# Patient Record
Sex: FEMALE | Race: WHITE | Employment: UNEMPLOYED | URBAN - METROPOLITAN AREA
[De-identification: names, ages, dates, MRNs, and addresses within clinical notes are randomized per-mention and may not be internally consistent; named-entity substitution may affect disease eponyms.]

---

## 2017-11-21 ENCOUNTER — APPOINTMENT (OUTPATIENT)
Dept: ULTRASOUND IMAGING | Age: 57
DRG: 607 | End: 2017-11-21
Attending: EMERGENCY MEDICINE
Payer: COMMERCIAL

## 2017-11-21 ENCOUNTER — HOSPITAL ENCOUNTER (INPATIENT)
Age: 57
LOS: 3 days | Discharge: HOME HEALTH | DRG: 607 | End: 2017-11-24
Attending: EMERGENCY MEDICINE | Admitting: INTERNAL MEDICINE
Payer: COMMERCIAL

## 2017-11-21 DIAGNOSIS — M79.3 PANNICULITIS: Primary | ICD-10-CM

## 2017-11-21 LAB
ANION GAP SERPL CALC-SCNC: 7 MMOL/L (ref 5–15)
BASOPHILS # BLD: 0 K/UL (ref 0–0.1)
BASOPHILS NFR BLD: 0 % (ref 0–1)
BUN SERPL-MCNC: 14 MG/DL (ref 6–20)
BUN/CREAT SERPL: 16 (ref 12–20)
CALCIUM SERPL-MCNC: 9.8 MG/DL (ref 8.5–10.1)
CHLORIDE SERPL-SCNC: 106 MMOL/L (ref 97–108)
CO2 SERPL-SCNC: 27 MMOL/L (ref 21–32)
CREAT SERPL-MCNC: 0.85 MG/DL (ref 0.55–1.02)
EOSINOPHIL # BLD: 0.1 K/UL (ref 0–0.4)
EOSINOPHIL NFR BLD: 2 % (ref 0–7)
ERYTHROCYTE [DISTWIDTH] IN BLOOD BY AUTOMATED COUNT: 15.3 % (ref 11.5–14.5)
GLUCOSE BLD STRIP.AUTO-MCNC: 157 MG/DL (ref 65–100)
GLUCOSE BLD STRIP.AUTO-MCNC: 94 MG/DL (ref 65–100)
GLUCOSE SERPL-MCNC: 93 MG/DL (ref 65–100)
HCT VFR BLD AUTO: 38 % (ref 35–47)
HGB BLD-MCNC: 11.6 G/DL (ref 11.5–16)
LACTATE SERPL-SCNC: 0.8 MMOL/L (ref 0.4–2)
LYMPHOCYTES # BLD: 1.5 K/UL (ref 0.8–3.5)
LYMPHOCYTES NFR BLD: 21 % (ref 12–49)
MCH RBC QN AUTO: 24.7 PG (ref 26–34)
MCHC RBC AUTO-ENTMCNC: 30.5 G/DL (ref 30–36.5)
MCV RBC AUTO: 81 FL (ref 80–99)
MONOCYTES # BLD: 0.5 K/UL (ref 0–1)
MONOCYTES NFR BLD: 6 % (ref 5–13)
NEUTS SEG # BLD: 5.3 K/UL (ref 1.8–8)
NEUTS SEG NFR BLD: 71 % (ref 32–75)
PLATELET # BLD AUTO: 172 K/UL (ref 150–400)
POTASSIUM SERPL-SCNC: 3.7 MMOL/L (ref 3.5–5.1)
RBC # BLD AUTO: 4.69 M/UL (ref 3.8–5.2)
SERVICE CMNT-IMP: ABNORMAL
SERVICE CMNT-IMP: NORMAL
SODIUM SERPL-SCNC: 140 MMOL/L (ref 136–145)
WBC # BLD AUTO: 7.4 K/UL (ref 3.6–11)

## 2017-11-21 PROCEDURE — 87040 BLOOD CULTURE FOR BACTERIA: CPT

## 2017-11-21 PROCEDURE — 85025 COMPLETE CBC W/AUTO DIFF WBC: CPT

## 2017-11-21 PROCEDURE — 82962 GLUCOSE BLOOD TEST: CPT

## 2017-11-21 PROCEDURE — 83605 ASSAY OF LACTIC ACID: CPT

## 2017-11-21 PROCEDURE — 74011000258 HC RX REV CODE- 258: Performed by: EMERGENCY MEDICINE

## 2017-11-21 PROCEDURE — 93971 EXTREMITY STUDY: CPT

## 2017-11-21 PROCEDURE — 80048 BASIC METABOLIC PNL TOTAL CA: CPT

## 2017-11-21 PROCEDURE — 96375 TX/PRO/DX INJ NEW DRUG ADDON: CPT

## 2017-11-21 PROCEDURE — 74011250637 HC RX REV CODE- 250/637: Performed by: EMERGENCY MEDICINE

## 2017-11-21 PROCEDURE — 65660000000 HC RM CCU STEPDOWN

## 2017-11-21 PROCEDURE — 74011250636 HC RX REV CODE- 250/636: Performed by: EMERGENCY MEDICINE

## 2017-11-21 PROCEDURE — 99284 EMERGENCY DEPT VISIT MOD MDM: CPT

## 2017-11-21 PROCEDURE — 74011250637 HC RX REV CODE- 250/637: Performed by: INTERNAL MEDICINE

## 2017-11-21 PROCEDURE — 36415 COLL VENOUS BLD VENIPUNCTURE: CPT

## 2017-11-21 PROCEDURE — 96365 THER/PROPH/DIAG IV INF INIT: CPT

## 2017-11-21 PROCEDURE — 74011250636 HC RX REV CODE- 250/636: Performed by: INTERNAL MEDICINE

## 2017-11-21 RX ORDER — DEXTROSE 50 % IN WATER (D50W) INTRAVENOUS SYRINGE
12.5-25 AS NEEDED
Status: DISCONTINUED | OUTPATIENT
Start: 2017-11-21 | End: 2017-11-24 | Stop reason: HOSPADM

## 2017-11-21 RX ORDER — METOPROLOL SUCCINATE 25 MG/1
25 TABLET, EXTENDED RELEASE ORAL DAILY
COMMUNITY
End: 2021-11-29

## 2017-11-21 RX ORDER — LEVOTHYROXINE SODIUM 112 UG/1
112 TABLET ORAL
COMMUNITY
End: 2021-11-29

## 2017-11-21 RX ORDER — PANTOPRAZOLE SODIUM 40 MG/1
40 TABLET, DELAYED RELEASE ORAL
Status: DISCONTINUED | OUTPATIENT
Start: 2017-11-22 | End: 2017-11-24 | Stop reason: HOSPADM

## 2017-11-21 RX ORDER — ONDANSETRON 2 MG/ML
4 INJECTION INTRAMUSCULAR; INTRAVENOUS
Status: DISCONTINUED | OUTPATIENT
Start: 2017-11-21 | End: 2017-11-23

## 2017-11-21 RX ORDER — MAGNESIUM SULFATE 100 %
4 CRYSTALS MISCELLANEOUS AS NEEDED
Status: DISCONTINUED | OUTPATIENT
Start: 2017-11-21 | End: 2017-11-24 | Stop reason: HOSPADM

## 2017-11-21 RX ORDER — SODIUM CHLORIDE 0.9 % (FLUSH) 0.9 %
5-10 SYRINGE (ML) INJECTION EVERY 8 HOURS
Status: DISCONTINUED | OUTPATIENT
Start: 2017-11-21 | End: 2017-11-24 | Stop reason: HOSPADM

## 2017-11-21 RX ORDER — GLIMEPIRIDE 2 MG/1
2 TABLET ORAL
COMMUNITY
End: 2019-06-11

## 2017-11-21 RX ORDER — GLIMEPIRIDE 1 MG/1
2 TABLET ORAL
Status: DISCONTINUED | OUTPATIENT
Start: 2017-11-22 | End: 2017-11-24 | Stop reason: HOSPADM

## 2017-11-21 RX ORDER — BUPROPION HYDROCHLORIDE 150 MG/1
150 TABLET ORAL DAILY
Status: DISCONTINUED | OUTPATIENT
Start: 2017-11-22 | End: 2017-11-22

## 2017-11-21 RX ORDER — ENOXAPARIN SODIUM 100 MG/ML
40 INJECTION SUBCUTANEOUS EVERY 24 HOURS
Status: DISCONTINUED | OUTPATIENT
Start: 2017-11-22 | End: 2017-11-22

## 2017-11-21 RX ORDER — LEVOTHYROXINE SODIUM 112 UG/1
112 TABLET ORAL
Status: DISCONTINUED | OUTPATIENT
Start: 2017-11-22 | End: 2017-11-24 | Stop reason: HOSPADM

## 2017-11-21 RX ORDER — PROCHLORPERAZINE MALEATE 10 MG
10 TABLET ORAL
COMMUNITY
End: 2017-11-24

## 2017-11-21 RX ORDER — ALBUTEROL SULFATE 0.83 MG/ML
2.5 SOLUTION RESPIRATORY (INHALATION)
Status: DISCONTINUED | OUTPATIENT
Start: 2017-11-21 | End: 2017-11-24 | Stop reason: HOSPADM

## 2017-11-21 RX ORDER — METFORMIN HYDROCHLORIDE 500 MG/1
500 TABLET, FILM COATED, EXTENDED RELEASE ORAL DAILY
COMMUNITY
End: 2017-11-28 | Stop reason: DRUGHIGH

## 2017-11-21 RX ORDER — PRAVASTATIN SODIUM 40 MG/1
40 TABLET ORAL
Status: DISCONTINUED | OUTPATIENT
Start: 2017-11-21 | End: 2017-11-24 | Stop reason: HOSPADM

## 2017-11-21 RX ORDER — METOPROLOL SUCCINATE 25 MG/1
25 TABLET, EXTENDED RELEASE ORAL DAILY
Status: DISCONTINUED | OUTPATIENT
Start: 2017-11-22 | End: 2017-11-24 | Stop reason: HOSPADM

## 2017-11-21 RX ORDER — HYDROCODONE BITARTRATE AND ACETAMINOPHEN 7.5; 325 MG/1; MG/1
1 TABLET ORAL
COMMUNITY
End: 2017-11-24

## 2017-11-21 RX ORDER — VALSARTAN 40 MG/1
40 TABLET ORAL DAILY
Status: DISCONTINUED | OUTPATIENT
Start: 2017-11-22 | End: 2017-11-24 | Stop reason: HOSPADM

## 2017-11-21 RX ORDER — PRAZOSIN HYDROCHLORIDE 1 MG/1
3 CAPSULE ORAL
Status: DISCONTINUED | OUTPATIENT
Start: 2017-11-21 | End: 2017-11-24 | Stop reason: HOSPADM

## 2017-11-21 RX ORDER — INSULIN LISPRO 100 [IU]/ML
INJECTION, SOLUTION INTRAVENOUS; SUBCUTANEOUS
Status: DISCONTINUED | OUTPATIENT
Start: 2017-11-21 | End: 2017-11-24 | Stop reason: HOSPADM

## 2017-11-21 RX ORDER — OXYCODONE AND ACETAMINOPHEN 5; 325 MG/1; MG/1
1 TABLET ORAL
Status: COMPLETED | OUTPATIENT
Start: 2017-11-21 | End: 2017-11-21

## 2017-11-21 RX ORDER — BUPROPION HYDROCHLORIDE 150 MG/1
150 TABLET ORAL
COMMUNITY
End: 2018-04-21 | Stop reason: DRUGHIGH

## 2017-11-21 RX ORDER — GABAPENTIN 100 MG/1
100 CAPSULE ORAL
Status: DISCONTINUED | OUTPATIENT
Start: 2017-11-21 | End: 2017-11-24 | Stop reason: HOSPADM

## 2017-11-21 RX ORDER — QUETIAPINE FUMARATE 100 MG/1
400 TABLET, FILM COATED ORAL
Status: DISCONTINUED | OUTPATIENT
Start: 2017-11-21 | End: 2017-11-24 | Stop reason: HOSPADM

## 2017-11-21 RX ORDER — GABAPENTIN 100 MG/1
100 CAPSULE ORAL
Status: ON HOLD | COMMUNITY
End: 2017-11-24

## 2017-11-21 RX ORDER — TOPIRAMATE 100 MG/1
200 TABLET, FILM COATED ORAL
Status: DISCONTINUED | OUTPATIENT
Start: 2017-11-21 | End: 2017-11-24 | Stop reason: HOSPADM

## 2017-11-21 RX ORDER — MORPHINE SULFATE 2 MG/ML
2 INJECTION, SOLUTION INTRAMUSCULAR; INTRAVENOUS
Status: COMPLETED | OUTPATIENT
Start: 2017-11-21 | End: 2017-11-21

## 2017-11-21 RX ORDER — LANOLIN ALCOHOL/MO/W.PET/CERES
1000 CREAM (GRAM) TOPICAL DAILY
Status: DISCONTINUED | OUTPATIENT
Start: 2017-11-22 | End: 2017-11-24 | Stop reason: HOSPADM

## 2017-11-21 RX ORDER — SODIUM CHLORIDE 0.9 % (FLUSH) 0.9 %
5-10 SYRINGE (ML) INJECTION AS NEEDED
Status: DISCONTINUED | OUTPATIENT
Start: 2017-11-21 | End: 2017-11-24 | Stop reason: HOSPADM

## 2017-11-21 RX ORDER — LOSARTAN POTASSIUM 25 MG/1
25 TABLET ORAL DAILY
COMMUNITY
End: 2019-01-04

## 2017-11-21 RX ORDER — ACETAMINOPHEN 325 MG/1
650 TABLET ORAL
Status: DISCONTINUED | OUTPATIENT
Start: 2017-11-21 | End: 2017-11-24 | Stop reason: HOSPADM

## 2017-11-21 RX ORDER — HYDROMORPHONE HYDROCHLORIDE 2 MG/ML
1 INJECTION, SOLUTION INTRAMUSCULAR; INTRAVENOUS; SUBCUTANEOUS
Status: COMPLETED | OUTPATIENT
Start: 2017-11-21 | End: 2017-11-21

## 2017-11-21 RX ORDER — OXYCODONE HYDROCHLORIDE 5 MG/1
10 TABLET ORAL
Status: DISCONTINUED | OUTPATIENT
Start: 2017-11-21 | End: 2017-11-22

## 2017-11-21 RX ORDER — QUETIAPINE FUMARATE 400 MG/1
400 TABLET, FILM COATED ORAL
COMMUNITY
End: 2017-11-28 | Stop reason: DRUGHIGH

## 2017-11-21 RX ORDER — POTASSIUM CHLORIDE 750 MG/1
10 TABLET, FILM COATED, EXTENDED RELEASE ORAL
Status: DISCONTINUED | OUTPATIENT
Start: 2017-11-21 | End: 2017-11-24 | Stop reason: HOSPADM

## 2017-11-21 RX ORDER — PRAZOSIN HYDROCHLORIDE 1 MG/1
3 CAPSULE ORAL
COMMUNITY
End: 2018-04-20 | Stop reason: DRUGHIGH

## 2017-11-21 RX ORDER — ERGOCALCIFEROL 1.25 MG/1
50000 CAPSULE ORAL
COMMUNITY
End: 2017-11-24

## 2017-11-21 RX ORDER — CLONAZEPAM 0.5 MG/1
0.5 TABLET ORAL
Status: DISCONTINUED | OUTPATIENT
Start: 2017-11-21 | End: 2017-11-24 | Stop reason: HOSPADM

## 2017-11-21 RX ORDER — ERGOCALCIFEROL 1.25 MG/1
50000 CAPSULE ORAL
Status: DISCONTINUED | OUTPATIENT
Start: 2017-11-23 | End: 2017-11-24 | Stop reason: HOSPADM

## 2017-11-21 RX ORDER — LANOLIN ALCOHOL/MO/W.PET/CERES
1000 CREAM (GRAM) TOPICAL DAILY
COMMUNITY
End: 2017-11-28 | Stop reason: DRUGHIGH

## 2017-11-21 RX ADMIN — POTASSIUM CHLORIDE 10 MEQ: 750 TABLET, FILM COATED, EXTENDED RELEASE ORAL at 22:19

## 2017-11-21 RX ADMIN — Medication 10 ML: at 20:36

## 2017-11-21 RX ADMIN — PRAZOSIN HYDROCHLORIDE 3 MG: 1 CAPSULE ORAL at 22:20

## 2017-11-21 RX ADMIN — QUETIAPINE FUMARATE 400 MG: 100 TABLET ORAL at 22:20

## 2017-11-21 RX ADMIN — CLONAZEPAM 0.5 MG: 0.5 TABLET ORAL at 22:20

## 2017-11-21 RX ADMIN — CEFTRIAXONE SODIUM 2 G: 2 INJECTION, POWDER, FOR SOLUTION INTRAMUSCULAR; INTRAVENOUS at 13:57

## 2017-11-21 RX ADMIN — OXYCODONE HYDROCHLORIDE 10 MG: 5 TABLET ORAL at 22:19

## 2017-11-21 RX ADMIN — PRAVASTATIN SODIUM 40 MG: 40 TABLET ORAL at 22:20

## 2017-11-21 RX ADMIN — ONDANSETRON 4 MG: 2 INJECTION INTRAMUSCULAR; INTRAVENOUS at 20:36

## 2017-11-21 RX ADMIN — VANCOMYCIN HYDROCHLORIDE 3000 MG: 10 INJECTION, POWDER, LYOPHILIZED, FOR SOLUTION INTRAVENOUS at 14:37

## 2017-11-21 RX ADMIN — HYDROMORPHONE HYDROCHLORIDE 1 MG: 2 INJECTION INTRAMUSCULAR; INTRAVENOUS; SUBCUTANEOUS at 16:59

## 2017-11-21 RX ADMIN — TOPIRAMATE 200 MG: 100 TABLET ORAL at 22:20

## 2017-11-21 RX ADMIN — Medication 10 ML: at 22:20

## 2017-11-21 RX ADMIN — OXYCODONE HYDROCHLORIDE AND ACETAMINOPHEN 1 TABLET: 5; 325 TABLET ORAL at 14:51

## 2017-11-21 RX ADMIN — ONDANSETRON 4 MG: 2 INJECTION INTRAMUSCULAR; INTRAVENOUS at 16:58

## 2017-11-21 RX ADMIN — MORPHINE SULFATE 2 MG: 2 INJECTION, SOLUTION INTRAMUSCULAR; INTRAVENOUS at 13:53

## 2017-11-21 RX ADMIN — GABAPENTIN 100 MG: 100 CAPSULE ORAL at 22:26

## 2017-11-21 NOTE — ED NOTES
Assumed care of patient. Patient placed in position of comfort. Call bell in reach. Skin warm and dry. Respirations even and unlabored. In no apparent distress at this time. Pt presents ambulatory into the ED with c/o worsening skin infection and Rt leg pain x 1 week-has been taking PO abx. Sent to ED by PCP for further management.

## 2017-11-21 NOTE — ED NOTES
TRANSFER - OUT REPORT:    Verbal report given to Hazel Sung RN (name) on Bobby May  being transferred to  (unit) for routine progression of care       Report consisted of patients Situation, Background, Assessment and   Recommendations(SBAR). Information from the following report(s) SBAR was reviewed with the receiving nurse. Lines:   Peripheral IV 11/21/17 Left;Upper Arm (Active)   Site Assessment Clean, dry, & intact 11/21/2017  1:13 PM   Phlebitis Assessment 0 11/21/2017  1:13 PM   Infiltration Assessment 0 11/21/2017  1:13 PM   Dressing Status Clean, dry, & intact 11/21/2017  1:13 PM        Opportunity for questions and clarification was provided.

## 2017-11-21 NOTE — PROGRESS NOTES
TRANSFER - IN REPORT:    Verbal report received from Yesenia(name) on Clint Ellis  being received from ED(unit) for routine progression of care      Report consisted of patients Situation, Background, Assessment and   Recommendations(SBAR). Information from the following report(s) SBAR, Kardex, ED Summary, MAR, Accordion and Recent Results was reviewed with the receiving nurse. Opportunity for questions and clarification was provided. Assessment completed upon patients arrival to unit and care assumed at that time. Patient currently in ED.

## 2017-11-21 NOTE — ED PROVIDER NOTES
[facility]  EMERGENCY DEPARTMENT HISTORY AND PHYSICAL EXAM       Date of Service: 11/21/2017   Patient Name: Gokul Jacinto   YOB: 1960  Medical Record Number: [id number]    History of Presenting Illness     Chief Complaint   Patient presents with    Wound Check     Pt states being tx for staph infection Reports \"area\" on R abd isn't healing Pt denies fever +chill    Leg Pain     Sent to r/o DVT        History Provided By:  patient    Additional History:   Gokul Jacinto is a 62 y.o. female with PMhx significant for HTN, depression, diabetes, hypothyroidism, seizure who presents to the ED with cc of leg pain and skin infection. She was seen 11/16/17 by her PCP Dr Davi Franco who prescribed Bactrim for skin infection on the right lower abdomen, she has been taking as prescribed, went back today for f/u with him and he stated the infection is worse and wanted her sent to ED for admission and IV antibiotics, she also c/o RLE pain that started around the same time as the initial infection. He is concerned she may have a DVT. She denies fever, vomiting, diarrhea, dysuria, falls, syncope. She has never had a blood clot before or a MRSA infection. She denies recent long car rides or plane rides. Social Hx: denies Tobacco, denies EtOH, denies Illicit Drugs    There are no other complaints, changes or physical findings at this time.     Primary Care Provider: KERRI Barajas Dr    Past History     Past Medical History:   Past Medical History:   Diagnosis Date    Arrhythmia     Arthritis     Chronic pain     djd    Depression     Diabetes [facility]     GERD (gastroesophageal reflux disease)     Hypertension     Hypothyroidism     Kidney stone     Liver disease     Obesity, morbid, BMI 50 or higher ([facility]     Psychiatric disorder     anxiety    Psychiatric disorder     depression    Seizures ([facility]     Thyroid disease         Past Surgical History:   Past Surgical History:   Procedure Laterality Date    CARDIAC CATHERIZATION  11/18/2010         HX APPENDECTOMY      HX CHOLECYSTECTOMY      HX HYSTERECTOMY      HX TUBAL LIGATION          Family History:   Family History   Problem Relation Age of Onset    Heart Disease Mother     Hypertension Mother     Cancer Mother     Heart Disease Father     Hypertension Father     Cancer Brother         Social History:   Social History   Substance Use Topics    Smoking status: Former Smoker    Smokeless tobacco: Never Used    Alcohol use No        Allergies: Allergies   Allergen Reactions    Celebrex [Celecoxib] Nausea Only    Sulfa (Sulfonamide Antibiotics) Nausea Only    Codeine Other (comments)     Hyper activity         Review of Systems   Review of Systems   Constitutional: Positive for chills. Negative for fever. HENT: Negative. Eyes: Negative. Respiratory: Negative. Cardiovascular: Negative. Gastrointestinal: Negative. Endocrine: Negative. Genitourinary: Negative. Musculoskeletal: Positive for arthralgias (RLE pain ). Negative for gait problem and joint swelling. Skin: Positive for color change and rash (redness and pain right lower quadrant). Neurological: Negative. Psychiatric/Behavioral: Negative. Physical Exam  Physical Exam   Constitutional: She is oriented to person, place, and time. No distress. Morbidly obese     HENT:   Head: Normocephalic and atraumatic. Mouth/Throat: Oropharynx is clear and moist.   Eyes: Conjunctivae are normal. Pupils are equal, round, and reactive to light. Neck: No tracheal deviation present. Cardiovascular: Normal rate, regular rhythm, normal heart sounds and intact distal pulses. Pulmonary/Chest: Effort normal and breath sounds normal. No stridor. She has no wheezes. She has no rales. Abdominal: Soft. She exhibits no distension. There is tenderness (RLQ pain). There is no rebound.    Musculoskeletal: Normal range of motion. She exhibits tenderness (RLE tenderness +HOMANS sign). She exhibits no edema (bilateral LE are equal in size, she has a lot of excess skin but no obvious edema). Neurological: She is alert and oriented to person, place, and time. Skin: Skin is warm. Rash (right lower quadrant with redness, no skin breakdown, malodorus) noted. Psychiatric: She has a normal mood and affect. Her behavior is normal.       Medical Decision Making   I am the first provider for this patient. I reviewed the vital signs, available nursing notes, past medical history, past surgical history, family history and social history. Panniculitis, MRSA skin infection, cellulitis, abscess, DVT, superficial thrombophlebitis    60yo female morbidly obese referred by PCP to ED for IV antibiotics and DVT evaluation, initially tx with Bactrim 11/16/17, on reeval by PCP today worsening skin infection in RLQ with leg pain. She has redness within her pannus concerning for panniculitis, there is no obvious fluctuant area, she has a positive Homans sign, will obtain BMP to eval Creatinine given recent Bactrim use, CBC, blood culture, IV vanc/Ceftriaxone, RLE DVT and admit. Old Medical Records: Old medical records. Nursing notes. ED Course:  12:56 PM   Initial assessment performed. The patients presenting problems have been discussed, and they are in agreement with the care plan formulated and outlined with them. I have encouraged them to ask questions as they arise throughout their visit.   1:47 PM  Spoke with Dr Rosanna Feliciano who states it looks worse despite Fluconazole PO and Bactrim PO, he feels she has failed outpt treatment and who like further eval of RLE pain as well  2:37 PM  Patient refused morphine, requesting percocet, will order, I paged hospitalist for admission      Diagnostic Study Results   Labs -  Results reviewed    Radiologic Studies -  The following have been ordered and reviewed:  DUPLEX LOWER EXT VENOUS RIGHT    (Results Pending)     Study Result      INDICATION:  Leg swelling, pain, DVT suspected     COMPARISON: None.     FINDINGS: Duplex Doppler sonography of the right lower extremity was performed  from the groin to the calf. The right common femoral, femoral and popliteal  veins are compressible with normal color-flow and wave forms and response to  physiologic maneuvers including Valsalva and augmentation. A 4 x 3 x 1.5 cm  complex Duckworth's cyst is incidentally noted.     IMPRESSION  IMPRESSION: Baker's cyst. No deep venous thrombosis identified.          Vital Signs-Reviewed the patient's vital signs. Patient Vitals for the past 12 hrs:   Temp Pulse Resp BP SpO2   11/21/17 1252 98.6 °F (37 °C) 85 18 114/66 98 %       Medications Given in the ED:  Medications   vancomycin (VANCOCIN) 2000 mg in  ml infusion (not administered)   cefTRIAXone (ROCEPHIN) 2 g in 0.9% sodium chloride (MBP/ADV) 50 mL (not administered)   morphine injection 2 mg (not administered)       Diagnosis   Clinical Impression:   Panniculitis, cellulitis, Bakers Cyst    Plan:  1: Panniculitis---will admit for IV antibiotics due to failure of outpt treatment, Vancomycin, Ceftriaxone ordered    2: Bakers cyst--->RLE pain--DVT US ordered, pain medication ordered    Disposition Note:  Admit to Hospitalist for IV antibiotics for cellulitis    _____________________________             ------------------------------------------  Begin Attending Documentation  ------------------------------------------    Attending Attestation: I was not present during the patient's evaluation by the resident. I personally evaluated the patient including the history and physical. I have read the resident's note and agree with their history, physical and plan.     HPI: Jasiel Alejandro is a 62 y.o. female with hx of HTN, DM, GERD, anxiety, and depression who presents by private vehicle to [facility] ED with CC of pain, redness, and swelling to area of skin on right lower ABD. Pt states area has been affected since (11/16/17), and has been becoming progressively worse since onset despite taking Bactrim BID per her PCP. Pt also c/o mild nausea and intermittent chills. She also reports right leg pain since initial onset of her symptoms, which she states is exacerbated by movement of her leg. Pt reports hx of staph infection over the same location, which improved with PO antibiotics. She denies hx of DVT or PE, and denies recent travel or surgery. Pt notes hx of chronic back pain, which is currently unchanged. She specifically denies fever, vomiting, diarrhea, CP, ABD pain, or cough. PCP: Caleb Burr NP    There are no other complaints, changes, or physical findings at this time. PE:  Gen: NAD, WD/WN   Heart: nl S1, S2, no m/r/g   Lungs: CTAB, no w/r/r   Abd: soft, nttp, ND, erythema and warmth over right lower abdomen and groin region, no fluctuance/induration/abscess   Ext: no swelling, no joint erythema/warmth, good ROM all joints, right calf TTP, no left calf TTP   Neuro: grossly intact, no focal deficits    Assessment: Patient presents to ED with panniculitis that has failed to respond to outpatient therapy. She also reports right leg pain. Will obtain labs and duplex RLE to rule out DVT. Plan to start IV antibiotics and admit for further management as patient has failed outpatient therapy.     Diagnosis: Panniculitis, Baker's cyst      Todd Perez MD.    ------------------------------------------  End Attending Documentation  ------------------------------------------

## 2017-11-21 NOTE — IP AVS SNAPSHOT
[address] [facility] 
[phone] Patient: Clarisse Dunlap MRN: [id number] [facility]:[birth year]23 You are allergic to the following Allergen Reactions Celebrex (Celecoxib) Nausea Only Sulfa (Sulfonamide Antibiotics) Nausea Only Codeine Other (comments) Hyper activity Recent Documentation Height Weight Breastfeeding? BMI OB Status Smoking Status 1.575 m (!) 175.2 kg No 70.65 kg/m2 Hysterectomy Former Smoker Unresulted Labs-Please follow up with your PCP about these lab tests Order Current Status CULTURE, BLOOD, PAIRED Preliminary result Emergency Contacts  (Rel.) Home Phone Work Phone Mobile Phone Mookie Brock (Child) [phone] -- -- About your hospitalization You were admitted on:  November 21, 2017 You last received care in the:  [address] You were discharged on:  November 24, 2017 Why you were hospitalized Your primary diagnosis was:  Right Anterior Knee Pain Your diagnoses also included:  Panniculitis, Cyst, Baker's Knee, Right Providers Seen During Your Hospitalization Provider Specialty Primary office phone Shade Hugo MD Emergency Medicine [phone] Shree Thomas MD Hospitalist [phone] Your Primary Care Physician (PCP) Primary Care Physician Office Phone Office Fax Rafiq Meyer [phone] Follow-up Information Follow up With Details Comments Contact Info Murleen Dance, NP  Please call Dr Dennie Mews on Monday to schedule a hospital follow-up appointment. [address] [phone] My Medications STOP taking these medications   
 ergocalciferol 50,000 unit capsule Commonly known as:  ERGOCALCIFEROL HYDROcodone-acetaminophen 7.5-325 mg per tablet Commonly known as:  Polly Matias  
   
  
 prochlorperazine 10 mg tablet Commonly known as:  COMPAZINE  
   
  
  
TAKE these medications as instructed Instructions Each Dose to Equal  
 Morning Noon Evening Bedtime buPROPion  mg tablet Commonly known as:  Jimbo Oden Your last dose was: Your next dose is: Take 150 mg by mouth every morning. 150 mg  
    
   
   
   
  
 busPIRone 15 mg tablet Commonly known as:  BUSPAR Your last dose was: Your next dose is: Take 1 Tab by mouth Multiple. Take 2 tab po with breakfast Take 1 tab po with lunch Take 2 tab po with dinner 15 mg  
    
   
   
   
  
 butalbital-acetaminophen-caffeine -40 mg per tablet Commonly known as:  Darnella Nim Your last dose was: Your next dose is: Take 2 Tabs by mouth every eight (8) hours as needed for Headache. Max Daily Amount: 6 Tabs. 2 Tab  
    
   
   
   
  
 clonazePAM 2 mg tablet Commonly known as:  Nancie Mtz Your last dose was: Your next dose is: Take 0.5 mg by mouth every twelve (12) hours as needed (panci attack). 0.5 mg  
    
   
   
   
  
 diclofenac EC 50 mg EC tablet Commonly known as:  VOLTAREN Your last dose was: Your next dose is: Take 1 Tab by mouth Multiple. Take 1 tab po BID with meals for 1 week then rest for 1 week and can take again repeating same cycle 50 mg  
    
   
   
   
  
 fluconazole 200 mg tablet Commonly known as:  DIFLUCAN Your last dose was: Your next dose is: Take 1 Tab by mouth daily for 7 days. FDA advises cautious prescribing of oral fluconazole in pregnancy. 200 mg  
    
   
   
   
  
 gabapentin 100 mg capsule Commonly known as:  NEURONTIN Your last dose was: Your next dose is: Take 1 Cap by mouth three (3) times daily. 100 mg  
    
   
   
   
  
 glimepiride 2 mg tablet Commonly known as:  AMARYL Your last dose was: Your next dose is: Take 2 mg by mouth every morning. 2 mg KLOR-CON M10 10 mEq tablet Generic drug:  potassium chloride Your last dose was: Your next dose is: Take 10 mEq by mouth nightly. 10 mEq  
    
   
   
   
  
 levothyroxine 112 mcg tablet Commonly known as:  SYNTHROID Your last dose was: Your next dose is: Take 112 mcg by mouth Daily (before breakfast). 112 mcg  
    
   
   
   
  
 losartan 25 mg tablet Commonly known as:  COZAAR Your last dose was: Your next dose is: Take 25 mg by mouth daily. 25 mg  
    
   
   
   
  
 metFORMIN 500 mg Tg24 24 hour tablet [facility] Your last dose was: Your next dose is: Take 500 mg by mouth daily. 500 mg  
    
   
   
   
  
 nystatin (bulk) 15 billion unit Powd Your last dose was: Your next dose is:    
   
   
 1 Units by Does Not Apply route three (3) times daily. To skin folds, ventral fold 1 Units  
    
   
   
   
  
 ondansetron 4 mg disintegrating tablet Commonly known as:  ZOFRAN ODT Your last dose was: Your next dose is: Take 1 Tab by mouth every eight (8) hours as needed for Nausea. 4 mg  
    
   
   
   
  
 oxyCODONE-acetaminophen 7.5-325 mg per tablet Commonly known as:  PERCOCET Your last dose was: Your next dose is: Take 1 Tab by mouth every four (4) hours as needed for Pain. Max Daily Amount: 6 Tabs. 1 Tab  
    
   
   
   
  
 prazosin 1 mg capsule Commonly known as:  MINIPRESS Your last dose was: Your next dose is: Take 3 mg by mouth nightly. 3 mg PriLOSEC 20 mg capsule Generic drug:  omeprazole Your last dose was: Your next dose is: Take 40 mg by mouth daily. 40 mg  
    
   
   
   
  
 * PROAIR HFA 90 mcg/actuation inhaler Generic drug:  albuterol Your last dose was: Your next dose is: Take  by inhalation. * albuterol 2.5 mg /3 mL (0.083 %) nebulizer solution Commonly known as:  PROVENTIL VENTOLIN Your last dose was: Your next dose is:    
   
   
 by Nebulization route once. SEROquel 400 mg tablet Generic drug:  QUEtiapine Your last dose was: Your next dose is: Take 400 mg by mouth nightly. 400 mg  
    
   
   
   
  
 simvastatin 20 mg tablet Commonly known as:  ZOCOR Your last dose was: Your next dose is: Take 20 mg by mouth nightly. 20 mg  
    
   
   
   
  
 TOPAMAX 200 mg tablet Generic drug:  topiramate Your last dose was: Your next dose is: Take 200 mg by mouth nightly. 200 mg  
    
   
   
   
  
 TOPROL XL 25 mg XL tablet Generic drug:  metoprolol succinate Your last dose was: Your next dose is: Take 25 mg by mouth daily. 25 mg  
    
   
   
   
  
 VITAMIN B-12 1,000 mcg tablet Generic drug:  cyanocobalamin Your last dose was: Your next dose is: Take 1,000 mcg by mouth daily. 1000 mcg * Notice: This list has 2 medication(s) that are the same as other medications prescribed for you. Read the directions carefully, and ask your doctor or other care provider to review them with you. Where to Get Your Medications Information on where to get these meds will be given to you by the nurse or doctor. ! Ask your nurse or doctor about these medications  
  busPIRone 15 mg tablet  
 butalbital-acetaminophen-caffeine -40 mg per tablet  
 diclofenac EC 50 mg EC tablet  
 fluconazole 200 mg tablet  
 gabapentin 100 mg capsule nystatin (bulk) 15 billion unit Powd  
 ondansetron 4 mg disintegrating tablet  
 oxyCODONE-acetaminophen 7.5-325 mg per tablet Discharge Instructions HOSPITALIST DISCHARGE INSTRUCTIONS 
NAME: Mckenna Sheriff :  1960 MRN:  [id number] Date/Time:  2017 10:23 AM 
 
ADMIT DATE: 2017 DISCHARGE DATE: 2017 ADMITTING DIAGNOSIS:Panniculitis, Intertrigo, Right leg Pain/Baker's Cyst, Diabetes, Hypothyroidism, Anxiety/Depression, Seizure Disorder, Super Morbid Obesity MEDICATIONS: 
  
 
         As per medication reconciliation · It is important that you take the medication exactly as they are prescribed. · Keep your medication in the bottles provided by the pharmacist and keep a list of the medication names, dosages, and times to be taken in your wallet. · Do not take other medications without consulting your doctor. Pain Management: per above medications What to do at [facility] Recommended diet:  Cardiac Diet and Diabetic Diet Recommended activity: Activity as tolerated If you experience any of the following symptoms then please call your primary care physician or return to the emergency room if you cannot get hold of your doctor: 
Fever, chills, nausea, vomiting, diarrhea, change in mentation, falling, bleeding, shortness of breath. Follow Up: 
 PCP you are to call and set up an appointment to see them in 2 week. F/U [facility] Information obtained by : 
I understand that if any problems occur once I am at home I am to contact my physician. I understand and acknowledge receipt of the instructions indicated above. Physician's or R.N.'s Signature                                                                  Date/Time Patient or Representative Signature                                                          Date/Time Discharge Orders None [employer] Announcement We are excited to announce that we are making your provider's discharge notes available to you in [employer]. You will see these notes when they are completed and signed by the physician that discharged you from your recent hospital stay. If you have any questions or concerns about any information you see in [employer], please call the Health Information Department where you were seen or reach out to your Primary Care Provider for more information about your plan of care. Introducing [facility]! Sara Alfredty introduces [employer] patient portal. Now you can access parts of your medical record, email your doctor's office, and request medication refills online. 1. In your internet browser, go to [email or web address] 2. Click on the First Time User? Click Here link in the Sign In box. You will see the New Member Sign Up page. 3. Enter your [employer] Access Code exactly as it appears below. You will not need to use this code after youve completed the sign-up process. If you do not sign up before the expiration date, you must request a new code. · [employer] Access Code: [email or web address] Expires: 2/19/2018  1:06 PM 
 
4. Enter the last four digits of your Social Security Number (xxxx) and Date of Birth (mm/dd/yyyy) as indicated and click Submit. You will be taken to the next sign-up page. 5. Create a [employer] ID. This will be your [employer] login ID and cannot be changed, so think of one that is secure and easy to remember. 6. Create a [employer] password. You can change your password at any time. 7. Enter your Password Reset Question and Answer.  This can be used at a later time if you forget your password. 8. Enter your e-mail address. You will receive e-mail notification when new information is available in [address] 9. Click Sign Up. You can now view and download portions of your medical record. 10. Click the Download Summary menu link to download a portable copy of your medical information. If you have questions, please visit the Frequently Asked Questions section of the [employer] website. Remember, [employer] is NOT to be used for urgent needs. For medical emergencies, dial 911. Now available from your iPhone and Android! General Information Please provide this summary of care documentation to your next provider. Patient Signature:  ____________________________________________________________ Date:  ____________________________________________________________  
  
[facility] Signature:  ____________________________________________________________ Date:  ____________________________________________________________

## 2017-11-21 NOTE — H&P
Hospitalist Admission Note    NAME: Yoni Rose   :  1960   MRN:  [id number]     Date/Time:  2017 4:26 PM    Patient PCP: Siri Romero NP  ________________________________________________________________________    My assessment of this patient's clinical condition and my plan of care is as follows. Assessment / Plan:  Panniculitis: Failed outpatient therapy as her cellulitis has only worsened over the last 5 days since starting Bactrim  -admit to medical floor  -IV Vancomycin  -IV dilaudid now and otherwise Oxycodone 10 mg every 4 hours as needed for pain  -Wound Care Consult for skin care recommendations    Right Leg Pain: Doppler indicative of Bakers cyst: patient is severe and significantly limits patient  -consult Orthopedic surgery   -PT/OT consult    Diabetes:  -check A1c, continue home Glipizide and hold home metformin    Hypothyroidism:  -continue home Levothyroxine    Anxiety/Depression:  Seizure Disorder:  -continue Wellbutrin, Seroquel and Topamax with prn Klonipin    Hypertension:  -continue home Metoprolol and Losartan    Morbid Obesity    Polypharmacy: I personally corrected the med list in the EMR as the [facility] med list was [facility] per patient; I did not include the bactrim or topical meds on this list as I suspect that these may change at time of discharge (these meds that I did not add to EMR med list were added to bottom of list in this document below)    Code Status: Full  Surrogate Decision Maker:     DVT Prophylaxis: Lovenox  GI Prophylaxis: not indicated but continue home PPI for GERD    Baseline: Walks with cane        Subjective:   CHIEF COMPLAINT: leg pain    HISTORY OF PRESENT ILLNESS:     Saray Hurtado is a 62 y.o.  female who presents with panniculitis in her right groin. Patient reports that she started having pain about last Thursday (5 days PTA) and went to her PCP where she was diagnosed with panniculitis.  Patient was started on Bacrtim. Patient reports compliance with her antibiotic regimen. She had a follow-up appointment today and since her infection appeared to be getting worse her PCP referred her to the ED for admission since she failed outpatient therapy. Patient denies fevers but has had chills. She reports nausea but denies vomiting. She denies any CP or SOB or Abdominal pain. She reports right inguinal burning and states that she has not got any relief from this pain. Patient also complaining of pain behind her right knee and in her upper calf. In the ED patient was started on Vancomycin and RLE doppler was obtained which showed complex bakers cyst.    Patient has been applying Nystatin powder, Clotrimazole and Triamcinolone to her right inguinal rash. We were asked to admit for work up and evaluation of the above problems.      Past Medical History:   Diagnosis Date    Arrhythmia     Arthritis     Chronic pain     djd    Depression     Diabetes (HCC)     GERD (gastroesophageal reflux disease)     Hypertension     Hypothyroidism     Kidney stone     Liver disease     Obesity, morbid, BMI 50 or higher ([facility] [address]     Psychiatric disorder     anxiety    Psychiatric disorder     depression    Seizures ([facility] [facility]     Thyroid disease         Past Surgical History:   Procedure Laterality Date    CARDIAC CATHETERIZATION  11/18/2010         HX APPENDECTOMY      HX CHOLECYSTECTOMY      HX HYSTERECTOMY      HX TUBAL LIGATION         Social History   Substance Use Topics    Smoking status: Former Smoker    Smokeless tobacco: Never Used    Alcohol use No        Family History   Problem Relation Age of Onset    Heart Disease Mother     Hypertension Mother     Cancer Mother     Heart Disease Father     Hypertension Father     Cancer Brother      Allergies   Allergen Reactions    Celebrex [Celecoxib] Nausea Only    Sulfa (Sulfonamide Antibiotics) Nausea Only    Codeine Other (comments)     Hyper activity Prior to Admission medications    Medication Sig Start Date End Date Taking? Authorizing Provider   prochlorperazine (COMPAZINE) 10 mg tablet Take 10 mg by mouth every eight (8) hours as needed (nausea). Yes Historical Provider   levothyroxine (SYNTHROID) 112 mcg tablet Take 112 mcg by mouth Daily (before breakfast). Yes Historical Provider   glimepiride (AMARYL) 2 mg tablet Take 2 mg by mouth every morning. Yes Historical Provider   metFORMIN (GLUMETZA ER) 500 mg TG24 24 hour tablet Take 500 mg by mouth daily. Yes Historical Provider   losartan (COZAAR) 25 mg tablet Take 25 mg by mouth daily. Yes Historical Provider   gabapentin (NEURONTIN) 100 mg capsule Take 100 mg by mouth four (4) times daily as needed (neuropathy pain). Yes Historical Provider   metoprolol succinate (TOPROL XL) 25 mg XL tablet Take 25 mg by mouth daily. Yes Historical Provider   cyanocobalamin (VITAMIN B-12) 1,000 mcg tablet Take 1,000 mcg by mouth daily. Yes Historical Provider   buPROPion XL (WELLBUTRIN XL) 150 mg tablet Take 150 mg by mouth every morning. Yes Historical Provider   prazosin (MINIPRESS) 1 mg capsule Take 3 mg by mouth nightly. Yes Historical Provider   HYDROcodone-acetaminophen (NORCO) 7.5-325 mg per tablet Take 1 Tab by mouth every six (6) hours as needed for Pain. Yes Historical Provider   QUEtiapine (SEROQUEL) 400 mg tablet Take 400 mg by mouth nightly. Yes Historical Provider   Cholecalciferol, Vitamin D3, 50,000 unit cap Take  by mouth every Monday and Thursday. Historical Provider   albuterol (PROAIR HFA) 90 mcg/actuation inhaler Take  by inhalation. Historical Provider   albuterol (PROVENTIL VENTOLIN) 2.5 mg /3 mL (0.083 %) nebulizer solution by Nebulization route once. Historical Provider   ondansetron (ZOFRAN ODT) 4 mg disintegrating tablet Take 1 Tab by mouth every eight (8) hours as needed for Nausea.  3/15/11   April ALONSO Black MD   omeprazole (PRILOSEC) 20 mg capsule Take 40 mg by mouth daily. Historical Provider   topiramate (TOPAMAX) 200 mg tablet Take 200 mg by mouth nightly. 12/8/10   Historical Provider   simvastatin (ZOCOR) 20 mg tablet Take 20 mg by mouth nightly. Ashish Briceno MD   clonazepam (KLONOPIN) 2 mg tablet Take 0.5 mg by mouth every twelve (12) hours as needed (panci attack). Phys MD Kaity   potassium chloride (KLOR-CON M10) 10 mEq tablet Take 10 mEq by mouth nightly. 4/23/10   Ashish Briceno MD   Med list also with: Nystatin powder, Clotrimazole, Triamcinolone and Bactrim      REVIEW OF SYSTEMS:     A complete 14 ponint ROS was reviewed and is negative other than stated as per HPI    Objective:   VITALS:    Visit Vitals    BP (!) 139/94 (BP 1 Location: Right arm, BP Patient Position: At rest)    Pulse 79    Temp 98.6 °F (37 °C)    Resp 12    Ht 5' 2\" (1.575 m)    Wt (!) 175.2 kg (386 lb 3.9 oz)    SpO2 98%    BMI 70.65 kg/m2       PHYSICAL EXAM:    General:    Alert, cooperative, no distress, morbidly obese, appears stated age. HEENT: Atraumatic, anicteric sclerae, pink conjunctivae     No oral ulcers, mucosa moist, throat clear, dentition fair  Neck:  Supple, symmetrical,  Thyroid not enlarge  Lungs:   Clear to auscultation bilaterally. No Wheezing or Rhonchi. No rales. Chest wall:  No tenderness  No Accessory muscle use. Heart:   Regular  rhythm,  No  murmur   No edema  Abdomen:   Soft, non-tender. Not distended. Bowel sounds normal  Extremities: No cyanosis. No clubbing, Skin turgor normal  Skin:     Not pale. Not Jaundiced  Right inguinal panniculitis as below  Psych:  Good insight. Not depressed. Not anxious or agitated. Neurologic: EOMs intact. No facial asymmetry. No aphasia or slurred speech. No focal neurologic deficits.  Alert and oriented X 4.       (Above: right inguinal fold)    (Above: left inguinal fold)        _______________________________________________________________________  Care Plan discussed with:    Comments   Patient x Family      RN x    Care Manager                    Consultant:      _______________________________________________________________________  Expected  Disposition:   Home with Family x   HH/PT/OT/RN ? [place]   SNF/LTC    [facility]    ________________________________________________________________________  TOTAL TIME:  70 Minutes    Critical Care Provided     Minutes non procedure based      Comments    x Reviewed previous records and correcting the med list in the EMR as the previous med list was from [facility]" per patient   >50% of visit spent in counseling and coordination of care x Discussion with patient and/or family and questions answered       ________________________________________________________________________  Signed: David Dao MD    Procedures: see electronic medical records for all procedures/Xrays and details which were not copied into this note but were reviewed prior to creation of Plan. LAB DATA REVIEWED:    Recent Results (from the past 24 hour(s))   METABOLIC PANEL, BASIC    Collection Time: 11/21/17  1:22 PM   Result Value Ref Range    Sodium 140 136 - 145 mmol/L    Potassium 3.7 3.5 - 5.1 mmol/L    Chloride 106 97 - 108 mmol/L    CO2 27 21 - 32 mmol/L    Anion gap 7 5 - 15 mmol/L    Glucose 93 65 - 100 mg/dL    BUN 14 6 - 20 MG/DL    Creatinine 0.85 0.55 - 1.02 MG/DL    BUN/Creatinine ratio 16 12 - 20      GFR est AA >60 >60 ml/min/1.73m2    GFR est non-AA >60 >60 ml/min/1.73m2    Calcium 9.8 8.5 - 10.1 MG/DL   CBC WITH AUTOMATED DIFF    Collection Time: 11/21/17  1:22 PM   Result Value Ref Range    WBC 7.4 3.6 - 11.0 K/uL    RBC 4.69 3.80 - 5.20 M/uL    HGB 11.6 11.5 - 16.0 g/dL    HCT 38.0 35.0 - 47.0 %    MCV 81.0 80.0 - 99.0 FL    MCH 24.7 (L) 26.0 - 34.0 PG    MCHC 30.5 30.0 - 36.5 g/dL    RDW 15.3 (H) 11.5 - 14.5 %    PLATELET 979 836 - 432 K/uL    NEUTROPHILS 71 32 - 75 %    LYMPHOCYTES 21 12 - 49 %    MONOCYTES 6 5 - 13 %    EOSINOPHILS 2 0 - 7 %    BASOPHILS 0 0 - 1 %    ABS. NEUTROPHILS 5.3 1.8 - 8.0 K/UL    ABS. LYMPHOCYTES 1.5 0.8 - 3.5 K/UL    ABS. MONOCYTES 0.5 0.0 - 1.0 K/UL    ABS. EOSINOPHILS 0.1 0.0 - 0.4 K/UL    ABS.  BASOPHILS 0.0 0.0 - 0.1 K/UL   LACTIC ACID    Collection Time: 11/21/17  1:22 PM   Result Value Ref Range    Lactic acid 0.8 0.4 - 2.0 MMOL/L

## 2017-11-21 NOTE — PROGRESS NOTES
Pharmacy Automatic Renal Dosing Protocol - Antimicrobials    Indication for Antimicrobials: SSTI     Current Regimen of Each Antimicrobial:  Vancomycin - pharmacy dosing (Start Date ; Day # 1)    Previous Antimicrobial Therapy:  Bactrim (-) outpatient med    Goal Level: VANCOMYCIN TROUGH GOAL RANGE    Vancomycin Trough: 15 - 20 mcg/mL    Measured / Extrapolated Vancomycin Level:    /     Significant Cultures:    blood: pending    Paralysis, amputations, malnutrition:     Labs:  Recent Labs      17   1322   CREA  0.85   BUN  14   WBC  7.4     Temp (24hrs), Av.6 °F (37 °C), Min:98.6 °F (37 °C), Max:98.6 °F (37 °C)      Creatinine Clearance (mL/min) or Dialysis: 58  No results found for: PCT    Impression/Plan: vancomycin 3000 mg x 1 dose, then 2500 mg every 18 hours     Pharmacy will follow daily and adjust medications as appropriate for renal function and/or serum levels.     Thank you,  Chelsy Tavera, PHARMD

## 2017-11-21 NOTE — ED NOTES
Transported to [address] [place] [address],[address] via stretcher, by [facility] and ED tech.

## 2017-11-21 NOTE — PROGRESS NOTES
Primary Nurse Checo Garland and ALYCIA Madden performed a dual skin assessment on this patient Impairment noted- see wound doc flow sheet. Non-blanchable redness and excoriation noted to pannus. Patient also has scattered bruising on left thigh and nicole.     Marin score is 20

## 2017-11-21 NOTE — PROGRESS NOTES
Oncology Nursing Communication Tool  6:52 PM  11/21/2017     Bedside and Verbal shift change report given to Lalitha Galindo RN (incoming nurse) by Trinity Trevizo (outgoing nurse) on Lucy Yee. Report included the following information SBAR, Kardex, ED Summary, MAR, Accordion and Recent Results. Significant changes during shift: patient admitted from ED. Patient has consult that needs to be called in the morning. Issues for physician to address: wound care? Oncology Shift Note   Admission Date 11/21/2017   Admission Diagnosis Panniculitis   Code Status Full Code   Consults IP CONSULT TO ORTHOPEDIC SURGERY      Cardiac Monitoring [] Yes [x] No      Purposeful Hourly Rounding [x] Yes    Neda Score Total Score: 2   Neda score 3 or > [] Bed Alarm [] Avasys [] 1:1 sitter [] Patient refused (Place signed refusal form in chart)      Pain Managed [x] Yes [] No    Key Pain Meds             HYDROcodone-acetaminophen (NORCO) 7.5-325 mg per tablet  (Taking) Take 1 Tab by mouth every six (6) hours as needed for Pain. Influenza Vaccine             Oxygen needs? [x] Room air Oxygen @  []1L    []2L    []3L   []4L    []5L   []6L     Use home O2? [] Yes [] No  Perform O2 challenge test using  smartphrase (.Homeoxygen)      Last bowel movement Last Bowel Movement Date: 11/20/17      Urinary Catheter             LDAs               Peripheral IV 11/21/17 Left;Upper Arm (Active)   Site Assessment Clean, dry, & intact 11/21/2017  5:38 PM   Phlebitis Assessment 0 11/21/2017  5:38 PM   Infiltration Assessment 0 11/21/2017  5:38 PM   Dressing Status Clean, dry, & intact 11/21/2017  5:38 PM   Dressing Type Tape;Transparent 11/21/2017  5:38 PM   Hub Color/Line Status Pink; Infusing 11/21/2017  5:38 PM                         Readmission Risk Assessment Tool Score Low Risk            2       Total Score        2 Charlson Comorbidity Score (Age + Comorbid Conditions)        Criteria that do not apply: Has Seen PCP in Last 6 Months (Yes=3, No=0)    . Living with Significant Other. Assisted Living. LTAC. SNF. or   Rehab    Patient Length of Stay (>5 days = 3)    IP Visits Last 12 Months (1-3=4, 4=9, >4=11)    Pt. Coverage (Medicare=5 , Medicaid, or Self-Pay=4)       Expected Length of Stay - - -   Actual Length of Stay 0          Opportunity for questions and clarifications were given to the incoming nurse. Patient's bed is in low position, side rails x2, door open PRN, call bell within reach and patient not in distress.       Checo Garland

## 2017-11-22 ENCOUNTER — APPOINTMENT (OUTPATIENT)
Dept: GENERAL RADIOLOGY | Age: 57
DRG: 607 | End: 2017-11-22
Attending: PHYSICIAN ASSISTANT
Payer: COMMERCIAL

## 2017-11-22 PROBLEM — M25.561 RIGHT ANTERIOR KNEE PAIN: Status: ACTIVE | Noted: 2017-11-22

## 2017-11-22 PROBLEM — M71.21: Status: ACTIVE | Noted: 2017-11-22

## 2017-11-22 LAB
ALBUMIN SERPL-MCNC: 2.9 G/DL (ref 3.5–5)
ALBUMIN/GLOB SERPL: 0.7 {RATIO} (ref 1.1–2.2)
ALP SERPL-CCNC: 108 U/L (ref 45–117)
ALT SERPL-CCNC: 27 U/L (ref 12–78)
ANION GAP SERPL CALC-SCNC: 5 MMOL/L (ref 5–15)
AST SERPL-CCNC: 35 U/L (ref 15–37)
BASOPHILS # BLD: 0.1 K/UL (ref 0–0.1)
BASOPHILS NFR BLD: 1 % (ref 0–1)
BILIRUB SERPL-MCNC: 0.3 MG/DL (ref 0.2–1)
BUN SERPL-MCNC: 15 MG/DL (ref 6–20)
BUN/CREAT SERPL: 16 (ref 12–20)
CALCIUM SERPL-MCNC: 8.9 MG/DL (ref 8.5–10.1)
CHLORIDE SERPL-SCNC: 106 MMOL/L (ref 97–108)
CO2 SERPL-SCNC: 27 MMOL/L (ref 21–32)
CREAT SERPL-MCNC: 0.94 MG/DL (ref 0.55–1.02)
EOSINOPHIL # BLD: 0.2 K/UL (ref 0–0.4)
EOSINOPHIL NFR BLD: 3 % (ref 0–7)
ERYTHROCYTE [DISTWIDTH] IN BLOOD BY AUTOMATED COUNT: 15.3 % (ref 11.5–14.5)
EST. AVERAGE GLUCOSE BLD GHB EST-MCNC: 134 MG/DL
GLOBULIN SER CALC-MCNC: 4 G/DL (ref 2–4)
GLUCOSE BLD STRIP.AUTO-MCNC: 117 MG/DL (ref 65–100)
GLUCOSE BLD STRIP.AUTO-MCNC: 119 MG/DL (ref 65–100)
GLUCOSE BLD STRIP.AUTO-MCNC: 124 MG/DL (ref 65–100)
GLUCOSE BLD STRIP.AUTO-MCNC: 87 MG/DL (ref 65–100)
GLUCOSE SERPL-MCNC: 111 MG/DL (ref 65–100)
HBA1C MFR BLD: 6.3 % (ref 4.2–6.3)
HCT VFR BLD AUTO: 37 % (ref 35–47)
HGB BLD-MCNC: 11.1 G/DL (ref 11.5–16)
LYMPHOCYTES # BLD: 1.8 K/UL (ref 0.8–3.5)
LYMPHOCYTES NFR BLD: 25 % (ref 12–49)
MCH RBC QN AUTO: 24.6 PG (ref 26–34)
MCHC RBC AUTO-ENTMCNC: 30 G/DL (ref 30–36.5)
MCV RBC AUTO: 82 FL (ref 80–99)
MONOCYTES # BLD: 0.5 K/UL (ref 0–1)
MONOCYTES NFR BLD: 8 % (ref 5–13)
NEUTS SEG # BLD: 4.6 K/UL (ref 1.8–8)
NEUTS SEG NFR BLD: 63 % (ref 32–75)
PLATELET # BLD AUTO: 164 K/UL (ref 150–400)
POTASSIUM SERPL-SCNC: 4.2 MMOL/L (ref 3.5–5.1)
PROT SERPL-MCNC: 6.9 G/DL (ref 6.4–8.2)
RBC # BLD AUTO: 4.51 M/UL (ref 3.8–5.2)
SERVICE CMNT-IMP: ABNORMAL
SERVICE CMNT-IMP: NORMAL
SODIUM SERPL-SCNC: 138 MMOL/L (ref 136–145)
WBC # BLD AUTO: 7.2 K/UL (ref 3.6–11)

## 2017-11-22 PROCEDURE — G8988 SELF CARE GOAL STATUS: HCPCS | Performed by: OCCUPATIONAL THERAPIST

## 2017-11-22 PROCEDURE — 74011250637 HC RX REV CODE- 250/637: Performed by: INTERNAL MEDICINE

## 2017-11-22 PROCEDURE — 97165 OT EVAL LOW COMPLEX 30 MIN: CPT | Performed by: OCCUPATIONAL THERAPIST

## 2017-11-22 PROCEDURE — 97161 PT EVAL LOW COMPLEX 20 MIN: CPT

## 2017-11-22 PROCEDURE — 97116 GAIT TRAINING THERAPY: CPT

## 2017-11-22 PROCEDURE — 65660000000 HC RM CCU STEPDOWN

## 2017-11-22 PROCEDURE — 80053 COMPREHEN METABOLIC PANEL: CPT | Performed by: INTERNAL MEDICINE

## 2017-11-22 PROCEDURE — G8978 MOBILITY CURRENT STATUS: HCPCS

## 2017-11-22 PROCEDURE — 97530 THERAPEUTIC ACTIVITIES: CPT | Performed by: OCCUPATIONAL THERAPIST

## 2017-11-22 PROCEDURE — 36415 COLL VENOUS BLD VENIPUNCTURE: CPT | Performed by: INTERNAL MEDICINE

## 2017-11-22 PROCEDURE — 85025 COMPLETE CBC W/AUTO DIFF WBC: CPT | Performed by: INTERNAL MEDICINE

## 2017-11-22 PROCEDURE — 74011250636 HC RX REV CODE- 250/636: Performed by: INTERNAL MEDICINE

## 2017-11-22 PROCEDURE — G8979 MOBILITY GOAL STATUS: HCPCS

## 2017-11-22 PROCEDURE — G8987 SELF CARE CURRENT STATUS: HCPCS | Performed by: OCCUPATIONAL THERAPIST

## 2017-11-22 PROCEDURE — 83036 HEMOGLOBIN GLYCOSYLATED A1C: CPT | Performed by: INTERNAL MEDICINE

## 2017-11-22 PROCEDURE — 82962 GLUCOSE BLOOD TEST: CPT

## 2017-11-22 PROCEDURE — 73560 X-RAY EXAM OF KNEE 1 OR 2: CPT

## 2017-11-22 RX ORDER — ENOXAPARIN SODIUM 100 MG/ML
40 INJECTION SUBCUTANEOUS EVERY 12 HOURS
Status: DISCONTINUED | OUTPATIENT
Start: 2017-11-22 | End: 2017-11-24 | Stop reason: HOSPADM

## 2017-11-22 RX ORDER — FLUCONAZOLE 2 MG/ML
200 INJECTION, SOLUTION INTRAVENOUS EVERY 24 HOURS
Status: DISCONTINUED | OUTPATIENT
Start: 2017-11-22 | End: 2017-11-24 | Stop reason: HOSPADM

## 2017-11-22 RX ORDER — HYDROMORPHONE HYDROCHLORIDE 2 MG/1
4 TABLET ORAL
Status: DISCONTINUED | OUTPATIENT
Start: 2017-11-22 | End: 2017-11-23

## 2017-11-22 RX ORDER — BUSPIRONE HYDROCHLORIDE 10 MG/1
15 TABLET ORAL
Status: DISCONTINUED | OUTPATIENT
Start: 2017-11-22 | End: 2017-11-23 | Stop reason: SDUPTHER

## 2017-11-22 RX ORDER — MICONAZOLE NITRATE 20 MG/G
CREAM TOPICAL 2 TIMES DAILY
Status: DISCONTINUED | OUTPATIENT
Start: 2017-11-22 | End: 2017-11-24 | Stop reason: HOSPADM

## 2017-11-22 RX ORDER — BUPROPION HYDROCHLORIDE 150 MG/1
300 TABLET ORAL
Status: DISCONTINUED | OUTPATIENT
Start: 2017-11-22 | End: 2017-11-24 | Stop reason: HOSPADM

## 2017-11-22 RX ORDER — NYSTATIN 100000 [USP'U]/G
POWDER TOPICAL 3 TIMES DAILY
Status: DISCONTINUED | OUTPATIENT
Start: 2017-11-22 | End: 2017-11-22 | Stop reason: ALTCHOICE

## 2017-11-22 RX ORDER — METFORMIN HYDROCHLORIDE 500 MG/1
1000 TABLET ORAL
Status: DISCONTINUED | OUTPATIENT
Start: 2017-11-22 | End: 2017-11-24 | Stop reason: HOSPADM

## 2017-11-22 RX ADMIN — VALSARTAN 40 MG: 40 TABLET ORAL at 12:02

## 2017-11-22 RX ADMIN — ONDANSETRON 4 MG: 2 INJECTION INTRAMUSCULAR; INTRAVENOUS at 21:09

## 2017-11-22 RX ADMIN — METFORMIN HYDROCHLORIDE 1000 MG: 500 TABLET, FILM COATED ORAL at 21:06

## 2017-11-22 RX ADMIN — PRAZOSIN HYDROCHLORIDE 3 MG: 1 CAPSULE ORAL at 21:04

## 2017-11-22 RX ADMIN — CLONAZEPAM 0.5 MG: 0.5 TABLET ORAL at 21:06

## 2017-11-22 RX ADMIN — ONDANSETRON 4 MG: 2 INJECTION INTRAMUSCULAR; INTRAVENOUS at 17:02

## 2017-11-22 RX ADMIN — ACETAMINOPHEN 650 MG: 325 TABLET ORAL at 01:34

## 2017-11-22 RX ADMIN — VANCOMYCIN HYDROCHLORIDE 2500 MG: 10 INJECTION, POWDER, LYOPHILIZED, FOR SOLUTION INTRAVENOUS at 08:39

## 2017-11-22 RX ADMIN — Medication 10 ML: at 05:26

## 2017-11-22 RX ADMIN — GABAPENTIN 100 MG: 100 CAPSULE ORAL at 21:08

## 2017-11-22 RX ADMIN — LEVOTHYROXINE SODIUM 112 MCG: 112 TABLET ORAL at 08:32

## 2017-11-22 RX ADMIN — GLIMEPIRIDE 2 MG: 1 TABLET ORAL at 08:32

## 2017-11-22 RX ADMIN — OXYCODONE HYDROCHLORIDE 10 MG: 5 TABLET ORAL at 01:35

## 2017-11-22 RX ADMIN — POTASSIUM CHLORIDE 10 MEQ: 750 TABLET, FILM COATED, EXTENDED RELEASE ORAL at 21:06

## 2017-11-22 RX ADMIN — OXYCODONE HYDROCHLORIDE 10 MG: 5 TABLET ORAL at 10:10

## 2017-11-22 RX ADMIN — PRAVASTATIN SODIUM 40 MG: 40 TABLET ORAL at 21:07

## 2017-11-22 RX ADMIN — ACETAMINOPHEN 650 MG: 325 TABLET ORAL at 14:05

## 2017-11-22 RX ADMIN — FLUCONAZOLE 200 MG: 2 INJECTION INTRAVENOUS at 12:44

## 2017-11-22 RX ADMIN — ACETAMINOPHEN 650 MG: 325 TABLET ORAL at 21:08

## 2017-11-22 RX ADMIN — ONDANSETRON 4 MG: 2 INJECTION INTRAMUSCULAR; INTRAVENOUS at 13:58

## 2017-11-22 RX ADMIN — ACETAMINOPHEN 650 MG: 325 TABLET ORAL at 05:25

## 2017-11-22 RX ADMIN — HYDROMORPHONE HYDROCHLORIDE 4 MG: 2 TABLET ORAL at 21:08

## 2017-11-22 RX ADMIN — Medication 10 ML: at 12:45

## 2017-11-22 RX ADMIN — CYANOCOBALAMIN TAB 500 MCG 1000 MCG: 500 TAB at 08:32

## 2017-11-22 RX ADMIN — TOPIRAMATE 200 MG: 100 TABLET ORAL at 21:07

## 2017-11-22 RX ADMIN — HYDROMORPHONE HYDROCHLORIDE 4 MG: 2 TABLET ORAL at 12:01

## 2017-11-22 RX ADMIN — BUPROPION HYDROCHLORIDE 300 MG: 150 TABLET, FILM COATED, EXTENDED RELEASE ORAL at 21:05

## 2017-11-22 RX ADMIN — PANTOPRAZOLE SODIUM 40 MG: 40 TABLET, DELAYED RELEASE ORAL at 08:31

## 2017-11-22 RX ADMIN — ENOXAPARIN SODIUM 40 MG: 40 INJECTION SUBCUTANEOUS at 21:08

## 2017-11-22 RX ADMIN — BUSPIRONE HYDROCHLORIDE 30 MG: 10 TABLET ORAL at 21:05

## 2017-11-22 RX ADMIN — MICONAZOLE NITRATE: 20 CREAM TOPICAL at 12:44

## 2017-11-22 RX ADMIN — OXYCODONE HYDROCHLORIDE 10 MG: 5 TABLET ORAL at 05:25

## 2017-11-22 RX ADMIN — BUSPIRONE HYDROCHLORIDE 15 MG: 10 TABLET ORAL at 12:01

## 2017-11-22 RX ADMIN — MICONAZOLE NITRATE: 20 CREAM TOPICAL at 17:06

## 2017-11-22 RX ADMIN — METOPROLOL SUCCINATE 25 MG: 25 TABLET, EXTENDED RELEASE ORAL at 08:33

## 2017-11-22 RX ADMIN — GABAPENTIN 100 MG: 100 CAPSULE ORAL at 10:10

## 2017-11-22 RX ADMIN — ENOXAPARIN SODIUM 40 MG: 40 INJECTION SUBCUTANEOUS at 08:39

## 2017-11-22 RX ADMIN — QUETIAPINE FUMARATE 400 MG: 100 TABLET ORAL at 21:07

## 2017-11-22 RX ADMIN — HYDROMORPHONE HYDROCHLORIDE 4 MG: 2 TABLET ORAL at 17:02

## 2017-11-22 NOTE — PROGRESS NOTES
Oncology Nursing Communication Tool  0730 AM  11/22/2017     Bedside shift change report given to [place] RN (incoming nurse) by Darrion Kong RN (outgoing nurse) on Nakita George. Report included the following information SBAR, Kardex, Procedure Summary, Intake/Output, MAR, Accordion, Recent Results and Med Rec Status. Significant changes during shift: None      Issues for physician to address: pt requesting orders for PTA meds         Oncology Shift Note   Admission Date 11/21/2017   Admission Diagnosis Panniculitis   Code Status Full Code   Consults IP CONSULT TO ORTHOPEDIC SURGERY      Cardiac Monitoring [] Yes [x] No      Purposeful Hourly Rounding [x] Yes    Neda Score Total Score: 2   Neda score 3 or > [] Bed Alarm [] Avasys [] 1:1 sitter [] Patient refused (Place signed refusal form in chart)      Pain Managed [x] Yes [] No    Key Pain Meds             HYDROcodone-acetaminophen (NORCO) 7.5-325 mg per tablet  (Taking) Take 1 Tab by mouth every six (6) hours as needed for Pain. Influenza Vaccine             Oxygen needs?  [x] Room air Oxygen @  []1L    []2L    []3L   []4L    []5L   []6L     Use home O2? [] Yes [x] No  Perform O2 challenge test using  smartphrase (.Homeoxygen)      Last bowel movement Last Bowel Movement Date: 11/20/17      Urinary Catheter             LDAs               Peripheral IV 11/21/17 Left;Upper Arm (Active)   Site Assessment Clean, dry, & intact 11/22/2017  3:21 AM   Phlebitis Assessment 0 11/22/2017  3:21 AM   Infiltration Assessment 0 11/22/2017  3:21 AM   Dressing Status Clean, dry, & intact 11/22/2017  3:21 AM   Dressing Type Tape;Transparent 11/22/2017  3:21 AM   Hub Color/Line Status Pink;Capped;Flushed;Patent 11/22/2017  3:21 AM                         Readmission Risk Assessment Tool Score Low Risk            2       Total Score        2 Charlson Comorbidity Score (Age + Comorbid Conditions)        Criteria that do not apply:    Has Seen PCP in Last 6 Months (Yes=3, No=0)    . Living with Significant Other. Assisted Living. LTAC. SNF. or   Rehab    Patient Length of Stay (>5 days = 3)    IP Visits Last 12 Months (1-3=4, 4=9, >4=11)    Pt. Coverage (Medicare=5 , Medicaid, or Self-Pay=4)       Expected Length of Stay - - -   Actual Length of Stay 1          Opportunity for questions and clarifications were given to the incoming nurse. Patient's bed is in low position, side rails x2, door open PRN, call bell within reach and patient not in distress.       Roel Christiansen RN

## 2017-11-22 NOTE — PROGRESS NOTES
Lovenox Dosing Adjustment   Indication:  DVT ppx  Antiplatelets:  none  Estimated Creatinine Clearance: 104.3 mL/min (based on Cr of 0.94). Estimated Creatinine Clearance (using IBW): 52.2 mL/min  Recent Labs      17   0525  17   1322   CREA  0.94  0.85   BUN  15  14   WBC  7.2  7.4   K  4.2  3.7   CA  8.9  9.8   ALB  2.9*   --    HGB  11.1*  11.6   HCT  37.0  38.0   PLT  164  172   ALT  27   --      Temp (24hrs), Av.4 °F (36.9 °C), Min:97.8 °F (36.6 °C), Max:99.2 °F (37.3 °C)    Wt Readings from Last 1 Encounters:   17 (!) 175.2 kg (386 lb 3.9 oz)      Body mass index is 70.65 kg/(m^2). Plan:  Lovenox 40 mg SQ Daily has been adjusted to Lovenox 40 mg SQ q12 due to patient's BMI and renal function.     Thank you,  [place] Jimenez, [facility]

## 2017-11-22 NOTE — PROGRESS NOTES
Problem: Self Care Deficits Care Plan (Adult)  Goal: *Acute Goals and Plan of Care (Insert Text)  Occupational Therapy Goals:  Initiated 11/22/2017  1. Patient will perform grooming standing with modified independence within 7 days. 2. Patient will perform toileting with modified independence within 7 days. 3. Patient will perform lower body dressing with modified independence within 7 days. 4. Patient will transfer from toilet with modified independence using the least restrictive device and appropriate durable medical equipment within 7 days. Occupational Therapy EVALUATION  Patient: Digna Nicole (66 y.o. female)  Date: 11/22/2017  Primary Diagnosis: Panniculitis        Precautions:   Fall    ASSESSMENT :  Based on the objective data described below, the patient presents with general weakness and increased pain with mobility due to bakers cyst.  Pt was reaching out for objects with mobility with SPC (CGA). With use of rolling walker balance improved and pain was reduced. Pt reports that she has a RW at home. She is performing UB ADLS at a CGA to SBA level and lower body ADLS at a min assist level. .    Patient will benefit from skilled intervention to address the above impairments.   Patients rehabilitation potential is considered to be Good  Factors which may influence rehabilitation potential include:   []             None noted  []             Mental ability/status  []             Medical condition  []             Home/family situation and support systems  []             Safety awareness  [x]             Pain tolerance/management  []             Other:      PLAN :  Recommendations and Planned Interventions:  [x]               Self Care Training                  [x]        Therapeutic Activities  [x]               Functional Mobility Training    []        Cognitive Retraining  [x]               Therapeutic Exercises           []        Endurance Activities  [x]               Balance Training []        Neuromuscular Re-Education  []               Visual/Perceptual Training     [x]   Home Safety Training  [x]               Patient Education                 [x]        Family Training/Education  []               Other (comment):    Frequency/Duration: Patient will be followed by occupational therapy 3 times a week to address goals. Discharge Recommendations: Home Health  Further Equipment Recommendations for Discharge: none     SUBJECTIVE:   Patient stated I hurts less when I use the walker.     OBJECTIVE DATA SUMMARY:   HISTORY:   Past Medical History:   Diagnosis Date    Arrhythmia     Arthritis     Chronic pain     djd    Depression     [facility] ([facility]     GERD (gastroesophageal reflux disease)     Hypertension     Hypothyroidism     Kidney stone     Liver disease     Obesity, morbid, BMI 50 or higher ([facility]     Psychiatric disorder     anxiety    Psychiatric disorder     depression    Seizures ([facility]     Thyroid disease      Past Surgical History:   Procedure Laterality Date    CARDIAC CATHETERIZATION  11/18/2010         HX APPENDECTOMY      HX CHOLECYSTECTOMY      HX HYSTERECTOMY      HX TUBAL LIGATION         Prior Level of Function/Environment/Context: ambulated with SPC; wears slide on shoes; performed ADLS and IADLS without assist    Expanded or extensive additional review of patient history:     Home Situation  Home Environment: Private residence  # Steps to Enter: 3  Rails to Enter: Yes  Hand Rails : Bilateral  One/Two Story Residence: One story  Living Alone: No  Support Systems: Family member(s)  Patient Expects to be Discharged to[de-identified] Private residence  Current DME Used/Available at Home: Cane, straight  Tub or Shower Type: Tub/Shower combination  [x]  Right hand dominant   []  Left hand dominant    EXAMINATION OF PERFORMANCE DEFICITS:  Cognitive/Behavioral Status:  Neurologic State: Alert  Orientation Level: Oriented X4  Cognition: Appropriate decision making; Appropriate for age attention/concentration; Appropriate safety awareness  Perception: Appears intact  Perseveration: No perseveration noted  Safety/Judgement: Awareness of environment; Insight into deficits      Hearing: Auditory  Auditory Impairment: None    Vision/Perceptual:    Tracking: Able to track stimulus in all quadrants w/o difficulty                           Corrective Lenses: Glasses    Range of Motion:    AROM: Within functional limits  PROM: Within functional limits                      Strength:    Strength: Generally decreased, functional                Coordination:  Coordination: Within functional limits  Fine Motor Skills-Upper: Left Intact; Right Intact    Gross Motor Skills-Upper: Left Intact; Right Intact    Tone & Sensation:    Tone: Normal  Sensation: Intact                      Balance:  Sitting: Intact  Standing: Impaired  Standing - Static: Fair  Standing - Dynamic : Fair (needs RW for pain)    Functional Mobility and Transfers for ADLs:  Bed Mobility:  Rolling: Stand-by asssistance  Supine to Sit: Minimum assistance (RLE back onto bed)  Scooting: Stand-by asssistance    Transfers:  Sit to Stand: Contact guard assistance  Stand to Sit: Contact guard assistance  Bed to Chair: Minimum assistance (with SPC and hand held support or CGA with RW)  Toilet Transfer : Minimum assistance    ADL Assessment:  Feeding: Independent    Oral Facial Hygiene/Grooming: Contact guard assistance    Bathing: Minimum assistance (LB)    Upper Body Dressing: Setup    Lower Body Dressing: Minimum assistance    Toileting: Minimum assistance                ADL Intervention and task modifications:       Educated pt on walker safety and possible need for LB AE for dressing. Cognitive Retraining  Safety/Judgement: Awareness of environment; Insight into deficits      Functional Measure:  Barthel Index:    Bathin  Bladder: 10  Bowels: 10  Groomin  Dressin  Feeding: 10  Mobility: 10  Stairs: 0  Toilet Use: 5  Transfer (Bed to Chair and Back): 10  Total: 65       Barthel and G-code impairment scale:  Percentage of impairment CH  0% CI  1-19% CJ  20-39% CK  40-59% CL  60-79% CM  80-99% CN  100%   Barthel Score 0-100 100 99-80 79-60 59-40 20-39 1-19   0   Barthel Score 0-20 20 17-19 13-16 9-12 5-8 1-4 0      The Barthel ADL Index: Guidelines  1. The index should be used as a record of what a patient does, not as a record of what a patient could do. 2. The main aim is to establish degree of independence from any help, physical or verbal, however minor and for whatever reason. 3. The need for supervision renders the patient not independent. 4. A patient's performance should be established using the best available evidence. Asking the patient, friends/relatives and nurses are the usual sources, but direct observation and common sense are also important. However direct testing is not needed. 5. Usually the patient's performance over the preceding 24-48 hours is important, but occasionally longer periods will be relevant. 6. Middle categories imply that the patient supplies over 50 per cent of the effort. 7. Use of aids to be independent is allowed. Santi Gill., Barthel, DImtiazW. ([id number]). Functional evaluation: the Barthel Index. [facility] (14)2. RAYRAY Meza, Yasir Frias.Schuyler., [place], [address] [place] [address] (1999). Measuring the change indisability after inpatient rehabilitation; comparison of the responsiveness of the Barthel Index and Functional [place] Measure. Journal of Neurology, Neurosurgery, and Psychiatry, 66(4), 415-439. ALONSO Hylton.ALIZA.A, FANI Villasenor, & Fernando Overton M.A. (2004.) Assessment of post-stroke quality of life in cost-effectiveness studies: The usefulness of the Barthel Index and the EuroQoL-5D. Quality of Life Research, 13, 381-10         G codes:   In compliance with CMSs Claims Based Outcome Reporting, the following G-code set was chosen for this patient based on their primary functional limitation being treated: The outcome measure chosen to determine the severity of the functional limitation was the barthel with a score of 65/100 which was correlated with the impairment scale. ? Self Care:     - CURRENT STATUS: CJ - 20%-39% impaired, limited or restricted    - GOAL STATUS: CI - 1%-19% impaired, limited or restricted    - D/C STATUS:  ---------------To be determined---------------     Occupational Therapy Evaluation Charge Determination   History Examination Decision-Making   LOW Complexity : Brief history review  LOW Complexity : 1-3 performance deficits relating to physical, cognitive , or psychosocial skils that result in activity limitations and / or participation restrictions  MEDIUM Complexity : Patient may present with comorbidities that affect occupational performnce. Miniml to moderate modification of tasks or assistance (eg, physical or verbal ) with assesment(s) is necessary to enable patient to complete evaluation       Based on the above components, the patient evaluation is determined to be of the following complexity level: LOW   Pain:  Pain Scale 1: Numeric (0 - 10)  Pain Intensity 1: 10  Pain Location 1: Head;Leg        Pain Intervention(s) 1: Medication (see MAR)  Activity Tolerance:     Please refer to the flowsheet for vital signs taken during this treatment. After treatment:   [] Patient left in no apparent distress sitting up in chair  [x] Patient left in no apparent distress in bed  [x] Call bell left within reach  [x] Nursing notified  [] Caregiver present  [] Bed alarm activated    COMMUNICATION/EDUCATION:   The patients plan of care was discussed with: Physical Therapist, Registered Nurse and patient. [x] Home safety education was provided and the patient/caregiver indicated understanding. [x] Patient have participated as able in goal setting and plan of care.   [x] Patient agree to work toward stated goals and plan of care.  [] Patient understands intent and goals of therapy, but is neutral about his/her participation. [] Patient is unable to participate in goal setting and plan of care. This patients plan of care is appropriate for delegation to MELODY.     Thank you for this referral.  Erica Amaral OTR/L  Time Calculation: 23 mins

## 2017-11-22 NOTE — WOUND CARE
Wound care consulted to see this patient for right sided inguinal panniculitis. She is obese with a large abdominal pannus that often grows yeast. Currently only mildly itchy. There is mild redness with satellite lesions along the right side. Today the patient was instructed on alternate therapy once the yeast rash is gone (moisture wicking technology with coffee filters). Also told about Inter-Dry cloths that are available through [employer] [facility] on-line as an alternate. Plan for skin care: Treat twice daily with the Secura Antifungal Cream. kThis contains the Miconizole to treat the infection and the zinc oxide to protect the skin.    Shamar Mueller, RN, BSN, CWON ([id number])

## 2017-11-22 NOTE — PROGRESS NOTES
Oncology Interdisciplinary rounds were held today to discuss patient plan of care and outcomes. The following members were present: Nursing and Case Management.     ALOS: 1  DRG GLOS: 2.9    Plan of Care Discharge Disposition   Wound Care following  Antibiotics Home with home health- does not want SNF  11/24-11/25

## 2017-11-22 NOTE — PROGRESS NOTES
Initial CM Assessment    CM in to see patient for discharge planning. Introduced self and role of CM and verified all demographics. Patient is 61 yo female w PMH significant for HTN, depression, DM. Patient was admitted to hospital for failed outpatient treatement of panniculities in her right groin - sent from PCP today after apparent worsening after trial of Bactrim at home. Patient also has right leg pain w/ doppler diagnosis of Baker's cyst - pain limiting ambulation. She uses a rolling walker at home at baseline    Patient lives in private home w/ her  and mother and states \"we all take care of each other\". CM received consult for discharge planning and possible SNF placement. Patient states she does not want to go to SNF, but would rather go home w/ home health. Patient's mother [facility] [address] and she would like to also use them for services. FOC offered and signed form placed on chart. Referral sent to [facility] via [facility]. Patient states her mother and her daughter could assist her with wound care at home as needed after instruction by home health. Care Management Interventions  PCP Verified by CM:  Yes (PCP - Bernadine Caputo NP)  Transition of Care Consult (CM Consult): Home Health, Discharge Planning (Patient offered [address] - does not want SNF - OT/PT recommend Home w/ [facility] as well)  [address]: Yes  MyChart Signup: No  Discharge Durable Medical Equipment: No (Patient has rolling walker)  Physical Therapy Consult: Yes  Occupational Therapy Consult: Yes  Current Support Network: Lives with Spouse, Own Home, Family Lives Nearby  Confirm Follow Up Transport: Family  Plan discussed with Pt/Family/Caregiver: Yes  Freedom of Choice Offered: Yes ([address] form placed on chart)    Alejandra Ding, RN, BSN, ACM   - Medical Oncology  [phone]

## 2017-11-22 NOTE — PROGRESS NOTES
Problem: Mobility Impaired (Adult and Pediatric)  Goal: *Acute Goals and Plan of Care (Insert Text)  Physical Therapy Goals  Initiated 11/22/2017  1. Patient will move from supine to sit and sit to supine  in bed with independence within 7 day(s). 2.  Patient will transfer from bed to chair and chair to bed with modified independence using the least restrictive device within 7 day(s). 3.  Patient will perform sit to stand with modified independence within 7 day(s). 4.  Patient will ambulate with modified independence for 150 feet with the least restrictive device within 7 day(s). 5.  Patient will ascend/descend 3 stairs with one handrail(s) with modified independence within 7 day(s). physical Therapy EVALUATION  Patient: Anthony Santa (56 y.o. female)  Date: 11/22/2017  Primary Diagnosis: Panniculitis        Precautions:   Fall    ASSESSMENT :  Based on the objective data described below, the patient presents with c/o right posterior leg pain due to Baker's cyst, impaired balance, and decreased activity tolerance limiting patient's safe functional mobility. Patient lives with spouse and her mother in a 1 story home with 3 GREG. Patient normally ambulates with SPC or RW, depending on balance/strength. Patient required SBA-min assist for bed mobility, then CGA to stand. Patient ambulated 50 ft, first using SPC and HHA, then switched to RW due to fatigue/pain. No LOB noted, mainly limited by pain at this time. Anticipate discharge home with HHPT. Will continue to follow while patient remains in hospital.    Patient will benefit from skilled intervention to address the above impairments.   Patients rehabilitation potential is considered to be Good  Factors which may influence rehabilitation potential include:   []         None noted  []         Mental ability/status  []         Medical condition  []         Home/family situation and support systems  []         Safety awareness  [x]         Pain tolerance/management  []         Other:      PLAN :  Recommendations and Planned Interventions:  [x]           Bed Mobility Training             []    Neuromuscular Re-Education  [x]           Transfer Training                   []    Orthotic/Prosthetic Training  [x]           Gait Training                         []    Modalities  [x]           Therapeutic Exercises           []    Edema Management/Control  [x]           Therapeutic Activities            [x]    Patient and Family Training/Education  []           Other (comment):    Frequency/Duration: Patient will be followed by physical therapy  4 times a week to address goals. Discharge Recommendations: Home Health  Further Equipment Recommendations for Discharge: none, has all DME     SUBJECTIVE:   Patient stated I usually get around pretty good. It's just this pain. ...    OBJECTIVE DATA SUMMARY:   HISTORY:    Past Medical History:   Diagnosis Date    Arrhythmia     Arthritis     Chronic pain     djd    Depression     [facility]     GERD (gastroesophageal reflux disease)     Hypertension     Hypothyroidism     Kidney stone     Liver disease     Obesity, morbid, BMI 50 or higher ([facility]     Psychiatric disorder     anxiety    Psychiatric disorder     depression    Seizures ([facility]     Thyroid disease      Past Surgical History:   Procedure Laterality Date    CARDIAC CATHETERIZATION  11/18/2010         HX APPENDECTOMY      HX CHOLECYSTECTOMY      HX HYSTERECTOMY      HX TUBAL LIGATION       Prior Level of Function/Home Situation: mod I using SPC or RW, lives with spouse and mother   Personal factors and/or comorbidities impacting plan of care:     Home Situation  Home Environment: Private residence  # Steps to Enter: 3  Rails to Enter: Yes  Hand Rails : Bilateral  One/Two Story Residence: One story  Living Alone: No  Support Systems: Family member(s)  Patient Expects to be Discharged to[de-identified] Private residence  Current DME Used/Available at Home: Cane, straight  Tub or Shower Type: Tub/Shower combination    EXAMINATION/PRESENTATION/DECISION MAKING:   Critical Behavior:  Neurologic State: Alert  Orientation Level: Oriented X4  Cognition: Appropriate decision making, Appropriate for age attention/concentration, Appropriate safety awareness  Safety/Judgement: Awareness of environment, Insight into deficits  Hearing: Auditory  Auditory Impairment: None  Skin:  Receiving wound care for skin breakdown under pannus  Edema: bilateral LE's   Range Of Motion:  AROM: Within functional limits           PROM: Within functional limits           Strength:    Strength: Generally decreased, functional                    Tone & Sensation:   Tone: Normal              Sensation: Intact               Coordination:  Coordination: Within functional limits  Vision:   Tracking: Able to track stimulus in all quadrants w/o difficulty  Corrective Lenses: Glasses  Functional Mobility:  Bed Mobility:  Rolling: Stand-by asssistance  Supine to Sit: Minimum assistance (RLE back onto bed)     Scooting: Stand-by asssistance  Transfers:  Sit to Stand: Contact guard assistance  Stand to Sit: Contact guard assistance        Bed to Chair: Minimum assistance (with SPC and hand held support or CGA with RW)              Balance:   Sitting: Intact  Standing: Impaired  Standing - Static: Fair  Standing - Dynamic : Fair (needs RW for pain)  Ambulation/Gait Training:  Distance (ft): 50 Feet (ft)  Assistive Device: Cane, straight;Gait belt;Walker, rolling (first using SPC, then w/RW due to pain and impaired balance)  Ambulation - Level of Assistance: Contact guard assistance     Gait Description (WDL): Exceptions to WDL  Gait Abnormalities: Decreased step clearance;Shuffling gait;Trunk sway increased        Base of Support: Widened     Speed/Nan: Shuffled; Slow  Step Length: Left shortened;Right shortened            Functional Measure:  Barthel Index:    Bathin  Bladder: 10  Bowels: 10  Groomin  Dressin  Feeding: 10  Mobility: 10  Stairs: 0  Toilet Use: 5  Transfer (Bed to Chair and Back): 10  Total: 65       Barthel and G-code impairment scale:  Percentage of impairment CH  0% CI  1-19% CJ  20-39% CK  40-59% CL  60-79% CM  80-99% CN  100%   Barthel Score 0-100 100 99-80 79-60 59-40 20-39 1-19   0   Barthel Score 0-20 20 17-19 13-16 9-12 5-8 1-4 0      The Barthel ADL Index: Guidelines  1. The index should be used as a record of what a patient does, not as a record of what a patient could do. 2. The main aim is to establish degree of independence from any help, physical or verbal, however minor and for whatever reason. 3. The need for supervision renders the patient not independent. 4. A patient's performance should be established using the best available evidence. Asking the patient, friends/relatives and nurses are the usual sources, but direct observation and common sense are also important. However direct testing is not needed. 5. Usually the patient's performance over the preceding 24-48 hours is important, but occasionally longer periods will be relevant. 6. Middle categories imply that the patient supplies over 50 per cent of the effort. 7. Use of aids to be independent is allowed. Ирина Londono., Barthel, D.W. ([id number]). Functional evaluation: the Barthel Index. [address] [facility] (14)2. Errol Lemon ronnell RAYRAY Mark, Sid Washington., Ketty Grayson., [place], [address] [place] [address] (). Measuring the change indisability after inpatient rehabilitation; comparison of the responsiveness of the Barthel Index and Functional [place] Measure. Journal of Neurology, Neurosurgery, and Psychiatry, 66(4), 592-013. Soledad Mar, N.[place].A, ROSANNA Villasenor.FREEDOM, & Brois Jacinto, M.A. (2004.) Assessment of post-stroke quality of life in cost-effectiveness studies: The usefulness of the Barthel Index and the EuroQoL-5D. Quality of Life Research, 13, 027-34       G codes:   In compliance with CMSs Claims Based Outcome Reporting, the following G-code set was chosen for this patient based on their primary functional limitation being treated: The outcome measure chosen to determine the severity of the functional limitation was the Barthel Index with a score of 65/100 which was correlated with the impairment scale. ? Mobility - Walking and Moving Around:     - CURRENT STATUS: CJ - 20%-39% impaired, limited or restricted    - GOAL STATUS: CI - 1%-19% impaired, limited or restricted    - D/C STATUS:  ---------------To be determined---------------        Pain:  Pain Scale 1: Numeric (0 - 10)  Pain Intensity 1: 10  Pain Location 1: Head;Leg        Pain Intervention(s) 1: Medication (see MAR)  Activity Tolerance:   VSS  Please refer to the flowsheet for vital signs taken during this treatment. After treatment:   []         Patient left in no apparent distress sitting up in chair  [x]         Patient left in no apparent distress in bed  [x]         Call bell left within reach  [x]         Nursing notified  []         Caregiver present  []         Bed alarm activated    COMMUNICATION/EDUCATION:   The patients plan of care was discussed with: Occupational Therapist and Registered Nurse. [x]         Fall prevention education was provided and the patient/caregiver indicated understanding. [x]         Patient/family have participated as able in goal setting and plan of care. [x]         Patient/family agree to work toward stated goals and plan of care. []         Patient understands intent and goals of therapy, but is neutral about his/her participation. []         Patient is unable to participate in goal setting and plan of care.     Thank you for this referral.  Yaa Holcomb, PT   Time Calculation: 15 mins

## 2017-11-22 NOTE — PROGRESS NOTES
Hospitalist Progress Note    NAME: Vickey Cooks   :  1960   MRN:  [id number]       Assessment / Plan:  Panniculitis/Intertrigo: Failed outpatient therapy as her cellulitis has only worsened over the last 5 days since starting Bactrim, c/w Vancomycin add Diflucan, add Nystatin topical  Right Leg Pain: Doppler indicative of Bakers cyst: patient is severe and significantly limits patient, may need Rehab. Ortho consult is pending  Diabetes: continue home Glipizide and hold home metformin, HBA1C 6.3  Hypothyroidism: continue home Levothyroxine  Anxiety/Depression: c/w Wellbutrin, Buspar, Seroquel  Seizure Disorder:  C/w Topamax  Hypertension: continue home Metoprolol and Losartan  Super Morbid Obesity: c/w diabetic diet, needs bariatric treatment. Polypharmacy: medication reconciled  Surrogate Decision Maker:   Baseline: Walks with cane  Body mass index is 70.65 kg/(m^2). Code status: Full  Prophylaxis: Lovenox  Recommended Disposition: SNF/LTC     Subjective:     Chief Complaint / Reason for Physician Visit  \"My leg hurts\". Discussed with RN events overnight. Review of Systems:  Symptom Y/N Comments  Symptom Y/N Comments   Fever/Chills    Chest Pain     Poor Appetite    Edema     Cough    Abdominal Pain     Sputum    Joint Pain y    SOB/DIOP    Pruritis/Rash     Nausea/vomit    Tolerating PT/OT     Diarrhea    Tolerating Diet y    Constipation    Other       Could NOT obtain due to:      Objective:     VITALS:   Last 24hrs VS reviewed since prior progress note.  Most recent are:  Patient Vitals for the past 24 hrs:   Temp Pulse Resp BP SpO2   17 0722 98.3 °F (36.8 °C) 74 16 133/73 96 %   17 2248 97.8 °F (36.6 °C) 75 16 132/63 98 %   17 1949 99.2 °F (37.3 °C) 82 16 99/68 94 %   17 1737 98.4 °F (36.9 °C) 83 16 125/66 93 %   17 1600 - 79 14 116/68 97 %   17 1519 - 80 16 136/68 -   17 1438 - 79 12 (!) 139/94 98 %   17 1252 98.6 °F (37 °C) 85 18 114/66 98 %     No intake or output data in the 24 hours ending 11/22/17 0958     PHYSICAL EXAM:  General: WD, WN. Alert, cooperative, in pain    EENT:  EOMI. Anicteric sclerae. MMM  Resp:  Coarse BS  CV:  Regular  rhythm,  No edema  GI:  Soft, Non distended, Non tender.  +Bowel sounds  Neurologic:  Alert and oriented X 3, normal speech,   Psych:   Good insight. Not anxious nor agitated  Skin:  No rashes. No jaundice    Reviewed most current lab test results and cultures  YES  Reviewed most current radiology test results   YES  Review and summation of old records today    NO  Reviewed patient's current orders and MAR    YES  PMH/SH reviewed - no change compared to H&P  ________________________________________________________________________  Care Plan discussed with:    Comments   Patient y    Family      RN y    Care Manager     Consultant                        Multidiciplinary team rounds were held today with , nursing, pharmacist and clinical coordinator. Patient's plan of care was discussed; medications were reviewed and discharge planning was addressed. ________________________________________________________________________  Total NON critical care TIME: 35  Minutes    Total CRITICAL CARE TIME Spent:   Minutes non procedure based      Comments   >50% of visit spent in counseling and coordination of care y    ________________________________________________________________________  Hank Archibald MD     Procedures: see electronic medical records for all procedures/Xrays and details which were not copied into this note but were reviewed prior to creation of Plan. LABS:  I reviewed today's most current labs and imaging studies.   Pertinent labs include:  Recent Labs      11/22/17   0525  11/21/17   1322   WBC  7.2  7.4   HGB  11.1*  11.6   HCT  37.0  38.0   PLT  164  172     Recent Labs      11/22/17   0525  11/21/17   1322   NA  138  140   K  4.2  3.7   CL  106  106   CO2  27  27   GLU 111*  93   BUN  15  14   CREA  0.94  0.85   CA  8.9  9.8   ALB  2.9*   --    TBILI  0.3   --    SGOT  35   --    ALT  27   --        Signed: Kari Nava MD

## 2017-11-22 NOTE — CONSULTS
CC: Right knee pain    HPI: 62 y.o. female with history of   Right knee pain for approximately 1 week. She reports the pain was gradual onset. There is no injury reported. She is admitted to the hospital with a panniculitis. The right knee pain is anterior in nature. She has no pain with   Range of motion. She can bear weight. She has had no fevers or chills. ROS:  Positive per HPI, otherwise negative. PMHX:  Past Medical History:   Diagnosis Date    Arrhythmia     Arthritis     Chronic pain     djd    Depression     Diabetes (HCC)     GERD (gastroesophageal reflux disease)     Hypertension     Hypothyroidism     Kidney stone     Liver disease     Obesity, morbid, BMI 50 or higher ([facility] [address]     Psychiatric disorder     anxiety    Psychiatric disorder     depression    Seizures ([facility] [address]     Thyroid disease        PSHX  Past Surgical History:   Procedure Laterality Date    CARDIAC CATHETERIZATION  11/18/2010         HX APPENDECTOMY      HX CHOLECYSTECTOMY      HX HYSTERECTOMY      HX TUBAL LIGATION         ALLERGIES:  Allergies   Allergen Reactions    Celebrex [Celecoxib] Nausea Only    Sulfa (Sulfonamide Antibiotics) Nausea Only    Codeine Other (comments)     Hyper activity       MEDS  No current facility-administered medications on file prior to encounter. Current Outpatient Prescriptions on File Prior to Encounter   Medication Sig Dispense Refill    albuterol (PROAIR HFA) 90 mcg/actuation inhaler Take  by inhalation.  albuterol (PROVENTIL VENTOLIN) 2.5 mg /3 mL (0.083 %) nebulizer solution by Nebulization route once.  ondansetron (ZOFRAN ODT) 4 mg disintegrating tablet Take 1 Tab by mouth every eight (8) hours as needed for Nausea. 10 Tab 0    omeprazole (PRILOSEC) 20 mg capsule Take 40 mg by mouth daily.  topiramate (TOPAMAX) 200 mg tablet Take 200 mg by mouth nightly.  simvastatin (ZOCOR) 20 mg tablet Take 20 mg by mouth nightly.       clonazepam (KLONOPIN) 2 mg tablet Take 0.5 mg by mouth every twelve (12) hours as needed (panci attack).  potassium chloride (KLOR-CON M10) 10 mEq tablet Take 10 mEq by mouth nightly. SOC HX  Social History     Social History    Marital status:      Spouse name: N/A    Number of children: N/A    Years of education: N/A     Occupational History    Not on file. Social History Main Topics    Smoking status: Former Smoker    Smokeless tobacco: Never Used    Alcohol use No    Drug use: No    Sexual activity: Not Currently     Partners: Male     Other Topics Concern    Not on file     Social History Narrative       PE:  Visit Vitals    /62 (BP 1 Location: Left arm, BP Patient Position: At rest)    Pulse 77    Temp 98.3 °F (36.8 °C)    Resp 16    Ht 5' 2\" (1.575 m)    Wt (!) 175.2 kg (386 lb 3.9 oz)    SpO2 (!) 89%    Breastfeeding No    BMI 70.65 kg/m2       A&Ox3  Normocephalic, atraumatic  Trachea midline  No audible wheezes  NRRR  Abdomen soft/NT  Morbidly obese    Right Lower Extremity:   - skin intact; Compartments soft and compressible. -   Bony landmarks difficult to palpate due to large amount of adipose tissue around the knee. There is mild tenderness to palpation anteriorly  Likely at the patella. There is no pain with short arc or large arc range of motion. No joint irritability. No warmth or erythema around the joint.  -   I cannot appreciate a palpable Baker cyst at the popliteal fossa  - Motor: +EHL/FHL/TA/GSC/PL/PB  - SILT DP/MP/LP/MP/SURAL/SAPHENOUS  - Vascular: + DP, CR < 2 sec      IMAGING:    ultrasound demonstrated complex Baker's cyst at the popliteal fossa. Negative for DVT     three  Plain film views of the right knee  Demonstrated tricompartmental arthritic changes, most severe at the patellofemoral joint with a large osteophyte present. A/P:   Right knee osteoarthritis. No signs of septic joint.  -   No surgical intervention indicated.   The patient is not a surgical candidate as her BMI is 70.59  -  Recommend Tylenol or anti-inflammatories as tolerated if no contraindications for pain control.  -   Ambulate the patient with physical therapy  -  Weight loss highly recommended  - medical problems per primary team.  -   Will sign off. Call with questions or concerns.

## 2017-11-22 NOTE — PROGRESS NOTES
Bedside and Verbal shift change report given to Shante Driver RN (oncoming nurse) by Fariba Fuentes (offgoing nurse). Report given with SBAR, Kardex, Intake/Output, MAR and Recent Results.

## 2017-11-22 NOTE — CONSULTS
Orthopedic CONSULT NOTE    Subjective:     Date of Consultation:  November 22, 2017      Miguel Angel Lerner is a 62 y.o. female who is being seen for  Right knee pain. While Pt. Was admitted to the hospital. Workup has revealed U/S  that shows no dvt and incidental bakers cyst . Pt. Has not  been followed prior for this no injury. , and is now on medical service , admitted for panniculitis  . Patient's ambulatory status includes None and their living environment is home. Has insulin dependant DM, obesity . Patient Active Problem List    Diagnosis Date Noted    Right anterior knee pain 11/22/2017    Cyst, baker's knee, right 11/22/2017    Panniculitis 11/21/2017    Normal coronary arteries 07/30/2014    Diabetes ([address]     Hypertension     SOB (shortness of breath) 07/16/2014    DIOP (dyspnea on exertion) 07/16/2014     Family History   Problem Relation Age of Onset    Heart Disease Mother     Hypertension Mother     Cancer Mother     Heart Disease Father     Hypertension Father     Cancer Brother       Social History   Substance Use Topics    Smoking status: Former Smoker    Smokeless tobacco: Never Used    Alcohol use No     Past Medical History:   Diagnosis Date    Arrhythmia     Arthritis     Chronic pain     djd    Depression     Diabetes ([address]     GERD (gastroesophageal reflux disease)     Hypertension     Hypothyroidism     Kidney stone     Liver disease     Obesity, morbid, BMI 50 or higher ([address]     Psychiatric disorder     anxiety    Psychiatric disorder     depression    Seizures ([address]     Thyroid disease       Past Surgical History:   Procedure Laterality Date    CARDIAC CATHETERIZATION  11/18/2010         HX APPENDECTOMY      HX CHOLECYSTECTOMY      HX HYSTERECTOMY      HX TUBAL LIGATION        Prior to Admission medications    Medication Sig Start Date End Date Taking?  Authorizing Provider   prochlorperazine (COMPAZINE) 10 mg tablet Take 10 mg by mouth every eight (8) hours as needed (nausea). Yes Historical Provider   levothyroxine (SYNTHROID) 112 mcg tablet Take 112 mcg by mouth Daily (before breakfast). Yes Historical Provider   glimepiride (AMARYL) 2 mg tablet Take 2 mg by mouth every morning. Yes Historical Provider   metFORMIN (GLUMETZA ER) 500 mg TG24 24 hour tablet Take 500 mg by mouth daily. Yes Historical Provider   losartan (COZAAR) 25 mg tablet Take 25 mg by mouth daily. Yes Historical Provider   gabapentin (NEURONTIN) 100 mg capsule Take 100 mg by mouth four (4) times daily as needed (neuropathy pain). Yes Historical Provider   metoprolol succinate (TOPROL XL) 25 mg XL tablet Take 25 mg by mouth daily. Yes Historical Provider   cyanocobalamin (VITAMIN B-12) 1,000 mcg tablet Take 1,000 mcg by mouth daily. Yes Historical Provider   buPROPion XL (WELLBUTRIN XL) 150 mg tablet Take 150 mg by mouth every morning. Yes Historical Provider   prazosin (MINIPRESS) 1 mg capsule Take 3 mg by mouth nightly. Yes Historical Provider   HYDROcodone-acetaminophen (NORCO) 7.5-325 mg per tablet Take 1 Tab by mouth every six (6) hours as needed for Pain. Yes Historical Provider   QUEtiapine (SEROQUEL) 400 mg tablet Take 400 mg by mouth nightly. Yes Historical Provider   ergocalciferol (ERGOCALCIFEROL) 50,000 unit capsule Take 50,000 Units by mouth every Monday and Thursday. Yes Historical Provider   albuterol (PROAIR HFA) 90 mcg/actuation inhaler Take  by inhalation. Historical Provider   albuterol (PROVENTIL VENTOLIN) 2.5 mg /3 mL (0.083 %) nebulizer solution by Nebulization route once. Historical Provider   ondansetron (ZOFRAN ODT) 4 mg disintegrating tablet Take 1 Tab by mouth every eight (8) hours as needed for Nausea. 3/15/11   April ALONSO Black MD   omeprazole (PRILOSEC) 20 mg capsule Take 40 mg by mouth daily. Historical Provider   topiramate (TOPAMAX) 200 mg tablet Take 200 mg by mouth nightly.  12/8/10   Historical Provider   simvastatin (ZOCOR) 20 mg tablet Take 20 mg by mouth nightly. Ashish Briceno MD   clonazepam (KLONOPIN) 2 mg tablet Take 0.5 mg by mouth every twelve (12) hours as needed (panci attack). Ashish Briceno MD   potassium chloride (KLOR-CON M10) 10 mEq tablet Take 10 mEq by mouth nightly.  4/23/10   Ashish Briceno MD     Current Facility-Administered Medications   Medication Dose Route Frequency    buPROPion XL (WELLBUTRIN XL) tablet 300 mg  300 mg Oral QHS    busPIRone (BUSPAR) tablet 15 mg  15 mg Oral Multiple    fluconazole (DIFLUCAN) 200mg/100 mL IVPB (premix)  200 mg IntraVENous Q24H    nystatin (MYCOSTATIN) 100,000 unit/gram powder   Topical TID    oxyCODONE IR (ROXICODONE) tablet 10 mg  10 mg Oral Q4H PRN    albuterol (PROVENTIL VENTOLIN) nebulizer solution 2.5 mg  2.5 mg Nebulization Q4H PRN    clonazePAM (KlonoPIN) tablet 0.5 mg  0.5 mg Oral Q12H PRN    cyanocobalamin (VITAMIN B12) tablet 1,000 mcg  1,000 mcg Oral DAILY    gabapentin (NEURONTIN) capsule 100 mg  100 mg Oral QID PRN    glimepiride (AMARYL) tablet 2 mg  2 mg Oral ACB    levothyroxine (SYNTHROID) tablet 112 mcg  112 mcg Oral ACB    valsartan (DIOVAN) tablet 40 mg  40 mg Oral DAILY    metoprolol succinate (TOPROL-XL) XL tablet 25 mg  25 mg Oral DAILY    pantoprazole (PROTONIX) tablet 40 mg  40 mg Oral ACB    potassium chloride SR (KLOR-CON 10) tablet 10 mEq  10 mEq Oral QHS    prazosin (MINIPRESS) capsule 3 mg  3 mg Oral QHS    QUEtiapine (SEROquel) tablet 400 mg  400 mg Oral QHS    pravastatin (PRAVACHOL) tablet 40 mg  40 mg Oral QHS    topiramate (TOPAMAX) tablet 200 mg  200 mg Oral QHS    sodium chloride (NS) flush 5-10 mL  5-10 mL IntraVENous Q8H    sodium chloride (NS) flush 5-10 mL  5-10 mL IntraVENous PRN    acetaminophen (TYLENOL) tablet 650 mg  650 mg Oral Q4H PRN    ondansetron (ZOFRAN) injection 4 mg  4 mg IntraVENous Q4H PRN    enoxaparin (LOVENOX) injection 40 mg  40 mg SubCUTAneous Q24H    insulin lispro (HUMALOG) injection SubCUTAneous AC&HS    glucose chewable tablet 16 g  4 Tab Oral PRN    dextrose (D50W) injection syrg 12.5-25 g  12.5-25 g IntraVENous PRN    glucagon (GLUCAGEN) injection 1 mg  1 mg IntraMUSCular PRN    vancomycin (VANCOCIN) 2,500 mg in 0.9% sodium chloride 500 mL IVPB  2,500 mg IntraVENous Q18H    [START ON 2017] ergocalciferol (ERGOCALCIFEROL) capsule 50,000 Units  50,000 Units Oral EVERY MON & TH      Allergies   Allergen Reactions    Celebrex [Celecoxib] Nausea Only    Sulfa (Sulfonamide Antibiotics) Nausea Only    Codeine Other (comments)     Hyper activity        Review of Systems:  A comprehensive review of systems was negative except for that written in the HPI. Mental Status: no dementia    Objective:     Patient Vitals for the past 8 hrs:   BP Temp Pulse Resp SpO2   17 0722 133/73 98.3 °F (36.8 °C) 74 16 96 %     Temp (24hrs), Av.5 °F (36.9 °C), Min:97.8 °F (36.6 °C), Max:99.2 °F (37.3 °C)      EXAM: alert, cooperative, no distress, appears stated age, normal mood,   Right knee small amount of fluid anterior in the area of the patella bursa  . No increase warmth, no large knee joint effusion. No erythema. Knee is stable. Exam is difficult due to body habitus The patient left the office before the visit was finished. Knee exam non tender, stable.      Imaging Review: pending    Labs:   Recent Results (from the past 24 hour(s))   METABOLIC PANEL, BASIC    Collection Time: 17  1:22 PM   Result Value Ref Range    Sodium 140 136 - 145 mmol/L    Potassium 3.7 3.5 - 5.1 mmol/L    Chloride 106 97 - 108 mmol/L    CO2 27 21 - 32 mmol/L    Anion gap 7 5 - 15 mmol/L    Glucose 93 65 - 100 mg/dL    BUN 14 6 - 20 MG/DL    Creatinine 0.85 0.55 - 1.02 MG/DL    BUN/Creatinine ratio 16 12 - 20      GFR est AA >60 >60 ml/min/1.73m2    GFR est non-AA >60 >60 ml/min/1.73m2    Calcium 9.8 8.5 - 10.1 MG/DL   CBC WITH AUTOMATED DIFF    Collection Time: 17  1:22 PM   Result Value Ref Range WBC 7.4 3.6 - 11.0 K/uL    RBC 4.69 3.80 - 5.20 M/uL    HGB 11.6 11.5 - 16.0 g/dL    HCT 38.0 35.0 - 47.0 %    MCV 81.0 80.0 - 99.0 FL    MCH 24.7 (L) 26.0 - 34.0 PG    MCHC 30.5 30.0 - 36.5 g/dL    RDW 15.3 (H) 11.5 - 14.5 %    PLATELET 271 858 - 867 K/uL    NEUTROPHILS 71 32 - 75 %    LYMPHOCYTES 21 12 - 49 %    MONOCYTES 6 5 - 13 %    EOSINOPHILS 2 0 - 7 %    BASOPHILS 0 0 - 1 %    ABS. NEUTROPHILS 5.3 1.8 - 8.0 K/UL    ABS. LYMPHOCYTES 1.5 0.8 - 3.5 K/UL    ABS. MONOCYTES 0.5 0.0 - 1.0 K/UL    ABS. EOSINOPHILS 0.1 0.0 - 0.4 K/UL    ABS. BASOPHILS 0.0 0.0 - 0.1 K/UL   CULTURE, BLOOD, PAIRED    Collection Time: 11/21/17  1:22 PM   Result Value Ref Range    Special Requests: NO SPECIAL REQUESTS      Culture result: NO GROWTH AFTER 18 HOURS     LACTIC ACID    Collection Time: 11/21/17  1:22 PM   Result Value Ref Range    Lactic acid 0.8 0.4 - 2.0 MMOL/L   GLUCOSE, POC    Collection Time: 11/21/17  4:55 PM   Result Value Ref Range    Glucose (POC) 94 65 - 100 mg/dL    Performed by Taylor Quispe    GLUCOSE, POC    Collection Time: 11/21/17  9:08 PM   Result Value Ref Range    Glucose (POC) 157 (H) 65 - 100 mg/dL    Performed by Malcolm Asif (PCT)    METABOLIC PANEL, COMPREHENSIVE    Collection Time: 11/22/17  5:25 AM   Result Value Ref Range    Sodium 138 136 - 145 mmol/L    Potassium 4.2 3.5 - 5.1 mmol/L    Chloride 106 97 - 108 mmol/L    CO2 27 21 - 32 mmol/L    Anion gap 5 5 - 15 mmol/L    Glucose 111 (H) 65 - 100 mg/dL    BUN 15 6 - 20 MG/DL    Creatinine 0.94 0.55 - 1.02 MG/DL    BUN/Creatinine ratio 16 12 - 20      GFR est AA >60 >60 ml/min/1.73m2    GFR est non-AA >60 >60 ml/min/1.73m2    Calcium 8.9 8.5 - 10.1 MG/DL    Bilirubin, total 0.3 0.2 - 1.0 MG/DL    ALT (SGPT) 27 12 - 78 U/L    AST (SGOT) 35 15 - 37 U/L    Alk.  phosphatase 108 45 - 117 U/L    Protein, total 6.9 6.4 - 8.2 g/dL    Albumin 2.9 (L) 3.5 - 5.0 g/dL    Globulin 4.0 2.0 - 4.0 g/dL    A-G Ratio 0.7 (L) 1.1 - 2.2     CBC WITH AUTOMATED DIFF Collection Time: 11/22/17  5:25 AM   Result Value Ref Range    WBC 7.2 3.6 - 11.0 K/uL    RBC 4.51 3.80 - 5.20 M/uL    HGB 11.1 (L) 11.5 - 16.0 g/dL    HCT 37.0 35.0 - 47.0 %    MCV 82.0 80.0 - 99.0 FL    MCH 24.6 (L) 26.0 - 34.0 PG    MCHC 30.0 30.0 - 36.5 g/dL    RDW 15.3 (H) 11.5 - 14.5 %    PLATELET 635 084 - 356 K/uL    NEUTROPHILS 63 32 - 75 %    LYMPHOCYTES 25 12 - 49 %    MONOCYTES 8 5 - 13 %    EOSINOPHILS 3 0 - 7 %    BASOPHILS 1 0 - 1 %    ABS. NEUTROPHILS 4.6 1.8 - 8.0 K/UL    ABS. LYMPHOCYTES 1.8 0.8 - 3.5 K/UL    ABS. MONOCYTES 0.5 0.0 - 1.0 K/UL    ABS. EOSINOPHILS 0.2 0.0 - 0.4 K/UL    ABS. BASOPHILS 0.1 0.0 - 0.1 K/UL   HEMOGLOBIN A1C WITH EAG    Collection Time: 11/22/17  5:25 AM   Result Value Ref Range    Hemoglobin A1c 6.3 4.2 - 6.3 %    Est. average glucose 134 mg/dL   GLUCOSE, POC    Collection Time: 11/22/17  7:21 AM   Result Value Ref Range    Glucose (POC) 124 (H) 65 - 100 mg/dL    Performed by Dann Min          Impression:     Patient Active Problem List    Diagnosis Date Noted    Right anterior knee pain 11/22/2017    Cyst, baker's knee, right 11/22/2017    Panniculitis 11/21/2017    Normal coronary arteries 07/30/2014    Diabetes ([address]     Hypertension     SOB (shortness of breath) 07/16/2014    DIOP (dyspnea on exertion) 07/16/2014     Principal Problem:    Right anterior knee pain (11/22/2017)    Active Problems:    Panniculitis (11/21/2017)      Cyst, baker's knee, right (11/22/2017)        Plan:   Xray pending  Pain could be bursitis vs arthritis flare  Low suspicion for septic knee  Pain not in the area of the bakers cyst  Asymptomatic bakers cyst   Will check xray and when infection is under control may benefit from steroids  conservative management for now  Ice elevate and non steroidal if she can tolerate   Dr. Radha Castanon aware and agree with above plan.       Cat Rangel, [id number]

## 2017-11-23 LAB
ALBUMIN SERPL-MCNC: 2.6 G/DL (ref 3.5–5)
ALBUMIN/GLOB SERPL: 0.7 {RATIO} (ref 1.1–2.2)
ALP SERPL-CCNC: 91 U/L (ref 45–117)
ALT SERPL-CCNC: 26 U/L (ref 12–78)
ANION GAP SERPL CALC-SCNC: 7 MMOL/L (ref 5–15)
AST SERPL-CCNC: 31 U/L (ref 15–37)
BASOPHILS # BLD: 0 K/UL (ref 0–0.1)
BASOPHILS NFR BLD: 1 % (ref 0–1)
BILIRUB SERPL-MCNC: 0.3 MG/DL (ref 0.2–1)
BUN SERPL-MCNC: 16 MG/DL (ref 6–20)
BUN/CREAT SERPL: 18 (ref 12–20)
CALCIUM SERPL-MCNC: 8.9 MG/DL (ref 8.5–10.1)
CHLORIDE SERPL-SCNC: 107 MMOL/L (ref 97–108)
CO2 SERPL-SCNC: 27 MMOL/L (ref 21–32)
CREAT SERPL-MCNC: 0.89 MG/DL (ref 0.55–1.02)
EOSINOPHIL # BLD: 0.2 K/UL (ref 0–0.4)
EOSINOPHIL NFR BLD: 3 % (ref 0–7)
ERYTHROCYTE [DISTWIDTH] IN BLOOD BY AUTOMATED COUNT: 15.3 % (ref 11.5–14.5)
GLOBULIN SER CALC-MCNC: 3.7 G/DL (ref 2–4)
GLUCOSE BLD STRIP.AUTO-MCNC: 104 MG/DL (ref 65–100)
GLUCOSE BLD STRIP.AUTO-MCNC: 114 MG/DL (ref 65–100)
GLUCOSE BLD STRIP.AUTO-MCNC: 60 MG/DL (ref 65–100)
GLUCOSE BLD STRIP.AUTO-MCNC: 65 MG/DL (ref 65–100)
GLUCOSE BLD STRIP.AUTO-MCNC: 89 MG/DL (ref 65–100)
GLUCOSE BLD STRIP.AUTO-MCNC: 97 MG/DL (ref 65–100)
GLUCOSE SERPL-MCNC: 111 MG/DL (ref 65–100)
HCT VFR BLD AUTO: 36 % (ref 35–47)
HGB BLD-MCNC: 10.8 G/DL (ref 11.5–16)
LYMPHOCYTES # BLD: 1.7 K/UL (ref 0.8–3.5)
LYMPHOCYTES NFR BLD: 32 % (ref 12–49)
MAGNESIUM SERPL-MCNC: 1.9 MG/DL (ref 1.6–2.4)
MCH RBC QN AUTO: 24.9 PG (ref 26–34)
MCHC RBC AUTO-ENTMCNC: 30 G/DL (ref 30–36.5)
MCV RBC AUTO: 82.9 FL (ref 80–99)
MONOCYTES # BLD: 0.4 K/UL (ref 0–1)
MONOCYTES NFR BLD: 8 % (ref 5–13)
NEUTS SEG # BLD: 3 K/UL (ref 1.8–8)
NEUTS SEG NFR BLD: 56 % (ref 32–75)
PLATELET # BLD AUTO: 164 K/UL (ref 150–400)
POTASSIUM SERPL-SCNC: 4.2 MMOL/L (ref 3.5–5.1)
PROT SERPL-MCNC: 6.3 G/DL (ref 6.4–8.2)
RBC # BLD AUTO: 4.34 M/UL (ref 3.8–5.2)
SERVICE CMNT-IMP: ABNORMAL
SERVICE CMNT-IMP: NORMAL
SODIUM SERPL-SCNC: 141 MMOL/L (ref 136–145)
WBC # BLD AUTO: 5.4 K/UL (ref 3.6–11)

## 2017-11-23 PROCEDURE — 83735 ASSAY OF MAGNESIUM: CPT | Performed by: INTERNAL MEDICINE

## 2017-11-23 PROCEDURE — 80053 COMPREHEN METABOLIC PANEL: CPT | Performed by: INTERNAL MEDICINE

## 2017-11-23 PROCEDURE — 85025 COMPLETE CBC W/AUTO DIFF WBC: CPT | Performed by: INTERNAL MEDICINE

## 2017-11-23 PROCEDURE — 74011250637 HC RX REV CODE- 250/637: Performed by: INTERNAL MEDICINE

## 2017-11-23 PROCEDURE — 65660000000 HC RM CCU STEPDOWN

## 2017-11-23 PROCEDURE — 82962 GLUCOSE BLOOD TEST: CPT

## 2017-11-23 PROCEDURE — 74011250636 HC RX REV CODE- 250/636: Performed by: INTERNAL MEDICINE

## 2017-11-23 PROCEDURE — 36415 COLL VENOUS BLD VENIPUNCTURE: CPT | Performed by: INTERNAL MEDICINE

## 2017-11-23 PROCEDURE — 74011000250 HC RX REV CODE- 250: Performed by: INTERNAL MEDICINE

## 2017-11-23 RX ORDER — BUSPIRONE HYDROCHLORIDE 10 MG/1
15 TABLET ORAL
Status: DISCONTINUED | OUTPATIENT
Start: 2017-11-23 | End: 2017-11-24 | Stop reason: HOSPADM

## 2017-11-23 RX ORDER — BUSPIRONE HYDROCHLORIDE 10 MG/1
30 TABLET ORAL EVERY 12 HOURS
Status: DISCONTINUED | OUTPATIENT
Start: 2017-11-23 | End: 2017-11-24 | Stop reason: HOSPADM

## 2017-11-23 RX ORDER — BUTALBITAL, ACETAMINOPHEN AND CAFFEINE 50; 325; 40 MG/1; MG/1; MG/1
2 TABLET ORAL
Status: DISCONTINUED | OUTPATIENT
Start: 2017-11-23 | End: 2017-11-24 | Stop reason: HOSPADM

## 2017-11-23 RX ORDER — SODIUM CHLORIDE 9 MG/ML
50 INJECTION, SOLUTION INTRAVENOUS CONTINUOUS
Status: DISCONTINUED | OUTPATIENT
Start: 2017-11-23 | End: 2017-11-24 | Stop reason: HOSPADM

## 2017-11-23 RX ORDER — DICLOFENAC SODIUM 25 MG/1
50 TABLET, DELAYED RELEASE ORAL 2 TIMES DAILY WITH MEALS
Status: DISCONTINUED | OUTPATIENT
Start: 2017-11-23 | End: 2017-11-24 | Stop reason: HOSPADM

## 2017-11-23 RX ORDER — HYDROCODONE BITARTRATE AND ACETAMINOPHEN 7.5; 325 MG/1; MG/1
1 TABLET ORAL
Status: DISCONTINUED | OUTPATIENT
Start: 2017-11-23 | End: 2017-11-24 | Stop reason: HOSPADM

## 2017-11-23 RX ADMIN — LEVOTHYROXINE SODIUM 112 MCG: 112 TABLET ORAL at 06:30

## 2017-11-23 RX ADMIN — SODIUM CHLORIDE 10 MG: 9 INJECTION INTRAMUSCULAR; INTRAVENOUS; SUBCUTANEOUS at 12:18

## 2017-11-23 RX ADMIN — HYDROMORPHONE HYDROCHLORIDE 4 MG: 2 TABLET ORAL at 02:34

## 2017-11-23 RX ADMIN — GLIMEPIRIDE 2 MG: 1 TABLET ORAL at 09:01

## 2017-11-23 RX ADMIN — VALSARTAN 40 MG: 40 TABLET ORAL at 08:37

## 2017-11-23 RX ADMIN — POTASSIUM CHLORIDE 10 MEQ: 750 TABLET, FILM COATED, EXTENDED RELEASE ORAL at 20:46

## 2017-11-23 RX ADMIN — ACETAMINOPHEN 650 MG: 325 TABLET ORAL at 02:35

## 2017-11-23 RX ADMIN — BUSPIRONE HYDROCHLORIDE 30 MG: 10 TABLET ORAL at 08:40

## 2017-11-23 RX ADMIN — Medication 10 ML: at 06:29

## 2017-11-23 RX ADMIN — MICONAZOLE NITRATE: 20 CREAM TOPICAL at 17:43

## 2017-11-23 RX ADMIN — QUETIAPINE FUMARATE 400 MG: 100 TABLET ORAL at 20:48

## 2017-11-23 RX ADMIN — BUSPIRONE HYDROCHLORIDE 15 MG: 10 TABLET ORAL at 12:17

## 2017-11-23 RX ADMIN — HYDROCODONE BITARTRATE AND ACETAMINOPHEN 1 TABLET: 7.5; 325 TABLET ORAL at 08:44

## 2017-11-23 RX ADMIN — FLUCONAZOLE 200 MG: 2 INJECTION INTRAVENOUS at 10:57

## 2017-11-23 RX ADMIN — BUSPIRONE HYDROCHLORIDE 30 MG: 10 TABLET ORAL at 20:45

## 2017-11-23 RX ADMIN — SODIUM CHLORIDE 50 ML/HR: 900 INJECTION, SOLUTION INTRAVENOUS at 12:21

## 2017-11-23 RX ADMIN — CLONAZEPAM 0.5 MG: 0.5 TABLET ORAL at 20:53

## 2017-11-23 RX ADMIN — CYANOCOBALAMIN TAB 500 MCG 1000 MCG: 500 TAB at 08:39

## 2017-11-23 RX ADMIN — BUPROPION HYDROCHLORIDE 300 MG: 150 TABLET, FILM COATED, EXTENDED RELEASE ORAL at 20:44

## 2017-11-23 RX ADMIN — PANTOPRAZOLE SODIUM 40 MG: 40 TABLET, DELAYED RELEASE ORAL at 08:37

## 2017-11-23 RX ADMIN — DICLOFENAC SODIUM 50 MG: 25 TABLET, DELAYED RELEASE ORAL at 08:40

## 2017-11-23 RX ADMIN — TOPIRAMATE 200 MG: 100 TABLET ORAL at 20:49

## 2017-11-23 RX ADMIN — VANCOMYCIN HYDROCHLORIDE 2500 MG: 10 INJECTION, POWDER, LYOPHILIZED, FOR SOLUTION INTRAVENOUS at 02:35

## 2017-11-23 RX ADMIN — ACETAMINOPHEN 650 MG: 325 TABLET ORAL at 06:29

## 2017-11-23 RX ADMIN — PRAVASTATIN SODIUM 40 MG: 40 TABLET ORAL at 20:47

## 2017-11-23 RX ADMIN — ENOXAPARIN SODIUM 40 MG: 40 INJECTION SUBCUTANEOUS at 20:45

## 2017-11-23 RX ADMIN — ONDANSETRON 4 MG: 2 INJECTION INTRAMUSCULAR; INTRAVENOUS at 06:29

## 2017-11-23 RX ADMIN — PRAZOSIN HYDROCHLORIDE 3 MG: 1 CAPSULE ORAL at 20:47

## 2017-11-23 RX ADMIN — MICONAZOLE NITRATE: 20 CREAM TOPICAL at 12:27

## 2017-11-23 RX ADMIN — ENOXAPARIN SODIUM 40 MG: 40 INJECTION SUBCUTANEOUS at 08:44

## 2017-11-23 RX ADMIN — SODIUM CHLORIDE 10 MG: 9 INJECTION INTRAMUSCULAR; INTRAVENOUS; SUBCUTANEOUS at 20:53

## 2017-11-23 RX ADMIN — BUTALBITAL, ACETAMINOPHEN AND CAFFEINE 2 TABLET: 50; 325; 40 TABLET ORAL at 12:17

## 2017-11-23 RX ADMIN — HYDROCODONE BITARTRATE AND ACETAMINOPHEN 1 TABLET: 7.5; 325 TABLET ORAL at 14:57

## 2017-11-23 RX ADMIN — METOPROLOL SUCCINATE 25 MG: 25 TABLET, EXTENDED RELEASE ORAL at 08:37

## 2017-11-23 RX ADMIN — Medication 10 ML: at 20:50

## 2017-11-23 RX ADMIN — BUTALBITAL, ACETAMINOPHEN AND CAFFEINE 2 TABLET: 50; 325; 40 TABLET ORAL at 20:50

## 2017-11-23 RX ADMIN — ONDANSETRON 4 MG: 2 INJECTION INTRAMUSCULAR; INTRAVENOUS at 02:35

## 2017-11-23 RX ADMIN — DICLOFENAC SODIUM 50 MG: 25 TABLET, DELAYED RELEASE ORAL at 17:40

## 2017-11-23 RX ADMIN — Medication 10 ML: at 15:01

## 2017-11-23 RX ADMIN — GABAPENTIN 100 MG: 100 CAPSULE ORAL at 20:50

## 2017-11-23 RX ADMIN — ONDANSETRON 4 MG: 2 INJECTION INTRAMUSCULAR; INTRAVENOUS at 10:38

## 2017-11-23 RX ADMIN — HYDROCODONE BITARTRATE AND ACETAMINOPHEN 1 TABLET: 7.5; 325 TABLET ORAL at 18:52

## 2017-11-23 RX ADMIN — ERGOCALCIFEROL 50000 UNITS: 1.25 CAPSULE ORAL at 08:37

## 2017-11-23 NOTE — PROGRESS NOTES
Oncology Nursing Communication Tool  7:05 PM  11/23/2017     Bedside and Verbal shift change report given to Eb Schwarz RN (incoming nurse) by Heri Chris (outgoing nurse) on [facility]. Report included the following information SBAR, Kardex, Intake/Output, MAR, Accordion and Recent Results. Significant changes during shift: Patient had episode of nausea and vomiting that was unrelieved by zofran. Patient was NPO for the afternoon. Patient was also treated for hypoglycemia this evening and taken off NPO. Patient needs new IV, oncoming RN aware. Issues for physician to address: Patient continues with leg pain. Oncology Shift Note   Admission Date 11/21/2017   Admission Diagnosis Panniculitis   Code Status Full Code   Consults IP CONSULT TO ORTHOPEDIC SURGERY      Cardiac Monitoring [] Yes [x] No      Purposeful Hourly Rounding [x] Yes    Neda Score Total Score: 2   Neda score 3 or > [] Bed Alarm [] Avasys [] 1:1 sitter [] Patient refused (Place signed refusal form in chart)      Pain Managed [] Yes [x] No    Key Pain Meds             HYDROcodone-acetaminophen (NORCO) 7.5-325 mg per tablet  (Taking) Take 1 Tab by mouth every six (6) hours as needed for Pain. Influenza Vaccine Received Flu Vaccine for Current Season (usually Sept-March): Yes           Oxygen needs?  [x] Room air Oxygen @  []1L    []2L    []3L   []4L    []5L   []6L     Use home O2? [] Yes [] No  Perform O2 challenge test using  smartphrase (.Homeoxygen)      Last bowel movement Last Bowel Movement Date: 11/20/17      Urinary Catheter             LDAs               Peripheral IV 11/22/17 Right Forearm (Active)   Site Assessment Clean, dry, & intact 11/23/2017  3:00 PM   Phlebitis Assessment 0 11/23/2017  3:00 PM   Infiltration Assessment 0 11/23/2017  3:00 PM   Dressing Status Clean, dry, & intact 11/23/2017  3:00 PM   Dressing Type Tape;Transparent 11/23/2017  3:00 PM   Hub Color/Line Status Infusing;Blue 11/23/2017  3:00 PM                         Readmission Risk Assessment Tool Score Low Risk            2       Total Score        2 Charlson Comorbidity Score (Age + Comorbid Conditions)        Criteria that do not apply:    Has Seen PCP in Last 6 Months (Yes=3, No=0)    . Living with Significant Other. Assisted Living. LTAC. SNF. or   Rehab    Patient Length of Stay (>5 days = 3)    IP Visits Last 12 Months (1-3=4, 4=9, >4=11)    Pt. Coverage (Medicare=5 , Medicaid, or Self-Pay=4)       Expected Length of Stay 2d 21h   Actual Length of Stay 2          Opportunity for questions and clarifications were given to the incoming nurse. Patient's bed is in low position, side rails x2, door open PRN, call bell within reach and patient not in distress.       Heri Chris

## 2017-11-23 NOTE — PROGRESS NOTES
RN contacted Dr. Debora Khoury to report patient continues with nausea despite zofran and patient is also complaining of headache. RN advised to keep patient NPO for now and to administer Norco as prescribed for headache. RN will continue to monitor patient.

## 2017-11-23 NOTE — PROGRESS NOTES
Problem: Falls - Risk of  Goal: *Absence of Falls  Document Neda Fall Risk and appropriate interventions in the flowsheet.    Outcome: Progressing Towards Goal  Fall Risk Interventions:  Mobility Interventions: Patient to call before getting OOB, Utilize walker, cane, or other assitive device         Medication Interventions: Evaluate medications/consider consulting pharmacy, Patient to call before getting OOB, Teach patient to arise slowly

## 2017-11-23 NOTE — PROGRESS NOTES
Hospitalist Progress Note    NAME: Mckenna Sheriff   :  1960   MRN:  [id number]       Assessment / Plan:  Panniculitis/Intertrigo: Failed outpatient therapy as her cellulitis has only worsened over the last 5 days since starting Bactrim, D/c Vancomyci, c/w  Diflucan, add Nystatin topical  Right Leg Pain: Doppler indicative of Bakers cyst: Ortho help appreciated, will plan for D/c tomorrow home with home health, add Diclofenac  Diabetes: continue home Glipizide and  metformin, HBA1C 6.3  Hypothyroidism: continue home Levothyroxine  Anxiety/Depression: c/w Wellbutrin, Buspar, Seroquel  Seizure Disorder:  C/w Topamax  Hypertension: continue home Metoprolol and Losartan  Super Morbid Obesity: c/w diabetic diet, needs bariatric treatment. Polypharmacy: medication reconciled  Surrogate Decision Maker:   Baseline: Walks with cane  Body mass index is 70.65 kg/(m^2). Code status: Full  Prophylaxis: Lovenox  Recommended Disposition: Home with Home Health services     Subjective:     Chief Complaint / Reason for Physician Visit  \"I feel nauseous\". Discussed with RN events overnight. Review of Systems:  Symptom Y/N Comments  Symptom Y/N Comments   Fever/Chills    Chest Pain     Poor Appetite    Edema     Cough    Abdominal Pain     Sputum    Joint Pain y    SOB/DIOP    Pruritis/Rash     Nausea/vomit y   Tolerating PT/OT     Diarrhea    Tolerating Diet y    Constipation    Other       Could NOT obtain due to:      Objective:     VITALS:   Last 24hrs VS reviewed since prior progress note. Most recent are:  Patient Vitals for the past 24 hrs:   Temp Pulse Resp BP SpO2   17 2319 98 °F (36.7 °C) 80 16 113/46 95 %   17 1933 98.6 °F (37 °C) (!) 114 17 139/71 94 %   17 1444 98.3 °F (36.8 °C) 77 16 105/62 (!) 89 %   17 1113 98.5 °F (36.9 °C) 72 16 127/78 94 %     No intake or output data in the 24 hours ending 17 0815     PHYSICAL EXAM:  General: WD, WN.  Alert, cooperative, in pain    EENT:  EOMI. Anicteric sclerae. MMM  Resp:  Coarse BS  CV:  Regular  rhythm,  No edema  GI:  Soft, Non distended, Non tender.  +Bowel sounds  Neurologic:  Alert and oriented X 3, normal speech,   Psych:   Good insight. Not anxious nor agitated  Skin:  No rashes. No jaundice    Reviewed most current lab test results and cultures  YES  Reviewed most current radiology test results   YES  Review and summation of old records today    NO  Reviewed patient's current orders and MAR    YES  PMH/SH reviewed - no change compared to H&P  ________________________________________________________________________  Care Plan discussed with:    Comments   Patient y    Family      RN y    Care Manager     Consultant                        Multidiciplinary team rounds were held today with , nursing, pharmacist and clinical coordinator. Patient's plan of care was discussed; medications were reviewed and discharge planning was addressed. ________________________________________________________________________  Total NON critical care TIME: 25  Minutes    Total CRITICAL CARE TIME Spent:   Minutes non procedure based      Comments   >50% of visit spent in counseling and coordination of care y    ________________________________________________________________________  Adrian Mckeon MD     Procedures: see electronic medical records for all procedures/Xrays and details which were not copied into this note but were reviewed prior to creation of Plan. LABS:  I reviewed today's most current labs and imaging studies.   Pertinent labs include:  Recent Labs      11/23/17   0237  11/22/17   0525  11/21/17   1322   WBC  5.4  7.2  7.4   HGB  10.8*  11.1*  11.6   HCT  36.0  37.0  38.0   PLT  164  164  172     Recent Labs      11/23/17   0237  11/22/17   0525  11/21/17   1322   NA  141  138  140   K  4.2  4.2  3.7   CL  107  106  106   CO2  27  27  27   GLU  111*  111*  93   BUN  16  15  14   CREA  0.89  0.94  0.85 CA  8.9  8.9  9.8   MG  1.9   --    --    ALB  2.6*  2.9*   --    TBILI  0.3  0.3   --    SGOT  31  35   --    ALT  26  27   --        Signed: Jj Thomas MD

## 2017-11-23 NOTE — PROGRESS NOTES
*CM acknowledged consult*. CM followed up with [facility], referral still pending. CM will continue to follow up with home health agency in regards to pt's d/c needs.     BARBARA Allen CM  [phone]

## 2017-11-23 NOTE — PROGRESS NOTES
Patients blood glucose noted to be 60, pt given 4 oz orange juice. 1704:  RN rechecked patients blood glucose, now 65, pt given 4 more ounces of orange juice. 1717:  RN rechecked patients blood glucose, now 89. Patient given 3 devaughn crackers and peanut butter. Patient is ordering dinner. RN will continue to monitor.

## 2017-11-24 VITALS
HEART RATE: 65 BPM | HEIGHT: 62 IN | OXYGEN SATURATION: 94 % | DIASTOLIC BLOOD PRESSURE: 63 MMHG | TEMPERATURE: 98.1 F | BODY MASS INDEX: 53.92 KG/M2 | RESPIRATION RATE: 16 BRPM | WEIGHT: 293 LBS | SYSTOLIC BLOOD PRESSURE: 112 MMHG

## 2017-11-24 LAB
GLUCOSE BLD STRIP.AUTO-MCNC: 130 MG/DL (ref 65–100)
GLUCOSE BLD STRIP.AUTO-MCNC: 131 MG/DL (ref 65–100)
SERVICE CMNT-IMP: ABNORMAL
SERVICE CMNT-IMP: ABNORMAL

## 2017-11-24 PROCEDURE — 74011250637 HC RX REV CODE- 250/637: Performed by: INTERNAL MEDICINE

## 2017-11-24 PROCEDURE — 74011250636 HC RX REV CODE- 250/636: Performed by: INTERNAL MEDICINE

## 2017-11-24 PROCEDURE — 82962 GLUCOSE BLOOD TEST: CPT

## 2017-11-24 RX ORDER — FLUCONAZOLE 200 MG/1
200 TABLET ORAL DAILY
Qty: 7 TAB | Refills: 0 | Status: SHIPPED | OUTPATIENT
Start: 2017-11-24 | End: 2017-12-01

## 2017-11-24 RX ORDER — GABAPENTIN 100 MG/1
100 CAPSULE ORAL 3 TIMES DAILY
Qty: 90 CAP | Refills: 1 | Status: SHIPPED | OUTPATIENT
Start: 2017-11-24 | End: 2021-11-29

## 2017-11-24 RX ORDER — DICLOFENAC SODIUM 50 MG/1
50 TABLET, DELAYED RELEASE ORAL
Qty: 30 TAB | Refills: 0 | Status: ON HOLD | OUTPATIENT
Start: 2017-11-24 | End: 2019-01-01

## 2017-11-24 RX ORDER — BUTALBITAL, ACETAMINOPHEN AND CAFFEINE 50; 325; 40 MG/1; MG/1; MG/1
2 TABLET ORAL
Qty: 30 TAB | Refills: 0 | Status: ON HOLD | OUTPATIENT
Start: 2017-11-24 | End: 2019-01-01

## 2017-11-24 RX ORDER — ONDANSETRON 4 MG/1
4 TABLET, ORALLY DISINTEGRATING ORAL
Qty: 20 TAB | Refills: 0 | Status: SHIPPED | OUTPATIENT
Start: 2017-11-24 | End: 2018-12-26

## 2017-11-24 RX ORDER — BUSPIRONE HYDROCHLORIDE 15 MG/1
15 TABLET ORAL
Qty: 150 TAB | Refills: 1 | Status: ON HOLD | OUTPATIENT
Start: 2017-11-24 | End: 2019-01-01 | Stop reason: CLARIF

## 2017-11-24 RX ORDER — OXYCODONE AND ACETAMINOPHEN 7.5; 325 MG/1; MG/1
1 TABLET ORAL
Qty: 30 TAB | Refills: 0 | Status: ON HOLD | OUTPATIENT
Start: 2017-11-24 | End: 2019-01-01

## 2017-11-24 RX ORDER — NYSTATIN 100MM UNIT
1 POWDER (EA) MISCELLANEOUS 3 TIMES DAILY
Qty: 2 EACH | Refills: 1 | Status: SHIPPED | OUTPATIENT
Start: 2017-11-24 | End: 2018-12-26

## 2017-11-24 RX ADMIN — FLUCONAZOLE 200 MG: 2 INJECTION INTRAVENOUS at 10:51

## 2017-11-24 RX ADMIN — BUSPIRONE HYDROCHLORIDE 30 MG: 10 TABLET ORAL at 08:37

## 2017-11-24 RX ADMIN — HYDROCODONE BITARTRATE AND ACETAMINOPHEN 1 TABLET: 7.5; 325 TABLET ORAL at 09:26

## 2017-11-24 RX ADMIN — VALSARTAN 40 MG: 40 TABLET ORAL at 08:37

## 2017-11-24 RX ADMIN — HYDROCODONE BITARTRATE AND ACETAMINOPHEN 1 TABLET: 7.5; 325 TABLET ORAL at 05:30

## 2017-11-24 RX ADMIN — HYDROCODONE BITARTRATE AND ACETAMINOPHEN 1 TABLET: 7.5; 325 TABLET ORAL at 13:03

## 2017-11-24 RX ADMIN — PANTOPRAZOLE SODIUM 40 MG: 40 TABLET, DELAYED RELEASE ORAL at 08:38

## 2017-11-24 RX ADMIN — CYANOCOBALAMIN TAB 500 MCG 1000 MCG: 500 TAB at 08:38

## 2017-11-24 RX ADMIN — Medication 10 ML: at 05:34

## 2017-11-24 RX ADMIN — BUSPIRONE HYDROCHLORIDE 15 MG: 10 TABLET ORAL at 13:00

## 2017-11-24 RX ADMIN — MICONAZOLE NITRATE: 20 CREAM TOPICAL at 08:38

## 2017-11-24 RX ADMIN — METOPROLOL SUCCINATE 25 MG: 25 TABLET, EXTENDED RELEASE ORAL at 08:38

## 2017-11-24 RX ADMIN — DICLOFENAC SODIUM 50 MG: 25 TABLET, DELAYED RELEASE ORAL at 08:37

## 2017-11-24 RX ADMIN — ENOXAPARIN SODIUM 40 MG: 40 INJECTION SUBCUTANEOUS at 08:37

## 2017-11-24 RX ADMIN — BUTALBITAL, ACETAMINOPHEN AND CAFFEINE 2 TABLET: 50; 325; 40 TABLET ORAL at 08:37

## 2017-11-24 RX ADMIN — LEVOTHYROXINE SODIUM 112 MCG: 112 TABLET ORAL at 08:37

## 2017-11-24 RX ADMIN — Medication 10 ML: at 13:02

## 2017-11-24 RX ADMIN — GLIMEPIRIDE 2 MG: 1 TABLET ORAL at 08:37

## 2017-11-24 RX ADMIN — GABAPENTIN 100 MG: 100 CAPSULE ORAL at 08:37

## 2017-11-24 NOTE — DISCHARGE INSTRUCTIONS
HOSPITALIST DISCHARGE INSTRUCTIONS  NAME: Clint Ellis   :  1960   MRN:  [id number]     Date/Time:  2017 10:23 AM    ADMIT DATE: 2017     DISCHARGE DATE: 2017     ADMITTING DIAGNOSIS:Panniculitis, Intertrigo, Right leg Pain/Baker's Cyst, Diabetes, Hypothyroidism, Anxiety/Depression, Seizure Disorder, Super Morbid Obesity      MEDICATIONS:                As per medication reconciliation    · It is important that you take the medication exactly as they are prescribed. · Keep your medication in the bottles provided by the pharmacist and keep a list of the medication names, dosages, and times to be taken in your wallet. · Do not take other medications without consulting your doctor. Pain Management: per above medications    What to do at Home    Recommended diet:  Cardiac Diet and Diabetic Diet    Recommended activity: Activity as tolerated    If you experience any of the following symptoms then please call your primary care physician or return to the emergency room if you cannot get hold of your doctor:  Fever, chills, nausea, vomiting, diarrhea, change in mentation, falling, bleeding, shortness of breath. Follow Up:   PCP you are to call and set up an appointment to see them in 2 week. F/U [facility]      Information obtained by :  I understand that if any problems occur once I am at home I am to contact my physician. I understand and acknowledge receipt of the instructions indicated above.                                                                                                                                            Physician's or R.N.'s Signature                                                                  Date/Time                                                                                                                                              Patient or Representative Signature Date/Time

## 2017-11-24 NOTE — PROGRESS NOTES
Pt has dc orders and will need HH for Nursing, teaching, PT. Earlier in admission, pt selected [facility] and ref was sent. [facility] has been unable to reach pt's PCP today to get OK for [facility] - office is closed for holiday. Pt is able to do wound care and has cream for home use. [facility] will confirm with PCP on Monday,  and make first visit on Tues, . Pt and family aware. PCP appmt as reflected on AVS.    Pt for dc home with miriam.   BARBARA Beard

## 2017-11-24 NOTE — PROGRESS NOTES
Oncology Nursing Communication Tool  8:18 AM  11/24/2017     Bedside shift change report given to LILLIAN Mac RN (incoming nurse) by Anne Browne RN (outgoing nurse) on [facility]. Report included the following information SBAR, Kardex, Intake/Output, MAR, Accordion, Recent Results and Med Rec Status. Significant changes during shift: None      Issues for physician to address: None         Oncology Shift Note   Admission Date 11/21/2017   Admission Diagnosis Panniculitis   Code Status Full Code   Consults IP CONSULT TO ORTHOPEDIC SURGERY      Cardiac Monitoring [] Yes [x] No      Purposeful Hourly Rounding [x] Yes    Neda Score Total Score: 2   Neda score 3 or > [] Bed Alarm [] Avasys [] 1:1 sitter [] Patient refused (Place signed refusal form in chart)      Pain Managed [x] Yes [] No    Key Pain Meds             HYDROcodone-acetaminophen (NORCO) 7.5-325 mg per tablet  (Taking) Take 1 Tab by mouth every six (6) hours as needed for Pain. Influenza Vaccine Received Flu Vaccine for Current Season (usually Sept-March): Yes           Oxygen needs?  [x] Room air Oxygen @  []1L    []2L    []3L   []4L    []5L   []6L     Use home O2? [] Yes [x] No  Perform O2 challenge test using  smartphrase (.Homeoxygen)      Last bowel movement Last Bowel Movement Date: 11/20/17      Urinary Catheter             LDAs               Peripheral IV 11/22/17 Right Forearm (Active)   Site Assessment Clean, dry, & intact 11/24/2017  3:07 AM   Phlebitis Assessment 0 11/24/2017  3:07 AM   Infiltration Assessment 0 11/24/2017  3:07 AM   Dressing Status Clean, dry, & intact 11/24/2017  3:07 AM   Dressing Type Tape;Transparent 11/24/2017  3:07 AM   Hub Color/Line Status Blue;Capped;Flushed;Patent 11/24/2017  3:07 AM                         Readmission Risk Assessment Tool Score Low Risk            2       Total Score        2 Charlson Comorbidity Score (Age + Comorbid Conditions)        Criteria that do not apply:    Has Seen PCP in Last 6 Months (Yes=3, No=0)    . Living with Significant Other. Assisted Living. LTAC. SNF. or   Rehab    Patient Length of Stay (>5 days = 3)    IP Visits Last 12 Months (1-3=4, 4=9, >4=11)    Pt. Coverage (Medicare=5 , Medicaid, or Self-Pay=4)       Expected Length of Stay 2d 21h   Actual Length of Stay 3          Opportunity for questions and clarifications were given to the incoming nurse. Patient's bed is in low position, side rails x2, door open PRN, call bell within reach and patient not in distress.       India Banuelos RN

## 2017-11-24 NOTE — DIABETES MGMT
DTC Progress Note    Recommendations/ Comments: Chart reviewed for hypoglycemia- per RN, patient with n/v yesterday and was also NPO- please encourage po intake    Chart reviewed on [address]. Patient is a 62 y.o. female with known diabetes on Glimepiride and Metformin at home. A1c:   Lab Results   Component Value Date/Time    Hemoglobin A1c 6.3 11/22/2017 05:25 AM       Recent Glucose Results: Lab Results   Component Value Date/Time    GLUCPOC 131 (H) 11/24/2017 08:12 AM    GLUCPOC 104 (H) 11/23/2017 08:54 PM    GLUCPOC 89 11/23/2017 05:18 PM        Lab Results   Component Value Date/Time    Creatinine 0.89 11/23/2017 02:37 AM     Estimated Creatinine Clearance: 110.2 mL/min (based on Cr of 0.89). Active Orders   Diet    DIET CARDIAC Regular; Consistent Carb 1800kcal        PO intake: Patient Vitals for the past 72 hrs:   % Diet Eaten   11/23/17 1854 100 %       Current hospital DM medication: Lispro Correctional insulin with normal sensitivity    Will continue to follow as needed. Thank you.   Yoselin Chilel, [address] [place] [address], [address]  Office:  [phone]

## 2017-11-24 NOTE — DISCHARGE SUMMARY
Hospitalist Discharge Summary     Patient ID:  Marycruz Mena  [id number]  55 y.o.  1960    PCP on record: Bernadine aCputo NP    Admit date: 11/21/2017  Discharge date and time: 11/24/2017      DISCHARGE DIAGNOSIS:    Panniculitis, Intertrigo, Right leg Pain/Baker's Cyst, Diabetes, Hypothyroidism, Anxiety/Depression, Seizure Disorder, Super Morbid Obesity      CONSULTATIONS:  IP CONSULT TO ORTHOPEDIC SURGERY    Excerpted HPI from H&P of Laina Mcmahon MD:  Aimee Betancourt is a 62 y.o.  female who presents with panniculitis in her right groin. Patient reports that she started having pain about last Thursday (5 days PTA) and went to her PCP where she was diagnosed with panniculitis. Patient was started on Bacrtim. Patient reports compliance with her antibiotic regimen. She had a follow-up appointment today and since her infection appeared to be getting worse her PCP referred her to the ED for admission since she failed outpatient therapy. Patient denies fevers but has had chills. She reports nausea but denies vomiting. She denies any CP or SOB or Abdominal pain. She reports right inguinal burning and states that she has not got any relief from this pain. Patient also complaining of pain behind her right knee and in her upper calf. In the ED patient was started on Vancomycin and RLE doppler was obtained which showed complex bakers cyst.  Patient has been applying Nystatin powder, Clotrimazole and Triamcinolone to her right inguinal rash. We were asked to admit for work up and evaluation of the above problems. ______________________________________________________________________  DISCHARGE SUMMARY/HOSPITAL COURSE:  for full details see H&P, daily progress notes, labs, consult notes. Panniculitis/Intertrigo:  Failed outpatient therapy as her cellulitis has only worsened over the last 5 days since starting Bactrim, D/c Vancomycin, c/w  Diflucan, add Nystatin topical  Right Leg Pain: Doppler indicative of Bakers cyst: Ortho help appreciated, will plan for D/c tomorrow home with home health, add Diclofenac  Diabetes: continue home Glipizide and  metformin, HBA1C 6.3  Hypothyroidism: continue home Levothyroxine  Anxiety/Depression: c/w Wellbutrin, Buspar, Seroquel  Seizure Disorder:  C/w Topamax  Hypertension: continue home Metoprolol and Losartan  Super Morbid Obesity: c/w diabetic diet, needs bariatric treatment. Polypharmacy: medication reconciled  Surrogate Decision Maker:   Baseline: Walks with cane  Body mass index is 70.65 kg/(m^2). Code status: Full  Prophylaxis: Lovenox  Recommended Disposition: Home with Home Health services    D/c Home with [address]  And F/U with PCP and [facility]    _______________________________________________________________________  Patient seen and examined by me on discharge day. Pertinent Findings:  Gen:    Not in distress  Chest: Clear lungs  CVS:   Regular rhythm. No edema  Abd:  Soft, not distended, not tender  Neuro:  Alert, GCS 15    Home Health Care Discharge Planning: [facility]  Face to Face Encounter      NAME: Pastor Hunter   :  1960   MRN:  [id number]     Primary Diagnosis: Panniculitis, Intertrigo, Right leg Pain/Baker's Cyst, Diabetes, Hypothyroidism, Anxiety/Depression, Seizure Disorder, Super Morbid Obesity    Date of Face to Face:  2017 10:20 AM                                  Face to Face Encounter findings are related to primary reason for home care:   YES    1. I certify that the patient needs intermittent skilled nursing care, physical therapy and/or speech therapy. I will not be following this patient in the Community and Dr. Valdez Garcia, NP will be responsible for signing the [address] of Care. 2. Initial Orders for Care: Physical Therapy and Occupational Therapy    3.  I certify that this patient is homebound because of illness or injury, need the aid of supportive devices such as crutches, canes, wheelchairs, and walkers; the use of special transportation; or the assistance of another person in order to leave their place of residence. There exists a normal inability to leave home and leaving home requires a considerable and taxing effort. 4. I certify that this patient is under my care and that I had a Face-to-Face Encounter that meets the physician Face-to-Face Encounter requirements. Document the physical findings from the Face-to-Face Encounter that support the need for skilled services: Has new diagnosis that requires skilled nursing teaching and intervention , Has new medications that requires skilled nursing teaching and monitoring for understanding and compliance , Needs skilled safety assessment and interventions  and Has new finding of weakness and altered mobility that requires skilled physical/occupational and/or speech therapy services for evaluation and interventions. Patient is super morbidly obese, poor balance and poor endurance, high risk for falls          Julian Truong MD  Discharging Physician  Office: [phone]  Fax:   [phone]    _______________________________________________________________________  DISCHARGE MEDICATIONS:   Current Discharge Medication List      START taking these medications    Details   busPIRone (BUSPAR) 15 mg tablet Take 1 Tab by mouth Multiple. Take 2 tab po with breakfast  Take 1 tab po with lunch  Take 2 tab po with dinner  Qty: 150 Tab, Refills: 1      butalbital-acetaminophen-caffeine (FIORICET, ESGIC) -40 mg per tablet Take 2 Tabs by mouth every eight (8) hours as needed for Headache. Max Daily Amount: 6 Tabs. Qty: 30 Tab, Refills: 0      diclofenac EC (VOLTAREN) 50 mg EC tablet Take 1 Tab by mouth Multiple.  Take 1 tab po BID with meals for 1 week then rest for 1 week and can take again repeating same cycle  Qty: 30 Tab, Refills: 0      fluconazole (DIFLUCAN) 200 mg tablet Take 1 Tab by mouth daily for 7 days. FDA advises cautious prescribing of oral fluconazole in pregnancy. Qty: 7 Tab, Refills: 0      oxyCODONE-acetaminophen (PERCOCET) 7.5-325 mg per tablet Take 1 Tab by mouth every four (4) hours as needed for Pain. Max Daily Amount: 6 Tabs. Qty: 30 Tab, Refills: 0      nystatin, bulk, 15 billion unit powd 1 Units by Does Not Apply route three (3) times daily. To skin folds, ventral fold  Qty: 2 Each, Refills: 1         CONTINUE these medications which have CHANGED    Details   gabapentin (NEURONTIN) 100 mg capsule Take 1 Cap by mouth three (3) times daily. Qty: 90 Cap, Refills: 1      ondansetron (ZOFRAN ODT) 4 mg disintegrating tablet Take 1 Tab by mouth every eight (8) hours as needed for Nausea. Qty: 20 Tab, Refills: 0         CONTINUE these medications which have NOT CHANGED    Details   levothyroxine (SYNTHROID) 112 mcg tablet Take 112 mcg by mouth Daily (before breakfast). glimepiride (AMARYL) 2 mg tablet Take 2 mg by mouth every morning. metFORMIN (GLUMETZA ER) 500 mg TG24 24 hour tablet Take 500 mg by mouth daily. losartan (COZAAR) 25 mg tablet Take 25 mg by mouth daily. metoprolol succinate (TOPROL XL) 25 mg XL tablet Take 25 mg by mouth daily. cyanocobalamin (VITAMIN B-12) 1,000 mcg tablet Take 1,000 mcg by mouth daily. buPROPion XL (WELLBUTRIN XL) 150 mg tablet Take 150 mg by mouth every morning. prazosin (MINIPRESS) 1 mg capsule Take 3 mg by mouth nightly. QUEtiapine (SEROQUEL) 400 mg tablet Take 400 mg by mouth nightly. albuterol (PROAIR HFA) 90 mcg/actuation inhaler Take  by inhalation. albuterol (PROVENTIL VENTOLIN) 2.5 mg /3 mL (0.083 %) nebulizer solution by Nebulization route once. omeprazole (PRILOSEC) 20 mg capsule Take 40 mg by mouth daily. topiramate (TOPAMAX) 200 mg tablet Take 200 mg by mouth nightly. simvastatin (ZOCOR) 20 mg tablet Take 20 mg by mouth nightly.       clonazepam (KLONOPIN) 2 mg tablet Take 0.5 mg by mouth every twelve (12) hours as needed (panci attack). potassium chloride (KLOR-CON M10) 10 mEq tablet Take 10 mEq by mouth nightly. STOP taking these medications       prochlorperazine (COMPAZINE) 10 mg tablet Comments:   Reason for Stopping:         HYDROcodone-acetaminophen (NORCO) 7.5-325 mg per tablet Comments:   Reason for Stopping:         ergocalciferol (ERGOCALCIFEROL) 50,000 unit capsule Comments:   Reason for Stopping:               My Recommended Diet, Activity, Wound Care, and follow-up labs are listed in the patient's Discharge Insturctions which I have personally completed and reviewed.     _______________________________________________________________________  DISPOSITION:    Home with Family:    Home with HH/PT/OT/RN: y   SNF/LTC:    SHEA:    OTHER:        Condition at Discharge:  Stable  _______________________________________________________________________  Follow up with:   PCP : Patrick Brasher NP  Follow-up Information     Follow up With Details Comments [id number] [phone]          F/U PCP  F/U [facility]      Total time in minutes spent coordinating this discharge (includes going over instructions, follow-up, prescriptions, and preparing report for sign off to her PCP) :  35 minutes    Signed:  Leslie Preston MD

## 2017-11-24 NOTE — FACE TO FACE
Home Health Care Discharge Planning: [facility]  Face to Face Encounter      NAME: Babatunde Bell   :  1960   MRN:  [id number]     Primary Diagnosis: Panniculitis, Intertrigo, Right leg Pain/Baker's Cyst, Diabetes, Hypothyroidism, Anxiety/Depression, Seizure Disorder, Super Morbid Obesity    Date of Face to Face:  2017 10:20 AM                                  Face to Face Encounter findings are related to primary reason for home care:   YES    1. I certify that the patient needs intermittent skilled nursing care, physical therapy and/or speech therapy. I will not be following this patient in the Community and Dr. Judith Chaparro, NP will be responsible for signing the [address] of Care. 2. Initial Orders for Care: Physical Therapy and Occupational Therapy    3. I certify that this patient is homebound because of illness or injury, need the aid of supportive devices such as crutches, canes, wheelchairs, and walkers; the use of special transportation; or the assistance of another person in order to leave their place of residence. There exists a normal inability to leave home and leaving home requires a considerable and taxing effort. 4. I certify that this patient is under my care and that I had a Face-to-Face Encounter that meets the physician Face-to-Face Encounter requirements. Document the physical findings from the Face-to-Face Encounter that support the need for skilled services: Has new diagnosis that requires skilled nursing teaching and intervention , Has new medications that requires skilled nursing teaching and monitoring for understanding and compliance , Needs skilled safety assessment and interventions  and Has new finding of weakness and altered mobility that requires skilled physical/occupational and/or speech therapy services for evaluation and interventions.    Patient is super morbidly obese, poor balance and poor endurance, high risk for macie Deleon MD  Discharging Physician  Office: [phone]  Fax:   [phone]

## 2017-11-26 LAB
BACTERIA SPEC CULT: NORMAL
SERVICE CMNT-IMP: NORMAL

## 2017-11-27 ENCOUNTER — HOME HEALTH ADMISSION (OUTPATIENT)
Dept: HOME HEALTH SERVICES | Facility: HOME HEALTH | Age: 57
End: 2017-11-27
Payer: COMMERCIAL

## 2017-11-28 ENCOUNTER — HOME CARE VISIT (OUTPATIENT)
Dept: SCHEDULING | Facility: HOME HEALTH | Age: 57
End: 2017-11-28
Payer: COMMERCIAL

## 2017-11-28 VITALS
DIASTOLIC BLOOD PRESSURE: 89 MMHG | SYSTOLIC BLOOD PRESSURE: 136 MMHG | WEIGHT: 293 LBS | BODY MASS INDEX: 53.92 KG/M2 | HEIGHT: 62 IN | OXYGEN SATURATION: 97 % | TEMPERATURE: 98.9 F | HEART RATE: 100 BPM | RESPIRATION RATE: 23 BRPM

## 2017-11-28 PROCEDURE — G0151 HHCP-SERV OF PT,EA 15 MIN: HCPCS

## 2017-11-29 ENCOUNTER — HOME CARE VISIT (OUTPATIENT)
Dept: SCHEDULING | Facility: HOME HEALTH | Age: 57
End: 2017-11-29
Payer: COMMERCIAL

## 2017-11-29 PROCEDURE — G0157 HHC PT ASSISTANT EA 15: HCPCS

## 2017-11-29 PROCEDURE — G0299 HHS/HOSPICE OF RN EA 15 MIN: HCPCS

## 2017-11-30 ENCOUNTER — HOME CARE VISIT (OUTPATIENT)
Dept: HOME HEALTH SERVICES | Facility: HOME HEALTH | Age: 57
End: 2017-11-30
Payer: COMMERCIAL

## 2017-11-30 VITALS
TEMPERATURE: 98.2 F | HEART RATE: 65 BPM | OXYGEN SATURATION: 96 % | SYSTOLIC BLOOD PRESSURE: 120 MMHG | DIASTOLIC BLOOD PRESSURE: 80 MMHG | RESPIRATION RATE: 18 BRPM

## 2017-11-30 VITALS
SYSTOLIC BLOOD PRESSURE: 126 MMHG | RESPIRATION RATE: 17 BRPM | HEART RATE: 74 BPM | TEMPERATURE: 97.5 F | DIASTOLIC BLOOD PRESSURE: 86 MMHG | OXYGEN SATURATION: 97 %

## 2017-12-01 ENCOUNTER — HOME CARE VISIT (OUTPATIENT)
Dept: SCHEDULING | Facility: HOME HEALTH | Age: 57
End: 2017-12-01
Payer: COMMERCIAL

## 2017-12-01 PROCEDURE — G0300 HHS/HOSPICE OF LPN EA 15 MIN: HCPCS

## 2017-12-04 ENCOUNTER — HOME CARE VISIT (OUTPATIENT)
Dept: HOME HEALTH SERVICES | Facility: HOME HEALTH | Age: 57
End: 2017-12-04
Payer: COMMERCIAL

## 2017-12-04 ENCOUNTER — HOME CARE VISIT (OUTPATIENT)
Dept: SCHEDULING | Facility: HOME HEALTH | Age: 57
End: 2017-12-04
Payer: COMMERCIAL

## 2017-12-04 VITALS
SYSTOLIC BLOOD PRESSURE: 130 MMHG | TEMPERATURE: 97.8 F | RESPIRATION RATE: 17 BRPM | DIASTOLIC BLOOD PRESSURE: 70 MMHG | HEART RATE: 79 BPM | OXYGEN SATURATION: 97 %

## 2017-12-04 PROCEDURE — G0157 HHC PT ASSISTANT EA 15: HCPCS

## 2017-12-05 ENCOUNTER — HOME CARE VISIT (OUTPATIENT)
Dept: HOME HEALTH SERVICES | Facility: HOME HEALTH | Age: 57
End: 2017-12-05
Payer: COMMERCIAL

## 2017-12-05 VITALS
HEART RATE: 76 BPM | OXYGEN SATURATION: 96 % | RESPIRATION RATE: 19 BRPM | TEMPERATURE: 98.1 F | DIASTOLIC BLOOD PRESSURE: 84 MMHG | SYSTOLIC BLOOD PRESSURE: 128 MMHG

## 2017-12-06 ENCOUNTER — HOME CARE VISIT (OUTPATIENT)
Dept: SCHEDULING | Facility: HOME HEALTH | Age: 57
End: 2017-12-06
Payer: COMMERCIAL

## 2017-12-06 ENCOUNTER — HOME CARE VISIT (OUTPATIENT)
Dept: HOME HEALTH SERVICES | Facility: HOME HEALTH | Age: 57
End: 2017-12-06
Payer: COMMERCIAL

## 2017-12-06 VITALS
TEMPERATURE: 97.7 F | SYSTOLIC BLOOD PRESSURE: 110 MMHG | OXYGEN SATURATION: 6 % | HEART RATE: 73 BPM | RESPIRATION RATE: 22 BRPM | DIASTOLIC BLOOD PRESSURE: 70 MMHG

## 2017-12-06 PROCEDURE — G0299 HHS/HOSPICE OF RN EA 15 MIN: HCPCS

## 2017-12-07 ENCOUNTER — HOME CARE VISIT (OUTPATIENT)
Dept: HOME HEALTH SERVICES | Facility: HOME HEALTH | Age: 57
End: 2017-12-07
Payer: COMMERCIAL

## 2017-12-08 ENCOUNTER — HOME CARE VISIT (OUTPATIENT)
Dept: SCHEDULING | Facility: HOME HEALTH | Age: 57
End: 2017-12-08
Payer: COMMERCIAL

## 2017-12-11 ENCOUNTER — HOME CARE VISIT (OUTPATIENT)
Dept: SCHEDULING | Facility: HOME HEALTH | Age: 57
End: 2017-12-11
Payer: COMMERCIAL

## 2017-12-11 ENCOUNTER — HOME CARE VISIT (OUTPATIENT)
Dept: HOME HEALTH SERVICES | Facility: HOME HEALTH | Age: 57
End: 2017-12-11
Payer: COMMERCIAL

## 2017-12-11 VITALS
SYSTOLIC BLOOD PRESSURE: 140 MMHG | DIASTOLIC BLOOD PRESSURE: 82 MMHG | HEART RATE: 87 BPM | OXYGEN SATURATION: 95 % | RESPIRATION RATE: 22 BRPM | TEMPERATURE: 97.8 F

## 2017-12-11 PROCEDURE — G0299 HHS/HOSPICE OF RN EA 15 MIN: HCPCS

## 2017-12-12 PROCEDURE — A6449 LT COMPRES BAND >=3" <5"/YD: HCPCS

## 2017-12-12 PROCEDURE — A4452 WATERPROOF TAPE: HCPCS

## 2017-12-13 ENCOUNTER — HOME CARE VISIT (OUTPATIENT)
Dept: HOME HEALTH SERVICES | Facility: HOME HEALTH | Age: 57
End: 2017-12-13
Payer: COMMERCIAL

## 2017-12-15 ENCOUNTER — HOME CARE VISIT (OUTPATIENT)
Dept: SCHEDULING | Facility: HOME HEALTH | Age: 57
End: 2017-12-15
Payer: COMMERCIAL

## 2017-12-15 ENCOUNTER — HOME CARE VISIT (OUTPATIENT)
Dept: HOME HEALTH SERVICES | Facility: HOME HEALTH | Age: 57
End: 2017-12-15
Payer: COMMERCIAL

## 2017-12-15 PROCEDURE — G0299 HHS/HOSPICE OF RN EA 15 MIN: HCPCS

## 2017-12-17 VITALS
HEART RATE: 76 BPM | SYSTOLIC BLOOD PRESSURE: 128 MMHG | OXYGEN SATURATION: 95 % | DIASTOLIC BLOOD PRESSURE: 72 MMHG | TEMPERATURE: 98.1 F | RESPIRATION RATE: 20 BRPM

## 2017-12-18 ENCOUNTER — HOME CARE VISIT (OUTPATIENT)
Dept: HOME HEALTH SERVICES | Facility: HOME HEALTH | Age: 57
End: 2017-12-18
Payer: COMMERCIAL

## 2017-12-19 ENCOUNTER — HOME CARE VISIT (OUTPATIENT)
Dept: SCHEDULING | Facility: HOME HEALTH | Age: 57
End: 2017-12-19
Payer: COMMERCIAL

## 2017-12-19 ENCOUNTER — HOME CARE VISIT (OUTPATIENT)
Dept: HOME HEALTH SERVICES | Facility: HOME HEALTH | Age: 57
End: 2017-12-19
Payer: COMMERCIAL

## 2018-04-18 ENCOUNTER — HOME HEALTH ADMISSION (OUTPATIENT)
Dept: HOME HEALTH SERVICES | Facility: HOME HEALTH | Age: 58
End: 2018-04-18
Payer: COMMERCIAL

## 2018-04-20 ENCOUNTER — HOME CARE VISIT (OUTPATIENT)
Dept: SCHEDULING | Facility: HOME HEALTH | Age: 58
End: 2018-04-20
Payer: COMMERCIAL

## 2018-04-20 VITALS
SYSTOLIC BLOOD PRESSURE: 128 MMHG | OXYGEN SATURATION: 98 % | TEMPERATURE: 97.3 F | DIASTOLIC BLOOD PRESSURE: 74 MMHG | HEART RATE: 76 BPM | RESPIRATION RATE: 18 BRPM

## 2018-04-20 PROCEDURE — A6250 SKIN SEAL PROTECT MOISTURIZR: HCPCS

## 2018-04-20 PROCEDURE — G0299 HHS/HOSPICE OF RN EA 15 MIN: HCPCS

## 2018-04-20 PROCEDURE — 400013 HH SOC

## 2018-04-24 ENCOUNTER — HOME CARE VISIT (OUTPATIENT)
Dept: SCHEDULING | Facility: HOME HEALTH | Age: 58
End: 2018-04-24
Payer: COMMERCIAL

## 2018-04-24 PROCEDURE — G0300 HHS/HOSPICE OF LPN EA 15 MIN: HCPCS

## 2018-04-26 ENCOUNTER — HOME CARE VISIT (OUTPATIENT)
Dept: SCHEDULING | Facility: HOME HEALTH | Age: 58
End: 2018-04-26
Payer: COMMERCIAL

## 2018-04-26 VITALS
SYSTOLIC BLOOD PRESSURE: 130 MMHG | OXYGEN SATURATION: 97 % | HEART RATE: 77 BPM | TEMPERATURE: 98.3 F | DIASTOLIC BLOOD PRESSURE: 80 MMHG | RESPIRATION RATE: 17 BRPM

## 2018-05-02 ENCOUNTER — HOME CARE VISIT (OUTPATIENT)
Dept: HOME HEALTH SERVICES | Facility: HOME HEALTH | Age: 58
End: 2018-05-02
Payer: COMMERCIAL

## 2018-05-02 ENCOUNTER — HOME CARE VISIT (OUTPATIENT)
Dept: SCHEDULING | Facility: HOME HEALTH | Age: 58
End: 2018-05-02
Payer: COMMERCIAL

## 2018-05-02 VITALS
SYSTOLIC BLOOD PRESSURE: 130 MMHG | HEART RATE: 70 BPM | RESPIRATION RATE: 20 BRPM | TEMPERATURE: 97.4 F | OXYGEN SATURATION: 97 % | DIASTOLIC BLOOD PRESSURE: 78 MMHG

## 2018-05-02 PROCEDURE — G0299 HHS/HOSPICE OF RN EA 15 MIN: HCPCS

## 2018-08-29 ENCOUNTER — APPOINTMENT (OUTPATIENT)
Dept: CT IMAGING | Age: 58
End: 2018-08-29
Attending: EMERGENCY MEDICINE
Payer: COMMERCIAL

## 2018-08-29 ENCOUNTER — HOSPITAL ENCOUNTER (EMERGENCY)
Age: 58
Discharge: HOME | End: 2018-08-29
Attending: EMERGENCY MEDICINE
Payer: COMMERCIAL

## 2018-08-29 VITALS
SYSTOLIC BLOOD PRESSURE: 148 MMHG | BODY MASS INDEX: 53.92 KG/M2 | HEIGHT: 62 IN | TEMPERATURE: 98 F | RESPIRATION RATE: 16 BRPM | WEIGHT: 293 LBS | OXYGEN SATURATION: 95 % | DIASTOLIC BLOOD PRESSURE: 96 MMHG | HEART RATE: 95 BPM

## 2018-08-29 DIAGNOSIS — R73.9 HYPERGLYCEMIA: ICD-10-CM

## 2018-08-29 DIAGNOSIS — M54.6 ACUTE LEFT-SIDED THORACIC BACK PAIN: ICD-10-CM

## 2018-08-29 DIAGNOSIS — N30.01 ACUTE CYSTITIS WITH HEMATURIA: Primary | ICD-10-CM

## 2018-08-29 LAB
ALBUMIN SERPL-MCNC: 3.1 G/DL (ref 3.5–5)
ALBUMIN/GLOB SERPL: 0.8 {RATIO} (ref 1.1–2.2)
ALP SERPL-CCNC: 71 U/L (ref 45–117)
ALT SERPL-CCNC: 31 U/L (ref 12–78)
ANION GAP SERPL CALC-SCNC: 10 MMOL/L (ref 5–15)
APPEARANCE UR: CLEAR
AST SERPL-CCNC: 33 U/L (ref 15–37)
ATRIAL RATE: 100 BPM
BACTERIA URNS QL MICRO: ABNORMAL /HPF
BASOPHILS # BLD: 0.1 K/UL (ref 0–0.1)
BASOPHILS NFR BLD: 1 % (ref 0–1)
BILIRUB SERPL-MCNC: 0.3 MG/DL (ref 0.2–1)
BILIRUB UR QL: NEGATIVE
BUN SERPL-MCNC: 13 MG/DL (ref 6–20)
BUN/CREAT SERPL: 15 (ref 12–20)
CALCIUM SERPL-MCNC: 8.7 MG/DL (ref 8.5–10.1)
CALCULATED P AXIS, ECG09: 58 DEGREES
CALCULATED R AXIS, ECG10: 37 DEGREES
CALCULATED T AXIS, ECG11: 39 DEGREES
CHLORIDE SERPL-SCNC: 107 MMOL/L (ref 97–108)
CO2 SERPL-SCNC: 24 MMOL/L (ref 21–32)
COLOR UR: ABNORMAL
CREAT SERPL-MCNC: 0.85 MG/DL (ref 0.55–1.02)
DIAGNOSIS, 93000: NORMAL
DIFFERENTIAL METHOD BLD: ABNORMAL
EOSINOPHIL # BLD: 0.1 K/UL (ref 0–0.4)
EOSINOPHIL NFR BLD: 2 % (ref 0–7)
EPITH CASTS URNS QL MICRO: ABNORMAL /LPF
ERYTHROCYTE [DISTWIDTH] IN BLOOD BY AUTOMATED COUNT: 16.9 % (ref 11.5–14.5)
GLOBULIN SER CALC-MCNC: 4.1 G/DL (ref 2–4)
GLUCOSE SERPL-MCNC: 158 MG/DL (ref 65–100)
GLUCOSE UR STRIP.AUTO-MCNC: NEGATIVE MG/DL
HCT VFR BLD AUTO: 35.5 % (ref 35–47)
HGB BLD-MCNC: 9.9 G/DL (ref 11.5–16)
HGB UR QL STRIP: ABNORMAL
HYALINE CASTS URNS QL MICRO: ABNORMAL /LPF (ref 0–5)
IMM GRANULOCYTES # BLD: 0 K/UL (ref 0–0.04)
IMM GRANULOCYTES NFR BLD AUTO: 0 % (ref 0–0.5)
KETONES UR QL STRIP.AUTO: NEGATIVE MG/DL
LEUKOCYTE ESTERASE UR QL STRIP.AUTO: ABNORMAL
LIPASE SERPL-CCNC: 127 U/L (ref 73–393)
LYMPHOCYTES # BLD: 1.2 K/UL (ref 0.8–3.5)
LYMPHOCYTES NFR BLD: 18 % (ref 12–49)
MCH RBC QN AUTO: 21.8 PG (ref 26–34)
MCHC RBC AUTO-ENTMCNC: 27.9 G/DL (ref 30–36.5)
MCV RBC AUTO: 78.2 FL (ref 80–99)
MONOCYTES # BLD: 0.4 K/UL (ref 0–1)
MONOCYTES NFR BLD: 6 % (ref 5–13)
MUCOUS THREADS URNS QL MICRO: ABNORMAL /LPF
NEUTS SEG # BLD: 5.1 K/UL (ref 1.8–8)
NEUTS SEG NFR BLD: 73 % (ref 32–75)
NITRITE UR QL STRIP.AUTO: NEGATIVE
NRBC # BLD: 0 K/UL (ref 0–0.01)
NRBC BLD-RTO: 0 PER 100 WBC
P-R INTERVAL, ECG05: 146 MS
PH UR STRIP: 6 [PH] (ref 5–8)
PLATELET # BLD AUTO: 177 K/UL (ref 150–400)
PMV BLD AUTO: 11.7 FL (ref 8.9–12.9)
POTASSIUM SERPL-SCNC: 3.8 MMOL/L (ref 3.5–5.1)
PROT SERPL-MCNC: 7.2 G/DL (ref 6.4–8.2)
PROT UR STRIP-MCNC: NEGATIVE MG/DL
Q-T INTERVAL, ECG07: 342 MS
QRS DURATION, ECG06: 72 MS
QTC CALCULATION (BEZET), ECG08: 441 MS
RBC # BLD AUTO: 4.54 M/UL (ref 3.8–5.2)
RBC #/AREA URNS HPF: ABNORMAL /HPF (ref 0–5)
RBC MORPH BLD: ABNORMAL
SODIUM SERPL-SCNC: 141 MMOL/L (ref 136–145)
SP GR UR REFRACTOMETRY: 1.02 (ref 1–1.03)
TROPONIN I SERPL-MCNC: <0.05 NG/ML
UROBILINOGEN UR QL STRIP.AUTO: 0.2 EU/DL (ref 0.2–1)
VENTRICULAR RATE, ECG03: 100 BPM
WBC # BLD AUTO: 6.9 K/UL (ref 3.6–11)
WBC URNS QL MICRO: ABNORMAL /HPF (ref 0–4)

## 2018-08-29 PROCEDURE — 84484 ASSAY OF TROPONIN QUANT: CPT | Performed by: EMERGENCY MEDICINE

## 2018-08-29 PROCEDURE — 96365 THER/PROPH/DIAG IV INF INIT: CPT

## 2018-08-29 PROCEDURE — 94640 AIRWAY INHALATION TREATMENT: CPT

## 2018-08-29 PROCEDURE — 83690 ASSAY OF LIPASE: CPT | Performed by: EMERGENCY MEDICINE

## 2018-08-29 PROCEDURE — 74011250636 HC RX REV CODE- 250/636: Performed by: EMERGENCY MEDICINE

## 2018-08-29 PROCEDURE — 74011000250 HC RX REV CODE- 250: Performed by: EMERGENCY MEDICINE

## 2018-08-29 PROCEDURE — 74011000258 HC RX REV CODE- 258: Performed by: EMERGENCY MEDICINE

## 2018-08-29 PROCEDURE — 74176 CT ABD & PELVIS W/O CONTRAST: CPT

## 2018-08-29 PROCEDURE — 77030029684 HC NEB SM VOL KT MONA -A

## 2018-08-29 PROCEDURE — 85025 COMPLETE CBC W/AUTO DIFF WBC: CPT | Performed by: EMERGENCY MEDICINE

## 2018-08-29 PROCEDURE — 81001 URINALYSIS AUTO W/SCOPE: CPT | Performed by: EMERGENCY MEDICINE

## 2018-08-29 PROCEDURE — 36415 COLL VENOUS BLD VENIPUNCTURE: CPT | Performed by: EMERGENCY MEDICINE

## 2018-08-29 PROCEDURE — 93005 ELECTROCARDIOGRAM TRACING: CPT

## 2018-08-29 PROCEDURE — 80053 COMPREHEN METABOLIC PANEL: CPT | Performed by: EMERGENCY MEDICINE

## 2018-08-29 PROCEDURE — 99284 EMERGENCY DEPT VISIT MOD MDM: CPT

## 2018-08-29 RX ORDER — OXYCODONE HYDROCHLORIDE 5 MG/1
5 TABLET ORAL
Qty: 6 TAB | Refills: 0 | Status: ON HOLD | OUTPATIENT
Start: 2018-08-29 | End: 2019-01-01

## 2018-08-29 RX ORDER — SODIUM CHLORIDE 900 MG/100ML
INJECTION INTRAVENOUS
Status: DISCONTINUED
Start: 2018-08-29 | End: 2018-08-29 | Stop reason: HOSPADM

## 2018-08-29 RX ORDER — FENTANYL CITRATE 50 UG/ML
50 INJECTION, SOLUTION INTRAMUSCULAR; INTRAVENOUS
Status: DISCONTINUED | OUTPATIENT
Start: 2018-08-29 | End: 2018-08-29

## 2018-08-29 RX ORDER — IPRATROPIUM BROMIDE AND ALBUTEROL SULFATE 2.5; .5 MG/3ML; MG/3ML
3 SOLUTION RESPIRATORY (INHALATION)
Status: COMPLETED | OUTPATIENT
Start: 2018-08-29 | End: 2018-08-29

## 2018-08-29 RX ORDER — CEFTRIAXONE 1 G/1
INJECTION, POWDER, FOR SOLUTION INTRAMUSCULAR; INTRAVENOUS
Status: DISCONTINUED
Start: 2018-08-29 | End: 2018-08-29 | Stop reason: HOSPADM

## 2018-08-29 RX ORDER — CIPROFLOXACIN 500 MG/1
500 TABLET ORAL 2 TIMES DAILY
Qty: 20 TAB | Refills: 0 | Status: SHIPPED | OUTPATIENT
Start: 2018-08-29 | End: 2018-09-08

## 2018-08-29 RX ORDER — ONDANSETRON 2 MG/ML
4 INJECTION INTRAMUSCULAR; INTRAVENOUS
Status: DISCONTINUED | OUTPATIENT
Start: 2018-08-29 | End: 2018-08-29 | Stop reason: HOSPADM

## 2018-08-29 RX ADMIN — IPRATROPIUM BROMIDE AND ALBUTEROL SULFATE 3 ML: .5; 3 SOLUTION RESPIRATORY (INHALATION) at 11:00

## 2018-08-29 RX ADMIN — CEFTRIAXONE 1 G: 1 INJECTION, POWDER, FOR SOLUTION INTRAMUSCULAR; INTRAVENOUS at 14:14

## 2018-08-29 NOTE — DISCHARGE INSTRUCTIONS
Back Pain: Care Instructions  Your Care Instructions    Back pain has many possible causes. It is often related to problems with muscles and ligaments of the back. It may also be related to problems with the nerves, discs, or bones of the back. Moving, lifting, standing, sitting, or sleeping in an awkward way can strain the back. Sometimes you don't notice the injury until later. Arthritis is another common cause of back pain. Although it may hurt a lot, back pain usually improves on its own within several weeks. Most people recover in 12 weeks or less. Using good home treatment and being careful not to stress your back can help you feel better sooner. Follow-up care is a key part of your treatment and safety. Be sure to make and go to all appointments, and call your doctor if you are having problems. It's also a good idea to know your test results and keep a list of the medicines you take. How can you care for yourself at home? · Sit or lie in positions that are most comfortable and reduce your pain. Try one of these positions when you lie down:  ¨ Lie on your back with your knees bent and supported by large pillows. ¨ Lie on the floor with your legs on the seat of a sofa or chair. Duayne Lark on your side with your knees and hips bent and a pillow between your legs. ¨ Lie on your stomach if it does not make pain worse. · Do not sit up in bed, and avoid soft couches and twisted positions. Bed rest can help relieve pain at first, but it delays healing. Avoid bed rest after the first day of back pain. · Change positions every 30 minutes. If you must sit for long periods of time, take breaks from sitting. Get up and walk around, or lie in a comfortable position. · Try using a heating pad on a low or medium setting for 15 to 20 minutes every 2 or 3 hours. Try a warm shower in place of one session with the heating pad. · You can also try an ice pack for 10 to 15 minutes every 2 to 3 hours.  Put a thin cloth between the ice pack and your skin. · Take pain medicines exactly as directed. ¨ If the doctor gave you a prescription medicine for pain, take it as prescribed. ¨ If you are not taking a prescription pain medicine, ask your doctor if you can take an over-the-counter medicine. · Take short walks several times a day. You can start with 5 to 10 minutes, 3 or 4 times a day, and work up to longer walks. Walk on level surfaces and avoid hills and stairs until your back is better. · Return to work and other activities as soon as you can. Continued rest without activity is usually not good for your back. · To prevent future back pain, do exercises to stretch and strengthen your back and stomach. Learn how to use good posture, safe lifting techniques, and proper body mechanics. When should you call for help? Call your doctor now or seek immediate medical care if:    · You have new or worsening numbness in your legs.     · You have new or worsening weakness in your legs. (This could make it hard to stand up.)     · You lose control of your bladder or bowels.    Watch closely for changes in your health, and be sure to contact your doctor if:    · You have a fever, lose weight, or don't feel well.     · You do not get better as expected. Where can you learn more? Go to http://luis-julieth.info/. Enter N020 in the search box to learn more about \"Back Pain: Care Instructions. \"  Current as of: November 29, 2017  Content Version: 11.7  © 7152-3754 [employer]. Care instructions adapted under license by [employer] (which disclaims liability or warranty for this information). If you have questions about a medical condition or this instruction, always ask your healthcare professional. [place] [address] any warranty or liability for your use of this information.

## 2018-08-29 NOTE — ED PROVIDER NOTES
EMERGENCY DEPARTMENT HISTORY AND PHYSICAL EXAM 
 
 
Date: 8/29/2018 Patient Name: Manju Lucas History of Presenting Illness Chief Complaint Patient presents with  Flank Pain Ambulatory with cane for L flank pain x 8/16 Pt states she saw PCP on 8/16 and placed on antibiotics but without definitive diagnosis Pt reports \"pins and needles down there\" History Provided By: Patient HPI: Manju Lucas, 62 y.o. female with PMHx significant for DDD, kidney stone, DM, HTN, GERD, anxiety, and arthritis, presents ambulatory to the ED with cc of left mid back pain x ~2 weeks, which has been gradually migrating towards her left flank since onset. Pt reports associated nausea, and states her pain is generally worse when laying down at night. She states she was seen by her PCP (8/16/18), had normal UA, was diagnosed with kidney stone, and started on Amoxicillin, which she has been taking without improvement. Pt also reports she has been having diarrhea x ~6 days, and states her stool has been yellow in color. She denies recent trauma, injury, fall, or heavy lifting. Pt reports hx of cholecystectomy and hysterectomy. She specifically denies CP, SOB, cough, vomiting, dysuria, increased urinary frequency, and vaginal bleeding or discharge. Pt denies alcohol or tobacco use. There are no other complaints, changes, or physical findings at this time. PCP: PROVIDER UNKNOWN Current Facility-Administered Medications Medication Dose Route Frequency Provider Last Rate Last Dose  ondansetron (ZOFRAN) injection 4 mg  4 mg IntraVENous NOW Martine Cesar MD      
 fentaNYL citrate (PF) injection 50 mcg  50 mcg IntraVENous NOW Martine Cesar MD      
 
Current Outpatient Prescriptions Medication Sig Dispense Refill  buPROPion XL (WELLBUTRIN XL) 150 mg tablet Take 300 mg by mouth every morning.  clonazePAM (KLONOPIN) 2 mg tablet Take 1 mg by mouth three (3) times daily.  QUEtiapine (SEROQUEL) 400 mg tablet Take 400 mg by mouth nightly.  prazosin (MINIPRESS) 1 mg capsule Take 4 mg by mouth nightly.  ergocalciferol (VITAMIN D2) 50,000 unit capsule Take 50,000 Units by mouth two (2) days a week. Twice a week Sunday and Wednesday  metFORMIN (GLUCOPHAGE) 500 mg tablet Take 500 mg by mouth every evening.  estradiol (ESTRACE) 0.5 mg tablet Take 0.5 mg by mouth daily.  terconazole (TERAZOL 7) 0.4 % vaginal cream Insert 1 Applicator into vagina nightly. has refill for 7 day supply  fluconazole (DIFLUCAN) 100 mg tablet Take 100 mg by mouth Every Mon, Wed and Sat.  FLUoxetine (PROZAC) 20 mg capsule Take 20 mg by mouth daily.  diclofenac potassium (CATAFLAM) 50 mg tablet Take 50 mg by mouth three (3) times daily as needed (PRN back pain). pt takes prn back pain  nystatin (MYCOSTATIN) 100,000 unit/gram ointment Apply 1 Units to affected area two (2) times a day. apply twice daily to affected area  albuterol (PROVENTIL VENTOLIN) 2.5 mg /3 mL (0.083 %) nebulizer solution 1.25 mg by Nebulization route three (3) times daily as needed for Wheezing or Shortness of Breath.  albuterol (PROVENTIL HFA, VENTOLIN HFA, PROAIR HFA) 90 mcg/actuation inhaler Take 2 Puffs by inhalation three (3) times daily as needed for Wheezing or Shortness of Breath.  miconazole (MICOTIN) 2 % topical cream Apply 1 Each to affected area two (2) times a day. Apply ointment to fungal infection under abdominal pannus and groin twice a day for 14 days.  oxyCODONE-acetaminophen (PERCOCET) 7.5-325 mg per tablet Take 1 Tab by mouth every eight (8) hours as needed for Pain.  HYDROcodone-acetaminophen (NORCO) 7.5-325 mg per tablet Take 1 Tab by mouth every six (6) hours as needed for Pain.  cyanocobalamin 1,000 mcg tablet Take 5,000 mcg by mouth daily.  aspirin 81 mg chewable tablet Take 81 mg by mouth daily.  gabapentin (NEURONTIN) 100 mg capsule Take 1 Cap by mouth three (3) times daily. (Patient taking differently: Take 1 Cap by mouth four (4) times daily.) 90 Cap 1  
 ondansetron (ZOFRAN ODT) 4 mg disintegrating tablet Take 1 Tab by mouth every eight (8) hours as needed for Nausea. 20 Tab 0  
 busPIRone (BUSPAR) 15 mg tablet Take 1 Tab by mouth Multiple. Take 2 tab po with breakfast 
Take 1 tab po with lunch Take 2 tab po with dinner 150 Tab 1  
 butalbital-acetaminophen-caffeine (FIORICET, ESGIC) -40 mg per tablet Take 2 Tabs by mouth every eight (8) hours as needed for Headache. Max Daily Amount: 6 Tabs. 30 Tab 0  
 diclofenac EC (VOLTAREN) 50 mg EC tablet Take 1 Tab by mouth Multiple. Take 1 tab po BID with meals for 1 week then rest for 1 week and can take again repeating same cycle (Patient not taking: Reported on 4/21/2018) 30 Tab 0  
 oxyCODONE-acetaminophen (PERCOCET) 7.5-325 mg per tablet Take 1 Tab by mouth every four (4) hours as needed for Pain. Max Daily Amount: 6 Tabs. (Patient not taking: Reported on 11/28/2017) 30 Tab 0  
 nystatin, bulk, 15 billion unit powd 1 Units by Does Not Apply route three (3) times daily. To skin folds, ventral fold 2 Each 1  
 levothyroxine (SYNTHROID) 112 mcg tablet Take 112 mcg by mouth Daily (before breakfast).  glimepiride (AMARYL) 2 mg tablet Take 2 mg by mouth every morning.  losartan (COZAAR) 25 mg tablet Take 25 mg by mouth daily.  metoprolol succinate (TOPROL XL) 25 mg XL tablet Take 25 mg by mouth daily.  omeprazole (PRILOSEC) 20 mg capsule Take 40 mg by mouth daily.  topiramate (TOPAMAX) 200 mg tablet Take 200 mg by mouth nightly.  simvastatin (ZOCOR) 20 mg tablet Take 20 mg by mouth nightly.  potassium chloride (KLOR-CON M10) 10 mEq tablet Take 10 mEq by mouth nightly. Past History Past Medical History: 
Past Medical History:  
Diagnosis Date  Arrhythmia  Arthritis  Chronic pain   
 daniele  Depression  Diabetes ([facility]  GERD (gastroesophageal reflux disease)  Hypertension  Hypothyroidism  Kidney stone  Liver disease  Obesity, morbid, BMI 50 or higher ([facility]  Psychiatric disorder   
 anxiety  Psychiatric disorder   
 depression  Seizures ([facility]  Thyroid disease Past Surgical History: 
Past Surgical History:  
Procedure Laterality Date  CARDIAC CATHETERIZATION  11/18/2010  HX APPENDECTOMY  HX CHOLECYSTECTOMY  HX HYSTERECTOMY  HX TUBAL LIGATION Family History: 
Family History Problem Relation Age of Onset  Heart Disease Mother  Hypertension Mother  Cancer Mother  Heart Disease Father  Hypertension Father  Cancer Brother Social History: 
Social History Substance Use Topics  Smoking status: Former Smoker  Smokeless tobacco: Never Used  Alcohol use No  
 
 
Allergies: Allergies Allergen Reactions  Celebrex [Celecoxib] Nausea Only  Sulfa (Sulfonamide Antibiotics) Nausea Only  Adhesive  Codeine Other (comments) Hyper activity Review of Systems Review of Systems Respiratory: Negative for cough and shortness of breath. Cardiovascular: Negative for chest pain. Gastrointestinal: Positive for nausea. Negative for vomiting. Genitourinary: Negative for dysuria, frequency, vaginal bleeding and vaginal discharge. Musculoskeletal: Positive for back pain (left mid). All other systems reviewed and are negative. Physical Exam  
Physical Exam  
 
Vital signs and nursing notes reviewed CONSTITUTIONAL: Alert, in mild distress; well-developed; well-nourished. Morbidly obese body habitus HEAD:  Normocephalic, atraumatic EYES: PERRL; EOM's intact. ENTM: Nose: no rhinorrhea; Throat: no erythema or exudate, mucous membranes moist 
Neck:  Supple. trachea is midline. RESP: Chest clear, equal breath sounds.  - W/R/R 
 CV: regular tachycardia; S1 and S2 WNL; No murmurs, gallops or rubs. 2+ radial and DP pulses bilaterally. GI: non-distended, normal bowel sounds, abdomen soft; epigastric tenderness to palpation. No masses or organomegaly. : No costo-vertebral angle tenderness. BACK:  Tender in left thoracic area; no midline tenderness, normal appearance, without evidence of trauma UPPER EXT:  Normal inspection. no joint or soft tissue swelling LOWER EXT: No edema, no calf tenderness. NEURO: Alert and oriented x3, 5/5 strength and light touch sensation intact in bilateral upper and lower extremities. SKIN: No rashes; Warm and dry PSYCH: Normal mood, normal affect Diagnostic Study Results Labs - Recent Results (from the past 12 hour(s)) EKG, 12 LEAD, INITIAL Collection Time: 08/29/18 10:37 AM  
Result Value Ref Range Ventricular Rate 100 BPM  
 Atrial Rate 100 BPM  
 P-R Interval 146 ms  
 QRS Duration 72 ms Q-T Interval 342 ms QTC Calculation (Bezet) 441 ms Calculated P Axis 58 degrees Calculated R Axis 37 degrees Calculated T Axis 39 degrees Diagnosis Normal sinus rhythm Low voltage QRS When compared with ECG of 20-MAR-2014 13:03, No significant change was found CBC WITH AUTOMATED DIFF Collection Time: 08/29/18 10:46 AM  
Result Value Ref Range WBC 6.9 3.6 - 11.0 K/uL  
 RBC 4.54 3.80 - 5.20 M/uL HGB 9.9 (L) 11.5 - 16.0 g/dL HCT 35.5 35.0 - 47.0 % MCV 78.2 (L) 80.0 - 99.0 FL  
 MCH 21.8 (L) 26.0 - 34.0 PG  
 MCHC 27.9 (L) 30.0 - 36.5 g/dL  
 RDW 16.9 (H) 11.5 - 14.5 % PLATELET 135 650 - 328 K/uL MPV 11.7 8.9 - 12.9 FL  
 NRBC 0.0 0  WBC ABSOLUTE NRBC 0.00 0.00 - 0.01 K/uL NEUTROPHILS 73 32 - 75 % LYMPHOCYTES 18 12 - 49 % MONOCYTES 6 5 - 13 % EOSINOPHILS 2 0 - 7 % BASOPHILS 1 0 - 1 % IMMATURE GRANULOCYTES 0 0.0 - 0.5 % ABS. NEUTROPHILS 5.1 1.8 - 8.0 K/UL  
 ABS. LYMPHOCYTES 1.2 0.8 - 3.5 K/UL ABS. MONOCYTES 0.4 0.0 - 1.0 K/UL  
 ABS. EOSINOPHILS 0.1 0.0 - 0.4 K/UL  
 ABS. BASOPHILS 0.1 0.0 - 0.1 K/UL  
 ABS. IMM. GRANS. 0.0 0.00 - 0.04 K/UL  
 DF AUTOMATED    
 RBC COMMENTS OVALOCYTES 1+ RBC COMMENTS TEARDROP CELLS 
PRESENT 
    
 RBC COMMENTS MICROCYTOSIS 
1+ METABOLIC PANEL, COMPREHENSIVE Collection Time: 08/29/18 10:46 AM  
Result Value Ref Range Sodium 141 136 - 145 mmol/L Potassium 3.8 3.5 - 5.1 mmol/L Chloride 107 97 - 108 mmol/L  
 CO2 24 21 - 32 mmol/L Anion gap 10 5 - 15 mmol/L Glucose 158 (H) 65 - 100 mg/dL BUN 13 6 - 20 MG/DL Creatinine 0.85 0.55 - 1.02 MG/DL  
 BUN/Creatinine ratio 15 12 - 20 GFR est AA >60 >60 ml/min/1.73m2 GFR est non-AA >60 >60 ml/min/1.73m2 Calcium 8.7 8.5 - 10.1 MG/DL Bilirubin, total 0.3 0.2 - 1.0 MG/DL  
 ALT (SGPT) 31 12 - 78 U/L  
 AST (SGOT) 33 15 - 37 U/L Alk. phosphatase 71 45 - 117 U/L Protein, total 7.2 6.4 - 8.2 g/dL Albumin 3.1 (L) 3.5 - 5.0 g/dL Globulin 4.1 (H) 2.0 - 4.0 g/dL A-G Ratio 0.8 (L) 1.1 - 2.2 LIPASE Collection Time: 08/29/18 10:46 AM  
Result Value Ref Range Lipase 127 73 - 393 U/L  
TROPONIN I Collection Time: 08/29/18 10:46 AM  
Result Value Ref Range Troponin-I, Qt. <0.05 <0.05 ng/mL URINALYSIS W/ RFLX MICROSCOPIC Collection Time: 08/29/18 10:46 AM  
Result Value Ref Range Color YELLOW/STRAW Appearance CLEAR CLEAR Specific gravity 1.019 1.003 - 1.030    
 pH (UA) 6.0 5.0 - 8.0 Protein NEGATIVE  NEG mg/dL Glucose NEGATIVE  NEG mg/dL Ketone NEGATIVE  NEG mg/dL Bilirubin NEGATIVE  NEG Blood MODERATE (A) NEG Urobilinogen 0.2 0.2 - 1.0 EU/dL Nitrites NEGATIVE  NEG Leukocyte Esterase SMALL (A) NEG    
 WBC 5-10 0 - 4 /hpf  
 RBC 5-10 0 - 5 /hpf Epithelial cells MODERATE (A) FEW /lpf Bacteria 1+ (A) NEG /hpf Mucus 1+ (A) NEG /lpf Hyaline cast 0-2 0 - 5 /lpf Radiologic Studies -  
 
 CT Results  (Last 48 hours) 08/29/18 1114  CT ABD PELV WO CONT Final result Impression:  IMPRESSION:  
Nonobstructing stone in the right renal pelvis. No ureteral stone or  
hydronephrosis. Midline 4.9 cm pelvic cyst potentially of ovarian etiology,  
however is not well-visualized evaluated due to body habitus. It is unlikely  
that this Narrative:  EXAM:  CT ABD PELV WO CONT INDICATION: Left flank pain for 2 weeks COMPARISON: 1/12/2011 CONTRAST:  None. TECHNIQUE:   
Thin axial images were obtained through the abdomen and pelvis. Coronal and  
sagittal reconstructions were generated. Oral contrast was not administered. CT  
dose reduction was achieved through use of a standardized protocol tailored for  
this examination and automatic exposure control for dose modulation. The absence of intravenous contrast material reduces the sensitivity for  
evaluation of the solid parenchymal organs of the abdomen. FINDINGS:   
LUNG BASES: Clear. INCIDENTALLY IMAGED HEART AND MEDIASTINUM: Unremarkable. LIVER: No mass or biliary dilatation. GALLBLADDER: Surgically absent. SPLEEN: No mass. PANCREAS: No mass or ductal dilatation. ADRENALS: Unremarkable. KIDNEYS/URETERS: There is an 8 mm stone in the right renal pelvis without  
hydronephrosis. No ureteral stone or hydronephrosis. STOMACH: Unremarkable. SMALL BOWEL: No dilatation or wall thickening. COLON: No dilatation or wall thickening. APPENDIX: Surgically absent. PERITONEUM: No ascites or pneumoperitoneum. RETROPERITONEUM: No lymphadenopathy or aortic aneurysm. REPRODUCTIVE ORGANS: The uterus is surgically absent. There is a midline 4.9 x  
4.5 cm cyst, potentially of ovarian etiology, however it is not well-visualized  
due to patient body habitus. URINARY BLADDER: No mass or calculus. BONES: No destructive bone lesion.   
ADDITIONAL COMMENTS: N/A  
   
  
  
 
 
 Medical Decision Making I am the first provider for this patient. I reviewed the vital signs, available nursing notes, past medical history, past surgical history, family history and social history. Vital Signs-Reviewed the patient's vital signs. Patient Vitals for the past 12 hrs: 
 Temp Pulse Resp BP SpO2  
08/29/18 1130 - - - - 94 % 08/29/18 1103 - - 19 - 94 % 08/29/18 1037 - - 17 128/88 95 % 08/29/18 0959 98 °F (36.7 °C) (!) 104 20 151/90 97 % Pulse Oximetry Analysis - 97% on RA Cardiac Monitor:  
Rate: 100 bpm 
Rhythm: Sinus Rhythm EKG interpretation: 10:37 Rhythm: sinus rhythm. Rate (approx.): 100; Axis: normal; NV interval: normal; QRS interval: normal ; ST/T wave: normal; Other findings: no acute ischemic changes. Records Reviewed: Nursing Notes and Old Medical Records Provider Notes (Medical Decision Making):  
 
63 yo Female with mid back pain, initially central, now more left-sided, with accompanying diarrhea with yellow stools and epigastric pain. Pt is without gallbladder, will check lipase, though no risk factors for pancreatitis. Will evaluate for UTI and pyelonephritis. Consider renal stone, pt does have tender left side of the back, no rash consistent with shingles, but could be musculoskeletal. 
 
ED Course:  
Initial assessment performed. The patients presenting problems have been discussed, and they are in agreement with the care plan formulated and outlined with them. I have encouraged them to ask questions as they arise throughout their visit. 2:05 PM 
Pt re-evaluated, states she is feeling well enough for discharge, will prescribe pain medication to take at home, as pt unable to secure a ride. Disposition: 
DISCHARGE NOTE: 
2:05 PM 
The patient is ready for discharge. The patients signs, symptoms, diagnosis, and instructions for discharge have been discussed and the pt has conveyed their understanding.  The patient is to follow up as recommended or return to the ER should their symptoms worsen. Plan has been discussed and patient has conveyed their agreement. PLAN: 
1. Current Discharge Medication List  
  
START taking these medications Details  
ciprofloxacin HCl (CIPRO) 500 mg tablet Take 1 Tab by mouth two (2) times a day for 10 days. Qty: 20 Tab, Refills: 0  
  
oxyCODONE IR (ROXICODONE) 5 mg immediate release tablet Take 1 Tab by mouth every four (4) hours as needed for Pain for up to 6 doses. Max Daily Amount: 30 mg. 
Qty: 6 Tab, Refills: 0 Associated Diagnoses: Acute left-sided thoracic back pain 2. Follow-up Information Follow up With Details Comments Contact Info Provider Unknown In 3 days  for re-evaluation of your symptoms and follow-up regarding your pelvic cyst. Patient not available to ask Return to ED if worse Diagnosis Clinical Impression: 1. Acute cystitis with hematuria 2. Acute left-sided thoracic back pain 3. Hyperglycemia Attestations: This note is prepared by Cristiana Smith, acting as Scribe for Reinaldo Caballero MD. Reinaldo Caballero MD: The scribe's documentation has been prepared under my direction and personally reviewed by me in its entirety. I confirm that the note above accurately reflects all work, treatment, procedures, and medical decision making performed by me.

## 2018-08-29 NOTE — ED TRIAGE NOTES
Pt reports left flank pain for last several weeks, saw pcp on 16 of aug, reports given augmentin and told was kidney stone. Reports pain is worse, and has not passed stone. Pt reports now having numbness and tingling in genital area. Denies fever, reports nause, denies vomiting

## 2018-08-29 NOTE — ED NOTES
Discussed with the patient and all questioned fully answered. She will call me if any problems arise. I have reviewed discharge instructions with the patient. The patient verbalized understanding.

## 2018-11-08 ENCOUNTER — OFFICE VISIT (OUTPATIENT)
Dept: CARDIOLOGY CLINIC | Age: 58
End: 2018-11-08

## 2018-11-08 VITALS
WEIGHT: 293 LBS | HEIGHT: 62 IN | BODY MASS INDEX: 53.92 KG/M2 | HEART RATE: 97 BPM | DIASTOLIC BLOOD PRESSURE: 72 MMHG | RESPIRATION RATE: 20 BRPM | OXYGEN SATURATION: 96 % | SYSTOLIC BLOOD PRESSURE: 140 MMHG

## 2018-11-08 DIAGNOSIS — I10 HYPERTENSION, UNSPECIFIED TYPE: Primary | ICD-10-CM

## 2018-11-08 DIAGNOSIS — J44.9 CHRONIC OBSTRUCTIVE PULMONARY DISEASE, UNSPECIFIED COPD TYPE (HCC): ICD-10-CM

## 2018-11-08 DIAGNOSIS — R06.09 DOE (DYSPNEA ON EXERTION): ICD-10-CM

## 2018-11-08 PROBLEM — E66.01 OBESITY, MORBID (HCC): Status: ACTIVE | Noted: 2018-11-08

## 2018-11-08 RX ORDER — LORAZEPAM 1 MG/1
1 TABLET ORAL
Refills: 0 | COMMUNITY
Start: 2018-08-10 | End: 2019-08-20

## 2018-11-08 NOTE — PROGRESS NOTES
1. Have you been to the ER, urgent care clinic since your last visit? Hospitalized since your last visit? YES, ER, [facility], COUPLE OF MONTHS AGO, KIDNEY STONES. 2. Have you seen or consulted any other health care providers outside of the [address] [place] [address] since your last visit? Include any pap smears or colon screening. YES, PHYSIATRIST    C/O SOB.

## 2018-11-08 NOTE — PROGRESS NOTES
Rebecca Biggs DNP, ANP-BC  Subjective/HPI:     Shiva Fraire is a 62 y.o. female is here for, Long follow-up/new patient consultation. Patient last seen 2014 for dyspnea on exertion. Due to abnormal nuclear stress test coronary angiography was performed that MCV showing normal coronary arteries, ejection fraction normal.  She is here to discuss recurrent and progressive dyspnea on exertion, she brings a pulmonary function report with the interpretation of moderately severe obstruction with low vital capacity. Former smoker quit 20 years ago, no occupational history of toxins. PCP Provider  Nilson Coughlin DO  Past Medical History:   Diagnosis Date    Arrhythmia     Arthritis     Chronic pain     djd    Depression     Diabetes [facility] [address]     GERD (gastroesophageal reflux disease)     Hypertension     Hypothyroidism     Kidney stone     Liver disease     Obesity, morbid, BMI 50 or higher ([facility] [address]     Psychiatric disorder     anxiety    Psychiatric disorder     depression    Seizures ([facility] [address]     Thyroid disease       Past Surgical History:   Procedure Laterality Date    CARDIAC CATHETERIZATION  11/18/2010         HX APPENDECTOMY      HX CHOLECYSTECTOMY      HX HYSTERECTOMY      HX TUBAL LIGATION       Allergies   Allergen Reactions    Celebrex [Celecoxib] Nausea Only    Sulfa (Sulfonamide Antibiotics) Nausea Only    Adhesive     Codeine Other (comments)     Hyper activity      Family History   Problem Relation Age of Onset    Heart Disease Mother     Hypertension Mother     Cancer Mother     Heart Disease Father     Hypertension Father     Cancer Brother       Current Outpatient Medications   Medication Sig    LORazepam (ATIVAN) 1 mg tablet Take 1 mg by mouth as needed.  buPROPion XL (WELLBUTRIN XL) 150 mg tablet Take 300 mg by mouth every morning.  QUEtiapine (SEROQUEL) 400 mg tablet Take 400 mg by mouth nightly.     prazosin (MINIPRESS) 1 mg capsule Take 4 mg by mouth nightly.  ergocalciferol (VITAMIN D2) 50,000 unit capsule Take 50,000 Units by mouth two (2) days a week. Twice a week Sunday and Wednesday    metFORMIN (GLUCOPHAGE) 500 mg tablet Take 500 mg by mouth every evening.  estradiol (ESTRACE) 0.5 mg tablet Take 0.5 mg by mouth daily.  albuterol (PROVENTIL VENTOLIN) 2.5 mg /3 mL (0.083 %) nebulizer solution 1.25 mg by Nebulization route three (3) times daily as needed for Wheezing or Shortness of Breath.  albuterol (PROVENTIL HFA, VENTOLIN HFA, PROAIR HFA) 90 mcg/actuation inhaler Take 2 Puffs by inhalation three (3) times daily as needed for Wheezing or Shortness of Breath.  ondansetron (ZOFRAN ODT) 4 mg disintegrating tablet Take 1 Tab by mouth every eight (8) hours as needed for Nausea.  diclofenac EC (VOLTAREN) 50 mg EC tablet Take 1 Tab by mouth Multiple. Take 1 tab po BID with meals for 1 week then rest for 1 week and can take again repeating same cycle    nystatin, bulk, 15 billion unit powd 1 Units by Does Not Apply route three (3) times daily. To skin folds, ventral fold    levothyroxine (SYNTHROID) 112 mcg tablet Take 112 mcg by mouth Daily (before breakfast).  glimepiride (AMARYL) 2 mg tablet Take 2 mg by mouth every morning.  losartan (COZAAR) 25 mg tablet Take 25 mg by mouth daily.  metoprolol succinate (TOPROL XL) 25 mg XL tablet Take 25 mg by mouth daily.  omeprazole (PRILOSEC) 20 mg capsule Take 40 mg by mouth daily.  topiramate (TOPAMAX) 200 mg tablet Take 200 mg by mouth nightly.  simvastatin (ZOCOR) 20 mg tablet Take 20 mg by mouth nightly.  potassium chloride (KLOR-CON M10) 10 mEq tablet Take 10 mEq by mouth nightly.  oxyCODONE IR (ROXICODONE) 5 mg immediate release tablet Take 1 Tab by mouth every four (4) hours as needed for Pain for up to 6 doses. Max Daily Amount: 30 mg.    clonazePAM (KLONOPIN) 2 mg tablet Take 1 mg by mouth three (3) times daily.     terconazole (TERAZOL 7) 0.4 % vaginal cream Insert 1 Applicator into vagina nightly. has refill for 7 day supply    FLUoxetine (PROZAC) 20 mg capsule Take 20 mg by mouth daily.  diclofenac potassium (CATAFLAM) 50 mg tablet Take 50 mg by mouth three (3) times daily as needed (PRN back pain). pt takes prn back pain    nystatin (MYCOSTATIN) 100,000 unit/gram ointment Apply 1 Units to affected area two (2) times a day. apply twice daily to affected area    miconazole (MICOTIN) 2 % topical cream Apply 1 Each to affected area two (2) times a day. Apply ointment to fungal infection under abdominal pannus and groin twice a day for 14 days.  oxyCODONE-acetaminophen (PERCOCET) 7.5-325 mg per tablet Take 1 Tab by mouth every eight (8) hours as needed for Pain.  HYDROcodone-acetaminophen (NORCO) 7.5-325 mg per tablet Take 1 Tab by mouth every six (6) hours as needed for Pain.  cyanocobalamin 1,000 mcg tablet Take 5,000 mcg by mouth daily.  aspirin 81 mg chewable tablet Take 81 mg by mouth daily.  gabapentin (NEURONTIN) 100 mg capsule Take 1 Cap by mouth three (3) times daily. (Patient taking differently: Take 1 Cap by mouth four (4) times daily.)    busPIRone (BUSPAR) 15 mg tablet Take 1 Tab by mouth Multiple. Take 2 tab po with breakfast  Take 1 tab po with lunch  Take 2 tab po with dinner    butalbital-acetaminophen-caffeine (FIORICET, ESGIC) -40 mg per tablet Take 2 Tabs by mouth every eight (8) hours as needed for Headache. Max Daily Amount: 6 Tabs.  oxyCODONE-acetaminophen (PERCOCET) 7.5-325 mg per tablet Take 1 Tab by mouth every four (4) hours as needed for Pain. Max Daily Amount: 6 Tabs. (Patient not taking: Reported on 11/28/2017)     No current facility-administered medications for this visit.        Vitals:    11/08/18 1343 11/08/18 1405   BP: 140/78 140/72   Pulse: 97    Resp: 20    SpO2: 96%    Weight: (!) 408 lb 11.2 oz (185.4 kg)    Height: 5' 2\" (1.575 m)      Social History     Socioeconomic History    Marital status:  Spouse name: Not on file    Number of children: Not on file    Years of education: Not on file    Highest education level: Not on file   Social Needs    Financial resource strain: Not on file    Food insecurity - worry: Not on file    Food insecurity - inability: Not on file    Transportation needs - medical: Not on file   Social[employer] needs - non-medical: Not on file   Occupational History    Not on file   Tobacco Use    Smoking status: Former Smoker    Smokeless tobacco: Never Used   Substance and Sexual Activity    Alcohol use: No    Drug use: No    Sexual activity: Not Currently     Partners: Male   Other Topics Concern    Not on file   Social History Narrative    Not on file       I have reviewed the nurses notes, vitals, problem list, allergy list, medical history, family, social history and medications. Review of Symptoms:    General: Pt denies excessive weight gain or loss. Pt is able to conduct ADL's  HEENT: Denies blurred vision, headaches, epistaxis and difficulty swallowing. Respiratory: + Shortness of breath, + DIOP, no wheezing or stridor. Cardiovascular: Denies precordial pain, palpitations, edema or PND  Gastrointestinal: Denies poor appetite, indigestion, abdominal pain or blood in stool  Musculoskeletal: Denies pain or swelling from muscles or joints  Neurologic: Denies tremor, paresthesias, or sensory motor disturbance  Skin: Denies rash, itching or texture change. Physical Exam:      General: Well developed, morbidly obese in no acute distress, cooperative and alert  HEENT: No carotid bruits, no JVD, trach is midline. Neck Supple, PEERL, EOM intact. Heart:  Normal S1/S2 negative S3 or S4. Regular, no murmur, gallop or rub.   Respiratory: Diminished bilateral bases correction up, no expiratory wheezing rhonchi or rales. Abdomen:   Soft, non-tender, no masses, bowel sounds are active.   Extremities:  No edema, normal cap refill, no cyanosis, atraumatic.    Neuro: A&Ox3, speech clear, gait stable. Skin: Skin color is normal. No rashes or lesions. Non diaphoretic  Vascular: 2+ pulses symmetric in all extremities    Cardiographics    ECG: Normal sinus rhythm  Results for orders placed or performed during the hospital encounter of 08/29/18   EKG, 12 LEAD, INITIAL   Result Value Ref Range    Ventricular Rate 100 BPM    Atrial Rate 100 BPM    P-R Interval 146 ms    QRS Duration 72 ms    Q-T Interval 342 ms    QTC Calculation (Bezet) 441 ms    Calculated P Axis 58 degrees    Calculated R Axis 37 degrees    Calculated T Axis 39 degrees    Diagnosis       Normal sinus rhythm  Low voltage QRS  When compared with ECG of 20-MAR-2014 13:03,  No significant change was found  Confirmed by Naz Roa ([id number]) on 8/29/2018 7:00:26 PM           Cardiology Labs:  Lab Results   Component Value Date/Time    Cholesterol, total 185 07/25/2014 09:49 AM    HDL Cholesterol 64 07/25/2014 09:49 AM    LDL, calculated 77 07/25/2014 09:49 AM    Triglyceride 222 (H) 07/25/2014 09:49 AM       Lab Results   Component Value Date/Time    Sodium 141 08/29/2018 10:46 AM    Potassium 3.8 08/29/2018 10:46 AM    Chloride 107 08/29/2018 10:46 AM    CO2 24 08/29/2018 10:46 AM    Anion gap 10 08/29/2018 10:46 AM    Glucose 158 (H) 08/29/2018 10:46 AM    BUN 13 08/29/2018 10:46 AM    Creatinine 0.85 08/29/2018 10:46 AM    BUN/Creatinine ratio 15 08/29/2018 10:46 AM    GFR est AA >60 08/29/2018 10:46 AM    GFR est non-AA >60 08/29/2018 10:46 AM    Calcium 8.7 08/29/2018 10:46 AM    Bilirubin, total 0.3 08/29/2018 10:46 AM    AST (SGOT) 33 08/29/2018 10:46 AM    Alk. phosphatase 71 08/29/2018 10:46 AM    Protein, total 7.2 08/29/2018 10:46 AM    Albumin 3.1 (L) 08/29/2018 10:46 AM    Globulin 4.1 (H) 08/29/2018 10:46 AM    A-G Ratio 0.8 (L) 08/29/2018 10:46 AM    ALT (SGPT) 31 08/29/2018 10:46 AM           Assessment:     Assessment:     Diagnoses and all orders for this visit:    1.  Hypertension, unspecified type  - AMB POC EKG ROUTINE W/ 12 LEADS, INTER & REP    2. DIOP (dyspnea on exertion)    3. Chronic obstructive pulmonary disease, unspecified COPD type ([facility] [address]        ICD-10-CM ICD-9-CM    1. Hypertension, unspecified type I10 401.9 AMB POC EKG ROUTINE W/ 12 LEADS, INTER & REP   2. DIOP (dyspnea on exertion) R06.09 786.09    3. Chronic obstructive pulmonary disease, unspecified COPD type ([facility] J44.9 496      Orders Placed This Encounter    AMB POC EKG ROUTINE W/ 12 LEADS, INTER & REP     Order Specific Question:   Reason for Exam:     Answer:   ROUTINE    LORazepam (ATIVAN) 1 mg tablet     Sig: Take 1 mg by mouth as needed. Refill:  0        Plan:     Patient is a 59-year-old female presenting with progressive dyspnea on exertion, previous cardiac workup which included coronary angiography in 2014 shows normal coronary anatomy. Will further evaluate with echocardiogram however given abnormal pulmonary function test would proceed with pulmonary consult. Rita Benavides NP    This note was created using voice recognition software. Despite editing, there may be syntax errors. [facility]    11/8/2018         Patient seen, examined by me personally. Plan discussed as detailed. Agree with note as outlined by  NP. I confirm findings in history and physical exam. No additional findings noted. Agree with plan as outlined above. Her symptoms appear to be related to COPD, morbid obesity than cardiac. If echo normal, no further cardiac w/u needed. May need sleep apnea evaluation as well.     Aj Martinez MD

## 2018-11-14 ENCOUNTER — CLINICAL SUPPORT (OUTPATIENT)
Dept: CARDIOLOGY CLINIC | Age: 58
End: 2018-11-14

## 2018-11-14 DIAGNOSIS — R06.09 DYSPNEA ON EXERTION: Primary | ICD-10-CM

## 2018-11-20 ENCOUNTER — TELEPHONE (OUTPATIENT)
Dept: CARDIOLOGY CLINIC | Age: 58
End: 2018-11-20

## 2018-11-21 NOTE — TELEPHONE ENCOUNTER
Verified patient with two identifiers.   Spoke with pt advised her ECHO results were normal, faxed ECHO results to PCP Dr. Bradley Currie, @ [phone] per her request.     Ora Stark NP   You 23 minutes ago (10:57 AM)      With in normal limits

## 2018-11-28 NOTE — TELEPHONE ENCOUNTER
Patient returned your call. Please call her back when available.     Thanks,     [employer]

## 2018-12-26 ENCOUNTER — HOSPITAL ENCOUNTER (EMERGENCY)
Age: 58
Discharge: HOME | End: 2018-12-26
Attending: EMERGENCY MEDICINE
Payer: COMMERCIAL

## 2018-12-26 ENCOUNTER — APPOINTMENT (OUTPATIENT)
Dept: GENERAL RADIOLOGY | Age: 58
End: 2018-12-26
Attending: EMERGENCY MEDICINE
Payer: COMMERCIAL

## 2018-12-26 VITALS
HEART RATE: 95 BPM | DIASTOLIC BLOOD PRESSURE: 76 MMHG | RESPIRATION RATE: 16 BRPM | SYSTOLIC BLOOD PRESSURE: 129 MMHG | BODY MASS INDEX: 53.92 KG/M2 | OXYGEN SATURATION: 96 % | HEIGHT: 62 IN | WEIGHT: 293 LBS | TEMPERATURE: 98.5 F

## 2018-12-26 DIAGNOSIS — R06.00 DYSPNEA, UNSPECIFIED TYPE: Primary | ICD-10-CM

## 2018-12-26 DIAGNOSIS — N39.0 URINARY TRACT INFECTION WITH HEMATURIA, SITE UNSPECIFIED: ICD-10-CM

## 2018-12-26 DIAGNOSIS — B37.2 CANDIDA INFECTION OF FLEXURAL SKIN: ICD-10-CM

## 2018-12-26 DIAGNOSIS — R31.9 URINARY TRACT INFECTION WITH HEMATURIA, SITE UNSPECIFIED: ICD-10-CM

## 2018-12-26 LAB
ALBUMIN SERPL-MCNC: 3 G/DL (ref 3.5–5)
ALBUMIN/GLOB SERPL: 0.8 {RATIO} (ref 1.1–2.2)
ALP SERPL-CCNC: 63 U/L (ref 45–117)
ALT SERPL-CCNC: 26 U/L (ref 12–78)
ANION GAP SERPL CALC-SCNC: 6 MMOL/L (ref 5–15)
APPEARANCE UR: ABNORMAL
AST SERPL-CCNC: 29 U/L (ref 15–37)
ATRIAL RATE: 90 BPM
BACTERIA URNS QL MICRO: ABNORMAL /HPF
BASOPHILS # BLD: 0.1 K/UL (ref 0–0.1)
BASOPHILS NFR BLD: 1 % (ref 0–1)
BILIRUB SERPL-MCNC: 0.3 MG/DL (ref 0.2–1)
BILIRUB UR QL: NEGATIVE
BNP SERPL-MCNC: 34 PG/ML (ref 0–125)
BUN SERPL-MCNC: 21 MG/DL (ref 6–20)
BUN/CREAT SERPL: 19 (ref 12–20)
CALCIUM SERPL-MCNC: 9.1 MG/DL (ref 8.5–10.1)
CALCULATED P AXIS, ECG09: 47 DEGREES
CALCULATED R AXIS, ECG10: 27 DEGREES
CALCULATED T AXIS, ECG11: 25 DEGREES
CHLORIDE SERPL-SCNC: 107 MMOL/L (ref 97–108)
CK MB CFR SERPL CALC: 1.9 % (ref 0–2.5)
CK MB SERPL-MCNC: 3.6 NG/ML (ref 5–25)
CK SERPL-CCNC: 194 U/L (ref 26–192)
CO2 SERPL-SCNC: 26 MMOL/L (ref 21–32)
COLOR UR: ABNORMAL
CREAT SERPL-MCNC: 1.11 MG/DL (ref 0.55–1.02)
DIAGNOSIS, 93000: NORMAL
DIFFERENTIAL METHOD BLD: ABNORMAL
EOSINOPHIL # BLD: 0.3 K/UL (ref 0–0.4)
EOSINOPHIL NFR BLD: 4 % (ref 0–7)
EPITH CASTS URNS QL MICRO: ABNORMAL /LPF
ERYTHROCYTE [DISTWIDTH] IN BLOOD BY AUTOMATED COUNT: 17.7 % (ref 11.5–14.5)
GLOBULIN SER CALC-MCNC: 3.7 G/DL (ref 2–4)
GLUCOSE SERPL-MCNC: 155 MG/DL (ref 65–100)
GLUCOSE UR STRIP.AUTO-MCNC: NEGATIVE MG/DL
HCT VFR BLD AUTO: 38.4 % (ref 35–47)
HGB BLD-MCNC: 10.7 G/DL (ref 11.5–16)
HGB UR QL STRIP: ABNORMAL
IMM GRANULOCYTES # BLD: 0 K/UL (ref 0–0.04)
IMM GRANULOCYTES NFR BLD AUTO: 0 % (ref 0–0.5)
KETONES UR QL STRIP.AUTO: NEGATIVE MG/DL
LEUKOCYTE ESTERASE UR QL STRIP.AUTO: ABNORMAL
LIPASE SERPL-CCNC: 262 U/L (ref 73–393)
LYMPHOCYTES # BLD: 1.7 K/UL (ref 0.8–3.5)
LYMPHOCYTES NFR BLD: 22 % (ref 12–49)
MCH RBC QN AUTO: 21.7 PG (ref 26–34)
MCHC RBC AUTO-ENTMCNC: 27.9 G/DL (ref 30–36.5)
MCV RBC AUTO: 77.7 FL (ref 80–99)
MONOCYTES # BLD: 0.7 K/UL (ref 0–1)
MONOCYTES NFR BLD: 9 % (ref 5–13)
NEUTS SEG # BLD: 5.1 K/UL (ref 1.8–8)
NEUTS SEG NFR BLD: 64 % (ref 32–75)
NITRITE UR QL STRIP.AUTO: POSITIVE
NRBC # BLD: 0 K/UL (ref 0–0.01)
NRBC BLD-RTO: 0 PER 100 WBC
P-R INTERVAL, ECG05: 150 MS
PH UR STRIP: 6 [PH] (ref 5–8)
PLATELET # BLD AUTO: 237 K/UL (ref 150–400)
PMV BLD AUTO: 11.6 FL (ref 8.9–12.9)
POTASSIUM SERPL-SCNC: 4.3 MMOL/L (ref 3.5–5.1)
PROT SERPL-MCNC: 6.7 G/DL (ref 6.4–8.2)
PROT UR STRIP-MCNC: NEGATIVE MG/DL
Q-T INTERVAL, ECG07: 366 MS
QRS DURATION, ECG06: 74 MS
QTC CALCULATION (BEZET), ECG08: 447 MS
RBC # BLD AUTO: 4.94 M/UL (ref 3.8–5.2)
RBC #/AREA URNS HPF: ABNORMAL /HPF (ref 0–5)
RBC MORPH BLD: ABNORMAL
SODIUM SERPL-SCNC: 139 MMOL/L (ref 136–145)
SP GR UR REFRACTOMETRY: 1.03 (ref 1–1.03)
TROPONIN I SERPL-MCNC: <0.05 NG/ML
UA: UC IF INDICATED,UAUC: ABNORMAL
UROBILINOGEN UR QL STRIP.AUTO: 0.2 EU/DL (ref 0.2–1)
VENTRICULAR RATE, ECG03: 90 BPM
WBC # BLD AUTO: 7.9 K/UL (ref 3.6–11)
WBC URNS QL MICRO: ABNORMAL /HPF (ref 0–4)

## 2018-12-26 PROCEDURE — 96376 TX/PRO/DX INJ SAME DRUG ADON: CPT

## 2018-12-26 PROCEDURE — 96375 TX/PRO/DX INJ NEW DRUG ADDON: CPT

## 2018-12-26 PROCEDURE — 96366 THER/PROPH/DIAG IV INF ADDON: CPT

## 2018-12-26 PROCEDURE — 93005 ELECTROCARDIOGRAM TRACING: CPT

## 2018-12-26 PROCEDURE — 81001 URINALYSIS AUTO W/SCOPE: CPT

## 2018-12-26 PROCEDURE — 84484 ASSAY OF TROPONIN QUANT: CPT

## 2018-12-26 PROCEDURE — 87186 SC STD MICRODIL/AGAR DIL: CPT

## 2018-12-26 PROCEDURE — 85025 COMPLETE CBC W/AUTO DIFF WBC: CPT

## 2018-12-26 PROCEDURE — 99283 EMERGENCY DEPT VISIT LOW MDM: CPT

## 2018-12-26 PROCEDURE — 74011250636 HC RX REV CODE- 250/636: Performed by: EMERGENCY MEDICINE

## 2018-12-26 PROCEDURE — 74022 RADEX COMPL AQT ABD SERIES: CPT

## 2018-12-26 PROCEDURE — 83880 ASSAY OF NATRIURETIC PEPTIDE: CPT

## 2018-12-26 PROCEDURE — 36415 COLL VENOUS BLD VENIPUNCTURE: CPT

## 2018-12-26 PROCEDURE — 74011000250 HC RX REV CODE- 250: Performed by: EMERGENCY MEDICINE

## 2018-12-26 PROCEDURE — 99284 EMERGENCY DEPT VISIT MOD MDM: CPT

## 2018-12-26 PROCEDURE — 82550 ASSAY OF CK (CPK): CPT

## 2018-12-26 PROCEDURE — 83690 ASSAY OF LIPASE: CPT

## 2018-12-26 PROCEDURE — 94640 AIRWAY INHALATION TREATMENT: CPT

## 2018-12-26 PROCEDURE — 77030029684 HC NEB SM VOL KT MONA -A

## 2018-12-26 PROCEDURE — 96365 THER/PROPH/DIAG IV INF INIT: CPT

## 2018-12-26 PROCEDURE — 87077 CULTURE AEROBIC IDENTIFY: CPT

## 2018-12-26 PROCEDURE — 80053 COMPREHEN METABOLIC PANEL: CPT

## 2018-12-26 PROCEDURE — 74011000258 HC RX REV CODE- 258: Performed by: EMERGENCY MEDICINE

## 2018-12-26 PROCEDURE — 87086 URINE CULTURE/COLONY COUNT: CPT

## 2018-12-26 RX ORDER — CEFUROXIME AXETIL 500 MG/1
500 TABLET ORAL 2 TIMES DAILY
Qty: 14 TAB | Refills: 0 | Status: ON HOLD | OUTPATIENT
Start: 2018-12-26 | End: 2019-01-01

## 2018-12-26 RX ORDER — ONDANSETRON 2 MG/ML
4 INJECTION INTRAMUSCULAR; INTRAVENOUS
Status: COMPLETED | OUTPATIENT
Start: 2018-12-26 | End: 2018-12-26

## 2018-12-26 RX ORDER — ALBUTEROL SULFATE 90 UG/1
2 AEROSOL, METERED RESPIRATORY (INHALATION)
Qty: 1 INHALER | Refills: 0 | Status: SHIPPED | OUTPATIENT
Start: 2018-12-26

## 2018-12-26 RX ORDER — METOCLOPRAMIDE 10 MG/1
10 TABLET ORAL
Qty: 12 TAB | Refills: 0 | Status: SHIPPED | OUTPATIENT
Start: 2018-12-26 | End: 2018-12-31

## 2018-12-26 RX ORDER — EXENATIDE 2 MG/.85ML
INJECTION, SUSPENSION, EXTENDED RELEASE SUBCUTANEOUS
Refills: 0 | Status: ON HOLD | COMMUNITY
Start: 2018-12-05 | End: 2019-01-01

## 2018-12-26 RX ORDER — SODIUM CHLORIDE 0.9 % (FLUSH) 0.9 %
5-10 SYRINGE (ML) INJECTION EVERY 8 HOURS
Status: DISCONTINUED | OUTPATIENT
Start: 2018-12-26 | End: 2018-12-26 | Stop reason: HOSPADM

## 2018-12-26 RX ORDER — FAMOTIDINE 10 MG/ML
20 INJECTION INTRAVENOUS
Status: COMPLETED | OUTPATIENT
Start: 2018-12-26 | End: 2018-12-26

## 2018-12-26 RX ORDER — ONDANSETRON 4 MG/1
4 TABLET, ORALLY DISINTEGRATING ORAL
Qty: 10 TAB | Refills: 0 | Status: SHIPPED | OUTPATIENT
Start: 2018-12-26 | End: 2018-12-26

## 2018-12-26 RX ORDER — NYSTATIN 100MM UNIT
1 POWDER (EA) MISCELLANEOUS 3 TIMES DAILY
Qty: 2 EACH | Refills: 1 | Status: SHIPPED | OUTPATIENT
Start: 2018-12-26 | End: 2021-11-29

## 2018-12-26 RX ORDER — SODIUM CHLORIDE 0.9 % (FLUSH) 0.9 %
5-10 SYRINGE (ML) INJECTION AS NEEDED
Status: DISCONTINUED | OUTPATIENT
Start: 2018-12-26 | End: 2018-12-26 | Stop reason: HOSPADM

## 2018-12-26 RX ORDER — IPRATROPIUM BROMIDE AND ALBUTEROL SULFATE 2.5; .5 MG/3ML; MG/3ML
3 SOLUTION RESPIRATORY (INHALATION) ONCE
Status: COMPLETED | OUTPATIENT
Start: 2018-12-26 | End: 2018-12-26

## 2018-12-26 RX ADMIN — CEFTRIAXONE 1 G: 1 INJECTION, POWDER, FOR SOLUTION INTRAMUSCULAR; INTRAVENOUS at 04:24

## 2018-12-26 RX ADMIN — ONDANSETRON 4 MG: 2 INJECTION INTRAMUSCULAR; INTRAVENOUS at 06:24

## 2018-12-26 RX ADMIN — IPRATROPIUM BROMIDE AND ALBUTEROL SULFATE 3 ML: .5; 3 SOLUTION RESPIRATORY (INHALATION) at 06:01

## 2018-12-26 RX ADMIN — FAMOTIDINE 20 MG: 10 INJECTION, SOLUTION INTRAVENOUS at 03:18

## 2018-12-26 RX ADMIN — ONDANSETRON 4 MG: 2 INJECTION INTRAMUSCULAR; INTRAVENOUS at 03:18

## 2018-12-26 NOTE — DISCHARGE INSTRUCTIONS
Yeast Skin Infection: Care Instructions  Your Care Instructions    Yeast normally lives on your skin. Sometimes too much yeast can overgrow in certain areas of the skin and cause an infection. The infection causes red, scaly, moist patches on your skin that may itch. Common areas for skin yeast infections are skin folds under the breasts or belly area. The warm and moist areas in the skin folds can make it easier for yeast to overgrow. Yeast infections also can be found on other parts of the body such as the groin or armpits. You will probably get a cream or ointment that contains an antifungal medicine. Examples of these are miconazole and clotrimazole. You put it on your skin to treat the infection. Your doctor may give you a prescription for the cream or ointment. Or you may be able to buy it without a prescription at most drugstores. If the infection is severe, the doctor will prescribe antifungal pills. A yeast infection usually goes away after about a week of treatment. But it's important to use the medicine for as long as your doctor tells you to. Follow-up care is a key part of your treatment and safety. Be sure to make and go to all appointments, and call your doctor if you are having problems. It's also a good idea to know your test results and keep a list of the medicines you take. How can you care for yourself at home? · Be safe with medicines. Take your medicines exactly as prescribed. Call your doctor if you think you are having a problem with your medicine. · Keep your skin clean and dry. Your doctor may suggest using powder that contains an antifungal medicine in the skin folds. · Wear loose clothing. When should you call for help? Call your doctor now or seek immediate medical care if:    · You have symptoms of infection, such as:  ? Increased pain, swelling, warmth, or redness. ? Red streaks leading from the area. ? Pus draining from the area.   ? A fever.    Watch closely for changes in your health, and be sure to contact your doctor if:    · You do not get better as expected. Where can you learn more? Go to http://luis-julieth.info/. Enter I979 in the search box to learn more about \"Yeast Skin Infection: Care Instructions. \"  Current as of: April 18, 2018  Content Version: 11.8  © 3806-9206 [employer]. Care instructions adapted under license by [employer] (which disclaims liability or warranty for this information). If you have questions about a medical condition or this instruction, always ask your healthcare professional. [place] [address] any warranty or liability for your use of this information. Urinary Tract Infection in Women: Care Instructions  Your Care Instructions    A urinary tract infection, or UTI, is a general term for an infection anywhere between the kidneys and the urethra (where urine comes out). Most UTIs are bladder infections. They often cause pain or burning when you urinate. UTIs are caused by bacteria and can be cured with antibiotics. Be sure to complete your treatment so that the infection goes away. Follow-up care is a key part of your treatment and safety. Be sure to make and go to all appointments, and call your doctor if you are having problems. It's also a good idea to know your test results and keep a list of the medicines you take. How can you care for yourself at home? · Take your antibiotics as directed. Do not stop taking them just because you feel better. You need to take the full course of antibiotics. · Drink extra water and other fluids for the next day or two. This may help wash out the bacteria that are causing the infection. (If you have kidney, heart, or liver disease and have to limit fluids, talk with your doctor before you increase your fluid intake.)  · Avoid drinks that are carbonated or have caffeine. They can irritate the bladder. · Urinate often.  Try to empty your bladder each time. · To relieve pain, take a hot bath or lay a heating pad set on low over your lower belly or genital area. Never go to sleep with a heating pad in place. To prevent UTIs  · Drink plenty of water each day. This helps you urinate often, which clears bacteria from your system. (If you have kidney, heart, or liver disease and have to limit fluids, talk with your doctor before you increase your fluid intake.)  · Urinate when you need to. · Urinate right after you have sex. · Change sanitary pads often. · Avoid douches, bubble baths, feminine hygiene sprays, and other feminine hygiene products that have deodorants. · After going to the bathroom, wipe from front to back. When should you call for help? Call your doctor now or seek immediate medical care if:    · Symptoms such as fever, chills, nausea, or vomiting get worse or appear for the first time.     · You have new pain in your back just below your rib cage. This is called flank pain.     · There is new blood or pus in your urine.     · You have any problems with your antibiotic medicine.    Watch closely for changes in your health, and be sure to contact your doctor if:    · You are not getting better after taking an antibiotic for 2 days.     · Your symptoms go away but then come back. Where can you learn more? Go to http://luis-julieth.info/. Enter Z197 in the search box to learn more about \"Urinary Tract Infection in Women: Care Instructions. \"  Current as of: March 21, 2018  Content Version: 11.8  © 0167-1495 [employer]. Care instructions adapted under license by [employer] (which disclaims liability or warranty for this information). If you have questions about a medical condition or this instruction, always ask your healthcare professional. [place] [address] any warranty or liability for your use of this information.          Shortness of Breath: Care Instructions  Your Care Instructions  Shortness of breath has many causes. Sometimes conditions such as anxiety can lead to shortness of breath. Some people get mild shortness of breath when they exercise. Trouble breathing also can be a symptom of a serious problem, such as asthma, lung disease, emphysema, heart problems, and pneumonia. If your shortness of breath continues, you may need tests and treatment. Watch for any changes in your breathing and other symptoms. Follow-up care is a key part of your treatment and safety. Be sure to make and go to all appointments, and call your doctor if you are having problems. It's also a good idea to know your test results and keep a list of the medicines you take. How can you care for yourself at home? · Do not smoke or allow others to smoke around you. If you need help quitting, talk to your doctor about stop-smoking programs and medicines. These can increase your chances of quitting for good. · Get plenty of rest and sleep. · Take your medicines exactly as prescribed. Call your doctor if you think you are having a problem with your medicine. · Find healthy ways to deal with stress. ? Exercise daily. ? Get plenty of sleep. ? Eat regularly and well. When should you call for help? Call 911 anytime you think you may need emergency care. For example, call if:    · You have severe shortness of breath.     · You have symptoms of a heart attack. These may include:  ? Chest pain or pressure, or a strange feeling in the chest.  ? Sweating. ? Shortness of breath. ? Nausea or vomiting. ? Pain, pressure, or a strange feeling in the back, neck, jaw, or upper belly or in one or both shoulders or arms. ? Lightheadedness or sudden weakness. ? A fast or irregular heartbeat. After you call 911, the  may tell you to chew 1 adult-strength or 2 to 4 low-dose aspirin. Wait for an ambulance.  Do not try to drive yourself.    Call your doctor now or seek immediate medical care if:    · Your shortness of breath gets worse or you start to wheeze. Wheezing is a high-pitched sound when you breathe.     · You wake up at night out of breath or have to prop your head up on several pillows to breathe.     · You are short of breath after only light activity or while at rest.    Watch closely for changes in your health, and be sure to contact your doctor if:    · You do not get better over the next 1 to 2 days. Where can you learn more? Go to http://luis-julieth.info/. Enter S780 in the search box to learn more about \"Shortness of Breath: Care Instructions. \"  Current as of: December 6, 2017  Content Version: 11.8  © 1336-0219 [employer]. Care instructions adapted under license by [employer] (which disclaims liability or warranty for this information). If you have questions about a medical condition or this instruction, always ask your healthcare professional. [place] [address] any warranty or liability for your use of this information.

## 2018-12-26 NOTE — ED NOTES
Emergency Department Nursing Progress Note:    While flushing left AC IV, resistance noted. Firmness palpated at IV site, patient denies pain. IV removed due to infiltration. Right forearm IV started, medications given without difficulty.

## 2018-12-26 NOTE — ED TRIAGE NOTES
Emergency Department Triage Note:    Brought in by ambulance  from home with chief complaint of shortness of breath. SOB began this morning, hx of COPD - reports worsening over the past year. Also reports two day history of dysuria. Per EMS, O2 98% en route. Speaking in full sentences upon arrival, no apparent distress noted at this time.

## 2018-12-26 NOTE — ED NOTES
Emergency Department Nursing Discharge Note:    Discharge instructions provided by Dr. Jordi Mckay. Patient verbalized understanding. IV catheter removed with tip intact. Patient wheelchair out of ED to lobby. Family transportation home.

## 2018-12-26 NOTE — ED PROVIDER NOTES
EMERGENCY DEPARTMENT HISTORY AND PHYSICAL EXAM      Date: 12/26/2018  Patient Name: Jennifer Sparrow    History of Presenting Illness     Chief Complaint   Patient presents with    Shortness of Breath       History Provided By: Patient and EMS    HPI: Jennifer Sparrow, 62 y.o. female with PMHx significant for DM, hypothyroidism, COPD, anxiety, and depression, presents via EMS to the ED with cc of acute on chronic SOB x 1 day. Pt states he has a nebulizer and inhaler at home, however has not been using it as it makes her feel nauseous. She reports prior hx of 2ppd tobacco use x 17 years and states she quit several years ago. Per EMS, upon arrival pt's oxygen saturation remained 97% on RA. Pt also c/o 10/10 sharp mid-abdominal pain and dysuria x 2 days. Of note, she also reports new onset BLE swelling x several weeks. Pt specifically denies any recent fever, chills, vomiting, diarrhea, CP, lightheadedness, dizziness, numbness, weakness, tingling, BLE swelling, HA, heart palpitations, changes in BM, changes in PO intake, melena, hematochezia, cough, or congestion. Allergies: celebrex, sulfa, adhesive, codeine  PMHx: Significant for DM, COPD  PSHx: Significant for hysterectomy, cholecystectomy   Social Hx: - tobacco (prior), - EtOH, - Illicit Drugs    There are no other complaints, changes, or physical findings at this time.     PCP: Ramon Guevara DO   Cardiology: Harinder Morgan MD    Current Facility-Administered Medications   Medication Dose Route Frequency Provider Last Rate Last Dose    sodium chloride (NS) flush 5-10 mL  5-10 mL IntraVENous Q8H Wilson Stratton MD        sodium chloride (NS) flush 5-10 mL  5-10 mL IntraVENous PRN Wilson Stratton MD        sodium chloride (NS) flush 5-10 mL  5-10 mL IntraVENous Q8H ELVIRA'Reyna Wong MD        sodium chloride (NS) flush 5-10 mL  5-10 mL IntraVENous PRN Reyna Black MD         Current Outpatient Medications   Medication Sig Dispense Refill    BYDUREON BCISE 2 mg/0.85 mL atIn INJECT UNDER THE SKIN ONCE WEEKLY AS DIRECTED  0    nystatin, bulk, 15 billion unit powd 1 Units by Does Not Apply route three (3) times daily. To skin folds, ventral fold 2 Each 1    cefUROXime (CEFTIN) 500 mg tablet Take 1 Tab by mouth two (2) times a day for 7 days. 14 Tab 0    albuterol (PROVENTIL HFA, VENTOLIN HFA, PROAIR HFA) 90 mcg/actuation inhaler Take 2 Puffs by inhalation every four (4) hours as needed for Wheezing. 1 Inhaler 0    metoclopramide HCl (REGLAN) 10 mg tablet Take 1 Tab by mouth every six (6) hours as needed. 12 Tab 0    buPROPion XL (WELLBUTRIN XL) 150 mg tablet Take 300 mg by mouth every morning.  QUEtiapine (SEROQUEL) 400 mg tablet Take 400 mg by mouth nightly.  prazosin (MINIPRESS) 1 mg capsule Take 4 mg by mouth nightly.  ergocalciferol (VITAMIN D2) 50,000 unit capsule Take 50,000 Units by mouth two (2) days a week. Twice a week Sunday and Wednesday      metFORMIN (GLUCOPHAGE) 500 mg tablet Take 500 mg by mouth every evening.  diclofenac potassium (CATAFLAM) 50 mg tablet Take 50 mg by mouth three (3) times daily as needed (PRN back pain). pt takes prn back pain      aspirin 81 mg chewable tablet Take 81 mg by mouth daily.  gabapentin (NEURONTIN) 100 mg capsule Take 1 Cap by mouth three (3) times daily. (Patient taking differently: Take 1 Cap by mouth four (4) times daily.) 90 Cap 1    busPIRone (BUSPAR) 15 mg tablet Take 1 Tab by mouth Multiple. Take 2 tab po with breakfast  Take 1 tab po with lunch  Take 2 tab po with dinner 150 Tab 1    diclofenac EC (VOLTAREN) 50 mg EC tablet Take 1 Tab by mouth Multiple. Take 1 tab po BID with meals for 1 week then rest for 1 week and can take again repeating same cycle 30 Tab 0    levothyroxine (SYNTHROID) 112 mcg tablet Take 112 mcg by mouth Daily (before breakfast).  glimepiride (AMARYL) 2 mg tablet Take 2 mg by mouth every morning.       losartan (COZAAR) 25 mg tablet Take 25 mg by mouth daily.  metoprolol succinate (TOPROL XL) 25 mg XL tablet Take 25 mg by mouth daily.  omeprazole (PRILOSEC) 20 mg capsule Take 40 mg by mouth daily.  topiramate (TOPAMAX) 200 mg tablet Take 200 mg by mouth nightly.  simvastatin (ZOCOR) 20 mg tablet Take 20 mg by mouth nightly.  potassium chloride (KLOR-CON M10) 10 mEq tablet Take 10 mEq by mouth nightly.  LORazepam (ATIVAN) 1 mg tablet Take 1 mg by mouth as needed. 0    oxyCODONE IR (ROXICODONE) 5 mg immediate release tablet Take 1 Tab by mouth every four (4) hours as needed for Pain for up to 6 doses. Max Daily Amount: 30 mg. 6 Tab 0    clonazePAM (KLONOPIN) 2 mg tablet Take 1 mg by mouth three (3) times daily.  estradiol (ESTRACE) 0.5 mg tablet Take 0.5 mg by mouth daily.  terconazole (TERAZOL 7) 0.4 % vaginal cream Insert 1 Applicator into vagina nightly. has refill for 7 day supply      FLUoxetine (PROZAC) 20 mg capsule Take 20 mg by mouth daily.  miconazole (MICOTIN) 2 % topical cream Apply 1 Each to affected area two (2) times a day. Apply ointment to fungal infection under abdominal pannus and groin twice a day for 14 days.  oxyCODONE-acetaminophen (PERCOCET) 7.5-325 mg per tablet Take 1 Tab by mouth every eight (8) hours as needed for Pain.  HYDROcodone-acetaminophen (NORCO) 7.5-325 mg per tablet Take 1 Tab by mouth every six (6) hours as needed for Pain.  cyanocobalamin 1,000 mcg tablet Take 5,000 mcg by mouth daily.  butalbital-acetaminophen-caffeine (FIORICET, ESGIC) -40 mg per tablet Take 2 Tabs by mouth every eight (8) hours as needed for Headache. Max Daily Amount: 6 Tabs. 30 Tab 0    oxyCODONE-acetaminophen (PERCOCET) 7.5-325 mg per tablet Take 1 Tab by mouth every four (4) hours as needed for Pain. Max Daily Amount: 6 Tabs.  (Patient not taking: Reported on 11/28/2017) 30 Tab 0       Past History     Past Medical History:  Past Medical History:   Diagnosis Date    Arrhythmia     Arthritis     Chronic pain     djd    Depression     Diabetes (HCC)     GERD (gastroesophageal reflux disease)     Hypertension     Hypothyroidism     Kidney stone     Liver disease     Obesity, morbid, BMI 50 or higher ([facility] [address]     Psychiatric disorder     anxiety    Psychiatric disorder     depression    Seizures ([facility] [address]     Thyroid disease        Past Surgical History:  Past Surgical History:   Procedure Laterality Date    CARDIAC CATHETERIZATION  11/18/2010         HX APPENDECTOMY      HX CHOLECYSTECTOMY      HX HYSTERECTOMY      HX TUBAL LIGATION         Family History:  Family History   Problem Relation Age of Onset    Heart Disease Mother     Hypertension Mother     Cancer Mother     Heart Disease Father     Hypertension Father     Cancer Brother        Social History:  Social History     Tobacco Use    Smoking status: Former Smoker    Smokeless tobacco: Never Used   Substance Use Topics    Alcohol use: No    Drug use: No       Allergies: Allergies   Allergen Reactions    Celebrex [Celecoxib] Nausea Only    Sulfa (Sulfonamide Antibiotics) Nausea Only    Adhesive     Codeine Other (comments)     Hyper activity         Review of Systems   Review of Systems   Constitutional: Negative. Negative for appetite change, chills and fever. HENT: Negative. Negative for congestion. Eyes: Negative. Respiratory: Positive for shortness of breath. Negative for cough. Cardiovascular: Positive for leg swelling. Negative for chest pain and palpitations. Gastrointestinal: Positive for abdominal pain and nausea. Negative for blood in stool, constipation, diarrhea and vomiting. Genitourinary: Positive for dysuria. Negative for frequency and hematuria. Musculoskeletal: Negative. Skin: Negative. Neurological: Negative. Negative for dizziness, weakness, light-headedness, numbness and headaches. Psychiatric/Behavioral: Negative.     All other systems reviewed and are negative.       Physical Exam   General appearance - morbidly obese, well appearing, and in no distress  Eyes - pupils equal and reactive, extraocular eye movements intact  ENT - mucous membranes moist, pharynx normal without lesions  Neck - supple, no significant adenopathy; non-tender to palpation  Chest - clear to auscultation, no wheezes, rales or rhonchi; non-tender to palpation  Heart - normal rate and regular rhythm, S1 and S2 normal, no murmurs noted  Abdomen - soft, mild generalized tenderness, nondistended, no masses or organomegaly  Musculoskeletal - no joint tenderness, deformity or swelling; normal ROM  Extremities - peripheral pulses normal, trace b/l pedal edema  Skin - normal coloration and turgor, no rashes  Neurological - alert, oriented x3, normal speech, no focal findings or movement disorder noted    Diagnostic Study Results     Labs -     Recent Results (from the past 12 hour(s))   EKG, 12 LEAD, INITIAL    Collection Time: 12/26/18  2:04 AM   Result Value Ref Range    Ventricular Rate 90 BPM    Atrial Rate 90 BPM    P-R Interval 150 ms    QRS Duration 74 ms    Q-T Interval 366 ms    QTC Calculation (Bezet) 447 ms    Calculated P Axis 47 degrees    Calculated R Axis 27 degrees    Calculated T Axis 25 degrees    Diagnosis       Normal sinus rhythm  Low voltage QRS  When compared with ECG of 29-AUG-2018 10:37,  No significant change was found     METABOLIC PANEL, COMPREHENSIVE    Collection Time: 12/26/18  2:11 AM   Result Value Ref Range    Sodium 139 136 - 145 mmol/L    Potassium 4.3 3.5 - 5.1 mmol/L    Chloride 107 97 - 108 mmol/L    CO2 26 21 - 32 mmol/L    Anion gap 6 5 - 15 mmol/L    Glucose 155 (H) 65 - 100 mg/dL    BUN 21 (H) 6 - 20 MG/DL    Creatinine 1.11 (H) 0.55 - 1.02 MG/DL    BUN/Creatinine ratio 19 12 - 20      GFR est AA >60 >60 ml/min/1.73m2    GFR est non-AA 50 (L) >60 ml/min/1.73m2    Calcium 9.1 8.5 - 10.1 MG/DL    Bilirubin, total 0.3 0.2 - 1.0 MG/DL    ALT (SGPT) 26 12 - 78 U/L    AST (SGOT) 29 15 - 37 U/L    Alk. phosphatase 63 45 - 117 U/L    Protein, total 6.7 6.4 - 8.2 g/dL    Albumin 3.0 (L) 3.5 - 5.0 g/dL    Globulin 3.7 2.0 - 4.0 g/dL    A-G Ratio 0.8 (L) 1.1 - 2.2     CBC WITH AUTOMATED DIFF    Collection Time: 12/26/18  2:11 AM   Result Value Ref Range    WBC 7.9 3.6 - 11.0 K/uL    RBC 4.94 3.80 - 5.20 M/uL    HGB 10.7 (L) 11.5 - 16.0 g/dL    HCT 38.4 35.0 - 47.0 %    MCV 77.7 (L) 80.0 - 99.0 FL    MCH 21.7 (L) 26.0 - 34.0 PG    MCHC 27.9 (L) 30.0 - 36.5 g/dL    RDW 17.7 (H) 11.5 - 14.5 %    PLATELET 286 639 - 466 K/uL    MPV 11.6 8.9 - 12.9 FL    NRBC 0.0 0  WBC    ABSOLUTE NRBC 0.00 0.00 - 0.01 K/uL    NEUTROPHILS 64 32 - 75 %    LYMPHOCYTES 22 12 - 49 %    MONOCYTES 9 5 - 13 %    EOSINOPHILS 4 0 - 7 %    BASOPHILS 1 0 - 1 %    IMMATURE GRANULOCYTES 0 0.0 - 0.5 %    ABS. NEUTROPHILS 5.1 1.8 - 8.0 K/UL    ABS. LYMPHOCYTES 1.7 0.8 - 3.5 K/UL    ABS. MONOCYTES 0.7 0.0 - 1.0 K/UL    ABS. EOSINOPHILS 0.3 0.0 - 0.4 K/UL    ABS. BASOPHILS 0.1 0.0 - 0.1 K/UL    ABS. IMM.  GRANS. 0.0 0.00 - 0.04 K/UL    DF AUTOMATED      RBC COMMENTS ANISOCYTOSIS  2+        RBC COMMENTS HYPOCHROMIA  1+        RBC COMMENTS POLYCHROMASIA  PRESENT       TROPONIN I    Collection Time: 12/26/18  2:11 AM   Result Value Ref Range    Troponin-I, Qt. <0.05 <0.05 ng/mL   CK W/ CKMB & INDEX    Collection Time: 12/26/18  2:11 AM   Result Value Ref Range     (H) 26 - 192 U/L    CK - MB 3.6 (H) <3.6 NG/ML    CK-MB Index 1.9 0 - 2.5     LIPASE    Collection Time: 12/26/18  2:11 AM   Result Value Ref Range    Lipase 262 73 - 393 U/L   NT-PRO BNP    Collection Time: 12/26/18  2:11 AM   Result Value Ref Range    NT pro-BNP 34 0 - 125 PG/ML   URINALYSIS W/ REFLEX CULTURE    Collection Time: 12/26/18  3:08 AM   Result Value Ref Range    Color YELLOW/STRAW      Appearance CLOUDY (A) CLEAR      Specific gravity 1.028 1.003 - 1.030      pH (UA) 6.0 5.0 - 8.0      Protein NEGATIVE  NEG mg/dL    Glucose NEGATIVE  NEG mg/dL    Ketone NEGATIVE  NEG mg/dL    Bilirubin NEGATIVE  NEG      Blood LARGE (A) NEG      Urobilinogen 0.2 0.2 - 1.0 EU/dL    Nitrites POSITIVE (A) NEG      Leukocyte Esterase MODERATE (A) NEG      WBC 20-50 0 - 4 /hpf    RBC 10-20 0 - 5 /hpf    Epithelial cells FEW FEW /lpf    Bacteria 4+ (A) NEG /hpf    UA:UC IF INDICATED URINE CULTURE ORDERED (A) CNI         Radiologic Studies -   CXR Results  (Last 48 hours)               12/26/18 0307  XR ABD ACUTE W 1 V CHEST Final result    Impression:  IMPRESSION:   Right renal calculus. No acute process is identified. Narrative:   CLINICAL HISTORY: Abdominal pain       INDICATION: Abdominal pain and dyspnea       COMPARISON: 3/30/2014       FINDINGS:    PA view of the chest and 2 views of the abdomen are obtained. The cardiopericardial silhouette is within normal limits. There is no pleural   effusion, pneumothorax or focal consolidation present. There is no obstruction or free intraperitoneal air. There is no organomegaly. Visualized osseous structures are without acute change. Right renal calculus               Medical Decision Making   I am the first provider for this patient. I reviewed the vital signs, available nursing notes, past medical history, past surgical history, family history and social history. Vital Signs-Reviewed the patient's vital signs. Patient Vitals for the past 12 hrs:   Temp Pulse Resp BP SpO2   12/26/18 0200     96 %   12/26/18 0155 98.5 °F (36.9 °C) 95 16 129/76 98 %       Pulse Oximetry Analysis - 98% on RA    Cardiac Monitor:   Rate: 95 bpm  Rhythm: Normal Sinus Rhythm      EKG interpretation: (Preliminary)  0204  Rhythm: normal sinus rhythm; and irregular. Rate (approx.): 90; Axis: normal; [place] interval: normal; QRS interval: normal ; QTc: normal; ST/T wave: normal; Other findings: no ischemic changes.   Written by JOSE Arizmendi, as dictated by Denis Escalera MD.    Records Reviewed: Nursing Notes, Old Medical Records, Previous electrocardiograms, Ambulance Run Sheet, Previous Radiology Studies and Previous Laboratory Studies    Provider Notes (Medical Decision Making):   DDx: COPD exacerbation, PNA, UTI, GERD, CHF    ED Course:   Initial assessment performed. The patients presenting problems have been discussed, and they are in agreement with the care plan formulated and outlined with them. I have encouraged them to ask questions as they arise throughout their visit. Medications Given in the ED:    Medications   sodium chloride (NS) flush 5-10 mL (not administered)   sodium chloride (NS) flush 5-10 mL (not administered)   sodium chloride (NS) flush 5-10 mL (not administered)   sodium chloride (NS) flush 5-10 mL (not administered)   ondansetron (ZOFRAN) injection 4 mg (4 mg IntraVENous Given 12/26/18 0318)   famotidine (PF) (PEPCID) injection 20 mg (20 mg IntraVENous Given 12/26/18 0318)   cefTRIAXone (ROCEPHIN) 1 g in 0.9% sodium chloride (MBP/ADV) 50 mL (0 g IntraVENous IV Completed 12/26/18 0615)   albuterol-ipratropium (DUO-NEB) 2.5 MG-0.5 MG/3 ML (3 mL Nebulization Given 12/26/18 0601)   ondansetron (ZOFRAN) injection 4 mg (4 mg IntraVENous Given 12/26/18 2835)     Progress note:  6:13 AM  Pt called me back in the room after the initial interview to look at her abdominal wall skin because she is concerned she may have MRSA as she has had it in the past. Under her pannus there is beefy red, moist skin c/w candida. Will treat with Nystatin. Written by JOSE Haynes, as dictated by Reyna Black MD    Progress note:  6:22 AM  At time of discharge pt requesting a prescription for Seroquel. She states her PCP typically writes this, however she has been taking more than she's supposed to and is now out, but the prescription cannot be refilled until 12/31. I informed pt that I am unable to do that given she has not been taking the medication as prescribed.  I will change her Zofran prescription to Reglan. She is otherwise noted to be feeling better, ready for discharge. Updated pt and/or family on all final lab and imaging findings. Will follow up as instructed. All questions have been answered, pt voiced understanding and agreement with plan. Abx were prescribed, pt advised that diarrhea and rash are possible side effects of the medications. Specific return precautions provided as well as instructions to return to the ED should sx worsen at any time. Vital signs stable for discharge. Critical Care Time: 0    Disposition:  DISCHARGE NOTE:  6:24 AM  The patient is ready for discharge. The patients signs, symptoms, diagnosis, and instructions for discharge have been discussed and the pt has conveyed their understanding. The patient is to follow up as recommended with PCP or return to the ER should their symptoms worsen. Plan has been discussed and patient has conveyed their agreement. PLAN:  1. Discharge home  Discharge Medication List as of 12/26/2018  6:15 AM      START taking these medications    Details   cefUROXime (CEFTIN) 500 mg tablet Take 1 Tab by mouth two (2) times a day for 7 days. , Print, Disp-14 Tab, R-0         CONTINUE these medications which have CHANGED    Details   nystatin, bulk, 15 billion unit powd 1 Units by Does Not Apply route three (3) times daily. To skin folds, ventral fold, Print, Disp-2 Each, R-1      albuterol (PROVENTIL HFA, VENTOLIN HFA, PROAIR HFA) 90 mcg/actuation inhaler Take 2 Puffs by inhalation every four (4) hours as needed for Wheezing., Print, Disp-1 Inhaler, R-0      ondansetron (ZOFRAN ODT) 4 mg disintegrating tablet Take 1 Tab by mouth every eight (8) hours as needed for Nausea. , Print, Disp-10 Tab, R-0         CONTINUE these medications which have NOT CHANGED    Details   BYDUREON BCISE 2 mg/0.85 mL atIn INJECT UNDER THE SKIN ONCE WEEKLY AS DIRECTED, Historical Med, R-0, LORELEI      buPROPion XL (WELLBUTRIN XL) 150 mg tablet Take 300 mg by mouth every morning., Historical Med      QUEtiapine (SEROQUEL) 400 mg tablet Take 400 mg by mouth nightly., Historical Med      prazosin (MINIPRESS) 1 mg capsule Take 4 mg by mouth nightly., Historical Med      ergocalciferol (VITAMIN D2) 50,000 unit capsule Take 50,000 Units by mouth two (2) days a week. Twice a week Sunday and Wednesday, Historical Med      metFORMIN (GLUCOPHAGE) 500 mg tablet Take 500 mg by mouth every evening., Historical Med      diclofenac potassium (CATAFLAM) 50 mg tablet Take 50 mg by mouth three (3) times daily as needed (PRN back pain). pt takes prn back pain, Historical Med      aspirin 81 mg chewable tablet Take 81 mg by mouth daily. , Historical Med      gabapentin (NEURONTIN) 100 mg capsule Take 1 Cap by mouth three (3) times daily. , Print, Disp-90 Cap, R-1      busPIRone (BUSPAR) 15 mg tablet Take 1 Tab by mouth Multiple. Take 2 tab po with breakfast  Take 1 tab po with lunch  Take 2 tab po with dinner, Print, Disp-150 Tab, R-1      diclofenac EC (VOLTAREN) 50 mg EC tablet Take 1 Tab by mouth Multiple. Take 1 tab po BID with meals for 1 week then rest for 1 week and can take again repeating same cycle, Print, Disp-30 Tab, R-0      levothyroxine (SYNTHROID) 112 mcg tablet Take 112 mcg by mouth Daily (before breakfast). , Historical Med      glimepiride (AMARYL) 2 mg tablet Take 2 mg by mouth every morning., Historical Med      losartan (COZAAR) 25 mg tablet Take 25 mg by mouth daily. , Historical Med      metoprolol succinate (TOPROL XL) 25 mg XL tablet Take 25 mg by mouth daily. , Historical Med      omeprazole (PRILOSEC) 20 mg capsule 40 mg, Oral, DAILY, Until Discontinued, Historical Med      topiramate (TOPAMAX) 200 mg tablet Take 200 mg by mouth nightly., Historical Med      simvastatin (ZOCOR) 20 mg tablet 20 mg, Oral, EVERY BEDTIME, Until Discontinued, Historical Med      potassium chloride (KLOR-CON M10) 10 mEq tablet Take 10 mEq by mouth nightly., Historical Med      LORazepam (ATIVAN) 1 mg tablet Take 1 mg by mouth as needed., Historical Med, R-0      oxyCODONE IR (ROXICODONE) 5 mg immediate release tablet Take 1 Tab by mouth every four (4) hours as needed for Pain for up to 6 doses. Max Daily Amount: 30 mg., Print, Disp-6 Tab, R-0      clonazePAM (KLONOPIN) 2 mg tablet Take 1 mg by mouth three (3) times daily. , Historical Med      estradiol (ESTRACE) 0.5 mg tablet Take 0.5 mg by mouth daily. , Historical Med      terconazole (TERAZOL 7) 0.4 % vaginal cream Insert 1 Applicator into vagina nightly. has refill for 7 day supply, Historical Med      FLUoxetine (PROZAC) 20 mg capsule Take 20 mg by mouth daily. , Historical Med      miconazole (MICOTIN) 2 % topical cream Apply 1 Each to affected area two (2) times a day. Apply ointment to fungal infection under abdominal pannus and groin twice a day for 14 days. , Historical Med      !! oxyCODONE-acetaminophen (PERCOCET) 7.5-325 mg per tablet Take 1 Tab by mouth every eight (8) hours as needed for Pain., Historical Med      HYDROcodone-acetaminophen (NORCO) 7.5-325 mg per tablet Take 1 Tab by mouth every six (6) hours as needed for Pain., Historical Med      cyanocobalamin 1,000 mcg tablet Take 5,000 mcg by mouth daily. , Historical Med      butalbital-acetaminophen-caffeine (FIORICET, ESGIC) -40 mg per tablet Take 2 Tabs by mouth every eight (8) hours as needed for Headache. Max Daily Amount: 6 Tabs., Print, Disp-30 Tab, R-0      !! oxyCODONE-acetaminophen (PERCOCET) 7.5-325 mg per tablet Take 1 Tab by mouth every four (4) hours as needed for Pain. Max Daily Amount: 6 Tabs., Print, Disp-30 Tab, R-0       !! - Potential duplicate medications found. Please discuss with provider.       STOP taking these medications       nystatin (MYCOSTATIN) 100,000 unit/gram ointment Comments:   Reason for Stopping:         albuterol (PROVENTIL VENTOLIN) 2.5 mg /3 mL (0.083 %) nebulizer solution Comments:   Reason for Stoppin.   Follow-up Information     Follow up With Specialties Details Why Contact Info    Nithin May, DO Family Practice In 2 days  [address] [facility]  [phone]      [facility] [place] EM[facility] Emergency Medicine  If symptoms worsen [address]  [address] [facility]  [phone]        Return to ED if worse     Diagnosis     Clinical Impression:   1. Dyspnea, unspecified type    2. Urinary tract infection with hematuria, site unspecified    3. Candida infection of flexural skin        Attestations: This note is prepared by Cora Collins, acting as Scribe for Reyna Crowe MD.    Lady Issac MD: The scribe's documentation has been prepared under my direction and personally reviewed by me in its entirety. I confirm that the note above accurately reflects all work, treatment, procedures, and medical decision making performed by me. This note will not be viewable in [address]

## 2018-12-28 LAB
BACTERIA SPEC CULT: ABNORMAL
CC UR VC: ABNORMAL
SERVICE CMNT-IMP: ABNORMAL

## 2018-12-31 ENCOUNTER — APPOINTMENT (OUTPATIENT)
Dept: CT IMAGING | Age: 58
DRG: 660 | End: 2018-12-31
Attending: EMERGENCY MEDICINE
Payer: COMMERCIAL

## 2018-12-31 ENCOUNTER — HOSPITAL ENCOUNTER (INPATIENT)
Age: 58
LOS: 4 days | Discharge: NURSING, REHAB OR LONG-TERM CARE | DRG: 660 | End: 2019-01-04
Attending: EMERGENCY MEDICINE | Admitting: HOSPITALIST
Payer: COMMERCIAL

## 2018-12-31 DIAGNOSIS — R11.2 INTRACTABLE VOMITING WITH NAUSEA, UNSPECIFIED VOMITING TYPE: ICD-10-CM

## 2018-12-31 DIAGNOSIS — R19.00 PELVIC MASS: ICD-10-CM

## 2018-12-31 DIAGNOSIS — N20.1 RIGHT URETERAL STONE: Primary | ICD-10-CM

## 2018-12-31 PROBLEM — N13.2 URETERAL STONE WITH HYDRONEPHROSIS: Status: ACTIVE | Noted: 2018-12-31

## 2018-12-31 LAB
ALBUMIN SERPL-MCNC: 3.3 G/DL (ref 3.5–5)
ALBUMIN/GLOB SERPL: 0.8 {RATIO} (ref 1.1–2.2)
ALP SERPL-CCNC: 60 U/L (ref 45–117)
ALT SERPL-CCNC: 33 U/L (ref 12–78)
ANION GAP SERPL CALC-SCNC: 7 MMOL/L (ref 5–15)
APPEARANCE UR: ABNORMAL
AST SERPL-CCNC: 45 U/L (ref 15–37)
BACTERIA URNS QL MICRO: ABNORMAL /HPF
BASOPHILS # BLD: 0.1 K/UL (ref 0–0.1)
BASOPHILS NFR BLD: 1 % (ref 0–1)
BILIRUB SERPL-MCNC: 0.4 MG/DL (ref 0.2–1)
BILIRUB UR QL CFM: NEGATIVE
BUN SERPL-MCNC: 17 MG/DL (ref 6–20)
BUN/CREAT SERPL: 15 (ref 12–20)
CALCIUM SERPL-MCNC: 9.1 MG/DL (ref 8.5–10.1)
CHLORIDE SERPL-SCNC: 109 MMOL/L (ref 97–108)
CO2 SERPL-SCNC: 24 MMOL/L (ref 21–32)
COLOR UR: ABNORMAL
CREAT SERPL-MCNC: 1.12 MG/DL (ref 0.55–1.02)
DIFFERENTIAL METHOD BLD: ABNORMAL
EOSINOPHIL # BLD: 0.1 K/UL (ref 0–0.4)
EOSINOPHIL NFR BLD: 1 % (ref 0–7)
EPITH CASTS URNS QL MICRO: ABNORMAL /LPF
ERYTHROCYTE [DISTWIDTH] IN BLOOD BY AUTOMATED COUNT: 17.8 % (ref 11.5–14.5)
EST. AVERAGE GLUCOSE BLD GHB EST-MCNC: 209 MG/DL
GLOBULIN SER CALC-MCNC: 4 G/DL (ref 2–4)
GLUCOSE BLD STRIP.AUTO-MCNC: 101 MG/DL (ref 65–100)
GLUCOSE BLD STRIP.AUTO-MCNC: 103 MG/DL (ref 65–100)
GLUCOSE BLD STRIP.AUTO-MCNC: 113 MG/DL (ref 65–100)
GLUCOSE SERPL-MCNC: 110 MG/DL (ref 65–100)
GLUCOSE UR STRIP.AUTO-MCNC: NEGATIVE MG/DL
HBA1C MFR BLD: 8.9 % (ref 4.2–6.3)
HCT VFR BLD AUTO: 37.8 % (ref 35–47)
HGB BLD-MCNC: 10.8 G/DL (ref 11.5–16)
HGB UR QL STRIP: ABNORMAL
IMM GRANULOCYTES # BLD: 0 K/UL (ref 0–0.04)
IMM GRANULOCYTES NFR BLD AUTO: 0 % (ref 0–0.5)
KETONES UR QL STRIP.AUTO: ABNORMAL MG/DL
LACTATE SERPL-SCNC: 1.3 MMOL/L (ref 0.4–2)
LEUKOCYTE ESTERASE UR QL STRIP.AUTO: ABNORMAL
LIPASE SERPL-CCNC: 283 U/L (ref 73–393)
LYMPHOCYTES # BLD: 1.3 K/UL (ref 0.8–3.5)
LYMPHOCYTES NFR BLD: 19 % (ref 12–49)
MCH RBC QN AUTO: 21.6 PG (ref 26–34)
MCHC RBC AUTO-ENTMCNC: 28.6 G/DL (ref 30–36.5)
MCV RBC AUTO: 75.4 FL (ref 80–99)
MONOCYTES # BLD: 0.6 K/UL (ref 0–1)
MONOCYTES NFR BLD: 9 % (ref 5–13)
NEUTS SEG # BLD: 4.8 K/UL (ref 1.8–8)
NEUTS SEG NFR BLD: 70 % (ref 32–75)
NITRITE UR QL STRIP.AUTO: NEGATIVE
NRBC # BLD: 0 K/UL (ref 0–0.01)
NRBC BLD-RTO: 0 PER 100 WBC
OTHER,OTHU: ABNORMAL
PH UR STRIP: 6 [PH] (ref 5–8)
PLATELET # BLD AUTO: 164 K/UL (ref 150–400)
POTASSIUM SERPL-SCNC: 3.9 MMOL/L (ref 3.5–5.1)
PROT SERPL-MCNC: 7.3 G/DL (ref 6.4–8.2)
PROT UR STRIP-MCNC: ABNORMAL MG/DL
RBC # BLD AUTO: 5.01 M/UL (ref 3.8–5.2)
RBC #/AREA URNS HPF: ABNORMAL /HPF (ref 0–5)
RBC MORPH BLD: ABNORMAL
SERVICE CMNT-IMP: ABNORMAL
SODIUM SERPL-SCNC: 140 MMOL/L (ref 136–145)
SP GR UR REFRACTOMETRY: 1.02 (ref 1–1.03)
TROPONIN I SERPL-MCNC: <0.05 NG/ML
UA: UC IF INDICATED,UAUC: ABNORMAL
UROBILINOGEN UR QL STRIP.AUTO: 0.2 EU/DL (ref 0.2–1)
WBC # BLD AUTO: 6.9 K/UL (ref 3.6–11)
WBC URNS QL MICRO: ABNORMAL /HPF (ref 0–4)

## 2018-12-31 PROCEDURE — 80053 COMPREHEN METABOLIC PANEL: CPT

## 2018-12-31 PROCEDURE — 74011000250 HC RX REV CODE- 250: Performed by: EMERGENCY MEDICINE

## 2018-12-31 PROCEDURE — 83690 ASSAY OF LIPASE: CPT

## 2018-12-31 PROCEDURE — 87077 CULTURE AEROBIC IDENTIFY: CPT

## 2018-12-31 PROCEDURE — 81001 URINALYSIS AUTO W/SCOPE: CPT

## 2018-12-31 PROCEDURE — 87086 URINE CULTURE/COLONY COUNT: CPT

## 2018-12-31 PROCEDURE — 99285 EMERGENCY DEPT VISIT HI MDM: CPT

## 2018-12-31 PROCEDURE — 74177 CT ABD & PELVIS W/CONTRAST: CPT

## 2018-12-31 PROCEDURE — 82962 GLUCOSE BLOOD TEST: CPT

## 2018-12-31 PROCEDURE — 83605 ASSAY OF LACTIC ACID: CPT

## 2018-12-31 PROCEDURE — 74011250637 HC RX REV CODE- 250/637: Performed by: HOSPITALIST

## 2018-12-31 PROCEDURE — 74011636320 HC RX REV CODE- 636/320: Performed by: EMERGENCY MEDICINE

## 2018-12-31 PROCEDURE — 87186 SC STD MICRODIL/AGAR DIL: CPT

## 2018-12-31 PROCEDURE — 84484 ASSAY OF TROPONIN QUANT: CPT

## 2018-12-31 PROCEDURE — 74011250636 HC RX REV CODE- 250/636: Performed by: EMERGENCY MEDICINE

## 2018-12-31 PROCEDURE — 96375 TX/PRO/DX INJ NEW DRUG ADDON: CPT

## 2018-12-31 PROCEDURE — 85025 COMPLETE CBC W/AUTO DIFF WBC: CPT

## 2018-12-31 PROCEDURE — 83036 HEMOGLOBIN GLYCOSYLATED A1C: CPT

## 2018-12-31 PROCEDURE — 77030029684 HC NEB SM VOL KT MONA -A

## 2018-12-31 PROCEDURE — 96361 HYDRATE IV INFUSION ADD-ON: CPT

## 2018-12-31 PROCEDURE — 96374 THER/PROPH/DIAG INJ IV PUSH: CPT

## 2018-12-31 PROCEDURE — 94640 AIRWAY INHALATION TREATMENT: CPT

## 2018-12-31 PROCEDURE — 36415 COLL VENOUS BLD VENIPUNCTURE: CPT

## 2018-12-31 PROCEDURE — 74011250636 HC RX REV CODE- 250/636: Performed by: HOSPITALIST

## 2018-12-31 PROCEDURE — 65270000029 HC RM PRIVATE

## 2018-12-31 PROCEDURE — 77030033269 HC SLV COMPR SCD KNE2 CARD -B

## 2018-12-31 RX ORDER — CEFUROXIME AXETIL 250 MG/1
500 TABLET ORAL 2 TIMES DAILY
Status: CANCELLED | OUTPATIENT
Start: 2018-12-31

## 2018-12-31 RX ORDER — SODIUM CHLORIDE 0.9 % (FLUSH) 0.9 %
10 SYRINGE (ML) INJECTION
Status: COMPLETED | OUTPATIENT
Start: 2018-12-31 | End: 2018-12-31

## 2018-12-31 RX ORDER — MAGNESIUM SULFATE 100 %
4 CRYSTALS MISCELLANEOUS AS NEEDED
Status: DISCONTINUED | OUTPATIENT
Start: 2018-12-31 | End: 2019-01-04 | Stop reason: HOSPADM

## 2018-12-31 RX ORDER — PANTOPRAZOLE SODIUM 40 MG/1
40 TABLET, DELAYED RELEASE ORAL DAILY
Status: DISCONTINUED | OUTPATIENT
Start: 2019-01-01 | End: 2019-01-04 | Stop reason: HOSPADM

## 2018-12-31 RX ORDER — TOPIRAMATE 100 MG/1
200 TABLET, FILM COATED ORAL
Status: DISCONTINUED | OUTPATIENT
Start: 2018-12-31 | End: 2019-01-04 | Stop reason: HOSPADM

## 2018-12-31 RX ORDER — METOPROLOL SUCCINATE 25 MG/1
25 TABLET, EXTENDED RELEASE ORAL DAILY
Status: DISCONTINUED | OUTPATIENT
Start: 2019-01-01 | End: 2019-01-04 | Stop reason: HOSPADM

## 2018-12-31 RX ORDER — DICLOFENAC SODIUM 50 MG/1
50 TABLET, DELAYED RELEASE ORAL
Status: DISCONTINUED | OUTPATIENT
Start: 2018-12-31 | End: 2019-01-04 | Stop reason: HOSPADM

## 2018-12-31 RX ORDER — LEVOFLOXACIN 5 MG/ML
750 INJECTION, SOLUTION INTRAVENOUS
Status: DISCONTINUED | OUTPATIENT
Start: 2018-12-31 | End: 2019-01-01

## 2018-12-31 RX ORDER — KETOROLAC TROMETHAMINE 30 MG/ML
30 INJECTION, SOLUTION INTRAMUSCULAR; INTRAVENOUS
Status: COMPLETED | OUTPATIENT
Start: 2018-12-31 | End: 2018-12-31

## 2018-12-31 RX ORDER — GLIMEPIRIDE 2 MG/1
2 TABLET ORAL
Qty: 1 TAB | Refills: 0 | Status: SHIPPED | OUTPATIENT
Start: 2018-12-31 | End: 2018-12-31

## 2018-12-31 RX ORDER — ESTRADIOL 1 MG/1
0.5 TABLET ORAL DAILY
Status: DISCONTINUED | OUTPATIENT
Start: 2019-01-01 | End: 2019-01-04 | Stop reason: HOSPADM

## 2018-12-31 RX ORDER — LOSARTAN POTASSIUM 25 MG/1
25 TABLET ORAL DAILY
Status: DISCONTINUED | OUTPATIENT
Start: 2019-01-01 | End: 2019-01-02

## 2018-12-31 RX ORDER — FENTANYL CITRATE 50 UG/ML
50 INJECTION, SOLUTION INTRAMUSCULAR; INTRAVENOUS
Status: DISCONTINUED | OUTPATIENT
Start: 2018-12-31 | End: 2019-01-02

## 2018-12-31 RX ORDER — SODIUM CHLORIDE 0.9 % (FLUSH) 0.9 %
5-10 SYRINGE (ML) INJECTION EVERY 8 HOURS
Status: DISCONTINUED | OUTPATIENT
Start: 2018-12-31 | End: 2019-01-04 | Stop reason: HOSPADM

## 2018-12-31 RX ORDER — PRAZOSIN HYDROCHLORIDE 1 MG/1
4 CAPSULE ORAL
Status: DISCONTINUED | OUTPATIENT
Start: 2018-12-31 | End: 2019-01-04 | Stop reason: HOSPADM

## 2018-12-31 RX ORDER — GABAPENTIN 100 MG/1
100 CAPSULE ORAL 3 TIMES DAILY
Status: DISCONTINUED | OUTPATIENT
Start: 2018-12-31 | End: 2019-01-04 | Stop reason: HOSPADM

## 2018-12-31 RX ORDER — NYSTATIN 100000 [USP'U]/G
POWDER TOPICAL 3 TIMES DAILY
Status: DISCONTINUED | OUTPATIENT
Start: 2018-12-31 | End: 2019-01-04 | Stop reason: HOSPADM

## 2018-12-31 RX ORDER — LEVOTHYROXINE SODIUM 112 UG/1
112 TABLET ORAL
Status: DISCONTINUED | OUTPATIENT
Start: 2019-01-01 | End: 2019-01-04 | Stop reason: HOSPADM

## 2018-12-31 RX ORDER — ONDANSETRON 2 MG/ML
INJECTION INTRAMUSCULAR; INTRAVENOUS
Status: DISCONTINUED
Start: 2018-12-31 | End: 2018-12-31 | Stop reason: WASHOUT

## 2018-12-31 RX ORDER — FENTANYL CITRATE 50 UG/ML
100 INJECTION, SOLUTION INTRAMUSCULAR; INTRAVENOUS
Status: DISCONTINUED | OUTPATIENT
Start: 2018-12-31 | End: 2018-12-31

## 2018-12-31 RX ORDER — ACETAMINOPHEN 10 MG/ML
1000 INJECTION, SOLUTION INTRAVENOUS ONCE
Status: COMPLETED | OUTPATIENT
Start: 2018-12-31 | End: 2018-12-31

## 2018-12-31 RX ORDER — SODIUM CHLORIDE 9 MG/ML
50 INJECTION, SOLUTION INTRAVENOUS
Status: COMPLETED | OUTPATIENT
Start: 2018-12-31 | End: 2018-12-31

## 2018-12-31 RX ORDER — DEXTROSE 50 % IN WATER (D50W) INTRAVENOUS SYRINGE
12.5-25 AS NEEDED
Status: DISCONTINUED | OUTPATIENT
Start: 2018-12-31 | End: 2019-01-04 | Stop reason: HOSPADM

## 2018-12-31 RX ORDER — FENTANYL CITRATE 50 UG/ML
25 INJECTION, SOLUTION INTRAMUSCULAR; INTRAVENOUS
Status: COMPLETED | OUTPATIENT
Start: 2018-12-31 | End: 2018-12-31

## 2018-12-31 RX ORDER — LANOLIN ALCOHOL/MO/W.PET/CERES
5000 CREAM (GRAM) TOPICAL DAILY
Status: DISCONTINUED | OUTPATIENT
Start: 2019-01-01 | End: 2019-01-04 | Stop reason: HOSPADM

## 2018-12-31 RX ORDER — LEVOFLOXACIN 5 MG/ML
750 INJECTION, SOLUTION INTRAVENOUS EVERY 24 HOURS
Status: DISCONTINUED | OUTPATIENT
Start: 2018-12-31 | End: 2018-12-31

## 2018-12-31 RX ORDER — PRAVASTATIN SODIUM 40 MG/1
40 TABLET ORAL
Status: DISCONTINUED | OUTPATIENT
Start: 2018-12-31 | End: 2019-01-04 | Stop reason: HOSPADM

## 2018-12-31 RX ORDER — IPRATROPIUM BROMIDE AND ALBUTEROL SULFATE 2.5; .5 MG/3ML; MG/3ML
3 SOLUTION RESPIRATORY (INHALATION) ONCE
Status: COMPLETED | OUTPATIENT
Start: 2018-12-31 | End: 2018-12-31

## 2018-12-31 RX ORDER — FLUOXETINE HYDROCHLORIDE 20 MG/1
20 CAPSULE ORAL DAILY
Status: DISCONTINUED | OUTPATIENT
Start: 2019-01-01 | End: 2019-01-03

## 2018-12-31 RX ORDER — GUAIFENESIN 100 MG/5ML
81 LIQUID (ML) ORAL DAILY
Status: DISCONTINUED | OUTPATIENT
Start: 2019-01-01 | End: 2019-01-04 | Stop reason: HOSPADM

## 2018-12-31 RX ORDER — LEVOTHYROXINE SODIUM 112 UG/1
112 TABLET ORAL
Qty: 1 TAB | Refills: 0 | Status: SHIPPED | OUTPATIENT
Start: 2018-12-31 | End: 2018-12-31

## 2018-12-31 RX ORDER — CLONAZEPAM 1 MG/1
1 TABLET ORAL 3 TIMES DAILY
Status: DISCONTINUED | OUTPATIENT
Start: 2018-12-31 | End: 2019-01-03

## 2018-12-31 RX ORDER — SODIUM CHLORIDE 0.9 % (FLUSH) 0.9 %
5-10 SYRINGE (ML) INJECTION AS NEEDED
Status: DISCONTINUED | OUTPATIENT
Start: 2018-12-31 | End: 2019-01-04 | Stop reason: HOSPADM

## 2018-12-31 RX ORDER — QUETIAPINE FUMARATE 100 MG/1
400 TABLET, FILM COATED ORAL
Status: DISCONTINUED | OUTPATIENT
Start: 2018-12-31 | End: 2019-01-01

## 2018-12-31 RX ORDER — ONDANSETRON 2 MG/ML
4 INJECTION INTRAMUSCULAR; INTRAVENOUS
Status: DISCONTINUED | OUTPATIENT
Start: 2018-12-31 | End: 2018-12-31

## 2018-12-31 RX ORDER — INSULIN LISPRO 100 [IU]/ML
INJECTION, SOLUTION INTRAVENOUS; SUBCUTANEOUS
Status: DISCONTINUED | OUTPATIENT
Start: 2018-12-31 | End: 2019-01-04 | Stop reason: HOSPADM

## 2018-12-31 RX ORDER — ONDANSETRON 2 MG/ML
4 INJECTION INTRAMUSCULAR; INTRAVENOUS
Status: COMPLETED | OUTPATIENT
Start: 2018-12-31 | End: 2018-12-31

## 2018-12-31 RX ORDER — BUPROPION HYDROCHLORIDE 150 MG/1
300 TABLET ORAL
Status: DISCONTINUED | OUTPATIENT
Start: 2019-01-01 | End: 2019-01-04 | Stop reason: HOSPADM

## 2018-12-31 RX ADMIN — CLONAZEPAM 1 MG: 1 TABLET ORAL at 17:02

## 2018-12-31 RX ADMIN — PROCHLORPERAZINE EDISYLATE 10 MG: 5 INJECTION INTRAMUSCULAR; INTRAVENOUS at 09:41

## 2018-12-31 RX ADMIN — DIATRIZOATE MEGLUMINE AND DIATRIZOATE SODIUM 30 ML: 660; 100 LIQUID ORAL; RECTAL at 08:02

## 2018-12-31 RX ADMIN — FENTANYL CITRATE 25 MCG: 50 INJECTION, SOLUTION INTRAMUSCULAR; INTRAVENOUS at 07:41

## 2018-12-31 RX ADMIN — ACETAMINOPHEN 1000 MG: 10 INJECTION, SOLUTION INTRAVENOUS at 09:41

## 2018-12-31 RX ADMIN — PRAZOSIN HYDROCHLORIDE 4 MG: 1 CAPSULE ORAL at 22:46

## 2018-12-31 RX ADMIN — LEVOFLOXACIN 750 MG: 750 INJECTION, SOLUTION INTRAVENOUS at 17:02

## 2018-12-31 RX ADMIN — SODIUM CHLORIDE 1000 ML: 900 INJECTION, SOLUTION INTRAVENOUS at 07:41

## 2018-12-31 RX ADMIN — GABAPENTIN 100 MG: 100 CAPSULE ORAL at 16:13

## 2018-12-31 RX ADMIN — ONDANSETRON 4 MG: 2 INJECTION INTRAMUSCULAR; INTRAVENOUS at 07:41

## 2018-12-31 RX ADMIN — IPRATROPIUM BROMIDE AND ALBUTEROL SULFATE 3 ML: .5; 3 SOLUTION RESPIRATORY (INHALATION) at 16:13

## 2018-12-31 RX ADMIN — CLONAZEPAM 1 MG: 1 TABLET ORAL at 22:28

## 2018-12-31 RX ADMIN — IOPAMIDOL 100 ML: 755 INJECTION, SOLUTION INTRAVENOUS at 10:28

## 2018-12-31 RX ADMIN — FENTANYL CITRATE 50 MCG: 50 INJECTION, SOLUTION INTRAMUSCULAR; INTRAVENOUS at 16:23

## 2018-12-31 RX ADMIN — KETOROLAC TROMETHAMINE 30 MG: 30 INJECTION, SOLUTION INTRAMUSCULAR at 11:56

## 2018-12-31 RX ADMIN — Medication 10 ML: at 22:30

## 2018-12-31 RX ADMIN — FENTANYL CITRATE 50 MCG: 50 INJECTION, SOLUTION INTRAMUSCULAR; INTRAVENOUS at 22:33

## 2018-12-31 RX ADMIN — GABAPENTIN 100 MG: 100 CAPSULE ORAL at 22:28

## 2018-12-31 RX ADMIN — NYSTATIN: 100000 POWDER TOPICAL at 21:30

## 2018-12-31 RX ADMIN — TOPIRAMATE 200 MG: 100 TABLET, FILM COATED ORAL at 22:46

## 2018-12-31 RX ADMIN — Medication 10 ML: at 22:28

## 2018-12-31 RX ADMIN — SODIUM CHLORIDE 50 ML/HR: 900 INJECTION, SOLUTION INTRAVENOUS at 10:29

## 2018-12-31 RX ADMIN — QUETIAPINE FUMARATE 400 MG: 100 TABLET ORAL at 22:28

## 2018-12-31 RX ADMIN — PRAVASTATIN SODIUM 40 MG: 40 TABLET ORAL at 21:30

## 2018-12-31 RX ADMIN — Medication 10 ML: at 10:29

## 2018-12-31 NOTE — CONSULTS
Urology Consult    Patient: Rios Pizarro MRN: [id number]  SSN: [id number]    YOB: 1960  Age: 62 y.o. Sex: female          Date of Consultation:  December 31, 2018  Requesting Physician: Anne Marie Jamil MD  Reason for Consultation:    Pre-existing [facility] Patient:            Assessment/Plan:  Impression: Large 9 mm obstructing Right ureteral stone with pain. Recommend: Pain control, Flomax 0.4 mg po BID, IV hydration and urine for C&S. May need stent. NPO past MN and will re-assess in AM. The procedure along with the attendant risks and complications and alternative options for treatment were discussed with the patient who understands and agrees to proceed. Thank you,          Hulon Apley., M.D.  [phone]     History of Present Illness:  Patient is a 62 y.o. female admitted 12/31/2018 to the hospital for No admission diagnoses are documented for this encounter. .  She notes Right renal colic this am. CT today reveals Right upper ureteral stone 9 mms. Having trouble achieving pain relief. No fever or gross hematuria. Had some pain this past fall and CT revealed large stone Right renal pelvis. Did okay until now. Multiple medical problems not least of which is morbid obesity. Past Medical History: Allergies   Allergen Reactions    Celebrex [Celecoxib] Nausea Only    Morphine Nausea Only    Sulfa (Sulfonamide Antibiotics) Nausea Only    Adhesive     Codeine Other (comments)     Hyper activity      Prior to Admission medications    Medication Sig Start Date End Date Taking? Authorizing Provider   BYDUREON BCISE 2 mg/0.85 mL atIn INJECT UNDER THE SKIN ONCE WEEKLY AS DIRECTED 12/5/18   Ashish Briceno MD   nystatin, bulk, 15 billion unit powd 1 Units by Does Not Apply route three (3) times daily. To skin folds, ventral fold 12/26/18   Reyna Black MD   cefUROXime (CEFTIN) 500 mg tablet Take 1 Tab by mouth two (2) times a day for 7 days. 12/26/18 1/2/19  Reyna Black MD   albuterol (PROVENTIL HFA, VENTOLIN HFA, PROAIR HFA) 90 mcg/actuation inhaler Take 2 Puffs by inhalation every four (4) hours as needed for Wheezing. 12/26/18   Reyna Black MD   LORazepam (ATIVAN) 1 mg tablet Take 1 mg by mouth as needed. 8/10/18   Provider, Historical   oxyCODONE IR (ROXICODONE) 5 mg immediate release tablet Take 1 Tab by mouth every four (4) hours as needed for Pain for up to 6 doses. Max Daily Amount: 30 mg. 8/29/18   Walter Ramos MD   buPROPion XL (WELLBUTRIN XL) 150 mg tablet Take 300 mg by mouth every morning. Provider, Historical   clonazePAM (KLONOPIN) 2 mg tablet Take 1 mg by mouth three (3) times daily. Other, MD Ashish   QUEtiapine (SEROQUEL) 400 mg tablet Take 400 mg by mouth nightly. Provider, Historical   prazosin (MINIPRESS) 1 mg capsule Take 4 mg by mouth nightly. Provider, Historical   ergocalciferol (VITAMIN D2) 50,000 unit capsule Take 50,000 Units by mouth two (2) days a week. Twice a week Sunday and Wednesday    Provider, Historical   metFORMIN (GLUCOPHAGE) 500 mg tablet Take 500 mg by mouth every evening. Provider, Historical   estradiol (ESTRACE) 0.5 mg tablet Take 0.5 mg by mouth daily. Provider, Historical   terconazole (TERAZOL 7) 0.4 % vaginal cream Insert 1 Applicator into vagina nightly. has refill for 7 day supply    Provider, Historical   FLUoxetine (PROZAC) 20 mg capsule Take 20 mg by mouth daily. Provider, Historical   diclofenac potassium (CATAFLAM) 50 mg tablet Take 50 mg by mouth three (3) times daily as needed (PRN back pain). pt takes prn back pain    Provider, Historical   miconazole (MICOTIN) 2 % topical cream Apply 1 Each to affected area two (2) times a day. Apply ointment to fungal infection under abdominal pannus and groin twice a day for 14 days. Provider, Historical   oxyCODONE-acetaminophen (PERCOCET) 7.5-325 mg per tablet Take 1 Tab by mouth every eight (8) hours as needed for Pain. Provider, Historical   HYDROcodone-acetaminophen (NORCO) 7.5-325 mg per tablet Take 1 Tab by mouth every six (6) hours as needed for Pain. Madina Candelaria MD   cyanocobalamin 1,000 mcg tablet Take 5,000 mcg by mouth daily. Provider, Historical   aspirin 81 mg chewable tablet Take 81 mg by mouth daily. Ainta Montez NP   gabapentin (NEURONTIN) 100 mg capsule Take 1 Cap by mouth three (3) times daily. Patient taking differently: Take 1 Cap by mouth four (4) times daily. 11/24/17   Keisha Perry MD   busPIRone (BUSPAR) 15 mg tablet Take 1 Tab by mouth Multiple. Take 2 tab po with breakfast  Take 1 tab po with lunch  Take 2 tab po with dinner 11/24/17   Keisha Perry MD   butalbital-acetaminophen-caffeine (FIORICET, ESGIC) -40 mg per tablet Take 2 Tabs by mouth every eight (8) hours as needed for Headache. Max Daily Amount: 6 Tabs. 11/24/17   Milo Hinkle MD   diclofenac EC (VOLTAREN) 50 mg EC tablet Take 1 Tab by mouth Multiple. Take 1 tab po BID with meals for 1 week then rest for 1 week and can take again repeating same cycle 11/24/17   Keisha Perry MD   oxyCODONE-acetaminophen (PERCOCET) 7.5-325 mg per tablet Take 1 Tab by mouth every four (4) hours as needed for Pain. Max Daily Amount: 6 Tabs. Patient not taking: Reported on 11/28/2017 11/24/17   Keisha Perry MD   levothyroxine (SYNTHROID) 112 mcg tablet Take 112 mcg by mouth Daily (before breakfast). Provider, Historical   glimepiride (AMARYL) 2 mg tablet Take 2 mg by mouth every morning. Provider, Historical   losartan (COZAAR) 25 mg tablet Take 25 mg by mouth daily. Provider, Historical   metoprolol succinate (TOPROL XL) 25 mg XL tablet Take 25 mg by mouth daily. Provider, Historical   omeprazole (PRILOSEC) 20 mg capsule Take 40 mg by mouth daily. Provider, Historical   topiramate (TOPAMAX) 200 mg tablet Take 200 mg by mouth nightly.  12/8/10   Provider, Historical   simvastatin (ZOCOR) 20 mg tablet Take 20 mg by mouth nightly. Ashish Briceno MD   potassium chloride (KLOR-CON M10) 10 mEq tablet Take 10 mEq by mouth nightly. 4/23/10   Ashish Briceno MD      PMHx:  has a past medical history of Arrhythmia, Arthritis, Chronic pain, Depression, Diabetes ([address] GERD (gastroesophageal reflux disease), Hypertension, Hypothyroidism, Kidney stone, Liver disease, Obesity, morbid, BMI 50 or higher ([address] Psychiatric disorder, Psychiatric disorder, Seizures ([address] and Thyroid disease. PSurgHx:  has a past surgical history that includes hx cholecystectomy; hx appendectomy; hx hysterectomy; hx tubal ligation; and cardiac catheterization (2010). PSocHx:  reports that she has quit smoking. she has never used smokeless tobacco. She reports that she does not drink alcohol or use drugs. ROS:  Admission ROS by No admitting provider for patient encounter. from 2018 were reviewed with the patient and changes (other than per HPI) include: none. Physical Exam:            Vitals[de-identified]    Temp (24hrs), Av.4 °F (36.3 °C), Min:97.4 °F (36.3 °C), Max:97.4 °F (36.3 °C)   Blood pressure 130/76, pulse 89, temperature 97.4 °F (36.3 °C), resp. rate 18, height 5' 2\" (1.575 m), weight (!) 184 kg (405 lb 10.3 oz), SpO2 95 %. I&O's:    No intake/output data recorded. General:    appears nontoxic                     Skin:  no clubbing, cyanosis, edema                HEENT:  NCAT, O/P Clear        Throat/Neck:  supple, no LAD                 Chest[de-identified]  Clear      Heart[de-identified]  Regular rate and rhythm        Abdomen/Flank:  no CVAT, non-tender abdomen without masses. OBESE.              Bladder[de-identified]  Bladder not palpable   Genitalia:  N/a   Lymph nodes:  no cervical, supraclavicular, or groin adenopathy     Lab Results   Component Value Date/Time    WBC 6.9 2018 07:39 AM    HCT 37.8 2018 07:39 AM    PLATELET 532  07:39 AM    Sodium 140 2018 07:39 AM    Potassium 3.9 2018 07:39 AM Chloride 109 (H) 12/31/2018 07:39 AM    CO2 24 12/31/2018 07:39 AM    BUN 17 12/31/2018 07:39 AM    Creatinine 1.12 (H) 12/31/2018 07:39 AM    Glucose 110 (H) 12/31/2018 07:39 AM    Calcium 9.1 12/31/2018 07:39 AM    Magnesium 1.9 11/23/2017 02:37 AM    INR 1.0 07/25/2014 09:49 AM       UA:   Lab Results   Component Value Date/Time    Color DARK YELLOW 12/31/2018 09:49 AM    Appearance CLOUDY (A) 12/31/2018 09:49 AM    Specific gravity 1.021 12/31/2018 09:49 AM    pH (UA) 6.0 12/31/2018 09:49 AM    Protein TRACE (A) 12/31/2018 09:49 AM    Glucose NEGATIVE  12/31/2018 09:49 AM    Ketone TRACE (A) 12/31/2018 09:49 AM    Bilirubin NEGATIVE  12/26/2018 03:08 AM    Urobilinogen 0.2 12/31/2018 09:49 AM    Nitrites NEGATIVE  12/31/2018 09:49 AM    Leukocyte Esterase SMALL (A) 12/31/2018 09:49 AM    Epithelial cells MODERATE (A) 12/31/2018 09:49 AM    Bacteria 1+ (A) 12/31/2018 09:49 AM    WBC 5-10 12/31/2018 09:49 AM    RBC 10-20 12/31/2018 09:49 AM       Cultures: Reviewed  Xrays: Reviewed    Signed By: Korin Crowley MD  - December 31, 2018

## 2018-12-31 NOTE — ED NOTES
Pt states she can not take fentanyl or zofran because they both make her nauseous. Dr. Juan Diego Villeda bedside

## 2018-12-31 NOTE — ED NOTES
Bedside shift change report given to Clifford Ontiveros RN (oncoming nurse) by Marvin Nixon RN (offgoing nurse). Report included the following information SBAR, Kardex, ED Summary, Procedure Summary, MAR and Recent Results.

## 2018-12-31 NOTE — ED TRIAGE NOTES
Emergency Department Triage Note: 
 
Ambulatory to triage  from home with chief complaint of right flank pain. Reports being seen at [facility] ED 12/26/18 for same complaint, diagnosed with UTI. C/o urinary frequency, nausea, chills, and diarrhea. Denies hematuria, dysuria, or fever.

## 2018-12-31 NOTE — H&P
Hospitalist Admission NoteNAME: Samantha Pizarro :  1960 MRN:  [id number] Date/Time:  2018 2:39 PM 
 
Patient PCP: Marcel East, DO 
______________________________________________________________________ Given the patient's current clinical presentation, I have a high level of concern for decompensation if discharged from the emergency department. Complex decision making was performed, which includes reviewing the patient's available past medical records, laboratory results, and x-ray films. My assessment of this patient's clinical condition and my plan of care is as follows. Assessment / Plan: 
 
Right proximal ureter calculus POA 
causing minimal upstream hydroureter and hydronephrosis. -med admit 
-IVF, flomax, prn antiemetics and pain control 
-NPO midnight 
-urology on board UTI POA 
-urine cx grew klebsiella on , pt on cefuroxime and sensitivity is intermediate 
-will start on Levaquin empirically 
-follow urine cx sent today Diabetes: POA 
-check A1c, ISS, holding Glipizide and metformin as pt will be NPO 
  
Hypothyroidism: POA 
-continue home Levothyroxine 
  
Anxiety/Depression: POA Seizure Disorder: 
-continue Wellbutrin, Seroquel and Topamax with prn Klonipin 
  
Hypertension: 
-continue home Metoprolol and Losartan 
  
Polypharmacy:  
Pt on multiple medications with some duplicates, consulted pharmacy to review home medications 
  
Morbid Obesity Code Status: full Surrogate Decision Maker: , daughter and son DVT Prophylaxis: SCD 
GI Prophylaxis: not indicated but continue home PPI for GERD Baseline: Lives with  Subjective: CHIEF COMPLAINT: abd pain, N/V diarrhea HISTORY OF PRESENT ILLNESS:    
Tony Cardenas is a 62 y.o.   Morbidly obese female with h/o DJD, chronic pain, DM, anxiety, depression, seizures presented to ER with c/o above symptoms for last 5 days. Pain is on right side of abdomen, with chills, intractable N/V and loose BM. C/o dysuria and says she was diagnosed UTI on 12/26 and has been on antibiotics with no improvement. In ER she was afebrile, normal WBC. CT abd/pelvis showed There is a 9 mm calculus in the right proximal ureter causing minimal upstream hydroureter and hydronephrosis. Midline hypodense pelvic mass, unchanged from August 2018 is of unclear etiology. A top differential consideration is an ovarian cyst. Pt seen by urology, plan to take to OR in am for stent. We were asked to admit for work up and evaluation of the above problems. Past Medical History:  
Diagnosis Date  Arrhythmia  Arthritis  Chronic pain   
 djd  Depression  Diabetes [address] [address]  GERD (gastroesophageal reflux disease)  Hypertension  Hypothyroidism  Kidney stone  Liver disease  Obesity, morbid, BMI 50 or higher ([facility] [address]  Psychiatric disorder   
 anxiety  Psychiatric disorder   
 depression  Seizures ([facility] [address]  Thyroid disease Past Surgical History:  
Procedure Laterality Date  CARDIAC CATHETERIZATION  11/18/2010  HX APPENDECTOMY  HX CHOLECYSTECTOMY  HX HYSTERECTOMY  HX TUBAL LIGATION Social History Tobacco Use  Smoking status: Former Smoker  Smokeless tobacco: Never Used Substance Use Topics  Alcohol use: No  
  
 
Family History Problem Relation Age of Onset  Heart Disease Mother  Hypertension Mother  Cancer Mother  Heart Disease Father  Hypertension Father  Cancer Brother Allergies Allergen Reactions  Celebrex [Celecoxib] Nausea Only  Morphine Nausea Only  Sulfa (Sulfonamide Antibiotics) Nausea Only  Adhesive  Codeine Other (comments) Hyper activity Prior to Admission medications Medication Sig Start Date End Date Taking? Authorizing Provider levothyroxine (SYNTHROID) 112 mcg tablet Take 1 Tab by mouth now for 1 dose. 12/31/18 12/31/18 Yes Fito Clifton MD  
glimepiride (AMARYL) 2 mg tablet Take 1 Tab by mouth now for 1 dose. 12/31/18 12/31/18 Yes Fito Clifton MD  
BYDUREON BCISE 2 mg/0.85 mL atIn INJECT UNDER THE SKIN ONCE WEEKLY AS DIRECTED 12/5/18   Other, MD Ashish  
nystatin, bulk, 15 billion unit powd 1 Units by Does Not Apply route three (3) times daily. To skin folds, ventral fold 12/26/18   Reyna Black MD  
cefUROXime (CEFTIN) 500 mg tablet Take 1 Tab by mouth two (2) times a day for 7 days. 12/26/18 1/2/19  Reyna Black MD  
albuterol (PROVENTIL HFA, VENTOLIN HFA, PROAIR HFA) 90 mcg/actuation inhaler Take 2 Puffs by inhalation every four (4) hours as needed for Wheezing. 12/26/18   Reyna Black MD  
LORazepam (ATIVAN) 1 mg tablet Take 1 mg by mouth as needed. 8/10/18   Provider, Historical  
oxyCODONE IR (ROXICODONE) 5 mg immediate release tablet Take 1 Tab by mouth every four (4) hours as needed for Pain for up to 6 doses. Max Daily Amount: 30 mg. 8/29/18   Miguel Angel Johnson MD  
buPROPion XL (WELLBUTRIN XL) 150 mg tablet Take 300 mg by mouth every morning. Provider, Historical  
clonazePAM (KLONOPIN) 2 mg tablet Take 1 mg by mouth three (3) times daily. Other, MD Ashish  
QUEtiapine (SEROQUEL) 400 mg tablet Take 400 mg by mouth nightly. Provider, Historical  
prazosin (MINIPRESS) 1 mg capsule Take 4 mg by mouth nightly. Provider, Historical  
ergocalciferol (VITAMIN D2) 50,000 unit capsule Take 50,000 Units by mouth two (2) days a week. Twice a week Sunday and Wednesday    Provider, Historical  
metFORMIN (GLUCOPHAGE) 500 mg tablet Take 500 mg by mouth every evening. Provider, Historical  
estradiol (ESTRACE) 0.5 mg tablet Take 0.5 mg by mouth daily.     Provider, Historical  
terconazole (TERAZOL 7) 0.4 % vaginal cream Insert 1 Applicator into vagina nightly. has refill for 7 day supply    Provider, Historical  
FLUoxetine (PROZAC) 20 mg capsule Take 20 mg by mouth daily. Provider, Historical  
diclofenac potassium (CATAFLAM) 50 mg tablet Take 50 mg by mouth three (3) times daily as needed (PRN back pain). pt takes prn back pain    Provider, Historical  
miconazole (MICOTIN) 2 % topical cream Apply 1 Each to affected area two (2) times a day. Apply ointment to fungal infection under abdominal pannus and groin twice a day for 14 days. Provider, Historical  
oxyCODONE-acetaminophen (PERCOCET) 7.5-325 mg per tablet Take 1 Tab by mouth every eight (8) hours as needed for Pain. Provider, Historical  
HYDROcodone-acetaminophen (NORCO) 7.5-325 mg per tablet Take 1 Tab by mouth every six (6) hours as needed for Pain. Briana Franklin MD  
cyanocobalamin 1,000 mcg tablet Take 5,000 mcg by mouth daily. Provider, Historical  
aspirin 81 mg chewable tablet Take 81 mg by mouth daily. Blane Villalba NP  
gabapentin (NEURONTIN) 100 mg capsule Take 1 Cap by mouth three (3) times daily. Patient taking differently: Take 1 Cap by mouth four (4) times daily. 11/24/17   Tom King MD  
busPIRone (BUSPAR) 15 mg tablet Take 1 Tab by mouth Multiple. Take 2 tab po with breakfast 
Take 1 tab po with lunch Take 2 tab po with dinner 11/24/17   Tom King MD  
butalbital-acetaminophen-caffeine (FIORICET, ESGIC) -40 mg per tablet Take 2 Tabs by mouth every eight (8) hours as needed for Headache. Max Daily Amount: 6 Tabs. 11/24/17   Mencias, Vivienne Siemens, MD  
diclofenac EC (VOLTAREN) 50 mg EC tablet Take 1 Tab by mouth Multiple. Take 1 tab po BID with meals for 1 week then rest for 1 week and can take again repeating same cycle 11/24/17   Tom King MD  
oxyCODONE-acetaminophen (PERCOCET) 7.5-325 mg per tablet Take 1 Tab by mouth every four (4) hours as needed for Pain. Max Daily Amount: 6 Tabs. Patient not taking: Reported on 11/28/2017 11/24/17   Betzaida Le MD  
levothyroxine (SYNTHROID) 112 mcg tablet Take 112 mcg by mouth Daily (before breakfast). Provider, Historical  
glimepiride (AMARYL) 2 mg tablet Take 2 mg by mouth every morning. Provider, Historical  
losartan (COZAAR) 25 mg tablet Take 25 mg by mouth daily. Provider, Historical  
metoprolol succinate (TOPROL XL) 25 mg XL tablet Take 25 mg by mouth daily. Provider, Historical  
omeprazole (PRILOSEC) 20 mg capsule Take 40 mg by mouth daily. Provider, Historical  
topiramate (TOPAMAX) 200 mg tablet Take 200 mg by mouth nightly. 12/8/10   Provider, Historical  
simvastatin (ZOCOR) 20 mg tablet Take 20 mg by mouth nightly. Ashish Briceno MD  
potassium chloride (KLOR-CON M10) 10 mEq tablet Take 10 mEq by mouth nightly. 4/23/10   Ashish Briceno MD  
 
 
REVIEW OF SYSTEMS:    
I am not able to complete the review of systems because: The patient is intubated and sedated The patient has altered mental status due to his acute medical problems The patient has baseline aphasia from prior stroke(s) The patient has baseline dementia and is not reliable historian The patient is in acute medical distress and unable to provide information Total of 12 systems reviewed as follows:   
   POSITIVE= underlined text  Negative = text not underlined General:  fever, chills, sweats, generalized weakness, weight loss/gain,  
   loss of appetite Eyes:    blurred vision, eye pain, loss of vision, double vision ENT:    rhinorrhea, pharyngitis Respiratory:   cough, sputum production, SOB, DIOP, wheezing, pleuritic pain  
Cardiology:   chest pain, palpitations, orthopnea, PND, edema, syncope Gastrointestinal:  abdominal pain , N/V, diarrhea, dysphagia, constipation, bleeding Genitourinary:  frequency, urgency, dysuria, hematuria, incontinence Muskuloskeletal :  arthralgia, myalgia, back pain Hematology:  easy bruising, nose or gum bleeding, lymphadenopathy Dermatological: rash, ulceration, pruritis, color change / jaundice Endocrine:   hot flashes or polydipsia Neurological:  headache, dizziness, confusion, focal weakness, paresthesia, Speech difficulties, memory loss, gait difficulty Psychological: Feelings of anxiety, depression, agitation Objective: VITALS:   
Visit Vitals /76 Pulse 89 Temp 97.4 °F (36.3 °C) Resp 18 Ht 5' 2\" (1.575 m) Wt (!) 184 kg (405 lb 10.3 oz) SpO2 95% BMI 74.19 kg/m² PHYSICAL EXAM: 
 
General:    Alert, cooperative, no distress, appears stated age. HEENT: Atraumatic, anicteric sclerae, pink conjunctivae No oral ulcers, mucosa moist, throat clear, dentition fair Neck:  Supple, symmetrical,  thyroid: non tender Lungs:   Clear to auscultation bilaterally. No Wheezing or Rhonchi. No rales. Chest wall:  No tenderness  No Accessory muscle use. Heart:   Regular  rhythm,  No  murmur   No edema Abdomen:   Soft, right side abd tenderness. Not distended. Bowel sounds normal 
Extremities: No cyanosis. No clubbing,   
  Skin turgor normal, Capillary refill normal, Radial dial pulse 2+ Skin:     Not pale. Not Jaundiced  No rashes Psych:  Good insight. Not depressed. Not anxious or agitated. Neurologic: EOMs intact. No facial asymmetry. No aphasia or slurred speech. Symmetrical strength, Sensation grossly intact. Alert and oriented X 4.  
 
_______________________________________________________________________ Care Plan discussed with: 
  Comments Patient x Family RN x Care Manager Consultant:     
_______________________________________________________________________ Expected  Disposition:  
Home with Family x HH/PT/OT/RN   
SNF/LTC   
SHEA   
________________________________________________________________________ TOTAL TIME:  60 Minutes Critical Care Provided     Minutes non procedure based Comments Reviewed previous records  
>50% of visit spent in counseling and coordination of care  Discussion with patient and/or family and questions answered 
  
 
________________________________________________________________________ Signed: Rasheed Mayfield MD 
 
Procedures: see electronic medical records for all procedures/Xrays and details which were not copied into this note but were reviewed prior to creation of Plan. LAB DATA REVIEWED:   
Recent Results (from the past 24 hour(s)) CBC WITH AUTOMATED DIFF Collection Time: 12/31/18  7:39 AM  
Result Value Ref Range WBC 6.9 3.6 - 11.0 K/uL  
 RBC 5.01 3.80 - 5.20 M/uL  
 HGB 10.8 (L) 11.5 - 16.0 g/dL HCT 37.8 35.0 - 47.0 % MCV 75.4 (L) 80.0 - 99.0 FL  
 MCH 21.6 (L) 26.0 - 34.0 PG  
 MCHC 28.6 (L) 30.0 - 36.5 g/dL  
 RDW 17.8 (H) 11.5 - 14.5 % PLATELET 693 694 - 648 K/uL NRBC 0.0 0  WBC ABSOLUTE NRBC 0.00 0.00 - 0.01 K/uL NEUTROPHILS 70 32 - 75 % LYMPHOCYTES 19 12 - 49 % MONOCYTES 9 5 - 13 % EOSINOPHILS 1 0 - 7 % BASOPHILS 1 0 - 1 % IMMATURE GRANULOCYTES 0 0.0 - 0.5 % ABS. NEUTROPHILS 4.8 1.8 - 8.0 K/UL  
 ABS. LYMPHOCYTES 1.3 0.8 - 3.5 K/UL  
 ABS. MONOCYTES 0.6 0.0 - 1.0 K/UL  
 ABS. EOSINOPHILS 0.1 0.0 - 0.4 K/UL  
 ABS. BASOPHILS 0.1 0.0 - 0.1 K/UL  
 ABS. IMM. GRANS. 0.0 0.00 - 0.04 K/UL  
 DF AUTOMATED    
 RBC COMMENTS MICROCYTOSIS 1+ 
    
 RBC COMMENTS HYPOCHROMIA 2+ 
    
 RBC COMMENTS OVALOCYTES 1+ LIPASE Collection Time: 12/31/18  7:39 AM  
Result Value Ref Range Lipase 283 73 - 393 U/L  
TROPONIN I Collection Time: 12/31/18  7:39 AM  
Result Value Ref Range Troponin-I, Qt. <0.05 <0.05 ng/mL LACTIC ACID Collection Time: 12/31/18  7:39 AM  
Result Value Ref Range Lactic acid 1.3 0.4 - 2.0 MMOL/L  
METABOLIC PANEL, COMPREHENSIVE Collection Time: 12/31/18  7:39 AM  
Result Value Ref Range Sodium 140 136 - 145 mmol/L Potassium 3.9 3.5 - 5.1 mmol/L Chloride 109 (H) 97 - 108 mmol/L  
 CO2 24 21 - 32 mmol/L Anion gap 7 5 - 15 mmol/L Glucose 110 (H) 65 - 100 mg/dL BUN 17 6 - 20 MG/DL Creatinine 1.12 (H) 0.55 - 1.02 MG/DL  
 BUN/Creatinine ratio 15 12 - 20 GFR est AA >60 >60 ml/min/1.73m2 GFR est non-AA 50 (L) >60 ml/min/1.73m2 Calcium 9.1 8.5 - 10.1 MG/DL Bilirubin, total 0.4 0.2 - 1.0 MG/DL  
 ALT (SGPT) 33 12 - 78 U/L  
 AST (SGOT) 45 (H) 15 - 37 U/L Alk. phosphatase 60 45 - 117 U/L Protein, total 7.3 6.4 - 8.2 g/dL Albumin 3.3 (L) 3.5 - 5.0 g/dL Globulin 4.0 2.0 - 4.0 g/dL A-G Ratio 0.8 (L) 1.1 - 2.2 URINALYSIS W/ REFLEX CULTURE Collection Time: 12/31/18  9:49 AM  
Result Value Ref Range Color DARK YELLOW Appearance CLOUDY (A) CLEAR Specific gravity 1.021 1.003 - 1.030    
 pH (UA) 6.0 5.0 - 8.0 Protein TRACE (A) NEG mg/dL Glucose NEGATIVE  NEG mg/dL Ketone TRACE (A) NEG mg/dL Blood LARGE (A) NEG Urobilinogen 0.2 0.2 - 1.0 EU/dL Nitrites NEGATIVE  NEG Leukocyte Esterase SMALL (A) NEG    
 WBC 5-10 0 - 4 /hpf  
 RBC 10-20 0 - 5 /hpf Epithelial cells MODERATE (A) FEW /lpf Bacteria 1+ (A) NEG /hpf  
 UA:UC IF INDICATED URINE CULTURE ORDERED (A) CNI Other: Renal Epithelial cells Present BILIRUBIN, CONFIRM Collection Time: 12/31/18  9:49 AM  
Result Value Ref Range Bilirubin UA, confirm NEGATIVE  NEG    
GLUCOSE, POC Collection Time: 12/31/18  2:03 PM  
Result Value Ref Range Glucose (POC) 103 (H) 65 - 100 mg/dL Performed by [facility]

## 2018-12-31 NOTE — ED PROVIDER NOTES
EMERGENCY DEPARTMENT HISTORY AND PHYSICAL EXAM 
 
 
Date: 12/31/2018 Patient Name: Erica Quinonez History of Presenting Illness Chief Complaint Patient presents with  Flank Pain History Provided By: Patient HPI: Erica Quinonez, 62 y.o. female with PMHx significant for kidney stone, DM, HTN, seizures, presents ambulatory to the ED with cc of R flank and diffuse abdominal pain with associated N/V/D. Pt was initially seen here in the ED on 12/26/18 for the same complaints and was diagnosed with UTI. She states she has been taking the ABX as prescribed without any improvement. Pt reports waking up at midnight tonight with 10/10 R flank pain along with abdominal pain. She states her pain today is worse than when she was previously evaluated on 12/26. Pt reports last episode of vomiting was ~11:00 PM last night. She states she tried taking prescribed Zofran without any relief. Pt describes diarrhea as \"sometimes watery and sometimes looks like oil on top. \" She denies any recent foreign travel. Pt specifically denies any recent CP, SOB, fevers, chills, or any other complaints. PSHx: complete hysterectomy, appendectomy, cholecystecomy PFHx: DM, cardiac disease Social hx: -tobacco, -EtOH, -illicit drugs There are no other complaints, changes, or physical findings at this time. PCP: Saida Armenta, DO No current facility-administered medications on file prior to encounter. Current Outpatient Medications on File Prior to Encounter Medication Sig Dispense Refill  BYDUREON BCISE 2 mg/0.85 mL atIn INJECT UNDER THE SKIN ONCE WEEKLY AS DIRECTED  0  
 nystatin, bulk, 15 billion unit powd 1 Units by Does Not Apply route three (3) times daily. To skin folds, ventral fold 2 Each 1  
 cefUROXime (CEFTIN) 500 mg tablet Take 1 Tab by mouth two (2) times a day for 7 days.  14 Tab 0  
 albuterol (PROVENTIL HFA, VENTOLIN HFA, PROAIR HFA) 90 mcg/actuation inhaler Take 2 Puffs by inhalation every four (4) hours as needed for Wheezing. 1 Inhaler 0  
 LORazepam (ATIVAN) 1 mg tablet Take 1 mg by mouth as needed. 0  
 oxyCODONE IR (ROXICODONE) 5 mg immediate release tablet Take 1 Tab by mouth every four (4) hours as needed for Pain for up to 6 doses. Max Daily Amount: 30 mg. 6 Tab 0  
 buPROPion XL (WELLBUTRIN XL) 150 mg tablet Take 300 mg by mouth every morning.  clonazePAM (KLONOPIN) 2 mg tablet Take 1 mg by mouth three (3) times daily.  QUEtiapine (SEROQUEL) 400 mg tablet Take 400 mg by mouth nightly.  prazosin (MINIPRESS) 1 mg capsule Take 4 mg by mouth nightly.  ergocalciferol (VITAMIN D2) 50,000 unit capsule Take 50,000 Units by mouth two (2) days a week. Twice a week Sunday and Wednesday  metFORMIN (GLUCOPHAGE) 500 mg tablet Take 500 mg by mouth every evening.  estradiol (ESTRACE) 0.5 mg tablet Take 0.5 mg by mouth daily.  terconazole (TERAZOL 7) 0.4 % vaginal cream Insert 1 Applicator into vagina nightly. has refill for 7 day supply  FLUoxetine (PROZAC) 20 mg capsule Take 20 mg by mouth daily.  diclofenac potassium (CATAFLAM) 50 mg tablet Take 50 mg by mouth three (3) times daily as needed (PRN back pain). pt takes prn back pain    
 miconazole (MICOTIN) 2 % topical cream Apply 1 Each to affected area two (2) times a day. Apply ointment to fungal infection under abdominal pannus and groin twice a day for 14 days.  oxyCODONE-acetaminophen (PERCOCET) 7.5-325 mg per tablet Take 1 Tab by mouth every eight (8) hours as needed for Pain.  HYDROcodone-acetaminophen (NORCO) 7.5-325 mg per tablet Take 1 Tab by mouth every six (6) hours as needed for Pain.  cyanocobalamin 1,000 mcg tablet Take 5,000 mcg by mouth daily.  aspirin 81 mg chewable tablet Take 81 mg by mouth daily.     
 gabapentin (NEURONTIN) 100 mg capsule Take 1 Cap by mouth three (3) times daily. (Patient taking differently: Take 1 Cap by mouth four (4) times daily.) 90 Cap 1  
 busPIRone (BUSPAR) 15 mg tablet Take 1 Tab by mouth Multiple. Take 2 tab po with breakfast 
Take 1 tab po with lunch Take 2 tab po with dinner 150 Tab 1  
 butalbital-acetaminophen-caffeine (FIORICET, ESGIC) -40 mg per tablet Take 2 Tabs by mouth every eight (8) hours as needed for Headache. Max Daily Amount: 6 Tabs. 30 Tab 0  
 diclofenac EC (VOLTAREN) 50 mg EC tablet Take 1 Tab by mouth Multiple. Take 1 tab po BID with meals for 1 week then rest for 1 week and can take again repeating same cycle 30 Tab 0  
 oxyCODONE-acetaminophen (PERCOCET) 7.5-325 mg per tablet Take 1 Tab by mouth every four (4) hours as needed for Pain. Max Daily Amount: 6 Tabs. (Patient not taking: Reported on 11/28/2017) 30 Tab 0  
 levothyroxine (SYNTHROID) 112 mcg tablet Take 112 mcg by mouth Daily (before breakfast).  glimepiride (AMARYL) 2 mg tablet Take 2 mg by mouth every morning.  losartan (COZAAR) 25 mg tablet Take 25 mg by mouth daily.  metoprolol succinate (TOPROL XL) 25 mg XL tablet Take 25 mg by mouth daily.  omeprazole (PRILOSEC) 20 mg capsule Take 40 mg by mouth daily.  topiramate (TOPAMAX) 200 mg tablet Take 200 mg by mouth nightly.  simvastatin (ZOCOR) 20 mg tablet Take 20 mg by mouth nightly.  potassium chloride (KLOR-CON M10) 10 mEq tablet Take 10 mEq by mouth nightly. Past History Past Medical History: 
Past Medical History:  
Diagnosis Date  Arrhythmia  Arthritis  Chronic pain   
 djd  Depression  Diabetes ([address]  GERD (gastroesophageal reflux disease)  Hypertension  Hypothyroidism  Kidney stone  Liver disease  Obesity, morbid, BMI 50 or higher ([address]  Psychiatric disorder   
 anxiety  Psychiatric disorder   
 depression  Seizures ([facility] [address]  Thyroid disease Past Surgical History: 
Past Surgical History: Procedure Laterality Date  CARDIAC CATHETERIZATION  11/18/2010  HX APPENDECTOMY  HX CHOLECYSTECTOMY  HX HYSTERECTOMY  HX TUBAL LIGATION Family History: 
Family History Problem Relation Age of Onset  Heart Disease Mother  Hypertension Mother  Cancer Mother  Heart Disease Father  Hypertension Father  Cancer Brother Social History: 
Social History Tobacco Use  Smoking status: Former Smoker  Smokeless tobacco: Never Used Substance Use Topics  Alcohol use: No  
 Drug use: No  
 
 
Allergies: Allergies Allergen Reactions  Celebrex [Celecoxib] Nausea Only  Morphine Nausea Only  Sulfa (Sulfonamide Antibiotics) Nausea Only  Adhesive  Codeine Other (comments) Hyper activity Review of Systems Review of Systems Constitutional: Negative for chills and fever. HENT: Negative. Eyes: Negative. Respiratory: Negative for cough and shortness of breath. Cardiovascular: Negative for chest pain. Gastrointestinal: Positive for abdominal pain, diarrhea, nausea and vomiting. Endocrine: Negative for heat intolerance. Genitourinary: Positive for flank pain. Musculoskeletal: Negative for neck pain. Skin: Negative for rash. Allergic/Immunologic: Negative for immunocompromised state. Neurological: Negative for dizziness. Hematological: Does not bruise/bleed easily. Psychiatric/Behavioral: Negative. All other systems reviewed and are negative. Physical Exam  
Physical Exam  
Constitutional: She is oriented to person, place, and time. She appears well-developed. No distress. Severe morbid obesity HENT:  
Head: Normocephalic and atraumatic. Eyes: EOM are normal. Pupils are equal, round, and reactive to light. Neck: Normal range of motion. Neck supple. Cardiovascular: Normal rate, regular rhythm and normal heart sounds. Pulmonary/Chest: Effort normal and breath sounds normal. No respiratory distress. Abdominal: Soft. Bowel sounds are normal. She exhibits no mass. There is no tenderness. R CVA tenderness Diffuse abdominal tenderness without any rebound or guarding Musculoskeletal: Normal range of motion. She exhibits no edema. Neurological: She is alert and oriented to person, place, and time. Coordination normal.  
Skin: Skin is warm and dry. Psychiatric: She has a normal mood and affect. Her behavior is normal.  
Nursing note and vitals reviewed. Diagnostic Study Results Labs - Recent Results (from the past 12 hour(s)) CBC WITH AUTOMATED DIFF Collection Time: 12/31/18  7:39 AM  
Result Value Ref Range WBC 6.9 3.6 - 11.0 K/uL  
 RBC 5.01 3.80 - 5.20 M/uL  
 HGB 10.8 (L) 11.5 - 16.0 g/dL HCT 37.8 35.0 - 47.0 % MCV 75.4 (L) 80.0 - 99.0 FL  
 MCH 21.6 (L) 26.0 - 34.0 PG  
 MCHC 28.6 (L) 30.0 - 36.5 g/dL  
 RDW 17.8 (H) 11.5 - 14.5 % PLATELET 453 809 - 170 K/uL NRBC 0.0 0  WBC ABSOLUTE NRBC 0.00 0.00 - 0.01 K/uL NEUTROPHILS 70 32 - 75 % LYMPHOCYTES 19 12 - 49 % MONOCYTES 9 5 - 13 % EOSINOPHILS 1 0 - 7 % BASOPHILS 1 0 - 1 % IMMATURE GRANULOCYTES 0 0.0 - 0.5 % ABS. NEUTROPHILS 4.8 1.8 - 8.0 K/UL  
 ABS. LYMPHOCYTES 1.3 0.8 - 3.5 K/UL  
 ABS. MONOCYTES 0.6 0.0 - 1.0 K/UL  
 ABS. EOSINOPHILS 0.1 0.0 - 0.4 K/UL  
 ABS. BASOPHILS 0.1 0.0 - 0.1 K/UL  
 ABS. IMM. GRANS. 0.0 0.00 - 0.04 K/UL  
 DF AUTOMATED    
 RBC COMMENTS MICROCYTOSIS 1+ 
    
 RBC COMMENTS HYPOCHROMIA 2+ 
    
 RBC COMMENTS OVALOCYTES 1+ LIPASE Collection Time: 12/31/18  7:39 AM  
Result Value Ref Range Lipase 283 73 - 393 U/L  
TROPONIN I Collection Time: 12/31/18  7:39 AM  
Result Value Ref Range Troponin-I, Qt. <0.05 <0.05 ng/mL LACTIC ACID Collection Time: 12/31/18  7:39 AM  
Result Value Ref Range  Lactic acid 1.3 0.4 - 2.0 MMOL/L  
METABOLIC PANEL, COMPREHENSIVE  
 Collection Time: 12/31/18  7:39 AM  
Result Value Ref Range Sodium 140 136 - 145 mmol/L Potassium 3.9 3.5 - 5.1 mmol/L Chloride 109 (H) 97 - 108 mmol/L  
 CO2 24 21 - 32 mmol/L Anion gap 7 5 - 15 mmol/L Glucose 110 (H) 65 - 100 mg/dL BUN 17 6 - 20 MG/DL Creatinine 1.12 (H) 0.55 - 1.02 MG/DL  
 BUN/Creatinine ratio 15 12 - 20 GFR est AA >60 >60 ml/min/1.73m2 GFR est non-AA 50 (L) >60 ml/min/1.73m2 Calcium 9.1 8.5 - 10.1 MG/DL Bilirubin, total 0.4 0.2 - 1.0 MG/DL  
 ALT (SGPT) 33 12 - 78 U/L  
 AST (SGOT) 45 (H) 15 - 37 U/L Alk. phosphatase 60 45 - 117 U/L Protein, total 7.3 6.4 - 8.2 g/dL Albumin 3.3 (L) 3.5 - 5.0 g/dL Globulin 4.0 2.0 - 4.0 g/dL A-G Ratio 0.8 (L) 1.1 - 2.2 URINALYSIS W/ REFLEX CULTURE Collection Time: 12/31/18  9:49 AM  
Result Value Ref Range Color DARK YELLOW Appearance CLOUDY (A) CLEAR Specific gravity 1.021 1.003 - 1.030    
 pH (UA) 6.0 5.0 - 8.0 Protein TRACE (A) NEG mg/dL Glucose NEGATIVE  NEG mg/dL Ketone TRACE (A) NEG mg/dL Blood LARGE (A) NEG Urobilinogen 0.2 0.2 - 1.0 EU/dL Nitrites NEGATIVE  NEG Leukocyte Esterase SMALL (A) NEG    
 WBC 5-10 0 - 4 /hpf  
 RBC 10-20 0 - 5 /hpf Epithelial cells MODERATE (A) FEW /lpf Bacteria 1+ (A) NEG /hpf  
 UA:UC IF INDICATED URINE CULTURE ORDERED (A) CNI Other: Renal Epithelial cells Present BILIRUBIN, CONFIRM Collection Time: 12/31/18  9:49 AM  
Result Value Ref Range Bilirubin UA, confirm NEGATIVE  NEG    
GLUCOSE, POC Collection Time: 12/31/18  2:03 PM  
Result Value Ref Range Glucose (POC) 103 (H) 65 - 100 mg/dL Performed by [facility] Radiologic Studies -  
CT ABD PELV W CONT Final Result IMPRESSION:  
1. There is a 9 mm calculus in the right proximal ureter causing minimal  
upstream hydroureter and hydronephrosis. 2.   Midline hypodense pelvic mass, unchanged from August 2018 is of unclear etiology. A top differential consideration is an ovarian cyst.  
  
 
CT Results  (Last 48 hours) 12/31/18 1030  CT ABD PELV W CONT Final result Impression:  IMPRESSION:  
1. There is a 9 mm calculus in the right proximal ureter causing minimal  
upstream hydroureter and hydronephrosis. 2.   Midline hypodense pelvic mass, unchanged from August 2018 is of unclear  
etiology. A top differential consideration is an ovarian cyst.  
  
 Narrative:  EXAM: CT ABD PELV W CONT INDICATION: 62year-old with right flank and diffuse abdominal pain as well as  
nausea, vomiting, diarrhea. History of hysterectomy, appendectomy,  
cholecystectomy. COMPARISON: CT abdomen pelvis 8/29/2018, 1/12/2011 CONTRAST: 100 mL of Isovue-370. TECHNIQUE:   
Following the uneventful intravenous administration of contrast, thin axial  
images were obtained through the abdomen and pelvis. Coronal and sagittal  
reconstructions were generated. Oral contrast was administered. CT dose  
reduction was achieved through use of a standardized protocol tailored for this  
examination and automatic exposure control for dose modulation. FINDINGS:   
LUNG BASES: Clear. INCIDENTALLY IMAGED HEART AND MEDIASTINUM: Unremarkable. LIVER: No mass or biliary dilatation. GALLBLADDER: Surgically absent. SPLEEN: No mass. PANCREAS: No mass or ductal dilatation. ADRENALS: Unchanged thickening throughout the left adrenal gland dating back to  
2011. KIDNEYS: There is a 9 mm calculus in the right proximal ureter causing minimal  
upstream hydroureter and hydronephrosis. No left-sided nephrolithiasis or  
hydronephrosis. The left ureter is unremarkable. Bladder is normal in  
appearance. STOMACH: Unremarkable. SMALL BOWEL: No dilatation or wall thickening. COLON: No dilatation or wall thickening. APPENDIX: Surgically absent. PERITONEUM: No ascites or pneumoperitoneum. RETROPERITONEUM: No lymphadenopathy or aortic aneurysm. REPRODUCTIVE ORGANS: Midline oval hypodense mass measuring 4.6 x 4.6 cm,  
unchanged from August 2018. Detailed evaluation limited by body habitus. Uterus  
is surgically absent. URINARY BLADDER: No mass or calculus. BONES: No destructive bone lesion. ADDITIONAL COMMENTS: N/A  
   
  
  
 
CXR Results  (Last 48 hours) None Medical Decision Making I am the first provider for this patient. I reviewed the vital signs, available nursing notes, past medical history, past surgical history, family history and social history. Vital Signs-Reviewed the patient's vital signs. Patient Vitals for the past 12 hrs: 
 Temp Pulse Resp BP SpO2  
12/31/18 1132    130/76 95 % 12/31/18 1100    107/85 96 % 12/31/18 0948  89  143/63 97 % 12/31/18 0505 97.4 °F (36.3 °C) (!) 101 18 141/80 95 % Pulse Oximetry Analysis - 95% on RA Records Reviewed: Nursing Notes, Old Medical Records, Previous Radiology Studies and Previous Laboratory Studies Provider Notes (Medical Decision Making): DDx: kidney stone, UTI, diverticulitis, pancreatitis, electrolyte abnormality, gastroenteritis, colitis, dehydration ED Course:  
Initial assessment performed. The patients presenting problems have been discussed, and they are in agreement with the care plan formulated and outlined with them. I have encouraged them to ask questions as they arise throughout their visit. 11:37 AM 
Reviewed CT scan. Pt states her pain is unimproved. Will order Toradol. Consult Note: 
12:41 PM 
Eve Delacruz MD spoke with Dr. Dar Holt, Specialty: urology Discussed pt's hx, disposition, and available diagnostic and imaging results. Reviewed care plans. Consultant agrees with plans as outlined. He recommends admission to hospitalist and urology will f/u for stent placement.  
 
CONSULT NOTE:  
12:50 PM 
Eve Delacruz MD spoke with Dr. Branden Appiah,  
 Specialty: Hospitalist 
Discussed pt's hx, disposition, and available diagnostic and imaging results. Reviewed care plans. Consultant will evaluate pt for admission. PLAN: 
1. Admit to Hospitalist  
 
Admission Note: 
12:50 PM 
Patient is being admitted to the hospital by Dr. Marquis Foster. The results of their tests and reasons for their admission have been discussed with them and available family. They convey agreement and understanding for the need to be admitted and for their admission diagnosis. Diagnosis Clinical Impression: No diagnosis found. Attestations: This note is prepared by Debby Garcia, acting as Scribe for Osiel King MD. 
 
The scribe's documentation has been prepared under my direction and personally reviewed by me in its entirety. I confirm that the note above accurately reflects all work, treatment, procedures, and medical decision making performed by me.  
Osiel King MD

## 2019-01-01 LAB
ANION GAP SERPL CALC-SCNC: 11 MMOL/L (ref 5–15)
BASOPHILS # BLD: 0.1 K/UL (ref 0–0.1)
BASOPHILS NFR BLD: 1 % (ref 0–1)
BUN SERPL-MCNC: 15 MG/DL (ref 6–20)
BUN/CREAT SERPL: 20 (ref 12–20)
CALCIUM SERPL-MCNC: 8.5 MG/DL (ref 8.5–10.1)
CHLORIDE SERPL-SCNC: 110 MMOL/L (ref 97–108)
CO2 SERPL-SCNC: 21 MMOL/L (ref 21–32)
CREAT SERPL-MCNC: 0.75 MG/DL (ref 0.55–1.02)
DIFFERENTIAL METHOD BLD: ABNORMAL
EOSINOPHIL # BLD: 0.2 K/UL (ref 0–0.4)
EOSINOPHIL NFR BLD: 3 % (ref 0–7)
ERYTHROCYTE [DISTWIDTH] IN BLOOD BY AUTOMATED COUNT: 17.8 % (ref 11.5–14.5)
GLUCOSE BLD STRIP.AUTO-MCNC: 115 MG/DL (ref 65–100)
GLUCOSE BLD STRIP.AUTO-MCNC: 130 MG/DL (ref 65–100)
GLUCOSE BLD STRIP.AUTO-MCNC: 132 MG/DL (ref 65–100)
GLUCOSE BLD STRIP.AUTO-MCNC: 92 MG/DL (ref 65–100)
GLUCOSE SERPL-MCNC: 102 MG/DL (ref 65–100)
HCT VFR BLD AUTO: 33.7 % (ref 35–47)
HGB BLD-MCNC: 9.6 G/DL (ref 11.5–16)
IMM GRANULOCYTES # BLD: 0 K/UL (ref 0–0.04)
IMM GRANULOCYTES NFR BLD AUTO: 0 % (ref 0–0.5)
LYMPHOCYTES # BLD: 1.7 K/UL (ref 0.8–3.5)
LYMPHOCYTES NFR BLD: 28 % (ref 12–49)
MCH RBC QN AUTO: 21.8 PG (ref 26–34)
MCHC RBC AUTO-ENTMCNC: 28.5 G/DL (ref 30–36.5)
MCV RBC AUTO: 76.4 FL (ref 80–99)
MONOCYTES # BLD: 0.7 K/UL (ref 0–1)
MONOCYTES NFR BLD: 12 % (ref 5–13)
NEUTS SEG # BLD: 3.3 K/UL (ref 1.8–8)
NEUTS SEG NFR BLD: 56 % (ref 32–75)
NRBC # BLD: 0 K/UL (ref 0–0.01)
NRBC BLD-RTO: 0 PER 100 WBC
PLATELET # BLD AUTO: 166 K/UL (ref 150–400)
POTASSIUM SERPL-SCNC: 3.3 MMOL/L (ref 3.5–5.1)
RBC # BLD AUTO: 4.41 M/UL (ref 3.8–5.2)
RBC MORPH BLD: ABNORMAL
SERVICE CMNT-IMP: ABNORMAL
SERVICE CMNT-IMP: NORMAL
SODIUM SERPL-SCNC: 142 MMOL/L (ref 136–145)
WBC # BLD AUTO: 6 K/UL (ref 3.6–11)

## 2019-01-01 PROCEDURE — 74011250637 HC RX REV CODE- 250/637: Performed by: HOSPITALIST

## 2019-01-01 PROCEDURE — 80048 BASIC METABOLIC PNL TOTAL CA: CPT

## 2019-01-01 PROCEDURE — 85025 COMPLETE CBC W/AUTO DIFF WBC: CPT

## 2019-01-01 PROCEDURE — 94640 AIRWAY INHALATION TREATMENT: CPT

## 2019-01-01 PROCEDURE — 65270000029 HC RM PRIVATE

## 2019-01-01 PROCEDURE — 74011000250 HC RX REV CODE- 250: Performed by: HOSPITALIST

## 2019-01-01 PROCEDURE — 74011250636 HC RX REV CODE- 250/636: Performed by: HOSPITALIST

## 2019-01-01 PROCEDURE — 36415 COLL VENOUS BLD VENIPUNCTURE: CPT

## 2019-01-01 PROCEDURE — 82962 GLUCOSE BLOOD TEST: CPT

## 2019-01-01 RX ORDER — POTASSIUM CHLORIDE 750 MG/1
40 TABLET, FILM COATED, EXTENDED RELEASE ORAL
Status: COMPLETED | OUTPATIENT
Start: 2019-01-01 | End: 2019-01-01

## 2019-01-01 RX ORDER — QUETIAPINE FUMARATE 400 MG/1
400 TABLET, FILM COATED ORAL
COMMUNITY
End: 2021-11-29

## 2019-01-01 RX ORDER — HYDROMORPHONE HYDROCHLORIDE 2 MG/ML
1 INJECTION, SOLUTION INTRAMUSCULAR; INTRAVENOUS; SUBCUTANEOUS
Status: COMPLETED | OUTPATIENT
Start: 2019-01-01 | End: 2019-01-01

## 2019-01-01 RX ORDER — KETOROLAC TROMETHAMINE 30 MG/ML
15 INJECTION, SOLUTION INTRAMUSCULAR; INTRAVENOUS ONCE
Status: ACTIVE | OUTPATIENT
Start: 2019-01-01 | End: 2019-01-02

## 2019-01-01 RX ORDER — BUSPIRONE HYDROCHLORIDE 15 MG/1
15 TABLET ORAL 3 TIMES DAILY
COMMUNITY

## 2019-01-01 RX ORDER — IPRATROPIUM BROMIDE AND ALBUTEROL SULFATE 2.5; .5 MG/3ML; MG/3ML
3 SOLUTION RESPIRATORY (INHALATION) 2 TIMES DAILY
Status: DISCONTINUED | OUTPATIENT
Start: 2019-01-01 | End: 2019-01-03

## 2019-01-01 RX ORDER — LEVOFLOXACIN 5 MG/ML
750 INJECTION, SOLUTION INTRAVENOUS EVERY 24 HOURS
Status: DISCONTINUED | OUTPATIENT
Start: 2019-01-01 | End: 2019-01-02

## 2019-01-01 RX ORDER — BUSPIRONE HYDROCHLORIDE 5 MG/1
15 TABLET ORAL 3 TIMES DAILY
Status: DISCONTINUED | OUTPATIENT
Start: 2019-01-01 | End: 2019-01-04 | Stop reason: HOSPADM

## 2019-01-01 RX ADMIN — PRAVASTATIN SODIUM 40 MG: 40 TABLET ORAL at 21:44

## 2019-01-01 RX ADMIN — TOPIRAMATE 200 MG: 100 TABLET, FILM COATED ORAL at 21:44

## 2019-01-01 RX ADMIN — PRAZOSIN HYDROCHLORIDE 4 MG: 1 CAPSULE ORAL at 21:44

## 2019-01-01 RX ADMIN — Medication 10 ML: at 06:07

## 2019-01-01 RX ADMIN — NYSTATIN: 100000 POWDER TOPICAL at 13:24

## 2019-01-01 RX ADMIN — CLONAZEPAM 1 MG: 1 TABLET ORAL at 09:28

## 2019-01-01 RX ADMIN — Medication 10 ML: at 21:50

## 2019-01-01 RX ADMIN — CLONAZEPAM 1 MG: 1 TABLET ORAL at 14:32

## 2019-01-01 RX ADMIN — HYDROMORPHONE HYDROCHLORIDE 1 MG: 2 INJECTION INTRAMUSCULAR; INTRAVENOUS; SUBCUTANEOUS at 01:00

## 2019-01-01 RX ADMIN — Medication 10 ML: at 09:40

## 2019-01-01 RX ADMIN — POTASSIUM CHLORIDE 40 MEQ: 750 TABLET, EXTENDED RELEASE ORAL at 09:27

## 2019-01-01 RX ADMIN — BUSPIRONE HYDROCHLORIDE 15 MG: 5 TABLET ORAL at 22:38

## 2019-01-01 RX ADMIN — LEVOTHYROXINE SODIUM 112 MCG: 112 TABLET ORAL at 06:08

## 2019-01-01 RX ADMIN — NYSTATIN: 100000 POWDER TOPICAL at 21:43

## 2019-01-01 RX ADMIN — GABAPENTIN 100 MG: 100 CAPSULE ORAL at 09:29

## 2019-01-01 RX ADMIN — LEVOFLOXACIN 750 MG: 750 INJECTION, SOLUTION INTRAVENOUS at 17:41

## 2019-01-01 RX ADMIN — PANTOPRAZOLE SODIUM 40 MG: 40 TABLET, DELAYED RELEASE ORAL at 09:30

## 2019-01-01 RX ADMIN — FENTANYL CITRATE 50 MCG: 50 INJECTION, SOLUTION INTRAMUSCULAR; INTRAVENOUS at 20:34

## 2019-01-01 RX ADMIN — GABAPENTIN 100 MG: 100 CAPSULE ORAL at 21:43

## 2019-01-01 RX ADMIN — METOPROLOL SUCCINATE 25 MG: 25 TABLET, EXTENDED RELEASE ORAL at 09:33

## 2019-01-01 RX ADMIN — ESTRADIOL 0.5 MG: 1 TABLET ORAL at 09:31

## 2019-01-01 RX ADMIN — FENTANYL CITRATE 50 MCG: 50 INJECTION, SOLUTION INTRAMUSCULAR; INTRAVENOUS at 06:31

## 2019-01-01 RX ADMIN — QUETIAPINE FUMARATE 600 MG: 400 TABLET ORAL at 22:38

## 2019-01-01 RX ADMIN — Medication 10 ML: at 21:51

## 2019-01-01 RX ADMIN — GABAPENTIN 100 MG: 100 CAPSULE ORAL at 17:39

## 2019-01-01 RX ADMIN — IPRATROPIUM BROMIDE AND ALBUTEROL SULFATE 3 ML: .5; 3 SOLUTION RESPIRATORY (INHALATION) at 20:46

## 2019-01-01 RX ADMIN — CLONAZEPAM 1 MG: 1 TABLET ORAL at 21:42

## 2019-01-01 RX ADMIN — NYSTATIN: 100000 POWDER TOPICAL at 09:34

## 2019-01-01 RX ADMIN — BUPROPION HYDROCHLORIDE 300 MG: 150 TABLET, FILM COATED, EXTENDED RELEASE ORAL at 06:07

## 2019-01-01 RX ADMIN — LOSARTAN POTASSIUM 25 MG: 25 TABLET ORAL at 09:29

## 2019-01-01 RX ADMIN — FENTANYL CITRATE 50 MCG: 50 INJECTION, SOLUTION INTRAMUSCULAR; INTRAVENOUS at 13:12

## 2019-01-01 NOTE — PROGRESS NOTES
Bedside shift change report given to Efrain Overton (oncoming nurse) by Isabell Aguilar RN (offgoing nurse). Report included the following information SBAR, Kardex, Intake/Output, MAR and Recent Results.

## 2019-01-01 NOTE — PROGRESS NOTES
General Surgery End of Shift Nursing Note Bedside shift change report given to Shanelle (oncoming nurse) by Sammy Betancur (offgoing nurse). Report included the following information SBAR, Kardex, ED Summary, Intake/Output, MAR and Accordion. Shift worked:   [id number] Summary of shift:    Pt received from the ED with no complaints of pain. Tolerating diet well. Issues for physician to address:   none Number times ambulated in hallway past shift: 0 Number of times OOB to chair past shift: 0 Pain Management: 
Current medication: none given Patient states pain is manageable on current pain medication:none given GI: 
 
Current diet:  DIET DIABETIC CONSISTENT CARB Regular DIET NPO Tolerating current diet: YES Passing flatus: YES Last Bowel Movement: today 
 in ED Respiratory: 
 
Incentive Spirometer at bedside: NO 
Patient instructed on use: NO 
 
Patient Safety: 
 
Falls Score: 2 Bed Alarm On? Not applicable Sitter? Not applicable Shalini Roe

## 2019-01-01 NOTE — PROGRESS NOTES
General Surgery End of Shift Nursing Note Bedside shift change report given to Shanelle (oncoming nurse) by Martina Fitzpatrick (offgoing nurse). Report included the following information SBAR, Kardex, Intake/Output, MAR and Recent Results. Shift worked:   C Summary of shift:    0 Issues for physician to address:   0 Number times ambulated in hallway past shift: 0 Number of times OOB to chair past shift: 2 Pain Management: 
Current medication: 0 Patient states pain is manageable on current pain medication: YES 
 
GI: 
 
Current diet:  DIET DIABETIC CONSISTENT CARB Regular DIET NPO Tolerating current diet: YES Passing flatus: YES Last Bowel Movement: today Appearance: 0 Respiratory: 
 
Incentive Spirometer at bedside: YES Patient instructed on use: YES Patient Safety: 
 
Falls Score: 1 Bed Alarm On? No 
Sitter?  No 
 
Richie Eduardo RN

## 2019-01-01 NOTE — PROGRESS NOTES
Problem: Falls - Risk of 
Goal: *Absence of Falls Document Danielle Primes Fall Risk and appropriate interventions in the flowsheet. Fall Risk Interventions: 
Mobility Interventions: Communicate number of staff needed for ambulation/transfer, Patient to call before getting OOB Mentation Interventions: Door open when patient unattended Medication Interventions: Patient to call before getting OOB Elimination Interventions: Call light in reach, Patient to call for help with toileting needs Problem: Patient Education: Go to Patient Education Activity Goal: Patient/Family Education Outcome: Progressing Towards Goal 
Call bell within reach, siderails upx3.  Up with one assist.

## 2019-01-01 NOTE — PROGRESS NOTES
Pharmacy Medication Reconciliation The patient was interviewed regarding current PTA medication list, use and drug allergies;  michelle present in room and obtained permission from patient to discuss drug regimen with visitor(s) present. The patient was questioned regarding use of any other inhalers, topical products, over the counter medications, herbal medications, vitamin products or ophthalmic/nasal/otic medication use. -Clarified PTA med list with patient and RX query. PTA medication list was corrected to the following:  
 
Prior to Admission Medications Prescriptions Last Dose Informant Patient Reported? Taking? LORazepam (ATIVAN) 1 mg tablet   Yes Yes Sig: Take 1 mg by mouth every eight (8) hours as needed for Anxiety. QUEtiapine (SEROQUEL) 400 mg tablet   Yes Yes Sig: Take 600 mg by mouth nightly. albuterol (PROVENTIL HFA, VENTOLIN HFA, PROAIR HFA) 90 mcg/actuation inhaler   No Yes Sig: Take 2 Puffs by inhalation every four (4) hours as needed for Wheezing. aspirin 81 mg chewable tablet   Yes Yes Sig: Take 81 mg by mouth daily. buPROPion XL (WELLBUTRIN XL) 150 mg tablet   Yes Yes Sig: Take 300 mg by mouth nightly. busPIRone (BUSPAR) 15 mg tablet   Yes Yes Sig: Take 15 mg by mouth three (3) times daily. diclofenac potassium (CATAFLAM) 50 mg tablet   Yes Yes Sig: Take 50 mg by mouth two (2) times a day. Patient takes 1 tablet twice daily for a week on, then a week off, then repeats. ergocalciferol (VITAMIN D2) 50,000 unit capsule   Yes Yes Sig: Take 50,000 Units by mouth every seven (7) days. Twice a week Sunday and Wednesday  
estradiol (ESTRACE) 0.5 mg tablet   Yes Yes Sig: Take 0.5 mg by mouth daily. gabapentin (NEURONTIN) 100 mg capsule  Self No Yes Sig: Take 1 Cap by mouth three (3) times daily. Patient taking differently: Take 1 Cap by mouth four (4) times daily. glimepiride (AMARYL) 2 mg tablet   Yes Yes Sig: Take 2 mg by mouth every morning. levothyroxine (SYNTHROID) 112 mcg tablet   Yes Yes Sig: Take 112 mcg by mouth Daily (before breakfast). losartan (COZAAR) 25 mg tablet   Yes Yes Sig: Take 25 mg by mouth daily. metFORMIN (GLUCOPHAGE) 500 mg tablet   Yes Yes Sig: Take 1,000 mg by mouth daily (with dinner). metoprolol succinate (TOPROL XL) 25 mg XL tablet   Yes Yes Sig: Take 25 mg by mouth daily. miconazole (MICOTIN) 2 % topical cream   Yes Yes Sig: Apply 1 Each to affected area two (2) times a day. Apply ointment to fungal infection under abdominal pannus and groin twice a day for 14 days. nystatin, bulk, 15 billion unit powd   No Yes Si Units by Does Not Apply route three (3) times daily. To skin folds, ventral fold  
omeprazole (PRILOSEC) 20 mg capsule   Yes Yes Sig: Take 40 mg by mouth daily. potassium chloride (KLOR-CON M10) 10 mEq tablet   Yes Yes Sig: Take 10 mEq by mouth nightly. prazosin (MINIPRESS) 1 mg capsule   Yes Yes Sig: Take 4 mg by mouth nightly. simvastatin (ZOCOR) 20 mg tablet   Yes Yes Sig: Take 20 mg by mouth nightly. terconazole (TERAZOL 7) 0.4 % vaginal cream   Yes Yes Sig: Insert 1 Applicator into vagina nightly. has refill for 7 day supply  
topiramate (TOPAMAX) 200 mg tablet   Yes Yes Sig: Take 200 mg by mouth nightly. Facility-Administered Medications: None Thank you, [address], [place]

## 2019-01-01 NOTE — PROGRESS NOTES
Hospitalist Progress Note NAME: Vivienne Subramanian :  1960 MRN:  [id number] Assessment / Plan: 
Right proximal ureter calculus POA 
causing minimal upstream hydroureter and hydronephrosis IVF, flomax, prn antiemetics and pain control 
-Keep NPO 
-urology on board, likely for stent placement today. 
  
UTI POA 
-urine cx grew klebsiella on , pt on cefuroxime and sensitivity is intermediate 
-will continue on Levaquin empirically against which klebsiella was sensitive. 
-follow urine cx sent today 
  
Diabetes: POA 
-check A1c, ISS, holding Glipizide and metformin as pt will be NPO 
  
Hypothyroidism: POA 
-continue home Levothyroxine 
  
Anxiety/Depression: POA Seizure Disorder: 
-continue Wellbutrin, Seroquel and Topamax with prn Klonipin 
  
Hypertension: 
-continue home Metoprolol and Losartan 
  
  
Morbid Obesity 
  
Code Status: full Surrogate Decision Maker: , daughter and son DVT Prophylaxis: SCD 
GI Prophylaxis: not indicated but continue home PPI for GERD 
  
Baseline: Lives with  Subjective: Chief Complaint / Reason for Physician Visit Follow up of renal stone , still complains of right flank pain and nausea. Review of Systems: 
Symptom Y/N Comments  Symptom Y/N Comments Fever/Chills    Chest Pain Poor Appetite    Edema Cough    Abdominal Pain Sputum    Joint Pain SOB/DIOP    Pruritis/Rash Nausea/vomit    Tolerating PT/OT Diarrhea    Tolerating Diet Constipation    Other Could NOT obtain due to:   
 
Objective: VITALS:  
Last 24hrs VS reviewed since prior progress note. Most recent are: 
Patient Vitals for the past 24 hrs: 
 Temp Pulse Resp BP SpO2  
19 0335 98.4 °F (36.9 °C) 98 18 103/61 98 % 18 2216 98.5 °F (36.9 °C) 92 18 (!) 121/91 95 % 18 1912 98.2 °F (36.8 °C) 91 18 132/77 96 % 18 1649 98.2 °F (36.8 °C) 76 16 131/63 97 % 12/31/18 1400 98.6 °F (37 °C)  16 (!) 140/116 96 % 12/31/18 1132    130/76 95 % 12/31/18 1100    107/85 96 % 12/31/18 0948  89  143/63 97 % Intake/Output Summary (Last 24 hours) at 1/1/2019 8966 Last data filed at 1/1/2019 0400 Gross per 24 hour Intake 490 ml Output 450 ml Net 40 ml PHYSICAL EXAM: 
General: WD, WN. Alert, cooperative, no acute distress   
EENT:  EOMI. Anicteric sclerae. MMM Resp:  CTA bilaterally, no wheezing or rales. No accessory muscle use CV:  Regular  rhythm,  No edema GI:  Soft, Non distended, Non tender.  +Bowel sounds Neurologic:  Alert and oriented X 3, normal speech, Psych:   Good insight. Not anxious nor agitated Skin:  No rashes. No jaundice Reviewed most current lab test results and cultures  YES Reviewed most current radiology test results   YES Review and summation of old records today    NO Reviewed patient's current orders and MAR    YES 
PMH/SH reviewed - no change compared to H&P 
________________________________________________________________________ Care Plan discussed with: 
  Comments Patient Family RN Care Manager Consultant Multidiciplinary team rounds were held today with , nursing, pharmacist and clinical coordinator. Patient's plan of care was discussed; medications were reviewed and discharge planning was addressed. ________________________________________________________________________ Total CRITICAL CARE TIME Spent:   Minutes non procedure based Comments >50% of visit spent in counseling and coordination of care    
________________________________________________________________________ Jose David Devi MD  
 
Procedures: see electronic medical records for all procedures/Xrays and details which were not copied into this note but were reviewed prior to creation of Plan. LABS: 
I reviewed today's most current labs and imaging studies. Pertinent labs include: 
Recent Labs 01/01/19 
0431 12/31/18 
9569 WBC 6.0 6.9 HGB 9.6* 10.8* HCT 33.7* 37.8  164 Recent Labs 01/01/19 0431 12/31/18 
3620  140  
K 3.3* 3.9 * 109* CO2 21 24 * 110* BUN 15 17 CREA 0.75 1.12* CA 8.5 9.1 ALB  --  3.3* TBILI  --  0.4 SGOT  --  45* ALT  --  33 Signed: Rigo Hough MD

## 2019-01-02 ENCOUNTER — ANESTHESIA (OUTPATIENT)
Dept: SURGERY | Age: 59
DRG: 660 | End: 2019-01-02
Payer: COMMERCIAL

## 2019-01-02 ENCOUNTER — ANESTHESIA EVENT (OUTPATIENT)
Dept: SURGERY | Age: 59
DRG: 660 | End: 2019-01-02
Payer: COMMERCIAL

## 2019-01-02 ENCOUNTER — APPOINTMENT (OUTPATIENT)
Dept: GENERAL RADIOLOGY | Age: 59
DRG: 660 | End: 2019-01-02
Attending: UROLOGY
Payer: COMMERCIAL

## 2019-01-02 LAB
ALBUMIN SERPL-MCNC: 2.6 G/DL (ref 3.5–5)
ALBUMIN/GLOB SERPL: 0.8 {RATIO} (ref 1.1–2.2)
ALP SERPL-CCNC: 54 U/L (ref 45–117)
ALT SERPL-CCNC: 31 U/L (ref 12–78)
ANION GAP SERPL CALC-SCNC: 9 MMOL/L (ref 5–15)
AST SERPL-CCNC: 44 U/L (ref 15–37)
BACTERIA SPEC CULT: ABNORMAL
BASOPHILS # BLD: 0.1 K/UL (ref 0–0.1)
BASOPHILS NFR BLD: 1 % (ref 0–1)
BILIRUB SERPL-MCNC: 0.4 MG/DL (ref 0.2–1)
BUN SERPL-MCNC: 17 MG/DL (ref 6–20)
BUN/CREAT SERPL: 19 (ref 12–20)
CALCIUM SERPL-MCNC: 8.4 MG/DL (ref 8.5–10.1)
CC UR VC: ABNORMAL
CHLORIDE SERPL-SCNC: 110 MMOL/L (ref 97–108)
CO2 SERPL-SCNC: 20 MMOL/L (ref 21–32)
CREAT SERPL-MCNC: 0.9 MG/DL (ref 0.55–1.02)
DIFFERENTIAL METHOD BLD: ABNORMAL
EOSINOPHIL # BLD: 0.2 K/UL (ref 0–0.4)
EOSINOPHIL NFR BLD: 4 % (ref 0–7)
ERYTHROCYTE [DISTWIDTH] IN BLOOD BY AUTOMATED COUNT: 17.6 % (ref 11.5–14.5)
GLOBULIN SER CALC-MCNC: 3.4 G/DL (ref 2–4)
GLUCOSE BLD STRIP.AUTO-MCNC: 119 MG/DL (ref 65–100)
GLUCOSE BLD STRIP.AUTO-MCNC: 127 MG/DL (ref 65–100)
GLUCOSE BLD STRIP.AUTO-MCNC: 156 MG/DL (ref 65–100)
GLUCOSE BLD STRIP.AUTO-MCNC: 83 MG/DL (ref 65–100)
GLUCOSE BLD STRIP.AUTO-MCNC: 95 MG/DL (ref 65–100)
GLUCOSE SERPL-MCNC: 125 MG/DL (ref 65–100)
HCT VFR BLD AUTO: 32.7 % (ref 35–47)
HGB BLD-MCNC: 9.1 G/DL (ref 11.5–16)
IMM GRANULOCYTES # BLD: 0 K/UL (ref 0–0.04)
IMM GRANULOCYTES NFR BLD AUTO: 0 % (ref 0–0.5)
LYMPHOCYTES # BLD: 1.6 K/UL (ref 0.8–3.5)
LYMPHOCYTES NFR BLD: 27 % (ref 12–49)
MCH RBC QN AUTO: 21.3 PG (ref 26–34)
MCHC RBC AUTO-ENTMCNC: 27.8 G/DL (ref 30–36.5)
MCV RBC AUTO: 76.4 FL (ref 80–99)
MONOCYTES # BLD: 0.7 K/UL (ref 0–1)
MONOCYTES NFR BLD: 12 % (ref 5–13)
NEUTS SEG # BLD: 3.2 K/UL (ref 1.8–8)
NEUTS SEG NFR BLD: 56 % (ref 32–75)
NRBC # BLD: 0 K/UL (ref 0–0.01)
NRBC BLD-RTO: 0 PER 100 WBC
PLATELET # BLD AUTO: 142 K/UL (ref 150–400)
PMV BLD AUTO: 10.9 FL (ref 8.9–12.9)
POTASSIUM SERPL-SCNC: 3.7 MMOL/L (ref 3.5–5.1)
PROT SERPL-MCNC: 6 G/DL (ref 6.4–8.2)
RBC # BLD AUTO: 4.28 M/UL (ref 3.8–5.2)
RBC MORPH BLD: ABNORMAL
SERVICE CMNT-IMP: ABNORMAL
SERVICE CMNT-IMP: NORMAL
SERVICE CMNT-IMP: NORMAL
SODIUM SERPL-SCNC: 139 MMOL/L (ref 136–145)
WBC # BLD AUTO: 5.8 K/UL (ref 3.6–11)

## 2019-01-02 PROCEDURE — 82962 GLUCOSE BLOOD TEST: CPT

## 2019-01-02 PROCEDURE — 77030018832 HC SOL IRR H20 ICUM -A: Performed by: UROLOGY

## 2019-01-02 PROCEDURE — 74011250636 HC RX REV CODE- 250/636: Performed by: HOSPITALIST

## 2019-01-02 PROCEDURE — 77030019948 HC FBR LSR HOLM DISP OCOA -E: Performed by: UROLOGY

## 2019-01-02 PROCEDURE — 74011250636 HC RX REV CODE- 250/636

## 2019-01-02 PROCEDURE — 76210000016 HC OR PH I REC 1 TO 1.5 HR: Performed by: UROLOGY

## 2019-01-02 PROCEDURE — 77030026438 HC STYL ET INTUB CARD -A: Performed by: ANESTHESIOLOGY

## 2019-01-02 PROCEDURE — 77030018836 HC SOL IRR NACL ICUM -A: Performed by: UROLOGY

## 2019-01-02 PROCEDURE — C1894 INTRO/SHEATH, NON-LASER: HCPCS | Performed by: UROLOGY

## 2019-01-02 PROCEDURE — 77030019908 HC STETH ESOPH SIMS -A: Performed by: ANESTHESIOLOGY

## 2019-01-02 PROCEDURE — 74011250637 HC RX REV CODE- 250/637: Performed by: HOSPITALIST

## 2019-01-02 PROCEDURE — 77030012961 HC IRR KT CYSTO/TUR ICUM -A: Performed by: UROLOGY

## 2019-01-02 PROCEDURE — 76000 FLUOROSCOPY <1 HR PHYS/QHP: CPT

## 2019-01-02 PROCEDURE — 76010000161 HC OR TIME 1 TO 1.5 HR INTENSV-TIER 1: Performed by: UROLOGY

## 2019-01-02 PROCEDURE — 77030008684 HC TU ET CUF COVD -B: Performed by: ANESTHESIOLOGY

## 2019-01-02 PROCEDURE — 85025 COMPLETE CBC W/AUTO DIFF WBC: CPT

## 2019-01-02 PROCEDURE — 80053 COMPREHEN METABOLIC PANEL: CPT

## 2019-01-02 PROCEDURE — 74011250636 HC RX REV CODE- 250/636: Performed by: INTERNAL MEDICINE

## 2019-01-02 PROCEDURE — 74420 UROGRAPHY RTRGR +-KUB: CPT

## 2019-01-02 PROCEDURE — 76060000033 HC ANESTHESIA 1 TO 1.5 HR: Performed by: UROLOGY

## 2019-01-02 PROCEDURE — C2617 STENT, NON-COR, TEM W/O DEL: HCPCS | Performed by: UROLOGY

## 2019-01-02 PROCEDURE — 74011250636 HC RX REV CODE- 250/636: Performed by: ANESTHESIOLOGY

## 2019-01-02 PROCEDURE — C1769 GUIDE WIRE: HCPCS | Performed by: UROLOGY

## 2019-01-02 PROCEDURE — 77030021678 HC GLIDESCP STAT DISP VERT -B: Performed by: ANESTHESIOLOGY

## 2019-01-02 PROCEDURE — 77030018846 HC SOL IRR STRL H20 ICUM -A: Performed by: UROLOGY

## 2019-01-02 PROCEDURE — 74011000250 HC RX REV CODE- 250

## 2019-01-02 PROCEDURE — 36415 COLL VENOUS BLD VENIPUNCTURE: CPT

## 2019-01-02 PROCEDURE — 65270000029 HC RM PRIVATE

## 2019-01-02 PROCEDURE — 77030029684 HC NEB SM VOL KT MONA -A

## 2019-01-02 DEVICE — URETERAL STENT
Type: IMPLANTABLE DEVICE | Site: URETER | Status: FUNCTIONAL
Brand: POLARIS™ ULTRA

## 2019-01-02 RX ORDER — SODIUM CHLORIDE 0.9 % (FLUSH) 0.9 %
5-10 SYRINGE (ML) INJECTION EVERY 8 HOURS
Status: DISCONTINUED | OUTPATIENT
Start: 2019-01-02 | End: 2019-01-04 | Stop reason: HOSPADM

## 2019-01-02 RX ORDER — ONDANSETRON 2 MG/ML
INJECTION INTRAMUSCULAR; INTRAVENOUS AS NEEDED
Status: DISCONTINUED | OUTPATIENT
Start: 2019-01-02 | End: 2019-01-02 | Stop reason: HOSPADM

## 2019-01-02 RX ORDER — LIDOCAINE HYDROCHLORIDE 20 MG/ML
INJECTION, SOLUTION EPIDURAL; INFILTRATION; INTRACAUDAL; PERINEURAL AS NEEDED
Status: DISCONTINUED | OUTPATIENT
Start: 2019-01-02 | End: 2019-01-02 | Stop reason: HOSPADM

## 2019-01-02 RX ORDER — DIPHENHYDRAMINE HYDROCHLORIDE 50 MG/ML
12.5 INJECTION, SOLUTION INTRAMUSCULAR; INTRAVENOUS
Status: DISCONTINUED | OUTPATIENT
Start: 2019-01-02 | End: 2019-01-02 | Stop reason: HOSPADM

## 2019-01-02 RX ORDER — ACETAMINOPHEN 10 MG/ML
INJECTION, SOLUTION INTRAVENOUS AS NEEDED
Status: DISCONTINUED | OUTPATIENT
Start: 2019-01-02 | End: 2019-01-02 | Stop reason: HOSPADM

## 2019-01-02 RX ORDER — FENTANYL CITRATE 50 UG/ML
50 INJECTION, SOLUTION INTRAMUSCULAR; INTRAVENOUS
Status: DISCONTINUED | OUTPATIENT
Start: 2019-01-02 | End: 2019-01-02

## 2019-01-02 RX ORDER — HYDROMORPHONE HYDROCHLORIDE 2 MG/ML
1 INJECTION, SOLUTION INTRAMUSCULAR; INTRAVENOUS; SUBCUTANEOUS
Status: DISCONTINUED | OUTPATIENT
Start: 2019-01-02 | End: 2019-01-03

## 2019-01-02 RX ORDER — SODIUM CHLORIDE, SODIUM LACTATE, POTASSIUM CHLORIDE, CALCIUM CHLORIDE 600; 310; 30; 20 MG/100ML; MG/100ML; MG/100ML; MG/100ML
25 INJECTION, SOLUTION INTRAVENOUS CONTINUOUS
Status: DISPENSED | OUTPATIENT
Start: 2019-01-02 | End: 2019-01-03

## 2019-01-02 RX ORDER — ONDANSETRON 2 MG/ML
4 INJECTION INTRAMUSCULAR; INTRAVENOUS AS NEEDED
Status: DISCONTINUED | OUTPATIENT
Start: 2019-01-02 | End: 2019-01-02 | Stop reason: HOSPADM

## 2019-01-02 RX ORDER — MORPHINE SULFATE 10 MG/ML
2 INJECTION, SOLUTION INTRAMUSCULAR; INTRAVENOUS
Status: DISCONTINUED | OUTPATIENT
Start: 2019-01-02 | End: 2019-01-02 | Stop reason: HOSPADM

## 2019-01-02 RX ORDER — FENTANYL CITRATE 50 UG/ML
INJECTION, SOLUTION INTRAMUSCULAR; INTRAVENOUS AS NEEDED
Status: DISCONTINUED | OUTPATIENT
Start: 2019-01-02 | End: 2019-01-02 | Stop reason: HOSPADM

## 2019-01-02 RX ORDER — SODIUM CHLORIDE 0.9 % (FLUSH) 0.9 %
5-10 SYRINGE (ML) INJECTION AS NEEDED
Status: DISCONTINUED | OUTPATIENT
Start: 2019-01-02 | End: 2019-01-04 | Stop reason: HOSPADM

## 2019-01-02 RX ORDER — ROCURONIUM BROMIDE 10 MG/ML
INJECTION, SOLUTION INTRAVENOUS AS NEEDED
Status: DISCONTINUED | OUTPATIENT
Start: 2019-01-02 | End: 2019-01-02 | Stop reason: HOSPADM

## 2019-01-02 RX ORDER — DEXAMETHASONE SODIUM PHOSPHATE 4 MG/ML
INJECTION, SOLUTION INTRA-ARTICULAR; INTRALESIONAL; INTRAMUSCULAR; INTRAVENOUS; SOFT TISSUE AS NEEDED
Status: DISCONTINUED | OUTPATIENT
Start: 2019-01-02 | End: 2019-01-02 | Stop reason: HOSPADM

## 2019-01-02 RX ORDER — LIDOCAINE HYDROCHLORIDE 10 MG/ML
0.1 INJECTION, SOLUTION EPIDURAL; INFILTRATION; INTRACAUDAL; PERINEURAL AS NEEDED
Status: DISCONTINUED | OUTPATIENT
Start: 2019-01-02 | End: 2019-01-02 | Stop reason: HOSPADM

## 2019-01-02 RX ORDER — LEVOFLOXACIN 5 MG/ML
750 INJECTION, SOLUTION INTRAVENOUS EVERY 24 HOURS
Status: DISCONTINUED | OUTPATIENT
Start: 2019-01-03 | End: 2019-01-04 | Stop reason: HOSPADM

## 2019-01-02 RX ORDER — SODIUM CHLORIDE, SODIUM LACTATE, POTASSIUM CHLORIDE, CALCIUM CHLORIDE 600; 310; 30; 20 MG/100ML; MG/100ML; MG/100ML; MG/100ML
25 INJECTION, SOLUTION INTRAVENOUS CONTINUOUS
Status: DISCONTINUED | OUTPATIENT
Start: 2019-01-02 | End: 2019-01-02 | Stop reason: HOSPADM

## 2019-01-02 RX ORDER — FENTANYL CITRATE 50 UG/ML
25 INJECTION, SOLUTION INTRAMUSCULAR; INTRAVENOUS
Status: DISCONTINUED | OUTPATIENT
Start: 2019-01-02 | End: 2019-01-02 | Stop reason: HOSPADM

## 2019-01-02 RX ORDER — KETOROLAC TROMETHAMINE 30 MG/ML
15 INJECTION, SOLUTION INTRAMUSCULAR; INTRAVENOUS
Status: DISPENSED | OUTPATIENT
Start: 2019-01-02 | End: 2019-01-03

## 2019-01-02 RX ORDER — LEVOFLOXACIN 5 MG/ML
750 INJECTION, SOLUTION INTRAVENOUS ONCE
Status: ACTIVE | OUTPATIENT
Start: 2019-01-02 | End: 2019-01-03

## 2019-01-02 RX ORDER — MIDAZOLAM HYDROCHLORIDE 1 MG/ML
INJECTION, SOLUTION INTRAMUSCULAR; INTRAVENOUS AS NEEDED
Status: DISCONTINUED | OUTPATIENT
Start: 2019-01-02 | End: 2019-01-02 | Stop reason: HOSPADM

## 2019-01-02 RX ORDER — PROPOFOL 10 MG/ML
INJECTION, EMULSION INTRAVENOUS AS NEEDED
Status: DISCONTINUED | OUTPATIENT
Start: 2019-01-02 | End: 2019-01-02 | Stop reason: HOSPADM

## 2019-01-02 RX ORDER — FENTANYL CITRATE 50 UG/ML
INJECTION, SOLUTION INTRAMUSCULAR; INTRAVENOUS
Status: COMPLETED
Start: 2019-01-02 | End: 2019-01-02

## 2019-01-02 RX ORDER — SUCCINYLCHOLINE CHLORIDE 20 MG/ML
INJECTION INTRAMUSCULAR; INTRAVENOUS AS NEEDED
Status: DISCONTINUED | OUTPATIENT
Start: 2019-01-02 | End: 2019-01-02 | Stop reason: HOSPADM

## 2019-01-02 RX ADMIN — MIDAZOLAM HYDROCHLORIDE 1 MG: 1 INJECTION, SOLUTION INTRAMUSCULAR; INTRAVENOUS at 18:08

## 2019-01-02 RX ADMIN — ACETAMINOPHEN 1000 MG: 10 INJECTION, SOLUTION INTRAVENOUS at 18:39

## 2019-01-02 RX ADMIN — BUSPIRONE HYDROCHLORIDE 15 MG: 5 TABLET ORAL at 21:20

## 2019-01-02 RX ADMIN — LEVOFLOXACIN 750 MG: 5 INJECTION, SOLUTION INTRAVENOUS at 18:08

## 2019-01-02 RX ADMIN — ONDANSETRON 4 MG: 2 INJECTION INTRAMUSCULAR; INTRAVENOUS at 18:30

## 2019-01-02 RX ADMIN — FENTANYL CITRATE 50 MCG: 50 INJECTION, SOLUTION INTRAMUSCULAR; INTRAVENOUS at 05:38

## 2019-01-02 RX ADMIN — SUCCINYLCHOLINE CHLORIDE 180 MG: 20 INJECTION INTRAMUSCULAR; INTRAVENOUS at 18:16

## 2019-01-02 RX ADMIN — NYSTATIN: 100000 POWDER TOPICAL at 21:41

## 2019-01-02 RX ADMIN — FENTANYL CITRATE 25 MCG: 50 INJECTION, SOLUTION INTRAMUSCULAR; INTRAVENOUS at 19:24

## 2019-01-02 RX ADMIN — PRAZOSIN HYDROCHLORIDE 4 MG: 1 CAPSULE ORAL at 22:23

## 2019-01-02 RX ADMIN — NYSTATIN: 100000 POWDER TOPICAL at 09:36

## 2019-01-02 RX ADMIN — Medication 10 ML: at 05:57

## 2019-01-02 RX ADMIN — QUETIAPINE FUMARATE 600 MG: 400 TABLET ORAL at 22:24

## 2019-01-02 RX ADMIN — SODIUM CHLORIDE, SODIUM LACTATE, POTASSIUM CHLORIDE, AND CALCIUM CHLORIDE 25 ML/HR: 600; 310; 30; 20 INJECTION, SOLUTION INTRAVENOUS at 14:52

## 2019-01-02 RX ADMIN — TOPIRAMATE 200 MG: 100 TABLET, FILM COATED ORAL at 21:20

## 2019-01-02 RX ADMIN — LIDOCAINE HYDROCHLORIDE 100 MG: 20 INJECTION, SOLUTION EPIDURAL; INFILTRATION; INTRACAUDAL; PERINEURAL at 18:16

## 2019-01-02 RX ADMIN — FENTANYL CITRATE 25 MCG: 50 INJECTION, SOLUTION INTRAMUSCULAR; INTRAVENOUS at 20:02

## 2019-01-02 RX ADMIN — CLONAZEPAM 1 MG: 1 TABLET ORAL at 21:21

## 2019-01-02 RX ADMIN — FENTANYL CITRATE 50 MCG: 50 INJECTION, SOLUTION INTRAMUSCULAR; INTRAVENOUS at 09:00

## 2019-01-02 RX ADMIN — PROPOFOL 200 MG: 10 INJECTION, EMULSION INTRAVENOUS at 18:16

## 2019-01-02 RX ADMIN — GABAPENTIN 100 MG: 100 CAPSULE ORAL at 10:11

## 2019-01-02 RX ADMIN — FENTANYL CITRATE 25 MCG: 50 INJECTION, SOLUTION INTRAMUSCULAR; INTRAVENOUS at 19:43

## 2019-01-02 RX ADMIN — DEXAMETHASONE SODIUM PHOSPHATE 8 MG: 4 INJECTION, SOLUTION INTRA-ARTICULAR; INTRALESIONAL; INTRAMUSCULAR; INTRAVENOUS; SOFT TISSUE at 18:30

## 2019-01-02 RX ADMIN — CLONAZEPAM 1 MG: 1 TABLET ORAL at 10:11

## 2019-01-02 RX ADMIN — FENTANYL CITRATE 25 MCG: 50 INJECTION, SOLUTION INTRAMUSCULAR; INTRAVENOUS at 19:54

## 2019-01-02 RX ADMIN — PRAVASTATIN SODIUM 40 MG: 40 TABLET ORAL at 21:20

## 2019-01-02 RX ADMIN — ROCURONIUM BROMIDE 10 MG: 10 INJECTION, SOLUTION INTRAVENOUS at 18:16

## 2019-01-02 RX ADMIN — PANTOPRAZOLE SODIUM 40 MG: 40 TABLET, DELAYED RELEASE ORAL at 10:10

## 2019-01-02 RX ADMIN — METOPROLOL SUCCINATE 25 MG: 25 TABLET, EXTENDED RELEASE ORAL at 10:11

## 2019-01-02 RX ADMIN — Medication 10 ML: at 05:38

## 2019-01-02 RX ADMIN — GABAPENTIN 100 MG: 100 CAPSULE ORAL at 21:20

## 2019-01-02 RX ADMIN — HYDROMORPHONE HYDROCHLORIDE 1 MG: 2 INJECTION INTRAMUSCULAR; INTRAVENOUS; SUBCUTANEOUS at 23:33

## 2019-01-02 RX ADMIN — FENTANYL CITRATE 100 MCG: 50 INJECTION, SOLUTION INTRAMUSCULAR; INTRAVENOUS at 18:16

## 2019-01-02 RX ADMIN — LEVOTHYROXINE SODIUM 112 MCG: 112 TABLET ORAL at 05:58

## 2019-01-02 NOTE — ANESTHESIA PREPROCEDURE EVALUATION
Anesthetic History No history of anesthetic complications Review of Systems / Medical History Patient summary reviewed, nursing notes reviewed and pertinent labs reviewed Pulmonary COPD: mild Shortness of breath and smoker Comments: Former smoker Neuro/Psych  
 
seizures: well controlled Psychiatric history Comments: Anxiety/depression Cardiovascular Hypertension: well controlled Dysrhythmias Hyperlipidemia Exercise tolerance: <4 METS Comments: 55% EF by ECHO with valves not well visualized Had her beta blocker at 10:11 today GI/Hepatic/Renal 
  
GERD: well controlled Renal disease: stones Liver disease Endo/Other Diabetes: type 2, using insulin Hypothyroidism: poorly controlled Morbid obesity, arthritis and anemia Other Findings Physical Exam 
 
Airway Mallampati: III Neck ROM: decreased range of motion, short neck Mouth opening: Diminished (comment) Cardiovascular Rhythm: regular Rate: normal 
 
 
 
 Dental 
 
Dentition: Edentulous Pulmonary Decreased breath sounds: bilateral 
 
 
 
 
 Abdominal 
GI exam deferred Other Findings Anesthetic Plan ASA: 3 Anesthesia type: general 
 
Monitoring Plan: BIS Induction: Intravenous Anesthetic plan and risks discussed with: Patient

## 2019-01-02 NOTE — ROUTINE PROCESS
sbar in note pt to holding area identifies self pt has been npo except for meds  . Pt had  Left #22g  Iv cath in place burns when flushed. Nurse Magno Callahan r.n. Removed iv  Tip intact pressure held. Dressing applied. New Iv started to right hand #20 g cath. Infusing without problems. GLUCOSE  At 83 in holding area.

## 2019-01-02 NOTE — PROGRESS NOTES
Spiritual Care Partner Volunteer visited patient in General Surgery on January 2, 2019. Documented by: 
MODESTA Lopez Div  Paging Service [phone]

## 2019-01-02 NOTE — PERIOP NOTES
1623:  Patient resting in bed watching TV with call bell in hand. She was updated regarding the surgery delay & family in her room was updated as well. 1745:  Patient assisted OOB to BR to void

## 2019-01-02 NOTE — PROGRESS NOTES
Initial Nutrition Assessment: 
 
INTERVENTIONS/RECOMMENDATIONS:  
· Meals/Snacks: General/healthful diet:  Resume oral nutrition post procedure - recommend Consistent Carb Diet (CCD) for BG management. · Education:  Would likely benefit from referral to outpatient nutrition counseling for weight loss education post discharge (Ang Méndez, [address] ). ASSESSMENT:  
1/2:  Chart reviewed; med noted for uretal stone with hydronephrosis. MST for weight loss 2-13 lbs. Pt is morbidly obese with some weight fluctuation. Currently NPO for procedure today. PO prior to NPO was 100% of meals. Labs stable. Documented H/O DM with fair BG control. Diet Order: NPO 
% Eaten:   
Patient Vitals for the past 72 hrs: 
 % Diet Eaten 01/01/19 1437 100 % Pertinent Medications: [x]Reviewed []Other Pertinent Labs: [x]Reviewed []Other Food Allergies: [x]None []Other Last BM: 1/1   [x]Active     []Hyperactive  []Hypoactive       [] Absent BS Skin:    [x] Intact   [] Incision  [] Breakdown  [] Other: Anthropometrics:  
Height: 5' 2\" (157.5 cm) Weight: (!) 184 kg (405 lb 10.3 oz) IBW (%IBW):   ( ) UBW (%UBW):   (  %) Last Weight Metrics: 
Weight Loss Metrics 12/31/2018 12/26/2018 11/8/2018 8/29/2018 11/28/2017 11/21/2017 7/21/2014 Today's Wt 405 lb 10.3 oz 406 lb 408 lb 11.2 oz 420 lb 6.7 oz 386 lb 386 lb 3.9 oz 429 lb 11.2 oz  
BMI 74.19 kg/m2 74.26 kg/m2 74.75 kg/m2 76.9 kg/m2 70.6 kg/m2 70.65 kg/m2 73.72 kg/m2 BMI: Body mass index is 74.19 kg/m². This BMI is indicative of: 
 []Underweight    []Normal    []Overweight    [] Obesity   [x] Extreme Obesity (BMI>40) Estimated Nutrition Needs (Based on):  
8081 Kcals/day(BMR (2370) x 1.3AF (-500 kcals)) , 84 g(1.0 g/kg bw) Protein Carbohydrate: At Least 130 g/day  Fluids: 2300 mL/day (1ml/kcal) NUTRITION DIAGNOSES:  
Problem:  Inadequate energy intake Etiology: related to hydronephrosis Signs/Symptoms: as evidenced by NPO for procedure NUTRITION INTERVENTIONS: 
Meals/Snacks: General/healthful diet GOAL:  
Resume oral nutrition within 24-48 hrs LEARNING NEEDS (Diet, Food/Nutrient-Drug Interaction):  
 [x] None Identified 
 [] Identified and Education Provided/Documented 
 [] Identified and Pt declined/was not appropriate Cultureal, Druze, OR Ethnic Dietary Needs:  
 [x] None Identified 
 [] Identified and Addressed 
 
 [x] Interdisciplinary Care Plan Reviewed/Documented  
 [x] Discharge Planning:  Continue oral nutrition; would benefit from outpatient nutritional counseling for weight loss - BMI 74.2 MONITORING /EVALUATION:  
Food/Nutrient Intake Outcomes: Total energy intake Physical Signs/Symptoms Outcomes: Weight/weight change NUTRITION RISK:  
 [x] Patient At Nutritional Risk  
 [] Patient Not At Nutritional Risk PT SEEN FOR:  
 []  MD Consult: []Calorie Count []Diabetic Diet Education []Diet Education []Electrolyte Management []General Nutrition Management and Supplements []Management of Tube Feeding []TPN Recommendations [x]  RN Referral:  [x]MST score >=2 
   []Enteral/Parenteral Nutrition PTA []Pregnant: Gestational DM or Multigestation 
   []Pressure Ulcer/Wound Care needs 
     
[]  Low BMI 
[]  ELMER Morris RD Pager [phone] Weekend Pager [phone]

## 2019-01-02 NOTE — BRIEF OP NOTE
BRIEF OPERATIVE NOTE Date of Procedure: 1/2/2019 Preoperative Diagnosis: Kidney stone [N20.0] Right kidney Postoperative Diagnosis: Kidney stone [N20.0] Right kidney Procedure(s): 
CYSTOSCOPY WITH RIGHT STENT PLACEMENT UNDER CYSTOSCOPY, URETEROSCOPY AND LASER STONE Surgeon(s) and Role: Naila Francis MD - Primary Surgical Assistant: None Surgical Staff: 
Circ-1: Chris Hughes RN Radiology Technician: Simone Porter Scrub Tech-1: Francis Byrne Float Staff: Floridalma Osborn, [address] [place] [address] Event Time In Time Out Incision Start 6089 Incision Close 5004 Anesthesia: General  
Estimated Blood Loss: None Specimens: * No specimens in log * Findings: R UPJ stone Complications: None Implants:  
Implant Name Type Inv. Item Serial No.  Lot No. LRB No. Used Action Bridget Glez [id number]-[id number] [id number] [place] - [id number]  RHYSQ KOREY DB-PGTL [id number] -- ADEN [id number] [facility] [id number] Right 1 Implanted Stent on string

## 2019-01-02 NOTE — PROGRESS NOTES
General Surgery End of Shift Nursing Note Bedside shift change report given to [facility] [address] (oncoming nurse) by Irving Willett RN (offgoing nurse). Report included the following information SBAR, Kardex, Procedure Summary, Intake/Output and Recent Results. Shift worked:   7p-7a Summary of shift:    Pt medicated @ 2034 with 50 mcg of Fentanyl. @ 2045 pt c/o that the pain medication was ineffective and requested that this nurse contact her doctor to request more pain medication. She also had an order for Voltaren, which this nurse offered to  the pt and it was refused. The patients doctor was notified. The doctor gave her an order for Toradol, which the pt refused stating that \"she had got Toradol down in the ED and it didn't help\". Pt was NPO after midnight. CHG bath completed along with gown and linen change. Issues for physician to address:   N/A Number times ambulated in hallway past shift: 0 Number of times OOB to chair past shift: 3 Pain Management: 
Current medication: Fentanyl Patient states pain is manageable on current pain medication: NO 
 
GI: 
 
Current diet:  DIET NPO Tolerating current diet: YES Passing flatus: YES Last Bowel Movement: yesterday Respiratory: 
 
Incentive Spirometer at bedside: YES Patient instructed on use: YES Patient Safety: 
 
Falls Score: 3 Bed Alarm On? No 
Sitter?  No 
 
Patiorrlynn Quinones RN

## 2019-01-02 NOTE — PERIOP NOTES
TRANSFER - IN REPORT: 
 
Verbal report received from Coby Jarrell RN on Warren Sender  being received from 2111 for ordered procedure Report consisted of patients Situation, Background, Assessment and  
Recommendations(SBAR). Information from the following report(s) SBAR, Kardex, Intake/Output, MAR and Recent Results was reviewed with the receiving nurse. Opportunity for questions and clarification was provided. Assessment completed upon patients arrival to unit and care assumed.

## 2019-01-02 NOTE — PROGRESS NOTES
Still with pain-seems to have moved down into Right loin. No fever. For stent placement today. The procedure along with the attendant risks and complications and alternative options for treatment were discussed with the patient who understands and agrees to proceed. Ricky Alexander M.D. 
[phone]

## 2019-01-02 NOTE — PROGRESS NOTES
Hospitalist Progress Note NAME: Anna Tanner :  1960 MRN:  [id number] Assessment / Plan: 
Right proximal ureter calculus POA 
causing minimal upstream hydroureter and hydronephrosis IVF, flomax, prn antiemetics and pain control 
-Keep NPO for cystocopy and stent today  
-urology on board 
  
UTI POA 
-urine cx grew klebsiella on , pt on cefuroxime and sensitivity is intermediate 
-will continue on Levaquin empirically against which klebsiella was sensitive. 
-follow urine cx sent on 2019 showed gram neg rods , Fu sensitivity  
  
Diabetes: POA 
- Blood sugars controlled  
-check A1c 8.9, ISS, holding Glipizide and metformin as pt is  NPO 
  
Hypothyroidism: POA 
-continue home Levothyroxine 
  
Anxiety/Depression: POA Seizure Disorder: 
-continue Wellbutrin, Seroquel and Topamax with prn Klonipin 
  
Hypertension: 
- bp soft , will hold losartan  
-continue home Metoprolol Succ 25mg  
  
  
Morbid Obesity 
  
Code Status: full Surrogate Decision Maker: , daughter and son DVT Prophylaxis: SCD 
GI Prophylaxis: not indicated but continue home PPI for GERD 
  
Baseline: Lives with  Subjective: Chief Complaint / Reason for Physician Visit Follow up of renal stone , still complains of right flank pain Review of Systems: 
Symptom Y/N Comments  Symptom Y/N Comments Fever/Chills n   Chest Pain n   
Poor Appetite n   Edema Cough n   Abdominal Pain n   
Sputum n   Joint Pain SOB/DIOP n   Pruritis/Rash Nausea/vomit n   Tolerating PT/OT Diarrhea n   Tolerating Diet Constipation    Other Could NOT obtain due to:   
 
Objective: VITALS:  
Last 24hrs VS reviewed since prior progress note. Most recent are: 
Patient Vitals for the past 24 hrs: 
 Temp Pulse Resp BP SpO2  
19 0409 98.7 °F (37.1 °C) 97 18 98/54 94 % 19 0007 98.4 °F (36.9 °C) 96 18 123/64 94 % 193     92 % 01/01/19 1950 98.5 °F (36.9 °C) 89 18 111/59 97 % 01/01/19 1433 98.2 °F (36.8 °C) 90 18 124/63 95 % 01/01/19 1107 98.1 °F (36.7 °C) 86 19 108/67 95 % 01/01/19 0855 98 °F (36.7 °C) 81 18 117/44 100 % Intake/Output Summary (Last 24 hours) at 1/2/2019 0898 Last data filed at 1/2/2019 0400 Gross per 24 hour Intake 1175 ml Output 700 ml Net 475 ml PHYSICAL EXAM: 
General: WD, WN. Alert, cooperative, no acute distress   
EENT:  EOMI. Anicteric sclerae. MMM Resp:  CTA bilaterally, no wheezing or rales. No accessory muscle use CV:  Regular  rhythm,  No edema GI:  Soft, Non distended, Non tender.  +Bowel sounds Neurologic:  Alert and oriented X 3, normal speech, Psych:   Good insight. Not anxious nor agitated Skin:  No rashes. No jaundice Reviewed most current lab test results and cultures  YES Reviewed most current radiology test results   YES Review and summation of old records today    NO Reviewed patient's current orders and MAR    YES 
PMH/SH reviewed - no change compared to H&P 
________________________________________________________________________ Care Plan discussed with: 
  Comments Patient x Family RN x Care Manager Consultant Multidiciplinary team rounds were held today with , nursing, pharmacist and clinical coordinator. Patient's plan of care was discussed; medications were reviewed and discharge planning was addressed. ________________________________________________________________________ Total CRITICAL CARE TIME Spent:  20 Minutes non procedure based Comments >50% of visit spent in counseling and coordination of care x As above   
________________________________________________________________________ Jorje Lombardi MD  
 
Procedures: see electronic medical records for all procedures/Xrays and details which were not copied into this note but were reviewed prior to creation of Plan.    
 
LABS: 
 I reviewed today's most current labs and imaging studies. Pertinent labs include: 
Recent Labs 01/02/19 
7034 01/01/19 
0431 12/31/18 
8564 WBC 5.8 6.0 6.9 HGB 9.1* 9.6* 10.8* HCT 32.7* 33.7* 37.8 * 166 164 Recent Labs 01/02/19 
1383 01/01/19 
1636 12/31/18 
0506  142 140  
K 3.7 3.3* 3.9 * 110* 109* CO2 20* 21 24 * 102* 110* BUN 17 15 17 CREA 0.90 0.75 1.12* CA 8.4* 8.5 9.1 ALB 2.6*  --  3.3* TBILI 0.4  --  0.4 SGOT 44*  --  45* ALT 31  --  33 Signed: Darlene Hamilton MD

## 2019-01-03 LAB
ANION GAP SERPL CALC-SCNC: 8 MMOL/L (ref 5–15)
BUN SERPL-MCNC: 14 MG/DL (ref 6–20)
BUN/CREAT SERPL: 15 (ref 12–20)
CALCIUM SERPL-MCNC: 8.8 MG/DL (ref 8.5–10.1)
CHLORIDE SERPL-SCNC: 107 MMOL/L (ref 97–108)
CO2 SERPL-SCNC: 22 MMOL/L (ref 21–32)
CREAT SERPL-MCNC: 0.95 MG/DL (ref 0.55–1.02)
ERYTHROCYTE [DISTWIDTH] IN BLOOD BY AUTOMATED COUNT: 17.3 % (ref 11.5–14.5)
GLUCOSE BLD STRIP.AUTO-MCNC: 137 MG/DL (ref 65–100)
GLUCOSE BLD STRIP.AUTO-MCNC: 144 MG/DL (ref 65–100)
GLUCOSE BLD STRIP.AUTO-MCNC: 155 MG/DL (ref 65–100)
GLUCOSE BLD STRIP.AUTO-MCNC: 183 MG/DL (ref 65–100)
GLUCOSE SERPL-MCNC: 142 MG/DL (ref 65–100)
HCT VFR BLD AUTO: 37 % (ref 35–47)
HGB BLD-MCNC: 10.1 G/DL (ref 11.5–16)
MCH RBC QN AUTO: 21.3 PG (ref 26–34)
MCHC RBC AUTO-ENTMCNC: 27.3 G/DL (ref 30–36.5)
MCV RBC AUTO: 77.9 FL (ref 80–99)
NRBC # BLD: 0 K/UL (ref 0–0.01)
NRBC BLD-RTO: 0 PER 100 WBC
PLATELET # BLD AUTO: 158 K/UL (ref 150–400)
PMV BLD AUTO: 11.3 FL (ref 8.9–12.9)
POTASSIUM SERPL-SCNC: 4.6 MMOL/L (ref 3.5–5.1)
RBC # BLD AUTO: 4.75 M/UL (ref 3.8–5.2)
SERVICE CMNT-IMP: ABNORMAL
SODIUM SERPL-SCNC: 137 MMOL/L (ref 136–145)
WBC # BLD AUTO: 8.3 K/UL (ref 3.6–11)

## 2019-01-03 PROCEDURE — 97161 PT EVAL LOW COMPLEX 20 MIN: CPT | Performed by: PHYSICAL THERAPIST

## 2019-01-03 PROCEDURE — 97162 PT EVAL MOD COMPLEX 30 MIN: CPT | Performed by: PHYSICAL THERAPIST

## 2019-01-03 PROCEDURE — 74011250637 HC RX REV CODE- 250/637: Performed by: INTERNAL MEDICINE

## 2019-01-03 PROCEDURE — BT1D1ZZ FLUOROSCOPY OF RIGHT KIDNEY, URETER AND BLADDER USING LOW OSMOLAR CONTRAST: ICD-10-PCS | Performed by: UROLOGY

## 2019-01-03 PROCEDURE — 65270000029 HC RM PRIVATE

## 2019-01-03 PROCEDURE — 85027 COMPLETE CBC AUTOMATED: CPT

## 2019-01-03 PROCEDURE — 74011250637 HC RX REV CODE- 250/637: Performed by: HOSPITALIST

## 2019-01-03 PROCEDURE — 74011636637 HC RX REV CODE- 636/637: Performed by: HOSPITALIST

## 2019-01-03 PROCEDURE — 77010033678 HC OXYGEN DAILY

## 2019-01-03 PROCEDURE — 97530 THERAPEUTIC ACTIVITIES: CPT | Performed by: PHYSICAL THERAPIST

## 2019-01-03 PROCEDURE — 0T768DZ DILATION OF RIGHT URETER WITH INTRALUMINAL DEVICE, VIA NATURAL OR ARTIFICIAL OPENING ENDOSCOPIC: ICD-10-PCS | Performed by: UROLOGY

## 2019-01-03 PROCEDURE — 94760 N-INVAS EAR/PLS OXIMETRY 1: CPT

## 2019-01-03 PROCEDURE — 74011250636 HC RX REV CODE- 250/636: Performed by: HOSPITALIST

## 2019-01-03 PROCEDURE — 97116 GAIT TRAINING THERAPY: CPT | Performed by: PHYSICAL THERAPIST

## 2019-01-03 PROCEDURE — 0TC38ZZ EXTIRPATION OF MATTER FROM RIGHT KIDNEY PELVIS, VIA NATURAL OR ARTIFICIAL OPENING ENDOSCOPIC: ICD-10-PCS | Performed by: UROLOGY

## 2019-01-03 PROCEDURE — 80048 BASIC METABOLIC PNL TOTAL CA: CPT

## 2019-01-03 PROCEDURE — 36415 COLL VENOUS BLD VENIPUNCTURE: CPT

## 2019-01-03 PROCEDURE — 74011250636 HC RX REV CODE- 250/636: Performed by: INTERNAL MEDICINE

## 2019-01-03 PROCEDURE — 82962 GLUCOSE BLOOD TEST: CPT

## 2019-01-03 PROCEDURE — 74011250637 HC RX REV CODE- 250/637: Performed by: UROLOGY

## 2019-01-03 RX ORDER — IPRATROPIUM BROMIDE AND ALBUTEROL SULFATE 2.5; .5 MG/3ML; MG/3ML
3 SOLUTION RESPIRATORY (INHALATION)
Status: DISCONTINUED | OUTPATIENT
Start: 2019-01-03 | End: 2019-01-04 | Stop reason: HOSPADM

## 2019-01-03 RX ORDER — PHENAZOPYRIDINE HYDROCHLORIDE 100 MG/1
200 TABLET, FILM COATED ORAL
Status: DISCONTINUED | OUTPATIENT
Start: 2019-01-03 | End: 2019-01-04 | Stop reason: HOSPADM

## 2019-01-03 RX ORDER — LORAZEPAM 1 MG/1
1 TABLET ORAL
Status: DISCONTINUED | OUTPATIENT
Start: 2019-01-03 | End: 2019-01-04

## 2019-01-03 RX ORDER — HYDROCODONE BITARTRATE AND ACETAMINOPHEN 5; 325 MG/1; MG/1
1 TABLET ORAL
Status: DISCONTINUED | OUTPATIENT
Start: 2019-01-03 | End: 2019-01-04

## 2019-01-03 RX ADMIN — HYDROMORPHONE HYDROCHLORIDE 1 MG: 2 INJECTION INTRAMUSCULAR; INTRAVENOUS; SUBCUTANEOUS at 09:11

## 2019-01-03 RX ADMIN — INSULIN LISPRO 2 UNITS: 100 INJECTION, SOLUTION INTRAVENOUS; SUBCUTANEOUS at 16:55

## 2019-01-03 RX ADMIN — GABAPENTIN 100 MG: 100 CAPSULE ORAL at 16:13

## 2019-01-03 RX ADMIN — PHENAZOPYRIDINE HYDROCHLORIDE 200 MG: 100 TABLET ORAL at 16:57

## 2019-01-03 RX ADMIN — LEVOFLOXACIN 750 MG: 5 INJECTION, SOLUTION INTRAVENOUS at 16:57

## 2019-01-03 RX ADMIN — CLONAZEPAM 1 MG: 1 TABLET ORAL at 09:13

## 2019-01-03 RX ADMIN — LEVOTHYROXINE SODIUM 112 MCG: 112 TABLET ORAL at 06:40

## 2019-01-03 RX ADMIN — PRAZOSIN HYDROCHLORIDE 4 MG: 1 CAPSULE ORAL at 22:26

## 2019-01-03 RX ADMIN — HYDROCODONE BITARTRATE AND ACETAMINOPHEN 1 TABLET: 5; 325 TABLET ORAL at 16:13

## 2019-01-03 RX ADMIN — BUSPIRONE HYDROCHLORIDE 15 MG: 5 TABLET ORAL at 16:13

## 2019-01-03 RX ADMIN — GABAPENTIN 100 MG: 100 CAPSULE ORAL at 09:12

## 2019-01-03 RX ADMIN — HYDROMORPHONE HYDROCHLORIDE 1 MG: 2 INJECTION INTRAMUSCULAR; INTRAVENOUS; SUBCUTANEOUS at 13:09

## 2019-01-03 RX ADMIN — FLUOXETINE 20 MG: 20 CAPSULE ORAL at 09:12

## 2019-01-03 RX ADMIN — INSULIN LISPRO 2 UNITS: 100 INJECTION, SOLUTION INTRAVENOUS; SUBCUTANEOUS at 07:30

## 2019-01-03 RX ADMIN — ASPIRIN 81 MG 81 MG: 81 TABLET ORAL at 09:13

## 2019-01-03 RX ADMIN — TOPIRAMATE 200 MG: 100 TABLET, FILM COATED ORAL at 22:27

## 2019-01-03 RX ADMIN — PRAVASTATIN SODIUM 40 MG: 40 TABLET ORAL at 22:27

## 2019-01-03 RX ADMIN — HYDROMORPHONE HYDROCHLORIDE 1 MG: 2 INJECTION INTRAMUSCULAR; INTRAVENOUS; SUBCUTANEOUS at 04:36

## 2019-01-03 RX ADMIN — LORAZEPAM 1 MG: 1 TABLET ORAL at 16:13

## 2019-01-03 RX ADMIN — NYSTATIN: 100000 POWDER TOPICAL at 13:09

## 2019-01-03 RX ADMIN — BUSPIRONE HYDROCHLORIDE 15 MG: 5 TABLET ORAL at 22:28

## 2019-01-03 RX ADMIN — PANTOPRAZOLE SODIUM 40 MG: 40 TABLET, DELAYED RELEASE ORAL at 09:12

## 2019-01-03 RX ADMIN — CYANOCOBALAMIN TAB 500 MCG 5000 MCG: 500 TAB at 09:11

## 2019-01-03 RX ADMIN — GABAPENTIN 100 MG: 100 CAPSULE ORAL at 22:26

## 2019-01-03 RX ADMIN — Medication 10 ML: at 22:34

## 2019-01-03 RX ADMIN — METOPROLOL SUCCINATE 25 MG: 25 TABLET, EXTENDED RELEASE ORAL at 09:12

## 2019-01-03 RX ADMIN — HYDROCODONE BITARTRATE AND ACETAMINOPHEN 1 TABLET: 5; 325 TABLET ORAL at 22:15

## 2019-01-03 RX ADMIN — NYSTATIN: 100000 POWDER TOPICAL at 09:14

## 2019-01-03 RX ADMIN — PHENAZOPYRIDINE HYDROCHLORIDE 200 MG: 100 TABLET ORAL at 12:23

## 2019-01-03 RX ADMIN — INSULIN LISPRO 2 UNITS: 100 INJECTION, SOLUTION INTRAVENOUS; SUBCUTANEOUS at 12:23

## 2019-01-03 RX ADMIN — BUSPIRONE HYDROCHLORIDE 15 MG: 5 TABLET ORAL at 09:12

## 2019-01-03 RX ADMIN — ESTRADIOL 0.5 MG: 1 TABLET ORAL at 09:12

## 2019-01-03 RX ADMIN — NYSTATIN: 100000 POWDER TOPICAL at 22:27

## 2019-01-03 NOTE — PROGRESS NOTES
General Surgery End of Shift Nursing Note Bedside shift change report given to [facility] (oncoming nurse) by Gerardo Welch (offgoing nurse). Report included the following information SBAR, Kardex and MAR. Shift worked:   7p-7a Summary of shift:    Pt had some pain overnight, switched med from fentanyl to Dilaudid. Pt reported a decrease in pain. Issues for physician to address:   N/A Number times ambulated in hallway past shift: 0 Number of times OOB to chair past shift: 5 Pain Management: 
Current medication: Dilaudid Patient states pain is manageable on current pain medication: YES 
 
GI: 
 
Current diet:  DIET REGULAR Tolerating current diet: YES Passing flatus: YES Last Bowel Movement: several days ago Appearance:  
 
Respiratory: 
 
Incentive Spirometer at bedside: YES Patient instructed on use: YES Patient Safety: 
 
Falls Score: 2 Bed Alarm On? No 
Sitter? No 
 
Nate Grant

## 2019-01-03 NOTE — PERIOP NOTES
Handoff Report from Operating Room to PACU Report received from KEVIN Sahu and ALIZA Green regarding Hever Nolan. Surgeon(s): 
Alexander Smith MD  And Procedure(s) (LRB): 
CYSTOSCOPY WITH RIGHT STENT PLACEMENT UNDER CYSTOSCOPY, URETEROSCOPY AND LASER STONE EXTRACTION (Right)  confirmed  
with allergies discussed. Anesthesia type, drugs, patient history, complications, estimated blood loss, vital signs, intake and output, and lines and temperature were reviewed.

## 2019-01-03 NOTE — OP NOTES
[address] REPORT    Meliton Jim  MR#: [id number]  : 1960  ACCOUNT #: [de-identified]   DATE OF SERVICE: 2019    PREOPERATIVE DIAGNOSIS:  Right proximal ureteral stone. POSTOPERATIVE DIAGNOSIS:  Right proximal ureteral stone. PROCEDURES PERFORMED:  Cystoscopy, right retrograde pyelogram with right ureteroscopy, holmium laser lithotripsy and right double-J stent insertion under fluoroscopy. SURGEON:  Dave Emanuel MD    ESTIMATED BLOOD LOSS:  Minimal.    COMPLICATIONS:  None. ANESTHESIA:  General.    ASSISTANT:  None. FINDINGS:  Impacted right 8 mm UPJ stone. DRAINS:  A 6 x 24 right double-J stent on a string. SPECIMENS REMOVED:  None. DISPOSITION:  PACU. IMPLANTS:  Stent as above    CHIEF COMPLAINT:  The patient is a 60-year-old female with a known history of a right renal stone. She recently had developed pain and on CT scan showed the stone had engaged in the right proximal ureter. She had persistence of pain and remained hospitalized for several days. Options for treatment were discussed with several physicians. She elected to proceed with cystoscopy, attempted right ureteroscopy, holmium laser lithotripsy and right double-J stent insertion under fluoroscopy versus stent placement alone. The risks and benefits including bleeding, infection as well as need for stent and further procedures were outlined. Risk of injury to the ureter was discussed as well. After reviewing the options, she decided to proceed. TECHNICAL PROCEDURE USED:  The patient was taken to the operating room and placed on the OR table. General anesthesia was established, prepped and draped in the usual sterile fashion in lithotomy position. The patient is morbidly obese and care was taken by the staff to make sure she was adequately padded and secured on the table. A 21-Malawian cystoscope was inserted transurethrally towards the patient's bladder.   No stone was noted within the bladder. A retrograde pyelogram was performed using a 5-Estonian Angiocath up the right ureter. The ureter was of normal course and caliber up to approximately the level of the UPJ. There was a filling defect and a relatively radiolucent stone detected. I placed a 0.035 sensor wire by the stone without difficulty. An 18 coaxial dilator was then used to gently dilate the ureter and also insert an angled Glidewire. A flexible ureteroscope was then placed up over the angled Glidewire to the level of the stone. This bounced into the renal pelvis. Using a holmium laser with a relative dusting setting, the stone was fragmented into multiple very small pieces. This fragmented nicely without any trauma to the surrounding area. At this point in time, there were no significant fragments to remove. I did feel a stent was warranted due to the level of inflammation present. The scope was removed and a 6 x 24 double-J stent was placed up over the sensor wire. A good curl was confirmed both proximally and distally. A string was left attached to the stent to help facilitate its removal in several days. She tolerated the procedure well. She was taken to PACU in stable condition.       Cassandra Hall MD CAS / Latrice Camp  D: 01/02/2019 21:33     T: 01/03/2019 06:27  JOB #: [id number]

## 2019-01-03 NOTE — PERIOP NOTES
TRANSFER - OUT REPORT: 
 
Verbal report given to ALYCIA Romano(name) on Lorelei Phipps  being transferred to [facility] [place](unit) for routine post - op Report consisted of patients Situation, Background, Assessment and  
Recommendations(SBAR). Information from the following report(s) SBAR, Kardex, OR Summary and MAR was reviewed with the receiving nurse. Lines:  
Peripheral IV 01/02/19 Anterior;Right Hand (Active) Site Assessment Clean, dry, & intact 1/2/2019  8:17 PM  
Phlebitis Assessment 0 1/2/2019  8:17 PM  
Infiltration Assessment 0 1/2/2019  8:17 PM  
Dressing Status Clean, dry, & intact 1/2/2019  8:17 PM  
Dressing Type Transparent 1/2/2019  8:17 PM  
Hub Color/Line Status Pink; Infusing 1/2/2019  8:17 PM  
  
 
Opportunity for questions and clarification was provided. Patient transported with: 
 O2 @ 2 liters Registered Nurse

## 2019-01-03 NOTE — PROGRESS NOTES
ADULT PROTOCOL: JET AEROSOL ASSESSMENT Patient  Concepcion Tapia     62 y.o.   female     1/3/2019  11:25 AM 
 
Breath Sounds Pre Procedure: Right Breath Sounds: Diminished, Coarse Left Breath Sounds: Diminished, Coarse Breath Sounds Post Procedure: Right Breath Sounds: Diminished, Clear Left Breath Sounds: Diminished, Clear Heart Rate: Pre procedure Pulse: 87 
         Post procedure Pulse: 88 Resp Rate: Pre procedure Respirations: 20 
         Post procedure Respirations: 20 
Oxygen: O2 Device: Nasal cannula Changed: NO SpO2: Pre procedure SpO2: 92 % Post procedure SpO2: 95 % Nebulizer Therapy: Current medications Aerosolized Medications: DuoNeb Q4HPRN Changed: Yes - Pr advised she takes an inhaler 2 times a day when needed. She advised that she did not want the breathing treatments she did not feel it necessary right now. Will keep PRN like her home schedule. Problem List:  
Patient Active Problem List  
Diagnosis Code  SOB (shortness of breath) R06.02  
 DIOP (dyspnea on exertion) R06.09  
 Diabetes (HCC) E11.9  Hypertension I10  
 Normal coronary arteries Z03.89  
 Panniculitis M79.3  Right anterior knee pain M25.561  Cyst, baker's knee, right M71.21  
 Obesity, morbid ([address] E66.01  
 Ureteral stone with hydronephrosis N13.2 Respiratory Therapist: Dread Castro, RT

## 2019-01-03 NOTE — PROGRESS NOTES
S/P Right ureteroscopy yesterday with holmium laser lithotripsy of her stone and placement of JJ stent. She still c/o's of pain, but I think it is predictable stent pain. Afeb. VSS. Will add pyridium. Can be discharged when able and we will see in office next week to remove stent. Rosamaria Montes M.D. 
[phone]

## 2019-01-03 NOTE — ANESTHESIA POSTPROCEDURE EVALUATION
Procedure(s): 
CYSTOSCOPY WITH RIGHT STENT PLACEMENT UNDER CYSTOSCOPY, URETEROSCOPY AND LASER STONE EXTRACTION. Anesthesia Post Evaluation Patient location during evaluation: PACU Note status: Adequate. Level of consciousness: responsive to verbal stimuli and sleepy but conscious Pain management: satisfactory to patient Airway patency: patent Anesthetic complications: no 
Cardiovascular status: acceptable Respiratory status: acceptable Hydration status: acceptable Comments: +Post-Anesthesia Evaluation and Assessment Patient: Remberto Salguero MRN: [id number]  SSN: [id number] YOB: 1960  Age: 62 y.o. Sex: female Cardiovascular Function/Vital Signs /65 (BP 1 Location: Right arm, BP Patient Position: At rest)   Pulse 90   Temp 36.9 °C (98.5 °F)   Resp 14   Ht 5' 2\" (1.575 m)   Wt (!) 184 kg (405 lb 10.3 oz)   SpO2 95%   BMI 74.19 kg/m² Patient is status post Procedure(s): 
CYSTOSCOPY WITH RIGHT STENT PLACEMENT UNDER CYSTOSCOPY, URETEROSCOPY AND LASER STONE EXTRACTION. Nausea/Vomiting: Controlled. Postoperative hydration reviewed and adequate. Pain: 
Pain Scale 1: Numeric (0 - 10) (01/02/19 1925) Pain Intensity 1: 10 (01/02/19 1925) Managed. Neurological Status:  
Neuro (WDL): Within Defined Limits (01/02/19 1914) At baseline. Mental Status and Level of Consciousness: Arousable. Pulmonary Status:  
O2 Device: Nasal cannula (01/02/19 1914) Adequate oxygenation and airway patent. Complications related to anesthesia: None Post-anesthesia assessment completed. No concerns. Signed By: Nan Fernandes MD  
 1/2/2019 Post anesthesia nausea and vomiting:  controlled Visit Vitals /65 (BP 1 Location: Right arm, BP Patient Position: At rest) Pulse 90 Temp 36.9 °C (98.5 °F) Resp 14 Ht 5' 2\" (1.575 m) Wt (!) 184 kg (405 lb 10.3 oz) SpO2 95% BMI 74.19 kg/m²

## 2019-01-03 NOTE — PROGRESS NOTES
Physical Therapy Goals Initiated 1/3/2019 1. Patient will move from supine to sit and sit to supine , scoot up and down and roll side to side in bed with independence within 7 day(s). 2.  Patient will transfer from bed to chair and chair to bed with modified independence using the least restrictive device within 7 day(s). 3.  Patient will perform sit to stand with modified independence within 7 day(s). 4.  Patient will ambulate with modified independence for 75 feet with the least restrictive device within 7 day(s). 5.  Patient will ascend/descend 4 stairs with 1 handrail(s) with supervision/set-up within 7 day(s). physical Therapy EVALUATION Patient: Angela Hancock (89 y.o. female) Date: 1/3/2019 Primary Diagnosis: 'light-for-dates' infant with signs of fetal malnutrition Ureteral stone with hydronephrosis Procedure(s) (LRB): 
CYSTOSCOPY WITH RIGHT STENT PLACEMENT UNDER CYSTOSCOPY, URETEROSCOPY AND LASER STONE EXTRACTION (Right) 1 Day Post-Op Precautions: falls ASSESSMENT : 
Based on the objective data described below, the patient presents with impaired strength, activity tolerance, endurance, and balance all limiting her functional mobility and safety. Patient able to complete bed mobility with supervision and requires CGA/min A of 2 for transfers. Patient only able to ambulate 8 feet using RW for support before requesting seated rest secondary to c/o fatigue. O2 sats remained 95% on RA. Patient appears self limiting in her performance and is likely able to tolerate increased activity. At baseline pt reports that she is very limited by her impaired respiratory status and she is only able to walk very short distances in the home (up to approx 15-20ft). Patient reports she is currently below her functional baseline. Recommend SNF rehab following discharge to improve overall endurance and functional independence prior to returning to home with family. Patient will benefit from skilled intervention to address the above impairments. Patients rehabilitation potential is considered to be Fair Factors which may influence rehabilitation potential include:  
[]         None noted 
[]         Mental ability/status [x]         Medical condition 
[]         Home/family situation and support systems 
[]         Safety awareness 
[]         Pain tolerance/management 
[x]         Other: morbid obesity PLAN : 
Recommendations and Planned Interventions: 
[]           Bed Mobility Training             []    Neuromuscular Re-Education 
[x]           Transfer Training                   []    Orthotic/Prosthetic Training 
[x]           Gait Training                         []    Modalities [x]           Therapeutic Exercises           []    Edema Management/Control 
[x]           Therapeutic Activities            [x]    Patient and Family Training/Education 
[]           Other (comment): Frequency/Duration: Patient will be followed by physical therapy  4 times a week to address goals. Discharge Recommendations: Marcell Mariscal Further Equipment Recommendations for Discharge: has appropriate equipment SUBJECTIVE:  
Patient stated I don't do much at home, it's so hard to breathe.  OBJECTIVE DATA SUMMARY:  
HISTORY:   
Past Medical History:  
Diagnosis Date  Adverse effect of anesthesia   
 slow to wake up  Anxiety and depression  Arrhythmia  Arthritis  Chronic pain   
 djd  Diabetes ([facility] [address]  GERD (gastroesophageal reflux disease)  Hypertension  Hypothyroidism  Kidney stone  Liver disease  Obesity, morbid, BMI 50 or higher ([facility] [address]  Seizures ([facility] [address] Past Surgical History:  
Procedure Laterality Date  CARDIAC CATHETERIZATION  11/18/2010  HX APPENDECTOMY  HX CHOLECYSTECTOMY  HX HYSTERECTOMY  HX TUBAL LIGATION Prior Level of Function/Home Situation: patient reports modified independence for limited household distances; uses cane for ambulation Personal factors and/or comorbidities impacting plan of care: morbid obesity Home Situation Home Environment: Private residence # Steps to Enter: 3 Rails to Enter: Yes Hand Rails : Right One/Two Story Residence: One story Living Alone: No 
Support Systems: Family member(s) Patient Expects to be Discharged to[de-identified] Private residence Current DME Used/Available at Home: Cane, straight, Walker, rolling, Grab bars, Shower chair Tub or Shower Type: Tub/Shower combination EXAMINATION/PRESENTATION/DECISION MAKING: Critical Behavior: 
  
Orientation Level: Appropriate for age Cognition: Appropriate decision making, Appropriate safety awareness, Follows commands Hearing: Auditory Auditory Impairment: None Range Of Motion: 
AROM: Generally decreased, functional 
 
  
  
  
  
  
  
Strength:   
Strength: Generally decreased, functional 
  
  
  
  
  
  
Tone & Sensation:  
Tone: Normal 
  
  
  
  
  
  
  
  
   
Coordination: 
Coordination: Within functional limits Functional Mobility: 
Bed Mobility: 
  
Supine to Sit: Supervision Sit to Supine: Supervision Scooting: Supervision Transfers: 
Sit to Stand: Minimum assistance;Contact guard assistance;Assist x2; Additional time Stand Pivot Transfers: Contact guard assistance Balance:  
Sitting: Intact Standing: Impaired Standing - Static: Good;Constant support Standing - Dynamic : Fair(using RW for support) Ambulation/Gait Training: 
Distance (ft): 8 Feet (ft) Assistive Device: Walker, rolling;Gait belt Ambulation - Level of Assistance: Contact guard assistance Base of Support: Widened Speed/Nan: Pace decreased (<100 feet/min) Step Length: Left shortened;Right shortened Gait is slow but steady overall with no LOB noted Functional Measure: 
 
Elder Mobility Scale 9/20 Scores under 10  generally these patients are dependent in mobility maneuvers; require help with 
basic ADL, such as transfers, toileting and dressing. Scores between 10  13  generally these patients are borderline in terms of safe mobility and 
independence in ADL i.e. they require some help with some mobility maneuvers. Scores over 14  Generally these patients are able to perform mobility maneuvers alone and safely 
and are independent in basic ADL. Pain: 
  
Pain Intensity 1: 8 Pain Location 1: Abdomen Activity Tolerance:  
VSS; O2 sats remained 95% on RA Please refer to the flowsheet for vital signs taken during this treatment. After treatment:  
[]         Patient left in no apparent distress sitting up in chair 
[x]         Patient left in no apparent distress in bed 
[x]         Call bell left within reach [x]         Nursing notified 
[]         Caregiver present 
[]         Bed alarm activated COMMUNICATION/EDUCATION:  
The patients plan of care was discussed with: Physical Therapist and Registered Nurse. [x]         Fall prevention education was provided and the patient/caregiver indicated understanding. [x]         Patient/family have participated as able in goal setting and plan of care. [x]         Patient/family agree to work toward stated goals and plan of care. []         Patient understands intent and goals of therapy, but is neutral about his/her participation. []         Patient is unable to participate in goal setting and plan of care. Thank you for this referral. 
Ml Ariza, PT Time Calculation: 37 mins

## 2019-01-03 NOTE — PROGRESS NOTES
Pt currently in OR. Bedside shift change report given to [address] [place] [address] (oncoming nurse) by [facility] MATTHEW BURGOS (offgoing nurse). Report included the following information SBAR, Kardex, OR Summary, Procedure Summary, Intake/Output, MAR, Recent Results.

## 2019-01-03 NOTE — PROGRESS NOTES
Hospitalist Progress Note NAME: Prakash Sood :  1960 MRN:  [id number] Assessment / Plan: 
Right proximal ureter calculus POA 
causing minimal upstream hydroureter and hydronephrosis IVF, flomax, prn antiemetics and pain control - S/P Right ureteroscopy yesterday with holmium laser lithotripsy of her stone and placement of JJ stent 
-urology on board - pt still c/o pain which is normal after post stent placement - pyridium added  
  
UTI POA 
-urine cx grew klebsiella on , pt on cefuroxime and sensitivity is intermediate 
-will continue on Levaquin empirically against which klebsiella was sensitive. 
-follow urine cx sent on 2019 showed pseudomonas se to Levaquin 
    
Diabetes: POA 
- Blood sugars controlled  
-check A1c 8.9, ISS 
  
Hypothyroidism: POA 
-continue home Levothyroxine 
  
Anxiety/Depression: POA Seizure Disorder: 
-continue Wellbutrin, Seroquel and Topamax with prn Klonipin 
  
Hypertension: 
- bp wnl  , still  hold losartan  
-continue home Metoprolol Succ 25mg  
  
  
Morbid Obesity 
  
Code Status: full Surrogate Decision Maker: , daughter and son DVT Prophylaxis: SCD 
GI Prophylaxis: not indicated but continue home PPI for GERD 
  
Baseline: Lives with  Subjective: Chief Complaint / Reason for Physician Visit 
 , still complains of right flank pain and cystalgia Review of Systems: 
Symptom Y/N Comments  Symptom Y/N Comments Fever/Chills n   Chest Pain n   
Poor Appetite n   Edema Cough n   Abdominal Pain y Sputum n   Joint Pain SOB/DIOP n   Pruritis/Rash Nausea/vomit n   Tolerating PT/OT Diarrhea n   Tolerating Diet Constipation    Other Could NOT obtain due to:   
 
Objective: VITALS:  
Last 24hrs VS reviewed since prior progress note. Most recent are: 
Patient Vitals for the past 24 hrs: 
 Temp Pulse Resp BP SpO2  
19 0449 97.8 °F (36.6 °C) 88 12 119/71 90 % 01/03/19 0104 98.1 °F (36.7 °C) 97 18 98/67 96 % 01/02/19 2056 97.8 °F (36.6 °C) 76 16 151/72 98 % 01/02/19 2034 97.6 °F (36.4 °C) 78 16 154/76 98 % 01/02/19 2017 98.6 °F (37 °C) 79 16 166/64 97 % 01/02/19 2000  78 12 151/57 97 % 01/02/19 1945  81 12 149/54 97 % 01/02/19 1930  82 13 156/55 97 % 01/02/19 1925  84 14 154/61 96 % 01/02/19 1920  87 13 156/50 96 % 01/02/19 1915  90 14 147/55 94 % 01/02/19 1914 98.5 °F (36.9 °C) 90 14 149/65 95 % 01/02/19 1430 98.2 °F (36.8 °C) 82 26 128/81 94 % 01/02/19 1140 98.4 °F (36.9 °C) 85 18 106/62 96 % 01/02/19 0736 97.3 °F (36.3 °C) 84 18 107/65 93 % Intake/Output Summary (Last 24 hours) at 1/3/2019 7625 Last data filed at 1/3/2019 2172 Gross per 24 hour Intake 1083 ml Output 1400 ml Net -317 ml PHYSICAL EXAM: 
General: WD, WN. Alert, cooperative, no acute distress   
EENT:  EOMI. Anicteric sclerae. MMM Resp:  CTA bilaterally, no wheezing or rales. No accessory muscle use CV:  Regular  rhythm,  No edema GI:  Soft, Non distended, Non tender.  +Bowel sounds Neurologic:  Alert and oriented X 3, normal speech, Psych:   Good insight. Not anxious nor agitated Skin:  No rashes. No jaundice Reviewed most current lab test results and cultures  YES Reviewed most current radiology test results   YES Review and summation of old records today    NO Reviewed patient's current orders and MAR    YES 
PMH/SH reviewed - no change compared to H&P 
________________________________________________________________________ Care Plan discussed with: 
  Comments Patient x Family RN x Care Manager Consultant Multidiciplinary team rounds were held today with , nursing, pharmacist and clinical coordinator. Patient's plan of care was discussed; medications were reviewed and discharge planning was addressed. ________________________________________________________________________ Total CRITICAL CARE TIME Spent:  20 Minutes non procedure based Comments >50% of visit spent in counseling and coordination of care x As above   
________________________________________________________________________ Allen Allison MD  
 
Procedures: see electronic medical records for all procedures/Xrays and details which were not copied into this note but were reviewed prior to creation of Plan. LABS: 
I reviewed today's most current labs and imaging studies. Pertinent labs include: 
Recent Labs 01/03/19 
0503 01/02/19 
9056 01/01/19 
4001 WBC 8.3 5.8 6.0 HGB 10.1* 9.1* 9.6* HCT 37.0 32.7* 33.7*  
 142* 166 Recent Labs 01/03/19 
0503 01/02/19 
4661 01/01/19 
2841 12/31/18 
9223  139 142 140  
K 4.6 3.7 3.3* 3.9  110* 110* 109* CO2 22 20* 21 24 * 125* 102* 110* BUN 14 17 15 17 CREA 0.95 0.90 0.75 1.12* CA 8.8 8.4* 8.5 9.1 ALB  --  2.6*  --  3.3* TBILI  --  0.4  --  0.4 SGOT  --  44*  --  45* ALT  --  31  --  33 Signed: Allen Allison MD

## 2019-01-04 VITALS
DIASTOLIC BLOOD PRESSURE: 62 MMHG | TEMPERATURE: 98.4 F | HEIGHT: 62 IN | SYSTOLIC BLOOD PRESSURE: 114 MMHG | RESPIRATION RATE: 16 BRPM | OXYGEN SATURATION: 96 % | HEART RATE: 86 BPM | WEIGHT: 293 LBS | BODY MASS INDEX: 53.92 KG/M2

## 2019-01-04 LAB
GLUCOSE BLD STRIP.AUTO-MCNC: 109 MG/DL (ref 65–100)
GLUCOSE BLD STRIP.AUTO-MCNC: 167 MG/DL (ref 65–100)
GLUCOSE BLD STRIP.AUTO-MCNC: 172 MG/DL (ref 65–100)
SERVICE CMNT-IMP: ABNORMAL

## 2019-01-04 PROCEDURE — 82962 GLUCOSE BLOOD TEST: CPT

## 2019-01-04 PROCEDURE — 74011250637 HC RX REV CODE- 250/637: Performed by: INTERNAL MEDICINE

## 2019-01-04 PROCEDURE — 97165 OT EVAL LOW COMPLEX 30 MIN: CPT

## 2019-01-04 PROCEDURE — 74011250637 HC RX REV CODE- 250/637: Performed by: UROLOGY

## 2019-01-04 PROCEDURE — 97116 GAIT TRAINING THERAPY: CPT

## 2019-01-04 PROCEDURE — 74011250637 HC RX REV CODE- 250/637: Performed by: HOSPITALIST

## 2019-01-04 PROCEDURE — 97535 SELF CARE MNGMENT TRAINING: CPT

## 2019-01-04 PROCEDURE — 74011636637 HC RX REV CODE- 636/637: Performed by: HOSPITALIST

## 2019-01-04 RX ORDER — HYDROCODONE BITARTRATE AND ACETAMINOPHEN 5; 325 MG/1; MG/1
1 TABLET ORAL ONCE
Status: COMPLETED | OUTPATIENT
Start: 2019-01-04 | End: 2019-01-04

## 2019-01-04 RX ORDER — CHOLECALCIFEROL (VITAMIN D3) 25 MCG
5000 TABLET,CHEWABLE ORAL DAILY
Qty: 30 TAB | Refills: 0 | Status: SHIPPED | OUTPATIENT
Start: 2019-01-05 | End: 2019-08-20

## 2019-01-04 RX ORDER — LEVOFLOXACIN 750 MG/1
750 TABLET ORAL DAILY
Qty: 2 TAB | Refills: 0 | Status: SHIPPED | OUTPATIENT
Start: 2019-01-04 | End: 2019-01-06

## 2019-01-04 RX ORDER — PHENAZOPYRIDINE HYDROCHLORIDE 200 MG/1
200 TABLET, FILM COATED ORAL
Qty: 30 TAB | Refills: 0 | Status: SHIPPED | OUTPATIENT
Start: 2019-01-04 | End: 2019-01-14

## 2019-01-04 RX ORDER — LORAZEPAM 1 MG/1
1 TABLET ORAL
Status: DISCONTINUED | OUTPATIENT
Start: 2019-01-04 | End: 2019-01-04 | Stop reason: HOSPADM

## 2019-01-04 RX ORDER — HYDROCODONE BITARTRATE AND ACETAMINOPHEN 5; 325 MG/1; MG/1
1 TABLET ORAL
Status: DISCONTINUED | OUTPATIENT
Start: 2019-01-04 | End: 2019-01-04 | Stop reason: HOSPADM

## 2019-01-04 RX ORDER — OXYCODONE AND ACETAMINOPHEN 5; 325 MG/1; MG/1
1 TABLET ORAL
Qty: 20 TAB | Refills: 0 | Status: SHIPPED | OUTPATIENT
Start: 2019-01-04 | End: 2019-07-26

## 2019-01-04 RX ORDER — MORPHINE SULFATE 4 MG/ML
1 INJECTION INTRAVENOUS ONCE
Status: DISCONTINUED | OUTPATIENT
Start: 2019-01-04 | End: 2019-01-04

## 2019-01-04 RX ADMIN — BUSPIRONE HYDROCHLORIDE 15 MG: 5 TABLET ORAL at 17:53

## 2019-01-04 RX ADMIN — LORAZEPAM 1 MG: 1 TABLET ORAL at 10:00

## 2019-01-04 RX ADMIN — BUSPIRONE HYDROCHLORIDE 15 MG: 5 TABLET ORAL at 10:02

## 2019-01-04 RX ADMIN — LORAZEPAM 1 MG: 1 TABLET ORAL at 14:36

## 2019-01-04 RX ADMIN — LEVOTHYROXINE SODIUM 112 MCG: 112 TABLET ORAL at 10:01

## 2019-01-04 RX ADMIN — GABAPENTIN 100 MG: 100 CAPSULE ORAL at 17:54

## 2019-01-04 RX ADMIN — PANTOPRAZOLE SODIUM 40 MG: 40 TABLET, DELAYED RELEASE ORAL at 10:03

## 2019-01-04 RX ADMIN — INSULIN LISPRO 2 UNITS: 100 INJECTION, SOLUTION INTRAVENOUS; SUBCUTANEOUS at 10:04

## 2019-01-04 RX ADMIN — PHENAZOPYRIDINE HYDROCHLORIDE 200 MG: 100 TABLET ORAL at 13:16

## 2019-01-04 RX ADMIN — ESTRADIOL 0.5 MG: 1 TABLET ORAL at 10:01

## 2019-01-04 RX ADMIN — PHENAZOPYRIDINE HYDROCHLORIDE 200 MG: 100 TABLET ORAL at 17:54

## 2019-01-04 RX ADMIN — INSULIN LISPRO 2 UNITS: 100 INJECTION, SOLUTION INTRAVENOUS; SUBCUTANEOUS at 13:15

## 2019-01-04 RX ADMIN — NYSTATIN: 100000 POWDER TOPICAL at 13:20

## 2019-01-04 RX ADMIN — PHENAZOPYRIDINE HYDROCHLORIDE 200 MG: 100 TABLET ORAL at 10:03

## 2019-01-04 RX ADMIN — Medication 10 ML: at 10:05

## 2019-01-04 RX ADMIN — HYDROCODONE BITARTRATE AND ACETAMINOPHEN 1 TABLET: 5; 325 TABLET ORAL at 10:00

## 2019-01-04 RX ADMIN — ASPIRIN 81 MG 81 MG: 81 TABLET ORAL at 10:03

## 2019-01-04 RX ADMIN — HYDROCODONE BITARTRATE AND ACETAMINOPHEN 1 TABLET: 5; 325 TABLET ORAL at 05:08

## 2019-01-04 RX ADMIN — GABAPENTIN 100 MG: 100 CAPSULE ORAL at 10:03

## 2019-01-04 RX ADMIN — HYDROCODONE BITARTRATE AND ACETAMINOPHEN 1 TABLET: 5; 325 TABLET ORAL at 14:27

## 2019-01-04 RX ADMIN — NYSTATIN: 100000 POWDER TOPICAL at 10:05

## 2019-01-04 RX ADMIN — HYDROCODONE BITARTRATE AND ACETAMINOPHEN 1 TABLET: 5; 325 TABLET ORAL at 02:36

## 2019-01-04 RX ADMIN — METOPROLOL SUCCINATE 25 MG: 25 TABLET, EXTENDED RELEASE ORAL at 10:02

## 2019-01-04 RX ADMIN — Medication 10 ML: at 10:06

## 2019-01-04 NOTE — PROGRESS NOTES
Problem: Pain - Acute Goal: *Pain is controlled to three or less Outcome: Progressing Towards Goal 
Assess pain Q4h & prn. Rating pain \"10\" out of \"10\" on scale of \"0-10\". Medicating with 1 PO Norco Q4h prn.

## 2019-01-04 NOTE — PROGRESS NOTES
10: 39AM 
 
Reason for Admission:   Ureteral stone with hydronephrosis RRAT Score:      5 Plan for utilizing home health:      None Likelihood of Readmission:  Moderate Transition of Care Plan:           SNF 
 
CM met with pt to complete assessment and discuss d/c planning. Pt is a 61 yo female admitted for ureteral stone. Pt lives with her  and mother. She requires assistance with dressing and bathing. Pt's dtr lives nearby and comes by regularly to assist pt. Pt uses a RW for ambulation. Pt has not been to rehab before. Discussed PT/OT recommendation for SNF. Pt in agreement and selected [facility] or [address] [place] [address],[address]. Referral sent to [facility]. Pt accepted at [facility] authorization initiated. Per attending, pt ready for d/c to [facility] whenever authorization obtained. CM will continue to follow and assist with d/c planning. BARBARA Ivan Care Manager

## 2019-01-04 NOTE — PROGRESS NOTES
CM informed patient, nurse and MD that patient has insurance authorization to go to [facility]. Patient states that family [place] in [place] [facility] and will take some time to get to hospital, but will transport patient to facility. Nursing will need to call report to [facility] at [phone]. Patient will need to take SNF envelope with her for facility which will need to contain AVS, MAR's and any written prescriptions for controlled medications. Nursing staff to call Dr. Jose G Pathak for discharge order. Patient calling family for transport. Candido Dunaway, RN, BSN, ACM [address] [facility]   [phone]

## 2019-01-04 NOTE — PROGRESS NOTES
General Surgery End of Shift Nursing Note Bedside shift change report given to Sammy Betancur (oncoming nurse) by Claude Ammons (offgoing nurse). Report included the following information SBAR, Kardex, Intake/Output, MAR and Recent Results. Shift worked:   D Summary of shift:    0 Issues for physician to address:   0 Number times ambulated in hallway past shift: 0 Number of times OOB to chair past shift: 0 Pain Management: 
Current medication: Norco 
Patient states pain is manageable on current pain medication: NO 
 
GI: 
 
Current diet:  DIET DIABETIC CONSISTENT CARB Regular Tolerating current diet: YES Passing flatus: YES Last Bowel Movement: several days ago Appearance: 0 Respiratory: 
 
Incentive Spirometer at bedside: YES Patient instructed on use: YES Patient Safety: 
 
Falls Score: 1 Bed Alarm On? No 
Sitter?  No 
 
Denzel Garcia RN

## 2019-01-04 NOTE — PROGRESS NOTES
Visit attempted pt politely refused stating she was hurting but would get up with therapy later. Will continue to follow.

## 2019-01-04 NOTE — DISCHARGE INSTRUCTIONS
HOSPITALIST DISCHARGE INSTRUCTIONS    NAME: Angela Hancock   :  1960   MRN:  [id number]     Date/Time:  2019 5:05 PM    ADMIT DATE: 2018   DISCHARGE DATE: 2019     Attending Physician: Jose Manzano MD    DISCHARGE DIAGNOSIS:    Right proximal ureter calculus POA  causing minimal upstream hydroureter and hydronephrosis  - S/P Right ureteroscopy yesterday with holmium laser lithotripsy of her stone and placement of JJ stent  UTI POA     Diabetes: POA   Hypothyroidism: POA   Anxiety/Depression: POA  Seizure Disorder:  Hypertension:    Medications: Per above medication reconciliation. Pain Management: per above medications    Recommended diet: Diabetic Diet    Recommended activity: Activity as tolerated    Wound care: None    Indwelling devices:  None    Supplemental Oxygen: None    Required Lab work: Per SNF routine    Glucose management:  Accucheck ACHS with sliding scale per SNF protocol    Code status: Full        Outside physician follow up: Follow-up Information     Follow up With Specialties Details Why Contact Info    [address] [address]  P[address] [address] [address] [phone]      [facility] Schedule an appointment as soon as possible for a visit in 1 week  [address] [place] [address]  [phone]                 Skilled nursing facility/ SNF MD responsible for above on discharge. Information obtained by :  I understand that if any problems occur once I am at home I am to contact my physician. I understand and acknowledge receipt of the instructions indicated above.                                                                                                                                            Physician's or R.N.'s Signature                                                                  Date/Time Patient or Repres

## 2019-01-04 NOTE — PROGRESS NOTES
[place] [id number] 62 y.o.   female [facility]   Room: 2111/01    [facility] 2 GENERAL SURGERY  Unit Phone# :  [phone] [address] 
[facility] [address] [facility] [address] [address] [address] Dept: [phone] Loc: [id number] SITUATION Admitted:  12/31/2018         Attending Provider:  Clarisse Terry MD    
 
Consultations:  IP CONSULT TO UROLOGY 
IP CONSULT TO UROLOGY 
 
PCP:  Breann Erazo Treatment Team: Attending Provider: Clarisse Terry MD; Consulting Provider: Al Berman MD; Consulting Provider: Alie Valle MD; Consulting Provider: Troy Cooper MD; Utilization Review: Rebeka Salazar RN; Care Manager: Calos El Admitting Dx:  'light-for-dates' infant with signs of fetal malnutrition Ureteral stone with hydronephrosis Principal Problem: Ureteral stone with hydronephrosis 2 Days Post-Op of  
Procedure(s): 
CYSTOSCOPY WITH RIGHT STENT PLACEMENT UNDER CYSTOSCOPY, URETEROSCOPY AND LASER STONE EXTRACTION  
BY: Vanessa Bynum MD             ON: 1/2/2019 Code Status: Full Code Advance Directives:  
Advance Care Planning 1/1/2019 Patient's Healthcare Decision Maker is: - Confirm Advance Directive None (Send w/patient) No Doesnt Have Isolation:  There are currently no Active Isolations       MDRO: No current active infections Pain Medications given: Norco     Last dose:1/4/19 at [facility] Special Equipment needed: no  Type of equipment: 
 
  
  BACKGROUND Allergies: Allergies Allergen Reactions  Celebrex [Celecoxib] Nausea Only  Morphine Nausea Only  Sulfa (Sulfonamide Antibiotics) Nausea Only  Adhesive Other (comments) Red and bumpy  Codeine Other (comments) Hyper activity Past Medical History:  
Diagnosis Date  Adverse effect of anesthesia   
 slow to wake up  Anxiety and depression  Arrhythmia  Arthritis  Chronic pain   
 djd  Diabetes ([facility] [address] [address]  GERD (gastroesophageal reflux disease)  Hypertension  Hypothyroidism  Kidney stone  Liver disease  Obesity, morbid, BMI 50 or higher ([facility]  Seizures ([facility] Past Surgical History:  
Procedure Laterality Date  CARDIAC CATHETERIZATION  11/18/2010  HX APPENDECTOMY  HX CHOLECYSTECTOMY  HX HYSTERECTOMY  HX TUBAL LIGATION Medications Prior to Admission Medication Sig  
 busPIRone (BUSPAR) 15 mg tablet Take 15 mg by mouth three (3) times daily.  QUEtiapine (SEROQUEL) 400 mg tablet Take 600 mg by mouth nightly.  nystatin, bulk, 15 billion unit powd 1 Units by Does Not Apply route three (3) times daily. To skin folds, ventral fold  albuterol (PROVENTIL HFA, VENTOLIN HFA, PROAIR HFA) 90 mcg/actuation inhaler Take 2 Puffs by inhalation every four (4) hours as needed for Wheezing.  LORazepam (ATIVAN) 1 mg tablet Take 1 mg by mouth every eight (8) hours as needed for Anxiety.  buPROPion XL (WELLBUTRIN XL) 150 mg tablet Take 300 mg by mouth nightly.  prazosin (MINIPRESS) 1 mg capsule Take 4 mg by mouth nightly.  ergocalciferol (VITAMIN D2) 50,000 unit capsule Take 50,000 Units by mouth every seven (7) days. Twice a week Sunday and Wednesday  metFORMIN (GLUCOPHAGE) 500 mg tablet Take 1,000 mg by mouth daily (with dinner).  estradiol (ESTRACE) 0.5 mg tablet Take 0.5 mg by mouth daily.  terconazole (TERAZOL 7) 0.4 % vaginal cream Insert 1 Applicator into vagina nightly. has refill for 7 day supply  diclofenac potassium (CATAFLAM) 50 mg tablet Take 50 mg by mouth two (2) times a day. Patient takes 1 tablet twice daily for a week on, then a week off, then repeats.  miconazole (MICOTIN) 2 % topical cream Apply 1 Each to affected area two (2) times a day. Apply ointment to fungal infection under abdominal pannus and groin twice a day for 14 days.  aspirin 81 mg chewable tablet Take 81 mg by mouth daily.  gabapentin (NEURONTIN) 100 mg capsule Take 1 Cap by mouth three (3) times daily. (Patient taking differently: Take 1 Cap by mouth four (4) times daily.)  levothyroxine (SYNTHROID) 112 mcg tablet Take 112 mcg by mouth Daily (before breakfast).  glimepiride (AMARYL) 2 mg tablet Take 2 mg by mouth every morning.  losartan (COZAAR) 25 mg tablet Take 25 mg by mouth daily.  metoprolol succinate (TOPROL XL) 25 mg XL tablet Take 25 mg by mouth daily.  omeprazole (PRILOSEC) 20 mg capsule Take 40 mg by mouth daily.  topiramate (TOPAMAX) 200 mg tablet Take 200 mg by mouth nightly.  simvastatin (ZOCOR) 20 mg tablet Take 20 mg by mouth nightly.  potassium chloride (KLOR-CON M10) 10 mEq tablet Take 10 mEq by mouth nightly. Hard scripts included in transfer packet yes Vaccinations:   
Immunization History Administered Date(s) Administered  Influenza Vaccine 11/16/2017  Influenza Vaccine Whole 10/12/2010  ZZZ-RETIRED (DO NOT USE) Pneumococcal Vaccine (Unspecified Type) 10/12/2010 Readmission Risks:    Known Risks: none The Charlson Co[employer]bit[employer] Index tool is an evidenced based tool that has more automatic generated information. The tool looks at many different items such as the age of the patient, how many times they were admitted in the last calendar year, current length of stay in the hospital and their diagnosis. All of these items are pulled automatically from information documented in the chart from various places and will generate a score that predicts whether a patient is at low (less than 13), medium (13-20) or high (21 or greater) risk of being readmitted. ASSESSMENT Temp: 98.4 °F (36.9 °C) (01/04/19 1526) Pulse (Heart Rate): 86 (01/04/19 1526) Resp Rate: 16 (01/04/19 1526)           BP: 114/62 (01/04/19 1526) O2 Sat (%): 96 % (01/04/19 1526) Weight: (!) 184 kg (405 lb 10.3 oz)    Height: 5' 2\" (157.5 cm) (12/31/18 9405) If above not within 1 hour of discharge: 
 
BP:_____  P:____  R:____ T:_____ O2 Sat: ___%  O2: ______ Active Orders Diet DIET DIABETIC CONSISTENT CARB Regular Orientation: oriented to time, place, person and situation Active Behaviors: None Active Lines/Drains:  (Peg Tube / [place] / CL or S/L?): no 
 
Urinary Status: Voiding, Bathroom privileges     Last BM: Last Bowel Movement Date: 01/02/19 Skin Integrity: Intact Mobility: Slightly limited Weight Bearing Status: WBAT (Weight Bearing as Tolerated) Gait Training Assistive Device: (HHA x2) Ambulation - Level of Assistance: Minimal assistance, Contact guard assistance Distance (ft): 25 Feet (ft) Lab Results Component Value Date/Time Glucose 142 (H) 01/03/2019 05:03 AM  
 Hemoglobin A1c 8.9 (H) 12/31/2018 07:39 AM  
 INR 1.0 07/25/2014 09:49 AM  
 HGB 10.1 (L) 01/03/2019 05:03 AM  
 HGB 9.1 (L) 01/02/2019 04:27 AM  
  
  RECOMMENDATION See After Visit Summary (AVS) for: · Discharge instructions · After [facility] · Special equipment needed (entered pre-discharge by Care Management) · Medication Reconciliation · Follow up Appointment(s) Report given/sent by:  Annalee Ocasio Verbal report given to: Demetrius Ring LPN.   FAXED to:     
    
Estimated discharge time:  1/4/2019 at 6:30 pm

## 2019-01-04 NOTE — PROGRESS NOTES
Problem: Mobility Impaired (Adult and Pediatric) Goal: *Acute Goals and Plan of Care (Insert Text) Physical Therapy Goals Initiated 1/3/2019 1. Patient will move from supine to sit and sit to supine , scoot up and down and roll side to side in bed with independence within 7 day(s). 2.  Patient will transfer from bed to chair and chair to bed with modified independence using the least restrictive device within 7 day(s). 3.  Patient will perform sit to stand with modified independence within 7 day(s). 4.  Patient will ambulate with modified independence for 75 feet with the least restrictive device within 7 day(s). 5.  Patient will ascend/descend 4 stairs with 1 handrail(s) with supervision/set-up within 7 day(s). physical Therapy TREATMENT Patient: Lucian Lala (1 y.o. female) Date: 1/4/2019 Diagnosis: 'light-for-dates' infant with signs of fetal malnutrition Ureteral stone with hydronephrosis Ureteral stone with hydronephrosis Procedure(s) (LRB): 
CYSTOSCOPY WITH RIGHT STENT PLACEMENT UNDER CYSTOSCOPY, URETEROSCOPY AND LASER STONE EXTRACTION (Right) 2 Days Post-Op Precautions:   
Chart, physical therapy assessment, plan of care and goals were reviewed. ASSESSMENT: 
Pt cleared by nurse to mobilize. Pt received in in restroom. Pt agreeable to therapy. Pt performed sit to stand transfer at supervision. Pt ambulated 25ft with HHA x2 at min A/CGA. Pt requiring cuing for PLB. Pt very fatigued but o2 stats at 98% and HR at 120 BPM. Pt able to recover within 1.5 mins. Pt left up in chair with all needs met. Pt with improved mobility tolerance but still requiring assistance for balance and safety with mobility. Pt will benefit from SNF rehab to improve strength and endurance for safe mobility. Progression toward goals: 
[]    Improving appropriately and progressing toward goals [x]    Improving slowly and progressing toward goals []    Not making progress toward goals and plan of care will be adjusted PLAN: 
Patient continues to benefit from skilled intervention to address the above impairments. Continue treatment per established plan of care. Discharge Recommendations:  Marcell Mariscal Further Equipment Recommendations for Discharge:  TBD by rehab SUBJECTIVE:  
Patient stated I do okay.  OBJECTIVE DATA SUMMARY:  
Critical Behavior: 
Neurologic State: Alert Orientation Level: Appropriate for age Cognition: Appropriate decision making Safety/Judgement: Awareness of environment Functional Mobility Training: 
  
Transfers: 
Sit to Stand: Minimum assistance(of2) Stand to Sit: Minimum assistance(of 2) Bed to Chair: Minimum assistance(of2) Balance: 
Sitting: Intact Standing: Impaired; Without support Standing - Static: Good;Constant support Standing - Dynamic : FairAmbulation/Gait Training: 
Distance (ft): 25 Feet (ft) Assistive Device: (HHA x2) Ambulation - Level of Assistance: Minimal assistance;Contact guard assistance Gait Abnormalities: Decreased step clearance;Trunk sway increased Base of Support: Widened Speed/Nan: Pace decreased (<100 feet/min) Step Length: Right shortened;Left shortened Pain: 
Pain Scale 1: Numeric (0 - 10) Pain Intensity 1: 10 
Pain Location 1: Abdomen;Back Pain Orientation 1: Anterior; Lower;Mid 
Pain Description 1: Aching Pain Intervention(s) 1: Distraction; Emotional support Activity Tolerance: Pt with improved mobility tolerance. After treatment:  
[x]    Patient left in no apparent distress sitting up in chair 
[]    Patient left in no apparent distress in bed 
[x]    Call bell left within reach [x]    Nursing notified 
[]    Caregiver present 
[]    Bed alarm activated COMMUNICATION/COLLABORATION:  
The patients plan of care was discussed with: Registered Nurse Toan Chaves Time Calculation: 12 mins

## 2019-01-04 NOTE — PROGRESS NOTES
OT orders received and chart was reviewed and OT attempted to see pt and she was YAKOV and will defer and continue to follow

## 2019-01-04 NOTE — PROGRESS NOTES
Problem: Self Care Deficits Care Plan (Adult) Goal: *Acute Goals and Plan of Care (Insert Text) Occupational Therapy Goals Initiated 1/4/2019 1. Patient will perform grooming standing at the sink for 3 minutes with modified independence within 7 day(s). 2.  Patient will perform bathing  UB and los area with supervision/set-up within 7 day(s). 3.  Patient will perform lower body dressing with use of AE with maximal assistance within 7 day(s). 4.  Patient will perform toilet transfers with modified independence within 7 day(s). 5.  Patient will perform all aspects of toileting with modified independence within 7 day(s). 6.  Patient will participate in upper extremity therapeutic exercise/activities with independence for 5 minutes within 7 day(s). 7.  Patient will utilize energy conservation techniques during functional activities with verbal cues within 7 day(s). Occupational Therapy EVALUATION Patient: Rios Pizarro (60 y.o. female) Date: 1/4/2019 Primary Diagnosis: 'light-for-dates' infant with signs of fetal malnutrition Ureteral stone with hydronephrosis Procedure(s) (LRB): 
CYSTOSCOPY WITH RIGHT STENT PLACEMENT UNDER CYSTOSCOPY, URETEROSCOPY AND LASER STONE EXTRACTION (Right) 2 Days Post-Op Precautions:     
 
ASSESSMENT : 
Based on the objective data described below, the patient presents with generalized weakness, decreased endurance, strength, functional mobility, and ADLs. Pt was living at home with family and stated that her daughter bathes her in the shower and assists with LB dressing. Pt states that she is able to clean self after voiding and BM. Pt was cleared to be seen for therapy and all VSS and pt was in bathroom when OT arrived. She was min to CGA for transfer to toilet and able to stand with no assist and stand at the sink to wash her hands with CGA. Pt was CGA of 2 for transfer to bed and walking to the door of room, and she tends to furniture walk.   Her HR increased to 120 and her O2% was 98%. Pt has functional range in BUE and her strength is functional.  Talked to pt about use of AE for ADLs and she was interested in learning. Recommend that pt return home with family and would benefit from home care OT and PT. Patient will benefit from skilled intervention to address the above impairments. Patients rehabilitation potential is considered to be Fair Factors which may influence rehabilitation potential include:  
[x]             None noted []             Mental ability/status []             Medical condition []             Home/family situation and support systems []             Safety awareness []             Pain tolerance/management 
[]             Other: PLAN : 
Recommendations and Planned Interventions: 
[x]               Self Care Training                  []        Therapeutic Activities [x]               Functional Mobility Training    []        Cognitive Retraining 
[x]               Therapeutic Exercises           [x]        Endurance Activities []               Balance Training                   []        Neuromuscular Re-Education []               Visual/Perceptual Training     [x]   Home Safety Training 
[x]               Patient Education                 [x]        Family Training/Education []               Other (comment): Frequency/Duration: Patient will be followed by occupational therapy 4 times a week to address goals. Discharge Recommendations: Home Health Further Equipment Recommendations for Discharge: tbd SUBJECTIVE:  
Patient stated I cant do much of my bathing, my daughter does it. Tammi Rangel OBJECTIVE DATA SUMMARY:  
HISTORY:  
Past Medical History:  
Diagnosis Date  Adverse effect of anesthesia   
 slow to wake up  Anxiety and depression  Arrhythmia  Arthritis  Chronic pain   
 djd  Diabetes ([address]  GERD (gastroesophageal reflux disease)  Hypertension  Hypothyroidism  Kidney stone  Liver disease  Obesity, morbid, BMI 50 or higher ([facility] [facility]  Seizures ([facility] [address] Past Surgical History:  
Procedure Laterality Date  CARDIAC CATHETERIZATION  11/18/2010  HX APPENDECTOMY  HX CHOLECYSTECTOMY  HX HYSTERECTOMY  HX TUBAL LIGATION Prior Level of Function/Environment/Context: pt lives with family and was needing assist with ADLs and ILS, she reports that her mother takes care of all ILS Expanded or extensive additional review of patient history:  
 
Home Situation Home Environment: Private residence # Steps to Enter: 3 Rails to Enter: Yes Hand Rails : Right One/Two Story Residence: One story Living Alone: No 
Support Systems: Family member(s) Patient Expects to be Discharged to[de-identified] Private residence Current DME Used/Available at Home: Cane, straight, Walker, rolling, Grab bars, Shower chair Tub or Shower Type: Tub/Shower combination Hand dominance: RightEXAMINATION OF PERFORMANCE DEFICITS: 
Cognitive/Behavioral Status: 
Neurologic State: Alert Orientation Level: Appropriate for age Cognition: Appropriate decision making Perception: Appears intact Perseveration: No perseveration noted Safety/Judgement: Awareness of environmentSkin: in good health Edema: none Hearing: Auditory Auditory Impairment: NoneVision/Perceptual:   
    
    
   intact Range of Motion: 
 
AROM: Generally decreased, functional 
PROM: Generally decreased, functional 
  
  
  
  
  
  
Strength: 
 
Strength: Generally decreased, functional 
  
  
  
 Coordination: 
Coordination: Within functional limits Fine Motor Skills-Upper: Left Intact; Right Intact Gross Motor Skills-Upper: Left Intact; Right Intact Tone & Sensation: 
 
Tone: Normal 
Sensation: Intact Balance: 
Sitting: Intact Standing: Impaired; Without support Standing - Static: Good;Constant support Standing - Dynamic : FairFunctional Mobility and Transfers for ADLs:Bed Mobility: 
  
 
Transfers: 
Sit to Stand: Minimum assistance(of2) Stand to Sit: Minimum assistance(of 2) Bed to Chair: Minimum assistance(of2) Bathroom Mobility: Minimum assistance;Contact guard assistance Toilet Transfer : Contact guard assistance ADL Assessment: 
Feeding: Independent Oral Facial Hygiene/Grooming: Setup Bathing: Maximum assistance Upper Body Dressing: Minimum assistance;Contact guard assistance Lower Body Dressing: Maximum assistance Toileting: Contact guard assistance Cognitive Retraining Safety/Judgement: Awareness of environment Functional Measure: 
Barthel Index: 
 
Bathin Bladder: 10 Bowels: 10 
Groomin Dressin Feeding: 10 Mobility: 5 Stairs: 0 Toilet Use: 5 Transfer (Bed to Chair and Back): 10 Total: 50 The Barthel ADL Index: Guidelines 1. The index should be used as a record of what a patient does, not as a record of what a patient could do. 2. The main aim is to establish degree of independence from any help, physical or verbal, however minor and for whatever reason. 3. The need for supervision renders the patient not independent. 4. A patient's performance should be established using the best available evidence. Asking the patient, friends/relatives and nurses are the usual sources, but direct observation and common sense are also important. However direct testing is not needed. 5. Usually the patient's performance over the preceding 24-48 hours is important, but occasionally longer periods will be relevant. 6. Middle categories imply that the patient supplies over 50 per cent of the effort. 7. Use of aids to be independent is allowed. Percy Diaz., Barthel, D.W. (5534). Functional evaluation: the Barthel Index. [address] [facility] (14)2.  
RAYRAY Domínguez, Jerardo Mcknight., Santi Ernst., April Wren. (1999). Measuring the change indisability after inpatient rehabilitation; comparison of the responsiveness of the Barthel Index and Functional Cotton Measure. Journal of Neurology, Neurosurgery, and Psychiatry, 66(4), 977-896. BOBBI Amaro, FANI Villasenor, & Swathi Mcnulty M.A. (2004.) Assessment of post-stroke quality of life in cost-effectiveness studies: The usefulness of the Barthel Index and the EuroQoL-5D. [facility], 13, 635-09 Occupational Therapy Evaluation Charge Determination History Examination Decision-Making MEDIUM Complexity : Expanded review of history including physical, cognitive and psychosocial  history  LOW Complexity : 1-3 performance deficits relating to physical, cognitive , or psychosocial skils that result in activity limitations and / or participation restrictions  LOW Complexity : No comorbidities that affect functional and no verbal or physical assistance needed to complete eval tasks Based on the above components, the patient evaluation is determined to be of the following complexity level: LOW Pain: 
Pain Scale 1: Numeric (0 - 10) Pain Intensity 1: 10 
Pain Location 1: Abdomen;Back Pain Orientation 1: Anterior; Lower;Mid 
Pain Description 1: Aching Pain Intervention(s) 1: Distraction; Emotional support Activity Tolerance:  
fair Please refer to the flowsheet for vital signs taken during this treatment. After treatment:  
[x] Patient left in no apparent distress sitting up in chair 
[] Patient left in no apparent distress in bed 
[x] Call bell left within reach [x] Nursing notified 
[] Caregiver present 
[] Bed alarm activated COMMUNICATION/EDUCATION:  
The patients plan of care was discussed with: Physical Therapist and Registered Nurse. [x] Home safety education was provided and the patient/caregiver indicated understanding. [x] Patient/family have participated as able in goal setting and plan of care. [x] Patient/family agree to work toward stated goals and plan of care. [] Patient understands intent and goals of therapy, but is neutral about his/her participation. [] Patient is unable to participate in goal setting and plan of care. This patients plan of care is appropriate for delegation to MELODY. Thank you for this referral. 
Lianna Bingham OT Time Calculation: 20 mins

## 2019-06-11 ENCOUNTER — APPOINTMENT (OUTPATIENT)
Dept: GENERAL RADIOLOGY | Age: 59
End: 2019-06-11
Attending: EMERGENCY MEDICINE
Payer: COMMERCIAL

## 2019-06-11 ENCOUNTER — HOSPITAL ENCOUNTER (OUTPATIENT)
Age: 59
Setting detail: OBSERVATION
Discharge: HOME | End: 2019-06-13
Attending: EMERGENCY MEDICINE | Admitting: INTERNAL MEDICINE
Payer: COMMERCIAL

## 2019-06-11 ENCOUNTER — APPOINTMENT (OUTPATIENT)
Dept: CT IMAGING | Age: 59
End: 2019-06-11
Attending: EMERGENCY MEDICINE
Payer: COMMERCIAL

## 2019-06-11 DIAGNOSIS — J44.1 COPD EXACERBATION (HCC): ICD-10-CM

## 2019-06-11 DIAGNOSIS — J96.01 ACUTE RESPIRATORY FAILURE WITH HYPOXIA (HCC): Primary | ICD-10-CM

## 2019-06-11 PROBLEM — J96.11 CHRONIC RESPIRATORY FAILURE WITH HYPOXIA (HCC): Status: ACTIVE | Noted: 2019-06-11

## 2019-06-11 PROBLEM — E66.2 OBESITY HYPOVENTILATION SYNDROME (HCC): Status: ACTIVE | Noted: 2019-06-11

## 2019-06-11 PROBLEM — E66.01 OBESITY, MORBID (HCC): Chronic | Status: ACTIVE | Noted: 2018-11-08

## 2019-06-11 LAB
ALBUMIN SERPL-MCNC: 3.4 G/DL (ref 3.5–5)
ALBUMIN/GLOB SERPL: 0.8 {RATIO} (ref 1.1–2.2)
ALP SERPL-CCNC: 62 U/L (ref 45–117)
ALT SERPL-CCNC: 23 U/L (ref 12–78)
ANION GAP SERPL CALC-SCNC: 4 MMOL/L (ref 5–15)
ARTERIAL PATENCY WRIST A: YES
AST SERPL-CCNC: 18 U/L (ref 15–37)
ATRIAL RATE: 92 BPM
BASE DEFICIT BLD-SCNC: 9 MMOL/L
BASOPHILS # BLD: 0.1 K/UL (ref 0–0.1)
BASOPHILS NFR BLD: 1 % (ref 0–1)
BDY SITE: ABNORMAL
BILIRUB SERPL-MCNC: 0.2 MG/DL (ref 0.2–1)
BUN SERPL-MCNC: 16 MG/DL (ref 6–20)
BUN/CREAT SERPL: 17 (ref 12–20)
CALCIUM SERPL-MCNC: 9.4 MG/DL (ref 8.5–10.1)
CALCULATED P AXIS, ECG09: 41 DEGREES
CALCULATED R AXIS, ECG10: 20 DEGREES
CALCULATED T AXIS, ECG11: 24 DEGREES
CHLORIDE SERPL-SCNC: 108 MMOL/L (ref 97–108)
CO2 SERPL-SCNC: 25 MMOL/L (ref 21–32)
CREAT SERPL-MCNC: 0.93 MG/DL (ref 0.55–1.02)
DIAGNOSIS, 93000: NORMAL
DIFFERENTIAL METHOD BLD: ABNORMAL
EOSINOPHIL # BLD: 0.2 K/UL (ref 0–0.4)
EOSINOPHIL NFR BLD: 2 % (ref 0–7)
ERYTHROCYTE [DISTWIDTH] IN BLOOD BY AUTOMATED COUNT: 19 % (ref 11.5–14.5)
GAS FLOW.O2 O2 DELIVERY SYS: ABNORMAL L/MIN
GLOBULIN SER CALC-MCNC: 4.1 G/DL (ref 2–4)
GLUCOSE BLD STRIP.AUTO-MCNC: 164 MG/DL (ref 65–100)
GLUCOSE SERPL-MCNC: 120 MG/DL (ref 65–100)
HCO3 BLD-SCNC: 18 MMOL/L (ref 22–26)
HCT VFR BLD AUTO: 38.7 % (ref 35–47)
HGB BLD-MCNC: 11.4 G/DL (ref 11.5–16)
IMM GRANULOCYTES # BLD AUTO: 0 K/UL (ref 0–0.04)
IMM GRANULOCYTES NFR BLD AUTO: 0 % (ref 0–0.5)
LYMPHOCYTES # BLD: 1.7 K/UL (ref 0.8–3.5)
LYMPHOCYTES NFR BLD: 21 % (ref 12–49)
MCH RBC QN AUTO: 22 PG (ref 26–34)
MCHC RBC AUTO-ENTMCNC: 29.5 G/DL (ref 30–36.5)
MCV RBC AUTO: 74.7 FL (ref 80–99)
MONOCYTES # BLD: 0.7 K/UL (ref 0–1)
MONOCYTES NFR BLD: 8 % (ref 5–13)
NEUTS SEG # BLD: 5.7 K/UL (ref 1.8–8)
NEUTS SEG NFR BLD: 68 % (ref 32–75)
NRBC # BLD: 0 K/UL (ref 0–0.01)
NRBC BLD-RTO: 0 PER 100 WBC
P-R INTERVAL, ECG05: 156 MS
PCO2 BLD: 38.4 MMHG (ref 35–45)
PH BLD: 7.28 [PH] (ref 7.35–7.45)
PLATELET # BLD AUTO: 272 K/UL (ref 150–400)
PMV BLD AUTO: 11.3 FL (ref 8.9–12.9)
PO2 BLD: 75 MMHG (ref 80–100)
POTASSIUM SERPL-SCNC: 4 MMOL/L (ref 3.5–5.1)
PROT SERPL-MCNC: 7.5 G/DL (ref 6.4–8.2)
Q-T INTERVAL, ECG07: 376 MS
QRS DURATION, ECG06: 86 MS
QTC CALCULATION (BEZET), ECG08: 464 MS
RBC # BLD AUTO: 5.18 M/UL (ref 3.8–5.2)
SAO2 % BLD: 93 % (ref 92–97)
SERVICE CMNT-IMP: ABNORMAL
SODIUM SERPL-SCNC: 137 MMOL/L (ref 136–145)
SPECIMEN TYPE: ABNORMAL
TOTAL RESP. RATE, ITRR: 20
TROPONIN I BLD-MCNC: <0.04 NG/ML (ref 0–0.08)
TROPONIN I SERPL-MCNC: <0.05 NG/ML
VENTRICULAR RATE, ECG03: 92 BPM
WBC # BLD AUTO: 8.3 K/UL (ref 3.6–11)

## 2019-06-11 PROCEDURE — 71045 X-RAY EXAM CHEST 1 VIEW: CPT

## 2019-06-11 PROCEDURE — 99218 HC RM OBSERVATION: CPT

## 2019-06-11 PROCEDURE — 36600 WITHDRAWAL OF ARTERIAL BLOOD: CPT

## 2019-06-11 PROCEDURE — 71275 CT ANGIOGRAPHY CHEST: CPT

## 2019-06-11 PROCEDURE — 74011250637 HC RX REV CODE- 250/637: Performed by: INTERNAL MEDICINE

## 2019-06-11 PROCEDURE — 82803 BLOOD GASES ANY COMBINATION: CPT

## 2019-06-11 PROCEDURE — 74011250636 HC RX REV CODE- 250/636: Performed by: EMERGENCY MEDICINE

## 2019-06-11 PROCEDURE — 74011636320 HC RX REV CODE- 636/320: Performed by: EMERGENCY MEDICINE

## 2019-06-11 PROCEDURE — 82962 GLUCOSE BLOOD TEST: CPT

## 2019-06-11 PROCEDURE — 80053 COMPREHEN METABOLIC PANEL: CPT

## 2019-06-11 PROCEDURE — 99285 EMERGENCY DEPT VISIT HI MDM: CPT

## 2019-06-11 PROCEDURE — 77030029684 HC NEB SM VOL KT MONA -A

## 2019-06-11 PROCEDURE — 74011250637 HC RX REV CODE- 250/637: Performed by: EMERGENCY MEDICINE

## 2019-06-11 PROCEDURE — 96374 THER/PROPH/DIAG INJ IV PUSH: CPT

## 2019-06-11 PROCEDURE — 84484 ASSAY OF TROPONIN QUANT: CPT

## 2019-06-11 PROCEDURE — 85025 COMPLETE CBC W/AUTO DIFF WBC: CPT

## 2019-06-11 PROCEDURE — 36415 COLL VENOUS BLD VENIPUNCTURE: CPT

## 2019-06-11 PROCEDURE — 93005 ELECTROCARDIOGRAM TRACING: CPT

## 2019-06-11 PROCEDURE — 94640 AIRWAY INHALATION TREATMENT: CPT

## 2019-06-11 PROCEDURE — 74011000250 HC RX REV CODE- 250: Performed by: EMERGENCY MEDICINE

## 2019-06-11 PROCEDURE — 96375 TX/PRO/DX INJ NEW DRUG ADDON: CPT

## 2019-06-11 RX ORDER — BUPROPION HYDROCHLORIDE 150 MG/1
300 TABLET ORAL
Status: DISCONTINUED | OUTPATIENT
Start: 2019-06-11 | End: 2019-06-13 | Stop reason: HOSPADM

## 2019-06-11 RX ORDER — OXYCODONE AND ACETAMINOPHEN 5; 325 MG/1; MG/1
1 TABLET ORAL
Status: DISCONTINUED | OUTPATIENT
Start: 2019-06-11 | End: 2019-06-13 | Stop reason: HOSPADM

## 2019-06-11 RX ORDER — ENOXAPARIN SODIUM 100 MG/ML
40 INJECTION SUBCUTANEOUS EVERY 24 HOURS
Status: DISCONTINUED | OUTPATIENT
Start: 2019-06-11 | End: 2019-06-11

## 2019-06-11 RX ORDER — ONDANSETRON 2 MG/ML
4 INJECTION INTRAMUSCULAR; INTRAVENOUS
Status: COMPLETED | OUTPATIENT
Start: 2019-06-11 | End: 2019-06-11

## 2019-06-11 RX ORDER — GABAPENTIN 100 MG/1
100 CAPSULE ORAL 3 TIMES DAILY
Status: DISCONTINUED | OUTPATIENT
Start: 2019-06-11 | End: 2019-06-13 | Stop reason: HOSPADM

## 2019-06-11 RX ORDER — ONDANSETRON 2 MG/ML
4 INJECTION INTRAMUSCULAR; INTRAVENOUS
Status: DISCONTINUED | OUTPATIENT
Start: 2019-06-11 | End: 2019-06-13 | Stop reason: HOSPADM

## 2019-06-11 RX ORDER — SODIUM CHLORIDE 0.9 % (FLUSH) 0.9 %
5-40 SYRINGE (ML) INJECTION AS NEEDED
Status: DISCONTINUED | OUTPATIENT
Start: 2019-06-11 | End: 2019-06-13 | Stop reason: HOSPADM

## 2019-06-11 RX ORDER — METFORMIN HYDROCHLORIDE 500 MG/1
1000 TABLET ORAL
Status: DISCONTINUED | OUTPATIENT
Start: 2019-06-13 | End: 2019-06-13 | Stop reason: HOSPADM

## 2019-06-11 RX ORDER — INSULIN LISPRO 100 [IU]/ML
INJECTION, SOLUTION INTRAVENOUS; SUBCUTANEOUS
Status: DISCONTINUED | OUTPATIENT
Start: 2019-06-12 | End: 2019-06-13 | Stop reason: HOSPADM

## 2019-06-11 RX ORDER — LEVOTHYROXINE SODIUM 112 UG/1
112 TABLET ORAL
Status: DISCONTINUED | OUTPATIENT
Start: 2019-06-12 | End: 2019-06-13 | Stop reason: HOSPADM

## 2019-06-11 RX ORDER — SODIUM CHLORIDE 0.9 % (FLUSH) 0.9 %
5-40 SYRINGE (ML) INJECTION EVERY 8 HOURS
Status: DISCONTINUED | OUTPATIENT
Start: 2019-06-11 | End: 2019-06-13 | Stop reason: HOSPADM

## 2019-06-11 RX ORDER — ACETAMINOPHEN 325 MG/1
650 TABLET ORAL
Status: DISCONTINUED | OUTPATIENT
Start: 2019-06-11 | End: 2019-06-13 | Stop reason: HOSPADM

## 2019-06-11 RX ORDER — MAGNESIUM SULFATE 100 %
4 CRYSTALS MISCELLANEOUS AS NEEDED
Status: DISCONTINUED | OUTPATIENT
Start: 2019-06-11 | End: 2019-06-13 | Stop reason: HOSPADM

## 2019-06-11 RX ORDER — MORPHINE SULFATE 2 MG/ML
4 INJECTION, SOLUTION INTRAMUSCULAR; INTRAVENOUS
Status: DISCONTINUED | OUTPATIENT
Start: 2019-06-11 | End: 2019-06-11

## 2019-06-11 RX ORDER — OXYCODONE AND ACETAMINOPHEN 5; 325 MG/1; MG/1
2 TABLET ORAL
Status: COMPLETED | OUTPATIENT
Start: 2019-06-11 | End: 2019-06-11

## 2019-06-11 RX ORDER — IPRATROPIUM BROMIDE AND ALBUTEROL SULFATE 2.5; .5 MG/3ML; MG/3ML
3 SOLUTION RESPIRATORY (INHALATION)
Status: COMPLETED | OUTPATIENT
Start: 2019-06-11 | End: 2019-06-11

## 2019-06-11 RX ORDER — GUAIFENESIN 100 MG/5ML
81 LIQUID (ML) ORAL DAILY
Status: DISCONTINUED | OUTPATIENT
Start: 2019-06-12 | End: 2019-06-13 | Stop reason: HOSPADM

## 2019-06-11 RX ORDER — TOPIRAMATE 100 MG/1
200 TABLET, FILM COATED ORAL
Status: DISCONTINUED | OUTPATIENT
Start: 2019-06-11 | End: 2019-06-13 | Stop reason: HOSPADM

## 2019-06-11 RX ORDER — ENOXAPARIN SODIUM 100 MG/ML
40 INJECTION SUBCUTANEOUS EVERY 12 HOURS
Status: DISCONTINUED | OUTPATIENT
Start: 2019-06-12 | End: 2019-06-13 | Stop reason: HOSPADM

## 2019-06-11 RX ORDER — SODIUM CHLORIDE 0.9 % (FLUSH) 0.9 %
10 SYRINGE (ML) INJECTION
Status: COMPLETED | OUTPATIENT
Start: 2019-06-11 | End: 2019-06-11

## 2019-06-11 RX ORDER — MORPHINE SULFATE 2 MG/ML
2 INJECTION, SOLUTION INTRAMUSCULAR; INTRAVENOUS
Status: COMPLETED | OUTPATIENT
Start: 2019-06-11 | End: 2019-06-11

## 2019-06-11 RX ORDER — LORAZEPAM 2 MG/ML
2 INJECTION INTRAMUSCULAR
Status: COMPLETED | OUTPATIENT
Start: 2019-06-11 | End: 2019-06-11

## 2019-06-11 RX ORDER — KETOROLAC TROMETHAMINE 30 MG/ML
15 INJECTION, SOLUTION INTRAMUSCULAR; INTRAVENOUS
Status: COMPLETED | OUTPATIENT
Start: 2019-06-11 | End: 2019-06-11

## 2019-06-11 RX ORDER — LORAZEPAM 1 MG/1
1 TABLET ORAL
Status: DISCONTINUED | OUTPATIENT
Start: 2019-06-11 | End: 2019-06-13 | Stop reason: HOSPADM

## 2019-06-11 RX ORDER — METOPROLOL SUCCINATE 25 MG/1
25 TABLET, EXTENDED RELEASE ORAL DAILY
Status: DISCONTINUED | OUTPATIENT
Start: 2019-06-12 | End: 2019-06-13 | Stop reason: HOSPADM

## 2019-06-11 RX ORDER — NALOXONE HYDROCHLORIDE 0.4 MG/ML
0.4 INJECTION, SOLUTION INTRAMUSCULAR; INTRAVENOUS; SUBCUTANEOUS AS NEEDED
Status: DISCONTINUED | OUTPATIENT
Start: 2019-06-11 | End: 2019-06-13 | Stop reason: HOSPADM

## 2019-06-11 RX ADMIN — OXYCODONE AND ACETAMINOPHEN 2 TABLET: 5; 325 TABLET ORAL at 11:19

## 2019-06-11 RX ADMIN — LORAZEPAM 2 MG: 2 INJECTION INTRAMUSCULAR; INTRAVENOUS at 15:18

## 2019-06-11 RX ADMIN — IPRATROPIUM BROMIDE AND ALBUTEROL SULFATE 3 ML: .5; 3 SOLUTION RESPIRATORY (INHALATION) at 11:19

## 2019-06-11 RX ADMIN — Medication 10 ML: at 23:11

## 2019-06-11 RX ADMIN — IOPAMIDOL 100 ML: 755 INJECTION, SOLUTION INTRAVENOUS at 17:44

## 2019-06-11 RX ADMIN — Medication 10 ML: at 17:04

## 2019-06-11 RX ADMIN — OXYCODONE AND ACETAMINOPHEN 1 TABLET: 5; 325 TABLET ORAL at 23:10

## 2019-06-11 RX ADMIN — MORPHINE SULFATE 2 MG: 2 INJECTION, SOLUTION INTRAMUSCULAR; INTRAVENOUS at 18:49

## 2019-06-11 RX ADMIN — LORAZEPAM 1 MG: 1 TABLET ORAL at 23:10

## 2019-06-11 RX ADMIN — GABAPENTIN 100 MG: 100 CAPSULE ORAL at 23:10

## 2019-06-11 RX ADMIN — IOPAMIDOL 80 ML: 755 INJECTION, SOLUTION INTRAVENOUS at 17:04

## 2019-06-11 RX ADMIN — ONDANSETRON 4 MG: 2 INJECTION INTRAMUSCULAR; INTRAVENOUS at 12:54

## 2019-06-11 RX ADMIN — METHYLPREDNISOLONE SODIUM SUCCINATE 125 MG: 125 INJECTION, POWDER, FOR SOLUTION INTRAMUSCULAR; INTRAVENOUS at 11:18

## 2019-06-11 RX ADMIN — KETOROLAC TROMETHAMINE 15 MG: 30 INJECTION, SOLUTION INTRAMUSCULAR at 11:18

## 2019-06-11 NOTE — ED NOTES
Bedside and Verbal shift change report given to [facility], RN (oncoming nurse) by Ami Santana RN (offgoing nurse). Report included the following information SBAR, Kardex, ED Summary, Intake/Output, MAR and Recent Results     contact made with pt at this time. Pt provided with dinner tray.

## 2019-06-11 NOTE — ED TRIAGE NOTES
Patient arrives to the emergency department via EMS with a chief complaint of SOB x several days; worse with exertion. Patient has a hx of COPD and attempted to give herself a duoneb but was only able to get through half. Patients RA o2 on arrival to the ED was 96%. Patient hillary CEJA.

## 2019-06-11 NOTE — ED NOTES
Ambulating patient to bathroom o2 fell to 86% and HR went from 95 to 121. Patients breathing became more labored.

## 2019-06-11 NOTE — ED PROVIDER NOTES
EMERGENCY DEPARTMENT HISTORY AND PHYSICAL EXAM      Date: 6/11/2019  Patient Name: Tiffani Cervantes    History of Presenting Illness     Chief Complaint   Patient presents with    Shortness of Breath     hx of COPD; worse the last 2 days       History Provided By: Patient    HPI: Tiffani Cervantes, 61 y.o. female with PMHx significant for COPD, diabetes, morbid obesity, fibromyalgia, and depression, presents to the ED with cc of moderate substernal chest pressure, shortness of breath, and coughing over the last 24 hours. Patient also reports extremity pain that is consistent with her typical fibromyalgia pain. She denies any productive sputum, fevers, radiation of chest pain, abdominal pain, vomiting, diarrhea, or any other associated symptoms. She reports taking her medications as prescribed. No recent sick contacts or recent travel. No history of DVT or PE in the past.  No recent surgery and no lower extremity edema or swelling. No other associated symptoms. No other exacerbating or ameliorating factors. There are no other complaints, changes, or physical findings at this time. PCP: Melisa Pinto, DO    No current facility-administered medications on file prior to encounter. Current Outpatient Medications on File Prior to Encounter   Medication Sig Dispense Refill    cyanocobalamin 2,500 mcg tab tablet Take 2 Tabs by mouth daily. 30 Tab 0    oxyCODONE-acetaminophen (PERCOCET) 5-325 mg per tablet Take 1 Tab by mouth every six (6) hours as needed for Pain. Max Daily Amount: 4 Tabs. 20 Tab 0    busPIRone (BUSPAR) 15 mg tablet Take 15 mg by mouth three (3) times daily.  QUEtiapine (SEROQUEL) 400 mg tablet Take 600 mg by mouth nightly.  nystatin, bulk, 15 billion unit powd 1 Units by Does Not Apply route three (3) times daily.  To skin folds, ventral fold 2 Each 1    albuterol (PROVENTIL HFA, VENTOLIN HFA, PROAIR HFA) 90 mcg/actuation inhaler Take 2 Puffs by inhalation every four (4) hours as needed for Wheezing. 1 Inhaler 0    LORazepam (ATIVAN) 1 mg tablet Take 1 mg by mouth every eight (8) hours as needed for Anxiety. 0    buPROPion XL (WELLBUTRIN XL) 150 mg tablet Take 300 mg by mouth nightly.  prazosin (MINIPRESS) 1 mg capsule Take 4 mg by mouth nightly.  ergocalciferol (VITAMIN D2) 50,000 unit capsule Take 50,000 Units by mouth every seven (7) days. Twice a week Sunday and Wednesday      metFORMIN (GLUCOPHAGE) 500 mg tablet Take 1,000 mg by mouth daily (with dinner).  estradiol (ESTRACE) 0.5 mg tablet Take 0.5 mg by mouth daily.  terconazole (TERAZOL 7) 0.4 % vaginal cream Insert 1 Applicator into vagina nightly. has refill for 7 day supply      diclofenac potassium (CATAFLAM) 50 mg tablet Take 50 mg by mouth two (2) times a day. Patient takes 1 tablet twice daily for a week on, then a week off, then repeats.  miconazole (MICOTIN) 2 % topical cream Apply 1 Each to affected area two (2) times a day. Apply ointment to fungal infection under abdominal pannus and groin twice a day for 14 days.  aspirin 81 mg chewable tablet Take 81 mg by mouth daily.  gabapentin (NEURONTIN) 100 mg capsule Take 1 Cap by mouth three (3) times daily. (Patient taking differently: Take 1 Cap by mouth four (4) times daily.) 90 Cap 1    levothyroxine (SYNTHROID) 112 mcg tablet Take 112 mcg by mouth Daily (before breakfast).  glimepiride (AMARYL) 2 mg tablet Take 2 mg by mouth every morning.  metoprolol succinate (TOPROL XL) 25 mg XL tablet Take 25 mg by mouth daily.  omeprazole (PRILOSEC) 20 mg capsule Take 40 mg by mouth daily.  topiramate (TOPAMAX) 200 mg tablet Take 200 mg by mouth nightly.  simvastatin (ZOCOR) 20 mg tablet Take 20 mg by mouth nightly.          Past History     Past Medical History:  Past Medical History:   Diagnosis Date    Adverse effect of anesthesia     slow to wake up       Anxiety and depression     Arrhythmia     Arthritis     Chronic pain     djd    Diabetes ([facility]     GERD (gastroesophageal reflux disease)     Hypertension     Hypothyroidism     Kidney stone     Liver disease     Obesity, morbid, BMI 50 or higher ([facility]     Seizures (HCC)        Past Surgical History:  Past Surgical History:   Procedure Laterality Date    CARDIAC CATHETERIZATION  11/18/2010         HX APPENDECTOMY      HX CHOLECYSTECTOMY      HX HYSTERECTOMY      HX TUBAL LIGATION         Family History:  Family History   Problem Relation Age of Onset    Heart Disease Mother     Hypertension Mother     Cancer Mother     Heart Disease Father     Hypertension Father     Cancer Brother        Social History:  Social History     Tobacco Use    Smoking status: Former Smoker    Smokeless tobacco: Never Used    Tobacco comment: quit smoking  about 34  years ago      Substance Use Topics    Alcohol use: No    Drug use: Yes     Types: Prescription, OTC       Allergies: Allergies   Allergen Reactions    Celebrex [Celecoxib] Nausea Only    Morphine Nausea Only    Sulfa (Sulfonamide Antibiotics) Nausea Only    Adhesive Other (comments)     Red and bumpy      Codeine Other (comments)     Hyper activity         Review of Systems   Review of Systems   Constitutional: Negative for chills, diaphoresis, fatigue and fever. HENT: Negative for ear pain and sore throat. Eyes: Negative for pain and redness. Respiratory: Positive for cough. Negative for shortness of breath. Cardiovascular: Positive for chest pain. Negative for leg swelling. Gastrointestinal: Positive for nausea. Negative for abdominal pain, diarrhea and vomiting. Endocrine: Negative for cold intolerance and heat intolerance. Genitourinary: Negative for flank pain and hematuria. Musculoskeletal: Negative for back pain and neck stiffness. Skin: Negative for rash and wound. Neurological: Negative for dizziness, syncope and headaches.    All other systems reviewed and are negative. Physical Exam   Physical Exam   Constitutional: She is oriented to person, place, and time. She appears well-developed and well-nourished. Patient is a morbidly obese female appears in no acute distress. She is not tachypneic and appears in no significant pain. HENT:   Head: Normocephalic and atraumatic. Mouth/Throat: Oropharynx is clear and moist. No oropharyngeal exudate. Eyes: Pupils are equal, round, and reactive to light. Conjunctivae and EOM are normal.   Neck: Normal range of motion. Cardiovascular: Normal rate and regular rhythm. No murmur heard. Pulmonary/Chest: Effort normal and breath sounds normal. No respiratory distress. She has no wheezes. She exhibits tenderness. Patient has some reproducible chest wall tenderness to palpation in the anterior chest wall. Abdominal: Soft. Bowel sounds are normal. She exhibits no distension. There is no tenderness. Musculoskeletal: Normal range of motion. She exhibits no edema or deformity. Neurological: She is alert and oriented to person, place, and time. Coordination normal.   Patient is alert and oriented x3. Cranial nerves II through XII are intact. sHe has no facial droop, pronator drift, or any lower extremity weakness. sHe has no sensory deficits in his face or any 4 of his extremities. He has no hyperreflexia. sHe has a normal gait, normal speech pattern, no notable cognitive deficits. Skin: Skin is warm and dry. No rash noted. Psychiatric: She has a normal mood and affect. Her behavior is normal.   Nursing note and vitals reviewed.       Diagnostic Study Results     Labs -     Recent Results (from the past 24 hour(s))   CBC WITH AUTOMATED DIFF    Collection Time: 06/11/19 11:12 AM   Result Value Ref Range    WBC 8.3 3.6 - 11.0 K/uL    RBC 5.18 3.80 - 5.20 M/uL    HGB 11.4 (L) 11.5 - 16.0 g/dL    HCT 38.7 35.0 - 47.0 %    MCV 74.7 (L) 80.0 - 99.0 FL    MCH 22.0 (L) 26.0 - 34.0 PG    MCHC 29.5 (L) 30.0 - 36.5 g/dL    RDW 19.0 (H) 11.5 - 14.5 %    PLATELET 120 085 - 930 K/uL    MPV 11.3 8.9 - 12.9 FL    NRBC 0.0 0  WBC    ABSOLUTE NRBC 0.00 0.00 - 0.01 K/uL    NEUTROPHILS 68 32 - 75 %    LYMPHOCYTES 21 12 - 49 %    MONOCYTES 8 5 - 13 %    EOSINOPHILS 2 0 - 7 %    BASOPHILS 1 0 - 1 %    IMMATURE GRANULOCYTES 0 0.0 - 0.5 %    ABS. NEUTROPHILS 5.7 1.8 - 8.0 K/UL    ABS. LYMPHOCYTES 1.7 0.8 - 3.5 K/UL    ABS. MONOCYTES 0.7 0.0 - 1.0 K/UL    ABS. EOSINOPHILS 0.2 0.0 - 0.4 K/UL    ABS. BASOPHILS 0.1 0.0 - 0.1 K/UL    ABS. IMM. GRANS. 0.0 0.00 - 0.04 K/UL    DF AUTOMATED     TROPONIN I    Collection Time: 06/11/19 11:12 AM   Result Value Ref Range    Troponin-I, Qt. <0.05 <2.73 ng/mL   METABOLIC PANEL, COMPREHENSIVE    Collection Time: 06/11/19 11:12 AM   Result Value Ref Range    Sodium 137 136 - 145 mmol/L    Potassium 4.0 3.5 - 5.1 mmol/L    Chloride 108 97 - 108 mmol/L    CO2 25 21 - 32 mmol/L    Anion gap 4 (L) 5 - 15 mmol/L    Glucose 120 (H) 65 - 100 mg/dL    BUN 16 6 - 20 MG/DL    Creatinine 0.93 0.55 - 1.02 MG/DL    BUN/Creatinine ratio 17 12 - 20      GFR est AA >60 >60 ml/min/1.73m2    GFR est non-AA >60 >60 ml/min/1.73m2    Calcium 9.4 8.5 - 10.1 MG/DL    Bilirubin, total 0.2 0.2 - 1.0 MG/DL    ALT (SGPT) 23 12 - 78 U/L    AST (SGOT) 18 15 - 37 U/L    Alk.  phosphatase 62 45 - 117 U/L    Protein, total 7.5 6.4 - 8.2 g/dL    Albumin 3.4 (L) 3.5 - 5.0 g/dL    Globulin 4.1 (H) 2.0 - 4.0 g/dL    A-G Ratio 0.8 (L) 1.1 - 2.2     EKG, 12 LEAD, SUBSEQUENT    Collection Time: 06/11/19  3:08 PM   Result Value Ref Range    Ventricular Rate 92 BPM    Atrial Rate 92 BPM    P-R Interval 156 ms    QRS Duration 86 ms    Q-T Interval 376 ms    QTC Calculation (Bezet) 464 ms    Calculated P Axis 41 degrees    Calculated R Axis 20 degrees    Calculated T Axis 24 degrees    Diagnosis       Normal sinus rhythm  Normal ECG  When compared with ECG of 26-DEC-2018 02:04,  No significant change was found     POC TROPONIN-I    Collection Time: 06/11/19  3:10 PM   Result Value Ref Range    Troponin-I (POC) <0.04 0.00 - 0.08 ng/mL   POC G3 - PUL    Collection Time: 06/11/19  3:28 PM   Result Value Ref Range    pH (POC) 7.279 (L) 7.35 - 7.45      pCO2 (POC) 38.4 35.0 - 45.0 MMHG    pO2 (POC) 75 (L) 80 - 100 MMHG    HCO3 (POC) 18.0 (L) 22 - 26 MMOL/L    sO2 (POC) 93 92 - 97 %    Base deficit (POC) 9 mmol/L    Site LEFT RADIAL      Device: ROOM AIR      Allens test (POC) YES      Specimen type (POC) ARTERIAL      Total resp. rate 20         Radiologic Studies -   CTA CHEST W OR W WO CONT   Final Result   IMPRESSION: No evidence for pulmonary embolism. .      XR CHEST PORT   Final Result   IMPRESSION:   No acute process. CT Results  (Last 48 hours)               06/11/19 1703  CTA CHEST W OR W WO CONT Final result    Impression:  IMPRESSION: No evidence for pulmonary embolism. .       Narrative:  EXAM: CTA CHEST W OR W WO CONT       INDICATION: Shortness of breath. History of COPD. r/o PE       COMPARISON: 2011. CONTRAST: 160 mL of Isovue-370. TECHNIQUE:    Precontrast  images were obtained to localize the volume for acquisition. Multislice helical CT arteriography was performed from the diaphragm to the   thoracic inlet during uneventful rapid bolus intravenous contrast   administration. Lung and soft tissue windows were generated. Coronal and   sagittal images were generated and 3D post processing consisting of coronal   maximum intensity images was performed. CT dose reduction was achieved through   use of a standardized protocol tailored for this examination and automatic   exposure control for dose modulation. Note: The study required to injections. The study is somewhat compromised by   patient body habitus. FINDINGS:   The lungs are clear of mass, nodule, airspace disease or edema. The central pulmonary arteries are well enhanced and no pulmonary emboli are   identified.        There is no mediastinal or hilar adenopathy or mass. The aorta enhances normally   without evidence of aneurysm or dissection. The visualized portions of the upper abdominal organs are normal.   Cholecystectomy clips are present. CXR Results  (Last 48 hours)               06/11/19 1126  XR CHEST PORT Final result    Impression:  IMPRESSION:   No acute process. Narrative:  PORTABLE CHEST RADIOGRAPH/S: 6/11/2019 11:26 AM       Clinical history: Chest pain, hyperthyroidism, hypertension   INDICATION:   chest pain   COMPARISON: 12/26/2018       FINDINGS:   AP portable upright view of the chest demonstrates stable  cardiopericardial   silhouette. The lungs are adequately expanded. There is no edema, effusion,   consolidation, or pneumothorax. The osseous structures are unremarkable. Patient   is on a cardiac monitor. Medical Decision Making   I am the first provider for this patient. I reviewed the vital signs, available nursing notes, past medical history, past surgical history, family history and social history. Vital Signs-Reviewed the patient's vital signs. Patient Vitals for the past 24 hrs:   Temp Pulse Resp BP SpO2   06/11/19 1500  92 19 114/64 94 %   06/11/19 1430  92 19 117/61 94 %   06/11/19 1400  88 18 125/66 94 %   06/11/19 1330  87 18 122/69 94 %   06/11/19 1300  88 18 108/63 96 %   06/11/19 1130    101/86 99 %   06/11/19 1107    120/64 96 %   06/11/19 1020     96 %   06/11/19 1019 98.2 °F (36.8 °C) 93 16 116/65 94 %       Pulse Oximetry Analysis -93 % on room air    Cardiac Monitor:   Rate: 75 bpm  Rhythm: Normal Sinus Rhythm        Records Reviewed: Nursing Notes and Old Medical Records    Differential Diagnosis:    Patient presents with acute dyspnea. DDx: asthma, copd, pna, pulmonary edema, acute bronchitis, ACS, ptx, pna. Will obtain EKG, labs, CXR, provide O2 as needed for hypoxia, treat symptomatically and reassess.  Will continue to monitor closely in ED. Provider Notes (Medical Decision Making):   Patient with likely COPD exacerbation. Given significant walking hypoxia they will admit for further management      ED Course:     Initial assessment performed. The patients presenting problems have been discussed, and they are in agreement with the care plan formulated and outlined with them. I have encouraged them to ask questions as they arise throughout their visit. ED Course as of Jun 11 1931   Tue Jun 11, 2019   Drea Gonzalez - Ama[facility] Patient with persistent chest pain and shortness of breath of unclear etiology. Patient has known COPD and this is a likely culprit to her symptoms. CT results pending at this time. Borderline hypoxemia of unclear etiology. Case signed out to Dr. Madhuri Fam will follow up with the results of the CT. At that time will evaluate for possible admission.    [CC]      ED Course User Index  [CC] Anderson Callow, MD      PROGRESS:  Patient is an reevaluated, noted to become hypoxic and tachycardic with minimal ambulation, will admit for further management. Critical Care Time:     None    Disposition:  Admit Note  7:25 PM  Patient is being admitted to the hospital by Dr. Hieu Henderson. The results of their tests and reasons for their admission have been discussed with them and/or available family. They convey agreement and understanding for the need to be admitted and for their admission diagnosis. Consultation has been made with the inpatient physician specialist for hospitalization. PLAN:  1. Admit to the hospital  Diagnosis     Clinical Impression:   1. Acute respiratory failure with hypoxia ([address]    2.  COPD exacerbation ([address]

## 2019-06-12 ENCOUNTER — APPOINTMENT (OUTPATIENT)
Dept: NON INVASIVE DIAGNOSTICS | Age: 59
End: 2019-06-12
Attending: HOSPITALIST
Payer: COMMERCIAL

## 2019-06-12 LAB
ECHO AV PEAK GRADIENT: 8.1 MMHG
ECHO AV PEAK VELOCITY: 142.38 CM/S
ECHO EST RA PRESSURE: 10 MMHG
ECHO LA MAJOR AXIS: 2.79 CM
ECHO LV INTERNAL DIMENSION DIASTOLIC: 4.58 CM (ref 3.9–5.3)
ECHO LV INTERNAL DIMENSION SYSTOLIC: 3.36 CM
ECHO LV IVSD: 1.09 CM (ref 0.6–0.9)
ECHO LV IVSS: 1.04 CM
ECHO LV MASS 2D: 208.9 G (ref 67–162)
ECHO LV MASS INDEX 2D: 83.8 G/M2 (ref 43–95)
ECHO LV POSTERIOR WALL DIASTOLIC: 1.09 CM (ref 0.6–0.9)
ECHO LV POSTERIOR WALL SYSTOLIC: 1.05 CM
ECHO LVOT PEAK GRADIENT: 4.5 MMHG
ECHO LVOT PEAK VELOCITY: 106.2 CM/S
ECHO MV A VELOCITY: 51.66 CM/S
ECHO MV AREA PHT: 3.8 CM2
ECHO MV E VELOCITY: 70.78 CM/S
ECHO MV E/A RATIO: 1.37
ECHO MV PRESSURE HALF TIME (PHT): 58.6 MS
ECHO MV REGURGITANT PEAK GRADIENT: 7.8 MMHG
ECHO MV REGURGITANT PEAK VELOCITY: 139.55 CM/S
ECHO RIGHT VENTRICULAR SYSTOLIC PRESSURE (RVSP): 18.4 MMHG
ECHO TV REGURGITANT MAX VELOCITY: 145.29 CM/S
ECHO TV REGURGITANT PEAK GRADIENT: 8.4 MMHG
GLUCOSE BLD STRIP.AUTO-MCNC: 115 MG/DL (ref 65–100)
GLUCOSE BLD STRIP.AUTO-MCNC: 153 MG/DL (ref 65–100)
GLUCOSE BLD STRIP.AUTO-MCNC: 161 MG/DL (ref 65–100)
GLUCOSE BLD STRIP.AUTO-MCNC: 184 MG/DL (ref 65–100)
SERVICE CMNT-IMP: ABNORMAL

## 2019-06-12 PROCEDURE — 99218 HC RM OBSERVATION: CPT

## 2019-06-12 PROCEDURE — 74011250637 HC RX REV CODE- 250/637: Performed by: HOSPITALIST

## 2019-06-12 PROCEDURE — 74011250636 HC RX REV CODE- 250/636: Performed by: INTERNAL MEDICINE

## 2019-06-12 PROCEDURE — 94760 N-INVAS EAR/PLS OXIMETRY 1: CPT

## 2019-06-12 PROCEDURE — 96372 THER/PROPH/DIAG INJ SC/IM: CPT

## 2019-06-12 PROCEDURE — 74011250637 HC RX REV CODE- 250/637: Performed by: INTERNAL MEDICINE

## 2019-06-12 PROCEDURE — 94640 AIRWAY INHALATION TREATMENT: CPT

## 2019-06-12 PROCEDURE — 82962 GLUCOSE BLOOD TEST: CPT

## 2019-06-12 PROCEDURE — 74011250636 HC RX REV CODE- 250/636: Performed by: HOSPITALIST

## 2019-06-12 PROCEDURE — 74011636637 HC RX REV CODE- 636/637: Performed by: INTERNAL MEDICINE

## 2019-06-12 PROCEDURE — 74011000250 HC RX REV CODE- 250: Performed by: HOSPITALIST

## 2019-06-12 PROCEDURE — C8929 TTE W OR WO FOL WCON,DOPPLER: HCPCS

## 2019-06-12 RX ORDER — PRAZOSIN HYDROCHLORIDE 1 MG/1
4 CAPSULE ORAL
Status: DISCONTINUED | OUTPATIENT
Start: 2019-06-12 | End: 2019-06-13 | Stop reason: HOSPADM

## 2019-06-12 RX ORDER — PANTOPRAZOLE SODIUM 40 MG/1
40 TABLET, DELAYED RELEASE ORAL
Status: DISCONTINUED | OUTPATIENT
Start: 2019-06-12 | End: 2019-06-13 | Stop reason: HOSPADM

## 2019-06-12 RX ORDER — ESTRADIOL 1 MG/1
0.5 TABLET ORAL DAILY
Status: DISCONTINUED | OUTPATIENT
Start: 2019-06-12 | End: 2019-06-13 | Stop reason: HOSPADM

## 2019-06-12 RX ORDER — GUAIFENESIN 600 MG/1
600 TABLET, EXTENDED RELEASE ORAL 2 TIMES DAILY
Status: DISCONTINUED | OUTPATIENT
Start: 2019-06-12 | End: 2019-06-13 | Stop reason: HOSPADM

## 2019-06-12 RX ORDER — IPRATROPIUM BROMIDE AND ALBUTEROL SULFATE 2.5; .5 MG/3ML; MG/3ML
3 SOLUTION RESPIRATORY (INHALATION)
Status: DISCONTINUED | OUTPATIENT
Start: 2019-06-12 | End: 2019-06-13

## 2019-06-12 RX ADMIN — ENOXAPARIN SODIUM 40 MG: 40 INJECTION SUBCUTANEOUS at 08:38

## 2019-06-12 RX ADMIN — IPRATROPIUM BROMIDE AND ALBUTEROL SULFATE 3 ML: .5; 3 SOLUTION RESPIRATORY (INHALATION) at 12:57

## 2019-06-12 RX ADMIN — GABAPENTIN 100 MG: 100 CAPSULE ORAL at 15:09

## 2019-06-12 RX ADMIN — Medication 10 ML: at 21:33

## 2019-06-12 RX ADMIN — PANTOPRAZOLE SODIUM 40 MG: 40 TABLET, DELAYED RELEASE ORAL at 09:39

## 2019-06-12 RX ADMIN — QUETIAPINE FUMARATE 600 MG: 400 TABLET ORAL at 22:34

## 2019-06-12 RX ADMIN — ESTRADIOL 0.5 MG: 1 TABLET ORAL at 18:25

## 2019-06-12 RX ADMIN — BUPROPION HYDROCHLORIDE 300 MG: 150 TABLET, FILM COATED, EXTENDED RELEASE ORAL at 00:02

## 2019-06-12 RX ADMIN — LORAZEPAM 1 MG: 1 TABLET ORAL at 17:11

## 2019-06-12 RX ADMIN — Medication 10 ML: at 00:04

## 2019-06-12 RX ADMIN — ENOXAPARIN SODIUM 40 MG: 40 INJECTION SUBCUTANEOUS at 21:31

## 2019-06-12 RX ADMIN — LEVOTHYROXINE SODIUM 112 MCG: 0.11 TABLET ORAL at 06:34

## 2019-06-12 RX ADMIN — GABAPENTIN 100 MG: 100 CAPSULE ORAL at 08:37

## 2019-06-12 RX ADMIN — TOPIRAMATE 200 MG: 100 TABLET, FILM COATED ORAL at 21:31

## 2019-06-12 RX ADMIN — IPRATROPIUM BROMIDE AND ALBUTEROL SULFATE 3 ML: .5; 3 SOLUTION RESPIRATORY (INHALATION) at 20:47

## 2019-06-12 RX ADMIN — BUSPIRONE HYDROCHLORIDE 15 MG: 7.5 TABLET ORAL at 15:09

## 2019-06-12 RX ADMIN — INSULIN LISPRO 2 UNITS: 100 INJECTION, SOLUTION INTRAVENOUS; SUBCUTANEOUS at 12:12

## 2019-06-12 RX ADMIN — GUAIFENESIN 600 MG: 600 TABLET, EXTENDED RELEASE ORAL at 08:37

## 2019-06-12 RX ADMIN — ACETAMINOPHEN 650 MG: 325 TABLET ORAL at 21:38

## 2019-06-12 RX ADMIN — BUSPIRONE HYDROCHLORIDE 15 MG: 7.5 TABLET ORAL at 21:31

## 2019-06-12 RX ADMIN — IPRATROPIUM BROMIDE AND ALBUTEROL SULFATE 3 ML: .5; 3 SOLUTION RESPIRATORY (INHALATION) at 15:03

## 2019-06-12 RX ADMIN — Medication 10 ML: at 15:09

## 2019-06-12 RX ADMIN — TOPIRAMATE 200 MG: 100 TABLET, FILM COATED ORAL at 00:03

## 2019-06-12 RX ADMIN — METOPROLOL SUCCINATE 25 MG: 25 TABLET, FILM COATED, EXTENDED RELEASE ORAL at 08:37

## 2019-06-12 RX ADMIN — LORAZEPAM 1 MG: 1 TABLET ORAL at 08:37

## 2019-06-12 RX ADMIN — ASPIRIN 81 MG 81 MG: 81 TABLET ORAL at 08:37

## 2019-06-12 RX ADMIN — PRAZOSIN HYDROCHLORIDE 4 MG: 1 CAPSULE ORAL at 22:33

## 2019-06-12 RX ADMIN — OXYCODONE AND ACETAMINOPHEN 1 TABLET: 5; 325 TABLET ORAL at 18:25

## 2019-06-12 RX ADMIN — GABAPENTIN 100 MG: 100 CAPSULE ORAL at 21:31

## 2019-06-12 RX ADMIN — PERFLUTREN 2 ML: 6.52 INJECTION, SUSPENSION INTRAVENOUS at 13:32

## 2019-06-12 RX ADMIN — INSULIN LISPRO 2 UNITS: 100 INJECTION, SOLUTION INTRAVENOUS; SUBCUTANEOUS at 17:06

## 2019-06-12 RX ADMIN — BUSPIRONE HYDROCHLORIDE 15 MG: 7.5 TABLET ORAL at 00:02

## 2019-06-12 RX ADMIN — GUAIFENESIN 600 MG: 600 TABLET, EXTENDED RELEASE ORAL at 17:06

## 2019-06-12 RX ADMIN — ACETAMINOPHEN 650 MG: 325 TABLET ORAL at 11:24

## 2019-06-12 RX ADMIN — QUETIAPINE FUMARATE 600 MG: 400 TABLET ORAL at 00:03

## 2019-06-12 RX ADMIN — IPRATROPIUM BROMIDE AND ALBUTEROL SULFATE 3 ML: .5; 3 SOLUTION RESPIRATORY (INHALATION) at 08:59

## 2019-06-12 RX ADMIN — BUPROPION HYDROCHLORIDE 300 MG: 150 TABLET, FILM COATED, EXTENDED RELEASE ORAL at 22:58

## 2019-06-12 RX ADMIN — OXYCODONE AND ACETAMINOPHEN 1 TABLET: 5; 325 TABLET ORAL at 12:12

## 2019-06-12 RX ADMIN — BUSPIRONE HYDROCHLORIDE 15 MG: 7.5 TABLET ORAL at 08:37

## 2019-06-12 RX ADMIN — OXYCODONE AND ACETAMINOPHEN 1 TABLET: 5; 325 TABLET ORAL at 06:34

## 2019-06-12 NOTE — PROGRESS NOTES
Hospitalist Progress Note    NAME: Chucky August   :  1960   MRN:  [id number]       Assessment / Plan:  Acute bronchitis with mild COPD exacerbation   Likely baseline Chronic Hypoxic Respiratory failure POA  Due to Obesity Hypoventilation Syndrome POA  Due to Morbid Obesity POA- 374 Lb  -Sx: dry cough/ congestion/ DIOP  -start jet nebs  Not wheezing --> no steroids  Per pt she has h/o copd/emphysema  -refer to pulmonology OP  -O2 to keep philipp >90%, wean as tolerated, assess for home O2 on DC  SW consulted for home O2   -check echo   -OP sleep study   -CTA: clear/ no PE     Fibromyalgia/Chronic Pain syndrome on Polypharmacy POA  - pt requested today her pain meds to be increased   D/w pt that she should follow with PCP to address her polypharmacy   Will not adjust any pain meds now                   Code Status: Full  Surrogate Decision Maker: Daughter Reece Kolb  DVT Prophylaxis: SQ Lovenox       Baseline: Pt lives with daughter, ambulates with cane/walker   Recommended Disposition: Home w/Family     Subjective:     Chief Complaint / Reason for Physician Visit: following acute bronchitis / dyspnea  C/o dry cough/congestion       Discussed with RN events overnight. Review of Systems:  Symptom Y/N Comments  Symptom Y/N Comments   Fever/Chills n   Chest Pain n    Poor Appetite    Edema     Cough    Abdominal Pain n    Sputum    Joint Pain     SOB/DIOP y DIOP   Pruritis/Rash     Nausea/vomit    Tolerating PT/OT     Diarrhea    Tolerating Diet     Constipation    Other       Could NOT obtain due to:      Objective:     VITALS:   Last 24hrs VS reviewed since prior progress note.  Most recent are:  Patient Vitals for the past 24 hrs:   Temp Pulse Resp BP SpO2   19 1258     95 %   19 1121    96/65    19 1105 98.5 °F (36.9 °C) 85 18 109/68 94 %   19 0907     94 %   19 0723 98.2 °F (36.8 °C) 78 18 96/65 94 %   19 0516 98.2 °F (36.8 °C) 95 18 145/88 95 %   19 2345 98 °F (36.7 °C) 87 16 121/78 92 %   06/11/19 2130  96 19 123/55 95 %   06/11/19 2100  94  119/65 94 %   06/11/19 2030  (!) 101  111/63 94 %   06/11/19 2015    127/84 94 %   06/11/19 1945 99.8 °F (37.7 °C) 100 20 124/79 95 %   06/11/19 1500  92 19 114/64 94 %   06/11/19 1430  92 19 117/61 94 %   06/11/19 1400  88 18 125/66 94 %       Intake/Output Summary (Last 24 hours) at 6/12/2019 1344  Last data filed at 6/12/2019 0529  Gross per 24 hour   Intake 390 ml   Output 600 ml   Net -210 ml        PHYSICAL EXAM:  General: WD, WN. Alert, cooperative, no acute distress    EENT:  EOMI. Anicteric sclerae. MMM  Resp:  Distant BS bilaterally due to body habitus, no wheezing or rales. No accessory muscle use  CV:  Regular  rhythm,  No edema  GI:  Soft, Non distended, Non tender.  +Bowel sounds  Neurologic:  Alert and oriented X 3, normal speech,   Psych:   Good insight. Not anxious nor agitated  Skin:  No rashes. No jaundice    Reviewed most current lab test results and cultures  YES  Reviewed most current radiology test results   YES  Review and summation of old records today    NO  Reviewed patient's current orders and MAR    YES  PMH/SH reviewed - no change compared to H&P  ________________________________________________________________________  Care Plan discussed with:    Comments   Patient y    Family      RN y    Care Manager y    Consultant                        Multidiciplinary team rounds were held today with , nursing, pharmacist and clinical coordinator. Patient's plan of care was discussed; medications were reviewed and discharge planning was addressed.      ________________________________________________________________________  Total NON critical care TIME:  35   Minutes    Total CRITICAL CARE TIME Spent:   Minutes non procedure based      Comments   >50% of visit spent in counseling and coordination of care ________________________________________________________________________  Rhonda Sosa MD     Procedures: see electronic medical records for all procedures/Xrays and details which were not copied into this note but were reviewed prior to creation of Plan. LABS:  I reviewed today's most current labs and imaging studies.   Pertinent labs include:  Recent Labs     06/11/19  1112   WBC 8.3   HGB 11.4*   HCT 38.7        Recent Labs     06/11/19  1112      K 4.0      CO2 25   *   BUN 16   CREA 0.93   CA 9.4   ALB 3.4*   TBILI 0.2   SGOT 18   ALT 23       Signed: Rhonda Sosa MD

## 2019-06-12 NOTE — PROGRESS NOTES
ADULT PROTOCOL: JET AEROSOL ASSESSMENT    Patient  Rico Mckay     61 y.o.   female     6/12/2019  5:05 PM    Breath Sounds Pre Procedure: Right Breath Sounds: Diminished                               Left Breath Sounds: Diminished    Breath Sounds Post Procedure: Right Breath Sounds: Diminished                                 Left Breath Sounds: Diminished    Breathing pattern: Pre procedure Breathing Pattern: Regular          Post procedure Breathing Pattern: Regular    Heart Rate: Pre procedure Pulse: 75           Post procedure Pulse: 79    Resp Rate: Pre procedure Respirations: 18           Post procedure Respirations: 20    Peak Flow: Pre bronchodilator             Post bronchodilator         Oxygen: O2 Device: Room air       Changed: NO    SpO2: Pre procedure SpO2: 94 %   without oxygen              Post procedure SpO2: 98 %  without oxygen    Nebulizer Therapy: Current medications Aerosolized Medications: DuoNeb      Changed: No    Smoking History:  Former smoker    Problem List:   Patient Active Problem List   Diagnosis Code    SOB (shortness of breath) R06.02    DIOP (dyspnea on exertion) R06.09    Diabetes (HCC) E11.9    Hypertension I10    Normal coronary arteries Z03.89    Panniculitis M79.3    Right anterior knee pain M25.561    Cyst, baker's knee, right M71.21    Obesity, morbid ([address] E66.01    Ureteral stone with hydronephrosis N13.2    COPD exacerbation ([address] J44.1    Chronic respiratory failure with hypoxia ([address] J96.11    Obesity hypoventilation syndrome ([address] E66.2       Respiratory Therapist: Addi Swann RT

## 2019-06-12 NOTE — PROGRESS NOTES
Patient's oxygen saturations 100% on room air at rest. The patient ambulated half way down the turk without oxygen and oxygen saturations stayed above 96%. The patient's exhibited dyspnea on exertion and her heart rate increased to the 130's. The patient sat in the chair multiple times to catch her breath during ambulation.

## 2019-06-12 NOTE — PROGRESS NOTES
Lovenox 40 mg sc daily for dvt prophylaxis,  pt wt is 169kg,  bmi=68,  crcl=51.5, lovenox dosing adjusted to 40 mg sc q12h  as  per protocol.   PREMA MartinezD

## 2019-06-12 NOTE — ROUTINE PROCESS
Bedside, Verbal and Written shift change report given to Rich Henderson RN  (oncoming nurse) by Charmaine Sharp RN  (offgoing nurse). Report included the following information SBAR, Kardex, MAR and Recent Results.

## 2019-06-12 NOTE — PROGRESS NOTES
Problem: Falls - Risk of  Goal: *Absence of Falls  Description  Document Kameron Perera Fall Risk and appropriate interventions in the flowsheet.   Outcome: Progressing Towards Goal

## 2019-06-12 NOTE — H&P
Hospitalist Admission Note    NAME: Tiffani Cervantes   :  1960   MRN:  [id number]     Date/Time:  2019 10:08 PM    Patient PCP: Melisa Pinto DO (in [place])  ______________________________________________________________________  Given the patient's current clinical presentation, I have a high level of concern for decompensation if discharged from the emergency department. Complex decision making was performed, which includes reviewing the patient's available past medical records, laboratory results, and x-ray films. My assessment of this patient's clinical condition and my plan of care is as follows. Assessment / Plan:  Likely Chronic Hypoxic Resp failure POA  Due to Obesity Hypoventilation Syndrome POA  Due to Morbid Obesity POA- 374 Lb  Fibromyalgia/Chronic Pain syndrome on Polypharmacy POA  CXR neg acute  CTA chest neg acute, neg PE  No wheezing on exam    Observation on telemetry bed  Oxygen to keep sats >89% at rest & after ambulation- CM consulted for arranging oxygen on discharge-- pt likely needs it  Pt couldn't tolerate Nebs- made her more jittery & anxious- doesn't want to cont it, pt not wheezing  No indication for steroids  Outpatient Sleep study to R/o CRYSTAL & outpatient Pulmonary referral needed on DC. Bariatric surgery options discussed - pt is going to look into it & its coverage with her insurance. Limit polypharmacy as able- demanding her psych meds along with oxycodone & anxiety medications        Code Status: Full  Surrogate Decision Maker: Daughter Chepe Lei    DVT Prophylaxis: SQ Lovenox  GI Prophylaxis: not indicated    Baseline: Pt lives with daughter, is ambulatory       Subjective:   CHIEF COMPLAINT: SOB x 2 days    HISTORY OF PRESENT ILLNESS:     Ever Armas is a 61 y.o.  female who presents with above complains from home ambulatory. Pt complains of feeling SOB on ambulation with generalized weakness & fatigue x few days. Denies any Chest pain, palpitations, wheezing or orthopnea  Denies any new edema of the legs  H/o losing weight- from 400's down to 370's now  Had sleep study in past- but was borderline as per pt- never recommended CPAP    Pt was found to have mild hypoxia on ambulation & hypoxemia on ABG at rest in ER with unremarkable workup including CTA chest to r/o PE. We were asked to admit for work up and evaluation of the above problems. Past Medical History:   Diagnosis Date    Adverse effect of anesthesia     slow to wake up       Anxiety and depression     Arrhythmia     Arthritis     Chronic pain     djd    Diabetes (HCC)     GERD (gastroesophageal reflux disease)     Hypertension     Hypothyroidism     Kidney stone     Liver disease     Obesity, morbid, BMI 50 or higher (HCC)     Seizures (HCC)         Past Surgical History:   Procedure Laterality Date    CARDIAC CATHETERIZATION  11/18/2010         HX APPENDECTOMY      HX CHOLECYSTECTOMY      HX HYSTERECTOMY      HX TUBAL LIGATION         Social History     Tobacco Use    Smoking status: Former Smoker    Smokeless tobacco: Never Used    Tobacco comment: quit smoking  about 34  years ago      Substance Use Topics    Alcohol use: No        Family History   Problem Relation Age of Onset    Heart Disease Mother     Hypertension Mother     Cancer Mother     Heart Disease Father     Hypertension Father     Cancer Brother      Allergies   Allergen Reactions    Celebrex [Celecoxib] Nausea Only    Morphine Nausea Only    Sulfa (Sulfonamide Antibiotics) Nausea Only    Adhesive Other (comments)     Red and bumpy      Codeine Other (comments)     Hyper activity        Prior to Admission medications    Medication Sig Start Date End Date Taking? Authorizing Provider   cyanocobalamin 2,500 mcg tab tablet Take 2 Tabs by mouth daily.  1/5/19   Alice Waller MD   oxyCODONE-acetaminophen (PERCOCET) 5-325 mg per tablet Take 1 Tab by mouth every six (6) hours as needed for Pain. Max Daily Amount: 4 Tabs. 1/4/19   Alice Waller MD   busPIRone (BUSPAR) 15 mg tablet Take 15 mg by mouth three (3) times daily. Provider, Historical   QUEtiapine (SEROQUEL) 400 mg tablet Take 600 mg by mouth nightly. Provider, Historical   nystatin, bulk, 15 billion unit powd 1 Units by Does Not Apply route three (3) times daily. To skin folds, ventral fold 12/26/18   Reyna Black MD   albuterol (PROVENTIL HFA, VENTOLIN HFA, PROAIR HFA) 90 mcg/actuation inhaler Take 2 Puffs by inhalation every four (4) hours as needed for Wheezing. 12/26/18   Reyna Black MD   LORazepam (ATIVAN) 1 mg tablet Take 1 mg by mouth every eight (8) hours as needed for Anxiety. 8/10/18   Provider, Historical   buPROPion XL (WELLBUTRIN XL) 150 mg tablet Take 300 mg by mouth nightly. Provider, Historical   prazosin (MINIPRESS) 1 mg capsule Take 4 mg by mouth nightly. Provider, Historical   ergocalciferol (VITAMIN D2) 50,000 unit capsule Take 50,000 Units by mouth every seven (7) days. Twice a week Sunday and Wednesday    Provider, Historical   metFORMIN (GLUCOPHAGE) 500 mg tablet Take 1,000 mg by mouth daily (with dinner). Provider, Historical   estradiol (ESTRACE) 0.5 mg tablet Take 0.5 mg by mouth daily. Provider, Historical   terconazole (TERAZOL 7) 0.4 % vaginal cream Insert 1 Applicator into vagina nightly. has refill for 7 day supply    Provider, Historical   diclofenac potassium (CATAFLAM) 50 mg tablet Take 50 mg by mouth two (2) times a day. Patient takes 1 tablet twice daily for a week on, then a week off, then repeats. Provider, Historical   miconazole (MICOTIN) 2 % topical cream Apply 1 Each to affected area two (2) times a day. Apply ointment to fungal infection under abdominal pannus and groin twice a day for 14 days. Provider, Historical   aspirin 81 mg chewable tablet Take 81 mg by mouth daily.     Chong Au NP   gabapentin (NEURONTIN) 100 mg capsule Take 1 Cap by mouth three (3) times daily. Patient taking differently: Take 1 Cap by mouth four (4) times daily. 11/24/17   Marty Velez MD   levothyroxine (SYNTHROID) 112 mcg tablet Take 112 mcg by mouth Daily (before breakfast). Provider, Historical   glimepiride (AMARYL) 2 mg tablet Take 2 mg by mouth every morning. Provider, Historical   metoprolol succinate (TOPROL XL) 25 mg XL tablet Take 25 mg by mouth daily. Provider, Historical   omeprazole (PRILOSEC) 20 mg capsule Take 40 mg by mouth daily. Provider, Historical   topiramate (TOPAMAX) 200 mg tablet Take 200 mg by mouth nightly. 12/8/10   Provider, Historical   simvastatin (ZOCOR) 20 mg tablet Take 20 mg by mouth nightly. Other, MD Ashish       REVIEW OF SYSTEMS:     I am not able to complete the review of systems because:    The patient is intubated and sedated    The patient has altered mental status due to his acute medical problems    The patient has baseline aphasia from prior stroke(s)    The patient has baseline dementia and is not reliable historian    The patient is in acute medical distress and unable to provide information           Total of 12 systems reviewed as follows:       POSITIVE= underlined text  Negative = text not underlined  General:  fever, chills, sweats, generalized weakness, weight loss/gain,      loss of appetite   Eyes:    blurred vision, eye pain, loss of vision, double vision  ENT:    rhinorrhea, pharyngitis   Respiratory:   cough, sputum production, SOB, DIOP, wheezing, pleuritic pain   Cardiology:   chest pain, palpitations, orthopnea, PND, edema, syncope   Gastrointestinal:  abdominal pain , N/V, diarrhea, dysphagia, constipation, bleeding   Genitourinary:  frequency, urgency, dysuria, hematuria, incontinence   Muskuloskeletal :  arthralgia, myalgia, back pain  Hematology:  easy bruising, nose or gum bleeding, lymphadenopathy   Dermatological: rash, ulceration, pruritis, color change / jaundice  Endocrine: hot flashes or polydipsia   Neurological:  headache, dizziness, confusion, focal weakness, paresthesia,     Speech difficulties, memory loss, gait difficulty  Psychological: Feelings of anxiety, depression, agitation    Objective:   VITALS:    Visit Vitals  /55   Pulse 96   Temp 99.8 °F (37.7 °C)   Resp 19   SpO2 95%       PHYSICAL EXAM:    General:    Alert, cooperative, no distress, appears stated age, Morbidly obese +     HEENT: Atraumatic, anicteric sclerae, pink conjunctivae     No oral ulcers, mucosa moist, throat clear, dentition fair  Neck:  Supple, symmetrical,  thyroid: non tender  Lungs:   Clear to auscultation bilaterally. No Wheezing or Rhonchi. No rales. Chest wall:  No tenderness  No Accessory muscle use. Heart:   Regular  rhythm,  No  murmur   No edema  Abdomen:   Soft, non-tender. Not distended. Bowel sounds normal  Extremities: No cyanosis. No clubbing,      Skin turgor normal, Capillary refill normal, Radial dial pulse 2+  Skin:     Not pale. Not Jaundiced  No rashes   Psych:  Good insight. Not depressed. Not anxious or agitated. Neurologic: EOMs intact. No facial asymmetry. No aphasia or slurred speech. Symmetrical strength, Sensation grossly intact.  Alert and oriented X 4.     _______________________________________________________________________  Care Plan discussed with:    Comments   Patient x    Family      RN x    Care Manager                    Consultant:  elvis Villareal   _______________________________________________________________________  Expected  Disposition:   Home with Family x   HH/PT/OT/RN    SNF/LTC    SHEA    ________________________________________________________________________  TOTAL TIME:  46 Minutes    Critical Care Provided     Minutes non procedure based      Comments    x Reviewed previous records   >50% of visit spent in counseling and coordination of care x Discussion with patient and questions answered ________________________________________________________________________  Signed: Dagmar Camilo MD    Procedures: see electronic medical records for all procedures/Xrays and details which were not copied into this note but were reviewed prior to creation of Plan. LAB DATA REVIEWED:    Recent Results (from the past 24 hour(s))   CBC WITH AUTOMATED DIFF    Collection Time: 06/11/19 11:12 AM   Result Value Ref Range    WBC 8.3 3.6 - 11.0 K/uL    RBC 5.18 3.80 - 5.20 M/uL    HGB 11.4 (L) 11.5 - 16.0 g/dL    HCT 38.7 35.0 - 47.0 %    MCV 74.7 (L) 80.0 - 99.0 FL    MCH 22.0 (L) 26.0 - 34.0 PG    MCHC 29.5 (L) 30.0 - 36.5 g/dL    RDW 19.0 (H) 11.5 - 14.5 %    PLATELET 529 710 - 275 K/uL    MPV 11.3 8.9 - 12.9 FL    NRBC 0.0 0  WBC    ABSOLUTE NRBC 0.00 0.00 - 0.01 K/uL    NEUTROPHILS 68 32 - 75 %    LYMPHOCYTES 21 12 - 49 %    MONOCYTES 8 5 - 13 %    EOSINOPHILS 2 0 - 7 %    BASOPHILS 1 0 - 1 %    IMMATURE GRANULOCYTES 0 0.0 - 0.5 %    ABS. NEUTROPHILS 5.7 1.8 - 8.0 K/UL    ABS. LYMPHOCYTES 1.7 0.8 - 3.5 K/UL    ABS. MONOCYTES 0.7 0.0 - 1.0 K/UL    ABS. EOSINOPHILS 0.2 0.0 - 0.4 K/UL    ABS. BASOPHILS 0.1 0.0 - 0.1 K/UL    ABS. IMM. GRANS. 0.0 0.00 - 0.04 K/UL    DF AUTOMATED     TROPONIN I    Collection Time: 06/11/19 11:12 AM   Result Value Ref Range    Troponin-I, Qt. <0.05 <1.70 ng/mL   METABOLIC PANEL, COMPREHENSIVE    Collection Time: 06/11/19 11:12 AM   Result Value Ref Range    Sodium 137 136 - 145 mmol/L    Potassium 4.0 3.5 - 5.1 mmol/L    Chloride 108 97 - 108 mmol/L    CO2 25 21 - 32 mmol/L    Anion gap 4 (L) 5 - 15 mmol/L    Glucose 120 (H) 65 - 100 mg/dL    BUN 16 6 - 20 MG/DL    Creatinine 0.93 0.55 - 1.02 MG/DL    BUN/Creatinine ratio 17 12 - 20      GFR est AA >60 >60 ml/min/1.73m2    GFR est non-AA >60 >60 ml/min/1.73m2    Calcium 9.4 8.5 - 10.1 MG/DL    Bilirubin, total 0.2 0.2 - 1.0 MG/DL    ALT (SGPT) 23 12 - 78 U/L    AST (SGOT) 18 15 - 37 U/L    Alk.  phosphatase 62 45 - 117 U/L Protein, total 7.5 6.4 - 8.2 g/dL    Albumin 3.4 (L) 3.5 - 5.0 g/dL    Globulin 4.1 (H) 2.0 - 4.0 g/dL    A-G Ratio 0.8 (L) 1.1 - 2.2     EKG, 12 LEAD, SUBSEQUENT    Collection Time: 06/11/19  3:08 PM   Result Value Ref Range    Ventricular Rate 92 BPM    Atrial Rate 92 BPM    P-R Interval 156 ms    QRS Duration 86 ms    Q-T Interval 376 ms    QTC Calculation (Bezet) 464 ms    Calculated P Axis 41 degrees    Calculated R Axis 20 degrees    Calculated T Axis 24 degrees    Diagnosis       Normal sinus rhythm  Normal ECG  Confirmed by Keith Herring ([id number]) on 6/11/2019 8:31:01 PM     POC TROPONIN-I    Collection Time: 06/11/19  3:10 PM   Result Value Ref Range    Troponin-I (POC) <0.04 0.00 - 0.08 ng/mL   POC G3 - PUL    Collection Time: 06/11/19  3:28 PM   Result Value Ref Range    pH (POC) 7.279 (L) 7.35 - 7.45      pCO2 (POC) 38.4 35.0 - 45.0 MMHG    pO2 (POC) 75 (L) 80 - 100 MMHG    HCO3 (POC) 18.0 (L) 22 - 26 MMOL/L    sO2 (POC) 93 92 - 97 %    Base deficit (POC) 9 mmol/L    Site LEFT RADIAL      Device: ROOM AIR      Allens test (POC) YES      Specimen type (POC) ARTERIAL      Total resp.  rate 20

## 2019-06-13 VITALS
HEIGHT: 62 IN | SYSTOLIC BLOOD PRESSURE: 141 MMHG | DIASTOLIC BLOOD PRESSURE: 76 MMHG | BODY MASS INDEX: 53.92 KG/M2 | RESPIRATION RATE: 18 BRPM | TEMPERATURE: 98 F | OXYGEN SATURATION: 94 % | WEIGHT: 293 LBS | HEART RATE: 83 BPM

## 2019-06-13 PROBLEM — M79.7 FIBROMYALGIA: Status: ACTIVE | Noted: 2019-06-13

## 2019-06-13 PROBLEM — J40 BRONCHITIS: Status: ACTIVE | Noted: 2019-06-13

## 2019-06-13 LAB
GLUCOSE BLD STRIP.AUTO-MCNC: 138 MG/DL (ref 65–100)
GLUCOSE BLD STRIP.AUTO-MCNC: 181 MG/DL (ref 65–100)
SERVICE CMNT-IMP: ABNORMAL
SERVICE CMNT-IMP: ABNORMAL

## 2019-06-13 PROCEDURE — 94760 N-INVAS EAR/PLS OXIMETRY 1: CPT

## 2019-06-13 PROCEDURE — 74011250637 HC RX REV CODE- 250/637: Performed by: HOSPITALIST

## 2019-06-13 PROCEDURE — 74011250636 HC RX REV CODE- 250/636: Performed by: INTERNAL MEDICINE

## 2019-06-13 PROCEDURE — 96372 THER/PROPH/DIAG INJ SC/IM: CPT

## 2019-06-13 PROCEDURE — 82962 GLUCOSE BLOOD TEST: CPT

## 2019-06-13 PROCEDURE — 74011636637 HC RX REV CODE- 636/637: Performed by: INTERNAL MEDICINE

## 2019-06-13 PROCEDURE — 99218 HC RM OBSERVATION: CPT

## 2019-06-13 PROCEDURE — 74011250637 HC RX REV CODE- 250/637: Performed by: NURSE PRACTITIONER

## 2019-06-13 PROCEDURE — 74011000250 HC RX REV CODE- 250: Performed by: HOSPITALIST

## 2019-06-13 PROCEDURE — 94640 AIRWAY INHALATION TREATMENT: CPT

## 2019-06-13 PROCEDURE — 77010033678 HC OXYGEN DAILY

## 2019-06-13 PROCEDURE — 74011250637 HC RX REV CODE- 250/637: Performed by: INTERNAL MEDICINE

## 2019-06-13 RX ORDER — PREDNISONE 10 MG/1
TABLET ORAL
Qty: 21 TAB | Refills: 0 | Status: SHIPPED | OUTPATIENT
Start: 2019-06-13 | End: 2019-07-26

## 2019-06-13 RX ORDER — FLUTICASONE FUROATE AND VILANTEROL 200; 25 UG/1; UG/1
1 POWDER RESPIRATORY (INHALATION) DAILY
Qty: 1 INHALER | Refills: 0 | Status: ON HOLD | OUTPATIENT
Start: 2019-06-14 | End: 2019-08-21

## 2019-06-13 RX ORDER — IPRATROPIUM BROMIDE AND ALBUTEROL SULFATE 2.5; .5 MG/3ML; MG/3ML
3 SOLUTION RESPIRATORY (INHALATION)
Status: DISCONTINUED | OUTPATIENT
Start: 2019-06-13 | End: 2019-06-13 | Stop reason: HOSPADM

## 2019-06-13 RX ORDER — ACETAMINOPHEN 325 MG/1
650 TABLET ORAL
Status: SHIPPED | COMMUNITY
Start: 2019-06-13 | End: 2019-07-26

## 2019-06-13 RX ORDER — FLUTICASONE FUROATE AND VILANTEROL 200; 25 UG/1; UG/1
1 POWDER RESPIRATORY (INHALATION) DAILY
Status: DISCONTINUED | OUTPATIENT
Start: 2019-06-13 | End: 2019-06-13 | Stop reason: HOSPADM

## 2019-06-13 RX ADMIN — INSULIN LISPRO 2 UNITS: 100 INJECTION, SOLUTION INTRAVENOUS; SUBCUTANEOUS at 14:10

## 2019-06-13 RX ADMIN — IPRATROPIUM BROMIDE AND ALBUTEROL SULFATE 3 ML: .5; 3 SOLUTION RESPIRATORY (INHALATION) at 07:10

## 2019-06-13 RX ADMIN — IPRATROPIUM BROMIDE AND ALBUTEROL SULFATE 3 ML: .5; 3 SOLUTION RESPIRATORY (INHALATION) at 13:08

## 2019-06-13 RX ADMIN — METOPROLOL SUCCINATE 25 MG: 25 TABLET, FILM COATED, EXTENDED RELEASE ORAL at 09:39

## 2019-06-13 RX ADMIN — ASPIRIN 81 MG 81 MG: 81 TABLET ORAL at 09:37

## 2019-06-13 RX ADMIN — GUAIFENESIN 600 MG: 600 TABLET, EXTENDED RELEASE ORAL at 09:38

## 2019-06-13 RX ADMIN — FLUTICASONE FUROATE AND VILANTEROL TRIFENATATE 1 PUFF: 200; 25 POWDER RESPIRATORY (INHALATION) at 10:42

## 2019-06-13 RX ADMIN — LEVOTHYROXINE SODIUM 112 MCG: 0.11 TABLET ORAL at 06:40

## 2019-06-13 RX ADMIN — GABAPENTIN 100 MG: 100 CAPSULE ORAL at 09:38

## 2019-06-13 RX ADMIN — BUSPIRONE HYDROCHLORIDE 15 MG: 7.5 TABLET ORAL at 09:37

## 2019-06-13 RX ADMIN — OXYCODONE AND ACETAMINOPHEN 1 TABLET: 5; 325 TABLET ORAL at 06:24

## 2019-06-13 RX ADMIN — ESTRADIOL 0.5 MG: 1 TABLET ORAL at 09:37

## 2019-06-13 RX ADMIN — Medication 10 ML: at 06:24

## 2019-06-13 RX ADMIN — LORAZEPAM 1 MG: 1 TABLET ORAL at 12:39

## 2019-06-13 RX ADMIN — ENOXAPARIN SODIUM 40 MG: 40 INJECTION SUBCUTANEOUS at 09:37

## 2019-06-13 RX ADMIN — PANTOPRAZOLE SODIUM 40 MG: 40 TABLET, DELAYED RELEASE ORAL at 09:38

## 2019-06-13 NOTE — PROGRESS NOTES
Reason for Admission:   Acute Respiratory Failure w/Hypoxia; COPD                   RRAT Score:  11              Plan for utilizing home health:   Prior HH/SNF in the past. If the recommendation is SNF pt desires to return to [facility]. Pt utilizes shower chair while showering. Independent w/all other ADL's. Pt drives self to medical appointments. No O2. DME (cane/walker). Current Advanced Directive/Advance Care Plan: FULL. Not on file. Spouse and daughter Dana Torres) are the decision makers. Transition of Care Plan:  Pt to discharge home w/family. Daughter to  at time of discharge. Pt last seen in PCP (Dr. Candance ) office May 16th, 2019. Pt aware to continue following up w/PCP and all recommended specialits. .    SW to continue to assist.    Care Management Interventions  PCP Verified by CM: Yes(May 16th, 2019 )  Mode of Transport at Discharge: Other (see comment)(Daughter)  Transition of Care Consult (CM Consult): Discharge Planning  Discharge Durable Medical Equipment: (No O2.  DME(walker and cane). )  Current Support Network: Own Home(Lives in ranch style home w/three steps to enter the front door. [place] )  Confirm Follow Up Transport: Family(Daughter to transport at discharge. )  Plan discussed with Pt/Family/Caregiver: Yes  Discharge Location  Discharge Placement: (Home)    Mono Bobo, MSW  [phone]

## 2019-06-13 NOTE — DISCHARGE INSTRUCTIONS
Chronic Obstructive Pulmonary Disease (COPD): Care Instructions  Your Care Instructions    Chronic obstructive pulmonary disease (COPD) is a general term for a group of lung diseases, including emphysema and chronic bronchitis. People with COPD have decreased airflow in and out of the lungs, which makes it hard to breathe. The airways also can get clogged with thick mucus. Cigarette smoking is a major cause of COPD. Although there is no cure for COPD, you can slow its progress. Following your treatment plan and taking care of yourself can help you feel better and live longer. Follow-up care is a key part of your treatment and safety. Be sure to make and go to all appointments, and call your doctor if you are having problems. It's also a good idea to know your test results and keep a list of the medicines you take. How can you care for yourself at home?   Staying healthy    · Do not smoke. This is the most important step you can take to prevent more damage to your lungs. If you need help quitting, talk to your doctor about stop-smoking programs and medicines. These can increase your chances of quitting for good.     · Avoid colds and flu. Get a pneumococcal vaccine shot. If you have had one before, ask your doctor whether you need a second dose. Get the flu vaccine every fall. If you must be around people with colds or the flu, wash your hands often.     · Avoid secondhand smoke, air pollution, and high altitudes. Also avoid cold, dry air and hot, humid air. Stay at home with your windows closed when air pollution is bad.    Medicines and oxygen therapy    · Take your medicines exactly as prescribed. Call your doctor if you think you are having a problem with your medicine.     · You may be taking medicines such as:  ? Bronchodilators. These help open your airways and make breathing easier. Bronchodilators are either short-acting (work for 6 to 9 hours) or long-acting (work for 24 hours).  You inhale most bronchodilators, so they start to act quickly. Always carry your quick-relief inhaler with you in case you need it while you are away from home. ? Corticosteroids (prednisone, budesonide). These reduce airway inflammation. They come in pill or inhaled form. You must take these medicines every day for them to work well.     · A spacer may help you get more inhaled medicine to your lungs. Ask your doctor or pharmacist if a spacer is right for you. If it is, ask how to use it properly.     · Do not take any vitamins, over-the-counter medicine, or herbal products without talking to your doctor first.     · If your doctor prescribed antibiotics, take them as directed. Do not stop taking them just because you feel better. You need to take the full course of antibiotics.     · Oxygen therapy boosts the amount of oxygen in your blood and helps you breathe easier. Use the flow rate your doctor has recommended, and do not change it without talking to your doctor first.   Activity    · Get regular exercise. Walking is an easy way to get exercise. Start out slowly, and walk a little more each day.     · Pay attention to your breathing. You are exercising too hard if you cannot talk while you are exercising.     · Take short rest breaks when doing household chores and other activities.     · Learn breathing methods--such as breathing through pursed lips--to help you become less short of breath.     · If your doctor has not set you up with a pulmonary rehabilitation program, talk to him or her about whether rehab is right for you. Rehab includes exercise programs, education about your disease and how to manage it, help with diet and other changes, and emotional support. Diet    · Eat regular, healthy meals. Use bronchodilators about 1 hour before you eat to make it easier to eat. Eat several small meals instead of three large ones.  Drink beverages at the end of the meal. Avoid foods that are hard to chew.     · Eat foods that contain protein so that you do not lose muscle mass.     · Talk with your doctor if you gain too much weight or if you lose weight without trying.    Mental health    · Talk to your family, friends, or a therapist about your feelings. It is normal to feel frightened, angry, hopeless, helpless, and even guilty. Talking openly about bad feelings can help you cope. If these feelings last, talk to your doctor. When should you call for help? Call 911 anytime you think you may need emergency care. For example, call if:    · You have severe trouble breathing.    Call your doctor now or seek immediate medical care if:    · You have new or worse trouble breathing.     · You cough up blood.     · You have a fever.    Watch closely for changes in your health, and be sure to contact your doctor if:    · You cough more deeply or more often, especially if you notice more mucus or a change in the color of your mucus.     · You have new or worse swelling in your legs or belly.     · You are not getting better as expected. Where can you learn more? Go to http://luis-julieth.info/. Lowell Espinoza in the search box to learn more about \"Chronic Obstructive Pulmonary Disease (COPD): Care Instructions. \"  Current as of: 2018  Content Version: 11.9  © 6970-8095 [employer]. Care instructions adapted under license by [employer] (which disclaims liability or warranty for this information).  If you have questions about a medical condition or this instruction, always ask your healthcare professional. [place] [address] any warranty or liability for your use of this information.              HOSPITALIST DISCHARGE INSTRUCTIONS    NAME: Alma Miles   :  1960   MRN:  [id number]     Date/Time:  2019 2:26 PM    ADMIT DATE: 2019   DISCHARGE DATE: 2019     Attending Physician: Queta Dewitt NP    DISCHARGE DIAGNOSIS:  COPD exacerbation  Bronchitis  Obesity  Fibromyalgia       Medications: Per above medication reconciliation. Pain Management: per above medications    Recommended diet: Diabetic Diet    Recommended activity: Activity as tolerated    Wound care: None    Indwelling devices:  None    Supplemental Oxygen: None    Code status: Full        Outside physician follow up: Follow-up Information     Follow up With Specialties Details Why Contact Info    Marin Graec MD Pulmonary Disease Call today  [id number] [facility]  [place] [address] [place] [address]  [phone]                      Information obtained by :  I understand that if any problems occur once I am at home I am to contact my physician. I understand and acknowledge receipt of the instructions indicated above.                                                                                                                                            Physician's or R.N.'s Signature                                                                  Date/Time                                                                                                                                              Patient or Repres

## 2019-06-13 NOTE — DISCHARGE SUMMARY
Hospitalist Discharge Summary     Patient ID:  Tripp Jones  [id number]  89 y.o.  1960 6/11/2019    PCP on record: Gianna Pugh DO    Admit date: 6/11/2019  Discharge date and time: 6/13/2019    DISCHARGE DIAGNOSIS:    COPD exacerbation  Bronchitis  Morbid obesity  Fibromyalgia     CONSULTATIONS:  None    Excerpted HPI from H&P of Kolby Harper MD:  Jami Maldonado is a 61 y.o.  female who presents with above complains from home ambulatory. Pt complains of feeling SOB on ambulation with generalized weakness & fatigue x few days. Denies any Chest pain, palpitations, wheezing or orthopnea  Denies any new edema of the legs  H/o losing weight- from 400's down to 370's now  Had sleep study in past- but was borderline as per pt- never recommended CPAP   Pt was found to have mild hypoxia on ambulation & hypoxemia on ABG at rest in ER with unremarkable workup including CTA chest to r/o PE. \"    ______________________________________________________________________  DISCHARGE SUMMARY/HOSPITAL COURSE:  for full details see H&P, daily progress notes, labs, consult notes. Acute bronchitis with mild COPD exacerbation   Likely baseline Chronic Hypoxic Respiratory failure POA  Due to Obesity Hypoventilation Syndrome POA  Due to Morbid Obesity POA- 374 Lb  -Sx: dry cough/ congestion/ DIOP  · Given duo nebs   · Not wheezing   · Per pt she has h/o copd/emphysema  · Does not have a pulmonologist   · refer to pulmonology OP  · O2 to keep philipp >90%, wean as tolerated, assess for home O2 on DC  · Patient passed 2 O2 challenges.   She does not meet requirements for home O2  · ECHO results:  Left Ventricle: Estimated left ventricular ejection fraction is 56 - 60%  · CXR negative for acute process  · CTA negative for PE  · Needs OP sleep study  · Discharge home on LABA and steroid dose pack      Fibromyalgia/Chronic Pain syndrome on Polypharmacy POA  · Patient is requesting to have her pain medication increased. D/w pt that she should follow with PCP to address her polypharmacy. Will not adjust any pain meds now. Patient is very anxious. · Addressed weight, diet and exercise with patient. Decrease carbohydrates (white foods, sweet foods, sweet drinks and alcohol), increase green leafy vegetables and protein (lean meats and beans) with each meal. Avoid fried foods. Eat 3-5 small meals daily. Do not skip meals. Increase water intake. Increase physical activity to 30 minutes daily for health benefit or 60 minutes daily to prevent weight regain, as tolerated. Get 7-8 hours uninterrupted sleep nightly.    _______________________________________________________________________  Patient seen and examined by me on discharge day. Pertinent Findings:  Gen:    Not in distress  Chest: Clear lungs  CVS:   Regular rhythm. No edema  Abd:  Soft, not distended, not tender  Neuro:  Alert, oriented X 3   _______________________________________________________________________  DISCHARGE MEDICATIONS:   Current Discharge Medication List      START taking these medications    Details   acetaminophen (TYLENOL) 325 mg tablet Take 2 Tabs by mouth every six (6) hours as needed for Pain. fluticasone furoate-vilanterol (BREO ELLIPTA) 200-25 mcg/dose inhaler Take 1 Puff by inhalation daily. Qty: 1 Inhaler, Refills: 0      predniSONE (STERAPRED DS) 10 mg dose pack See administration instruction per 10mg dose pack  Qty: 21 Tab, Refills: 0         CONTINUE these medications which have NOT CHANGED    Details   oxyCODONE-acetaminophen (PERCOCET) 5-325 mg per tablet Take 1 Tab by mouth every six (6) hours as needed for Pain. Max Daily Amount: 4 Tabs. Qty: 20 Tab, Refills: 0    Associated Diagnoses: Right ureteral stone      busPIRone (BUSPAR) 15 mg tablet Take 15 mg by mouth three (3) times daily. QUEtiapine (SEROQUEL) 400 mg tablet Take 600 mg by mouth nightly.       nystatin, bulk, 15 billion unit powd 1 Units by Does Not Apply route three (3) times daily. To skin folds, ventral fold  Qty: 2 Each, Refills: 1      albuterol (PROVENTIL HFA, VENTOLIN HFA, PROAIR HFA) 90 mcg/actuation inhaler Take 2 Puffs by inhalation every four (4) hours as needed for Wheezing. Qty: 1 Inhaler, Refills: 0      LORazepam (ATIVAN) 1 mg tablet Take 1 mg by mouth every eight (8) hours as needed for Anxiety. Refills: 0      buPROPion XL (WELLBUTRIN XL) 150 mg tablet Take 300 mg by mouth nightly. prazosin (MINIPRESS) 1 mg capsule Take 4 mg by mouth nightly.      ergocalciferol (VITAMIN D2) 50,000 unit capsule Take 50,000 Units by mouth every seven (7) days. Twice a week Sunday and Wednesday      metFORMIN (GLUCOPHAGE) 500 mg tablet Take 1,000 mg by mouth daily (with dinner). estradiol (ESTRACE) 0.5 mg tablet Take 0.5 mg by mouth daily. miconazole (MICOTIN) 2 % topical cream Apply 1 Each to affected area two (2) times a day. Apply ointment to fungal infection under abdominal pannus and groin twice a day for 14 days. aspirin 81 mg chewable tablet Take 81 mg by mouth daily. gabapentin (NEURONTIN) 100 mg capsule Take 1 Cap by mouth three (3) times daily. Qty: 90 Cap, Refills: 1      levothyroxine (SYNTHROID) 112 mcg tablet Take 112 mcg by mouth Daily (before breakfast). metoprolol succinate (TOPROL XL) 25 mg XL tablet Take 25 mg by mouth daily. omeprazole (PRILOSEC) 20 mg capsule Take 40 mg by mouth daily. topiramate (TOPAMAX) 200 mg tablet Take 200 mg by mouth nightly. simvastatin (ZOCOR) 20 mg tablet Take 20 mg by mouth nightly. cyanocobalamin 2,500 mcg tab tablet Take 2 Tabs by mouth daily. Qty: 30 Tab, Refills: 0      terconazole (TERAZOL 7) 0.4 % vaginal cream Insert 1 Applicator into vagina nightly. has refill for 7 day supply      diclofenac potassium (CATAFLAM) 50 mg tablet Take 50 mg by mouth two (2) times a day. Patient takes 1 tablet twice daily for a week on, then a week off, then repeats. Patient Follow Up Instructions: Activity: Activity as tolerated  Diet: Diabetic Diet  Wound Care: None needed    Follow-up with PCP in 1 week.     Follow-up Information     Follow up With Specialties Details Why Contact Olivia Ferro MD Pulmonary Disease Call today  [address]  [facility]  [address]  [phone]      Hortencia Pintos, [address] [facility]   [address] [facility]  [phone]      Hortencia Silvas, [address] [facility]   [address] [address]  [phone]          ________________________________________________________________    Risk of deterioration: Moderate    Condition at Discharge:  Stable  __________________________________________________________________    Disposition  Home with family, no needs    ____________________________________________________________________    Code Status: Full Code  ___________________________________________________________________      Total time in minutes spent coordinating this discharge (includes going over instructions, follow-up, prescriptions, and preparing report for sign off to her PCP) :  31 minutes    Signed:  Erasmo Lopez NP

## 2019-06-13 NOTE — PROGRESS NOTES
Physical Therapy Note    Orders received and chart reviewed. PT consult for Home O2 assessment. Per documentation RN has completed O2 challenge today and patient is scheduled for D/C. Will complete orders at this time.      Thank you,  Reece PAYTONT

## 2019-06-13 NOTE — INTERDISCIPLINARY ROUNDS
Oncology Interdisciplinary rounds were held today to discuss patient plan of care and outcomes. The following members were present: Nursing, Physician, Case Management, Pharmacy, and PT/OT Actual Length of Stay: 0 Expected Length of Stay: - - - Plan            Discharge Jet neb treatments Home with family when discharged

## 2019-06-13 NOTE — PROGRESS NOTES
ADULT PROTOCOL: JET AEROSOL  REASSESSMENT    Patient  Brittany Callahan     61 y.o.   female     6/13/2019  10:38 AM    Breath Sounds Pre Procedure: Right Breath Sounds: Clear, Lower, Diminished                               Left Breath Sounds: Clear, Lower, Diminished    Breath Sounds Post Procedure: Right Breath Sounds: Clear, Lower, Diminished                                 Left Breath Sounds: Clear, Lower, Diminished    Breathing pattern: Pre procedure Breathing Pattern: Regular          Post procedure Breathing Pattern: Regular    Heart Rate: Pre procedure Pulse: 96           Post procedure Pulse: 92    Resp Rate: Pre procedure Respirations: 16           Post procedure Respirations: 16      Cough: Pre procedure Cough: Non-productive, Dry               Post procedure Cough: Non-productive      Oxygen: O2 Device: Nasal cannula   Flow rate (L/min) 1.5 lpm     Changed: NO    SpO2: Pre procedure SpO2: 98 %   with oxygen              Post procedure SpO2: 97 %  with oxygen    Nebulizer Therapy: Current medications Aerosolized Medications: DuoNeb      Changed: YES/ changed to TID and PRN        Problem List:   Patient Active Problem List   Diagnosis Code    SOB (shortness of breath) R06.02    DIOP (dyspnea on exertion) R06.09    Diabetes ([facility]) E11.9    Hypertension I10    Normal coronary arteries Z03.89    Panniculitis M79.3    Right anterior knee pain M25.561    Cyst, baker's knee, right M71.21    Obesity, morbid ([facility]) E66.01    Ureteral stone with hydronephrosis N13.2    COPD exacerbation ([facility]) J44.1    Chronic respiratory failure with hypoxia ([facility]) J96.11    Obesity hypoventilation syndrome ([facility] [address] E66.2       Respiratory Therapist: Fritzi Dancer, RT

## 2019-06-13 NOTE — PROGRESS NOTES
Oncology End of Shift Note      Bedside shift change report given to Rach Rod RN (incoming nurse) by Kristy Lr (outgoing nurse) on Tripp Jones. Report included the following information SBAR, Kardex, Intake/Output, MAR and Recent Results. Shift Summary: no iv access, may be d/c today. Percocet given x1. Slept through most of the night. Issues for Physician to Address:  D/c today? Patient on Cardiac Monitoring?     [x] Yes  [] No    Rhythm: Telemetry: normal sinus rhythm         Shift Events  See above      Kristy Lr

## 2019-06-13 NOTE — PROGRESS NOTES
Bedside and Verbal shift change report given to Daryle Cole, rn (oncoming nurse) by Kathleen Adamson (offgoing nurse). Report included the following information SBAR, Procedure Summary, Intake/Output, MAR, Recent Results and Cardiac Rhythm NSR.

## 2019-06-13 NOTE — PROGRESS NOTES
Pulse oximetry assessment   98% at rest on room air (if 88% or less, skip next steps)  97% while ambulating on room air  100% at rest on 2 LPM  99% while ambulating on 2 LPM

## 2019-07-25 ENCOUNTER — HOSPITAL ENCOUNTER (EMERGENCY)
Age: 59
Discharge: HOME | End: 2019-07-26
Attending: EMERGENCY MEDICINE | Admitting: EMERGENCY MEDICINE
Payer: COMMERCIAL

## 2019-07-25 ENCOUNTER — APPOINTMENT (OUTPATIENT)
Dept: GENERAL RADIOLOGY | Age: 59
End: 2019-07-25
Attending: EMERGENCY MEDICINE
Payer: COMMERCIAL

## 2019-07-25 DIAGNOSIS — E66.2 OBESITY HYPOVENTILATION SYNDROME (HCC): ICD-10-CM

## 2019-07-25 DIAGNOSIS — J44.1 ACUTE EXACERBATION OF CHRONIC OBSTRUCTIVE PULMONARY DISEASE (COPD) (HCC): Primary | ICD-10-CM

## 2019-07-25 LAB
COMMENT, HOLDF: NORMAL
SAMPLES BEING HELD,HOLD: NORMAL

## 2019-07-25 PROCEDURE — 80053 COMPREHEN METABOLIC PANEL: CPT

## 2019-07-25 PROCEDURE — 99285 EMERGENCY DEPT VISIT HI MDM: CPT

## 2019-07-25 PROCEDURE — 77030029684 HC NEB SM VOL KT MONA -A

## 2019-07-25 PROCEDURE — 85025 COMPLETE CBC W/AUTO DIFF WBC: CPT

## 2019-07-25 PROCEDURE — 36415 COLL VENOUS BLD VENIPUNCTURE: CPT

## 2019-07-25 PROCEDURE — 93005 ELECTROCARDIOGRAM TRACING: CPT

## 2019-07-25 PROCEDURE — 71046 X-RAY EXAM CHEST 2 VIEWS: CPT

## 2019-07-25 RX ORDER — IPRATROPIUM BROMIDE AND ALBUTEROL SULFATE 2.5; .5 MG/3ML; MG/3ML
3 SOLUTION RESPIRATORY (INHALATION)
Status: COMPLETED | OUTPATIENT
Start: 2019-07-25 | End: 2019-07-26

## 2019-07-25 RX ORDER — SODIUM CHLORIDE 0.9 % (FLUSH) 0.9 %
5-10 SYRINGE (ML) INJECTION AS NEEDED
Status: DISCONTINUED | OUTPATIENT
Start: 2019-07-25 | End: 2019-07-26 | Stop reason: HOSPADM

## 2019-07-26 ENCOUNTER — APPOINTMENT (OUTPATIENT)
Dept: CT IMAGING | Age: 59
End: 2019-07-26
Attending: EMERGENCY MEDICINE
Payer: COMMERCIAL

## 2019-07-26 VITALS
HEIGHT: 62 IN | WEIGHT: 293 LBS | TEMPERATURE: 98 F | RESPIRATION RATE: 21 BRPM | DIASTOLIC BLOOD PRESSURE: 80 MMHG | OXYGEN SATURATION: 100 % | BODY MASS INDEX: 53.92 KG/M2 | HEART RATE: 98 BPM | SYSTOLIC BLOOD PRESSURE: 112 MMHG

## 2019-07-26 LAB
ALBUMIN SERPL-MCNC: 3.7 G/DL (ref 3.5–5)
ALBUMIN/GLOB SERPL: 1 {RATIO} (ref 1.1–2.2)
ALP SERPL-CCNC: 56 U/L (ref 45–117)
ALT SERPL-CCNC: 19 U/L (ref 12–78)
ANION GAP SERPL CALC-SCNC: 9 MMOL/L (ref 5–15)
APPEARANCE UR: CLEAR
AST SERPL-CCNC: 16 U/L (ref 15–37)
ATRIAL RATE: 94 BPM
BACTERIA URNS QL MICRO: NEGATIVE /HPF
BASOPHILS # BLD: 0.1 K/UL (ref 0–0.1)
BASOPHILS NFR BLD: 1 % (ref 0–1)
BILIRUB SERPL-MCNC: 0.3 MG/DL (ref 0.2–1)
BILIRUB UR QL: NEGATIVE
BUN SERPL-MCNC: 18 MG/DL (ref 6–20)
BUN/CREAT SERPL: 19 (ref 12–20)
CALCIUM SERPL-MCNC: 10.1 MG/DL (ref 8.5–10.1)
CALCULATED P AXIS, ECG09: 41 DEGREES
CALCULATED R AXIS, ECG10: 32 DEGREES
CALCULATED T AXIS, ECG11: 30 DEGREES
CHLORIDE SERPL-SCNC: 109 MMOL/L (ref 97–108)
CO2 SERPL-SCNC: 23 MMOL/L (ref 21–32)
COLOR UR: ABNORMAL
CREAT SERPL-MCNC: 0.94 MG/DL (ref 0.55–1.02)
D DIMER PPP FEU-MCNC: 0.47 MG/L FEU (ref 0–0.65)
DIAGNOSIS, 93000: NORMAL
DIFFERENTIAL METHOD BLD: ABNORMAL
EOSINOPHIL # BLD: 0.2 K/UL (ref 0–0.4)
EOSINOPHIL NFR BLD: 2 % (ref 0–7)
EPITH CASTS URNS QL MICRO: ABNORMAL /LPF
ERYTHROCYTE [DISTWIDTH] IN BLOOD BY AUTOMATED COUNT: 18.6 % (ref 11.5–14.5)
GLOBULIN SER CALC-MCNC: 3.6 G/DL (ref 2–4)
GLUCOSE SERPL-MCNC: 102 MG/DL (ref 65–100)
GLUCOSE UR STRIP.AUTO-MCNC: NEGATIVE MG/DL
HCT VFR BLD AUTO: 39.4 % (ref 35–47)
HGB BLD-MCNC: 11.3 G/DL (ref 11.5–16)
HGB UR QL STRIP: NEGATIVE
IMM GRANULOCYTES # BLD AUTO: 0 K/UL (ref 0–0.04)
IMM GRANULOCYTES NFR BLD AUTO: 0 % (ref 0–0.5)
KETONES UR QL STRIP.AUTO: ABNORMAL MG/DL
LACTATE SERPL-SCNC: 2.5 MMOL/L (ref 0.4–2)
LACTATE SERPL-SCNC: 2.6 MMOL/L (ref 0.4–2)
LEUKOCYTE ESTERASE UR QL STRIP.AUTO: NEGATIVE
LYMPHOCYTES # BLD: 2.3 K/UL (ref 0.8–3.5)
LYMPHOCYTES NFR BLD: 26 % (ref 12–49)
MCH RBC QN AUTO: 22.1 PG (ref 26–34)
MCHC RBC AUTO-ENTMCNC: 28.7 G/DL (ref 30–36.5)
MCV RBC AUTO: 77 FL (ref 80–99)
MONOCYTES # BLD: 0.7 K/UL (ref 0–1)
MONOCYTES NFR BLD: 8 % (ref 5–13)
NEUTS SEG # BLD: 5.4 K/UL (ref 1.8–8)
NEUTS SEG NFR BLD: 63 % (ref 32–75)
NITRITE UR QL STRIP.AUTO: NEGATIVE
NRBC # BLD: 0 K/UL (ref 0–0.01)
NRBC BLD-RTO: 0 PER 100 WBC
P-R INTERVAL, ECG05: 144 MS
PH UR STRIP: 7.5 [PH] (ref 5–8)
PLATELET # BLD AUTO: 228 K/UL (ref 150–400)
PMV BLD AUTO: 11.7 FL (ref 8.9–12.9)
POTASSIUM SERPL-SCNC: 3.8 MMOL/L (ref 3.5–5.1)
PROT SERPL-MCNC: 7.3 G/DL (ref 6.4–8.2)
PROT UR STRIP-MCNC: NEGATIVE MG/DL
Q-T INTERVAL, ECG07: 354 MS
QRS DURATION, ECG06: 76 MS
QTC CALCULATION (BEZET), ECG08: 442 MS
RBC # BLD AUTO: 5.12 M/UL (ref 3.8–5.2)
RBC #/AREA URNS HPF: ABNORMAL /HPF (ref 0–5)
RBC MORPH BLD: ABNORMAL
SODIUM SERPL-SCNC: 141 MMOL/L (ref 136–145)
SP GR UR REFRACTOMETRY: 1.01 (ref 1–1.03)
UA: UC IF INDICATED,UAUC: ABNORMAL
UROBILINOGEN UR QL STRIP.AUTO: 0.2 EU/DL (ref 0.2–1)
VENTRICULAR RATE, ECG03: 94 BPM
WBC # BLD AUTO: 8.7 K/UL (ref 3.6–11)
WBC URNS QL MICRO: ABNORMAL /HPF (ref 0–4)

## 2019-07-26 PROCEDURE — 81001 URINALYSIS AUTO W/SCOPE: CPT

## 2019-07-26 PROCEDURE — 85379 FIBRIN DEGRADATION QUANT: CPT

## 2019-07-26 PROCEDURE — 83605 ASSAY OF LACTIC ACID: CPT

## 2019-07-26 PROCEDURE — 77030011943

## 2019-07-26 PROCEDURE — 74011000250 HC RX REV CODE- 250: Performed by: EMERGENCY MEDICINE

## 2019-07-26 PROCEDURE — 87040 BLOOD CULTURE FOR BACTERIA: CPT

## 2019-07-26 PROCEDURE — 74011636320 HC RX REV CODE- 636/320: Performed by: EMERGENCY MEDICINE

## 2019-07-26 PROCEDURE — 71275 CT ANGIOGRAPHY CHEST: CPT

## 2019-07-26 PROCEDURE — 96361 HYDRATE IV INFUSION ADD-ON: CPT

## 2019-07-26 PROCEDURE — 36415 COLL VENOUS BLD VENIPUNCTURE: CPT

## 2019-07-26 PROCEDURE — 74011250636 HC RX REV CODE- 250/636: Performed by: EMERGENCY MEDICINE

## 2019-07-26 PROCEDURE — 74011250637 HC RX REV CODE- 250/637: Performed by: EMERGENCY MEDICINE

## 2019-07-26 PROCEDURE — 94640 AIRWAY INHALATION TREATMENT: CPT

## 2019-07-26 PROCEDURE — 96374 THER/PROPH/DIAG INJ IV PUSH: CPT

## 2019-07-26 RX ORDER — HYDROCODONE BITARTRATE AND ACETAMINOPHEN 5; 325 MG/1; MG/1
1 TABLET ORAL ONCE
Status: COMPLETED | OUTPATIENT
Start: 2019-07-26 | End: 2019-07-26

## 2019-07-26 RX ORDER — AZITHROMYCIN 250 MG/1
TABLET, FILM COATED ORAL
Qty: 6 TAB | Refills: 0 | Status: SHIPPED | OUTPATIENT
Start: 2019-07-26 | End: 2019-08-20

## 2019-07-26 RX ORDER — ALPRAZOLAM 0.25 MG/1
0.25 TABLET ORAL
Status: COMPLETED | OUTPATIENT
Start: 2019-07-26 | End: 2019-07-26

## 2019-07-26 RX ORDER — LEVOFLOXACIN 5 MG/ML
750 INJECTION, SOLUTION INTRAVENOUS
Status: DISCONTINUED | OUTPATIENT
Start: 2019-07-26 | End: 2019-07-26 | Stop reason: HOSPADM

## 2019-07-26 RX ORDER — IBUPROFEN 400 MG/1
800 TABLET ORAL ONCE
Status: COMPLETED | OUTPATIENT
Start: 2019-07-26 | End: 2019-07-26

## 2019-07-26 RX ORDER — PREDNISONE 10 MG/1
30 TABLET ORAL
Qty: 9 TAB | Refills: 0 | Status: SHIPPED | OUTPATIENT
Start: 2019-07-27 | End: 2019-07-30

## 2019-07-26 RX ORDER — ACETAMINOPHEN 500 MG
1000 TABLET ORAL ONCE
Status: COMPLETED | OUTPATIENT
Start: 2019-07-26 | End: 2019-07-26

## 2019-07-26 RX ORDER — SODIUM CHLORIDE 0.9 % (FLUSH) 0.9 %
10 SYRINGE (ML) INJECTION
Status: DISCONTINUED | OUTPATIENT
Start: 2019-07-26 | End: 2019-07-26 | Stop reason: HOSPADM

## 2019-07-26 RX ORDER — SODIUM CHLORIDE 0.9 % (FLUSH) 0.9 %
10 SYRINGE (ML) INJECTION
Status: COMPLETED | OUTPATIENT
Start: 2019-07-26 | End: 2019-07-26

## 2019-07-26 RX ADMIN — SODIUM CHLORIDE 1000 ML: 900 INJECTION, SOLUTION INTRAVENOUS at 01:41

## 2019-07-26 RX ADMIN — Medication 10 ML: at 03:04

## 2019-07-26 RX ADMIN — ACETAMINOPHEN 1000 MG: 500 TABLET ORAL at 06:39

## 2019-07-26 RX ADMIN — ALPRAZOLAM 0.25 MG: 0.25 TABLET ORAL at 05:18

## 2019-07-26 RX ADMIN — IBUPROFEN 800 MG: 400 TABLET ORAL at 06:39

## 2019-07-26 RX ADMIN — IPRATROPIUM BROMIDE AND ALBUTEROL SULFATE 3 ML: .5; 3 SOLUTION RESPIRATORY (INHALATION) at 00:32

## 2019-07-26 RX ADMIN — METHYLPREDNISOLONE SODIUM SUCCINATE 125 MG: 125 INJECTION, POWDER, FOR SOLUTION INTRAMUSCULAR; INTRAVENOUS at 00:32

## 2019-07-26 RX ADMIN — HYDROCODONE BITARTRATE AND ACETAMINOPHEN 1 TABLET: 5; 325 TABLET ORAL at 01:40

## 2019-07-26 RX ADMIN — IOPAMIDOL 80 ML: 755 INJECTION, SOLUTION INTRAVENOUS at 03:03

## 2019-07-26 NOTE — ED TRIAGE NOTES
Pt arrived from triage, c/o dizziness/lightheadedness and cough intermittently all day today. Pt brought in by EMS, EMS placed on nasal O2 and received albuterol treatment from EMS PTA. Pt arrived on RA with Normal SPO2. Pt has Hx COPD, denies using O2 at home, denies CP. Pt reports increased SOB with activity. Pt on monitor x3. Dr. Gilbert Baer at bedside.

## 2019-07-26 NOTE — ED NOTES
Pt just spoke with  on the phone and found out her mother was here in the ED with CP and became very tearful and anxious    Pt requesting meds for anxiety at this time

## 2019-07-26 NOTE — ED NOTES
Pt straight cathed at this time, pt tolerated well. Output 300cc at this time.  Urine specimen collected and sent to lab

## 2019-07-26 NOTE — ED NOTES
IV infiltrated in CT, unable to complete IV antibiotics at this time.     Will work to establish IV access

## 2019-07-26 NOTE — ED NOTES
MD David reviewed discharge instructions with the patient. The patient verbalized understanding. All questions and concerns were addressed. The patient was discharged via wheelchair  in the care of family members with instructions and prescriptions in hand. Pt is alert and oriented x 4. Respirations are clear and unlabored. Pt provided with mask for home breathing treatment and educated how to use mask correctly, pt needs assistance with dressing.  Family at bedside

## 2019-07-26 NOTE — DISCHARGE INSTRUCTIONS
Patient Education        COPD Exacerbation Plan: Care Instructions  Your Care Instructions  If you have chronic obstructive pulmonary disease (COPD), your usual shortness of breath could suddenly get worse. You may start coughing more and have more mucus. This flare-up is called a COPD exacerbation (say \"[id number]-BAY-[id number]\"). A lung infection or air pollution could set off an exacerbation. Sometimes it can happen after a quick change in temperature or being around chemicals. Work with your doctor to make a plan for dealing with an exacerbation. You can better manage it if you plan ahead. Follow-up care is a key part of your treatment and safety. Be sure to make and go to all appointments, and call your doctor if you are having problems. It's also a good idea to know your test results and keep a list of the medicines you take. How can you care for yourself at home? During an exacerbation  · Do not panic if you start to have one. Quick treatment at home may help you prevent serious breathing problems. If you have a COPD exacerbation plan that you developed with your doctor, follow it. · Take your medicines exactly as your doctor tells you.  ? Use your inhaler as directed by your doctor. If your symptoms do not get better after you use your medicine, have someone take you to the emergency room. Call an ambulance if necessary. ? With inhaled medicines, a spacer or a nebulizer may help you get more medicine to your lungs. Ask your doctor or pharmacist how to use them properly. Practice using the spacer in front of a mirror before you have an exacerbation. This may help you get the medicine into your lungs quickly. ? If your doctor has given you steroid pills, take them as directed. ? Your doctor may have given you a prescription for antibiotics, which you can fill if you need it. ? Talk to your doctor if you have any problems with your medicine.  And call your doctor if you have to use your antibiotic or steroid pills.  Preventing an exacerbation  · Do not smoke. This is the most important step you can take to prevent more damage to your lungs and prevent problems. If you already smoke, it is never too late to stop. If you need help quitting, talk to your doctor about stop-smoking programs and medicines. These can increase your chances of quitting for good. · Take your daily medicines as prescribed. · Avoid colds and flu. ? Get a pneumococcal vaccine. ? Get a flu vaccine each year, as soon as it is available. Ask those you live or work with to do the same, so they will not get the flu and infect you. ? Try to stay away from people with colds or the flu. ? Wash your hands often. · Avoid secondhand smoke; air pollution; cold, dry air; hot, humid air; and high altitudes. Stay at home with your windows closed when air pollution is bad. · Learn breathing techniques for COPD, such as breathing through pursed lips. These techniques can help you breathe easier during an exacerbation. When should you call for help? Call 911 anytime you think you may need emergency care. For example, call if:    · You have severe trouble breathing.     · You have severe chest pain.    Call your doctor now or seek immediate medical care if:    · You have new or worse shortness of breath.     · You develop new chest pain.     · You are coughing more deeply or more often, especially if you notice more mucus or a change in the color of your mucus.     · You cough up blood.     · You have new or increased swelling in your legs or belly.     · You have a fever.    Watch closely for changes in your health, and be sure to contact your doctor if:    · You need to use your antibiotic or steroid pills.     · Your symptoms are getting worse. Where can you learn more? Go to http://luis-julieth.info/. Enter J826 in the search box to learn more about \"COPD Exacerbation Plan: Care Instructions. \"  Current as of: September 5, 2018  Content Version: 12.1  © 6222-3665 [employer]. Care instructions adapted under license by [employer] (which disclaims liability or warranty for this information). If you have questions about a medical condition or this instruction, always ask your healthcare professional. [address] any warranty or liability for your use of this information.

## 2019-07-26 NOTE — ED PROVIDER NOTES
EMERGENCY DEPARTMENT HISTORY AND PHYSICAL EXAM          Date: 7/25/2019  Patient Name: Arnoldo Turcios    History of Presenting Illness     Chief Complaint   Patient presents with    Shortness of Breath     intermittently today, took asthma inhaler at home without relief. pt given albuterol en route. 96% on RA in EMS       History Provided By: Patient and EMS    HPI: Arnoldo Turcios is a 61 y.o. female, pmhx diabetes, hypertension, hypothyroid, epilepsy, who presents via EMS to the ED c/o shortness of breath    Patient states she was feeling well until today when she started she denies any history of chest pain but says she is had this in the past.  She tried to use her albuterol at home but states it did not help her symptoms are to call EMS. She does note she is had increased coughing with some pain in her \"posterior lungs\". Upon arrival EMS notes that her vital signs are all treatment which patient states did not alleviate her symptoms. Patient specifically denies any recent fevers, chills, nausea, vomiting, diarrhea, abd pain, CP, SOB, urinary sxs, changes in BM, or headache. PCP: Huong Lockwood DO    Allergies: Multiple  Social Hx: +tobacco, -EtOH, -Illicit Drugs    There are no other complaints, changes, or physical findings at this time. Current Outpatient Medications   Medication Sig Dispense Refill    azithromycin (ZITHROMAX Z-VIVI) 250 mg tablet 2 tabs day 1 followed by one tab daily 6 Tab 0    predniSONE (DELTASONE) 10 mg tablet Take 30 mg by mouth daily (with breakfast) for 3 days. 9 Tab 0    fluticasone furoate-vilanterol (BREO ELLIPTA) 200-25 mcg/dose inhaler Take 1 Puff by inhalation daily. 1 Inhaler 0    cyanocobalamin 2,500 mcg tab tablet Take 2 Tabs by mouth daily. 30 Tab 0    busPIRone (BUSPAR) 15 mg tablet Take 15 mg by mouth three (3) times daily.  QUEtiapine (SEROQUEL) 400 mg tablet Take 600 mg by mouth nightly.       nystatin, bulk, 15 billion unit powd 1 Units by Does Not Apply route three (3) times daily. To skin folds, ventral fold 2 Each 1    albuterol (PROVENTIL HFA, VENTOLIN HFA, PROAIR HFA) 90 mcg/actuation inhaler Take 2 Puffs by inhalation every four (4) hours as needed for Wheezing. 1 Inhaler 0    LORazepam (ATIVAN) 1 mg tablet Take 1 mg by mouth every eight (8) hours as needed for Anxiety. 0    buPROPion XL (WELLBUTRIN XL) 150 mg tablet Take 300 mg by mouth nightly.  prazosin (MINIPRESS) 1 mg capsule Take 4 mg by mouth nightly.  ergocalciferol (VITAMIN D2) 50,000 unit capsule Take 50,000 Units by mouth every seven (7) days. Twice a week Sunday and Wednesday      metFORMIN (GLUCOPHAGE) 500 mg tablet Take 1,000 mg by mouth daily (with dinner).  estradiol (ESTRACE) 0.5 mg tablet Take 0.5 mg by mouth daily.  terconazole (TERAZOL 7) 0.4 % vaginal cream Insert 1 Applicator into vagina nightly. has refill for 7 day supply      diclofenac potassium (CATAFLAM) 50 mg tablet Take 50 mg by mouth two (2) times a day. Patient takes 1 tablet twice daily for a week on, then a week off, then repeats.  miconazole (MICOTIN) 2 % topical cream Apply 1 Each to affected area two (2) times a day. Apply ointment to fungal infection under abdominal pannus and groin twice a day for 14 days.  aspirin 81 mg chewable tablet Take 81 mg by mouth daily.  gabapentin (NEURONTIN) 100 mg capsule Take 1 Cap by mouth three (3) times daily. (Patient taking differently: Take 1 Cap by mouth four (4) times daily.) 90 Cap 1    levothyroxine (SYNTHROID) 112 mcg tablet Take 112 mcg by mouth Daily (before breakfast).  metoprolol succinate (TOPROL XL) 25 mg XL tablet Take 25 mg by mouth daily.  omeprazole (PRILOSEC) 20 mg capsule Take 40 mg by mouth daily.  topiramate (TOPAMAX) 200 mg tablet Take 200 mg by mouth nightly.  simvastatin (ZOCOR) 20 mg tablet Take 20 mg by mouth nightly.          Past History     Past Medical History:  Past Medical History: Diagnosis Date    Adverse effect of anesthesia     slow to wake up       Anxiety and depression     Arrhythmia     Arthritis     Chronic pain     djd    Diabetes (HCC)     GERD (gastroesophageal reflux disease)     Hypertension     Hypothyroidism     Kidney stone     Liver disease     Obesity, morbid, BMI 50 or higher ([facility]     Seizures (HCC)        Past Surgical History:  Past Surgical History:   Procedure Laterality Date    CARDIAC CATHETERIZATION  11/18/2010         HX APPENDECTOMY      HX CHOLECYSTECTOMY      HX HYSTERECTOMY      HX TUBAL LIGATION         Family History:  Family History   Problem Relation Age of Onset    Heart Disease Mother     Hypertension Mother     Cancer Mother     Heart Disease Father     Hypertension Father     Cancer Brother        Social History:  Social History     Tobacco Use    Smoking status: Former Smoker    Smokeless tobacco: Never Used    Tobacco comment: quit smoking  about 34  years ago      Substance Use Topics    Alcohol use: No    Drug use: Yes     Types: Prescription, OTC       Allergies: Allergies   Allergen Reactions    Celebrex [Celecoxib] Nausea Only    Morphine Nausea Only    Sulfa (Sulfonamide Antibiotics) Nausea Only    Adhesive Other (comments)     Red and bumpy      Codeine Other (comments)     Hyper activity         Review of Systems   Review of Systems   Constitutional: Negative for activity change, appetite change, chills, fever and unexpected weight change. HENT: Negative for congestion. Eyes: Negative for pain and visual disturbance. Respiratory: Positive for cough and shortness of breath. Cardiovascular: Negative for chest pain. Gastrointestinal: Negative for abdominal pain, diarrhea, nausea and vomiting. Genitourinary: Negative for dysuria. Musculoskeletal: Positive for back pain. Skin: Negative for rash. Neurological: Negative for headaches.      Physical Exam   Physical Exam   Constitutional: She is oriented to person, place, and time. She appears well-developed and well-nourished. This is a extremely morbidly obese, anxious female   HENT:   Head: Normocephalic and atraumatic. Mouth/Throat: Oropharynx is clear and moist.   Eyes: Pupils are equal, round, and reactive to light. Conjunctivae and EOM are normal. Right eye exhibits no discharge. Left eye exhibits no discharge. Neck: Normal range of motion. Neck supple. Cardiovascular: Regular rhythm and normal heart sounds. No murmur heard. Tachycardia borderline   Pulmonary/Chest: Effort normal and breath sounds normal. No respiratory distress. She has no wheezes. She has no rales. Patient has good air movement without any wheezes, rhonchi or rales. Abdominal: Soft. Bowel sounds are normal. She exhibits no distension and no mass. There is no tenderness. There is no rebound and no guarding. Musculoskeletal: Normal range of motion. She exhibits no edema or tenderness. Neurological: She is alert and oriented to person, place, and time. No cranial nerve deficit. She exhibits normal muscle tone. Skin: Skin is warm and dry. No rash noted. She is not diaphoretic. Nursing note and vitals reviewed. Diagnostic Study Results     Labs -     No results found for this or any previous visit (from the past 12 hour(s)). Radiologic Studies -   CTA CHEST W OR W WO CONT   Final Result   IMPRESSION: Suboptimal study technically. No definite acute abnormalities   suspected. XR CHEST PA LAT   Final Result   IMPRESSION: Cardiomegaly with possible left-sided airspace disease. PA and   lateral radiographs are recommended. CT Results  (Last 48 hours)               07/26/19 0303  CTA CHEST W OR W WO CONT Final result    Impression:  IMPRESSION: Suboptimal study technically. No definite acute abnormalities   suspected. Narrative:  Clinical indication: Chest pain and shortness of breath.        Localizer obtained without contrast at the level of the pulmonary arteries. Fast   injection rate of 80 cc of Isovue-370 with review of the raw data and MIP   reconstructions. Comparison June 11, 2019. CT dose reduction was achieved   through the use of a standardized protocol tailored for this examination and   automatic exposure control for dose modulation . Dinora Ochoa The study is somewhat technically compromised. No gross evidence for pulmonary emboli, pericardial pleural effusion shift   pneumothorax masses or adenopathy. Next               CXR Results  (Last 48 hours)               07/25/19 2301  XR CHEST PA LAT Final result    Impression:  IMPRESSION: Cardiomegaly with possible left-sided airspace disease. PA and   lateral radiographs are recommended. Narrative:  EXAM:  XR CHEST PA LAT. INDICATION: SOB.   COMPARISON: 6/11/2019. FINDINGS:    AP and lateral radiographs of the chest were obtained. The patient is on nasal   oxygen and is not completely undressed. Lungs: There may be left-sided airspace disease. Pleura: There is no pleural effusion or pneumothorax. Mediastinum: The cardiac silhouette is enlarged. Bones and soft tissues: There are degenerative changes of the spine. Medical Decision Making   I am the first provider for this patient. I reviewed the vital signs, available nursing notes, past medical history, past surgical history, family history and social history. Vital Signs-Reviewed the patient's vital signs. No data found.     Pulse Oximetry Analysis - 100% on RA    Cardiac Monitor:   Rate: 98bpm  Rhythm: Normal Sinus Rhythm      Records Reviewed: Nursing Notes, Old Medical Records, Ambulance Run Sheet, Previous Radiology Studies and Previous Laboratory Studies    Provider Notes (Medical Decision Making):   MDM: Anxious middle-aged obese female presenting with normal vital signs and normal lung exam.  She states she is unable to tolerate albuterol because she cannot breathe long enough to inhale it in. Will send patient for CTA given her level of anxiety and normal exam.    ED Course:   Initial assessment performed. The patients presenting problems have been discussed, and they are in agreement with the care plan formulated and outlined with them. I have encouraged them to ask questions as they arise throughout their visit. EKG interpretation: (Preliminary)  Rhythm: normal sinus rhythm; and regular . Rate (approx.): 94; Axis: normal; MN interval: normal; QRS interval: normal ; ST/T wave: normal; septal Q waves. PROGRESS NOTE:  1 AM  Pt now complaining of pelvic pain. Requesting narcotics. Attempted to help her with breathing treatments but she is extremely anxious and unable to follow instructions. Her lung exam is unchanged. 5 AM  After multiple attempts for CT angiogram her radiology is unable due to lack of IV access as her initial IV has infiltrated. Nurses been unsuccessful with multiple repeat attempts to regain abscess, thus will send d-dimer. 6:15 AM  Patient's d-dimer is less than 500. We will discharge her home as she appears comfortable laying flat in the room with normal vital signs and her mother at bedside. Prescription medication for anxiety as she states that he benzodiazepine helped her more than anything. I explained that she should discuss this with her primary care physician as we do not prescribe benzodiazepine or opiates for her chronic symptoms. Discharge note:  6:15 AM  Pt re-evaluated and noted to be feeling better, ready for discharge. Updated pt and family on all final lab findings. Will follow up as instructed. All questions have been answered, pt voiced understanding and agreement with plan. Specific return precautions provided as well as instructions to return to the ED should sx worsen at any time. Vital signs stable for discharge. Critical Care Time:   0      Diagnosis     Clinical Impression:   1.  Acute exacerbation [facility] obstructive pulmonary disease [facility]    2. [facility]ilation syndrome ([facility]        PLAN:  1. Discharge Medication List as of 7/26/2019  6:11 AM      START taking these medications    Details   azithromycin (ZITHROMAX Z-VIVI) 250 mg tablet 2 tabs day 1 followed by one tab daily, Normal, Disp-6 Tab, R-0      predniSONE (DELTASONE) 10 mg tablet Take 30 mg by mouth daily (with breakfast) for 3 days. , Normal, Disp-9 Tab, R-0         CONTINUE these medications which have NOT CHANGED    Details   fluticasone furoate-vilanterol (BREO ELLIPTA) 200-25 mcg/dose inhaler Take 1 Puff by inhalation daily. , Print, Disp-1 Inhaler, R-0      cyanocobalamin 2,500 mcg tab tablet Take 2 Tabs by mouth daily. , Normal, Disp-30 Tab, R-0      busPIRone (BUSPAR) 15 mg tablet Take 15 mg by mouth three (3) times daily. , Historical Med      QUEtiapine (SEROQUEL) 400 mg tablet Take 600 mg by mouth nightly., Historical Med      nystatin, bulk, 15 billion unit powd 1 Units by Does Not Apply route three (3) times daily. To skin folds, ventral fold, Print, Disp-2 Each, R-1      albuterol (PROVENTIL HFA, VENTOLIN HFA, PROAIR HFA) 90 mcg/actuation inhaler Take 2 Puffs by inhalation every four (4) hours as needed for Wheezing., Print, Disp-1 Inhaler, R-0      LORazepam (ATIVAN) 1 mg tablet Take 1 mg by mouth every eight (8) hours as needed for Anxiety. , Historical Med, R-0      buPROPion XL (WELLBUTRIN XL) 150 mg tablet Take 300 mg by mouth nightly., Historical Med      prazosin (MINIPRESS) 1 mg capsule Take 4 mg by mouth nightly., Historical Med      ergocalciferol (VITAMIN D2) 50,000 unit capsule Take 50,000 Units by mouth every seven (7) days. Twice a week Sunday and Wednesday, Historical Med      metFORMIN (GLUCOPHAGE) 500 mg tablet Take 1,000 mg by mouth daily (with dinner). , Historical Med      estradiol (ESTRACE) 0.5 mg tablet Take 0.5 mg by mouth daily. , Historical Med      terconazole (TERAZOL 7) 0.4 % vaginal cream Insert 1 Applicator into vagina nightly. has refill for 7 day supply, Historical Med      diclofenac potassium (CATAFLAM) 50 mg tablet Take 50 mg by mouth two (2) times a day. Patient takes 1 tablet twice daily for a week on, then a week off, then repeats. , Historical Med      miconazole (MICOTIN) 2 % topical cream Apply 1 Each to affected area two (2) times a day. Apply ointment to fungal infection under abdominal pannus and groin twice a day for 14 days. , Historical Med      aspirin 81 mg chewable tablet Take 81 mg by mouth daily. , Historical Med      gabapentin (NEURONTIN) 100 mg capsule Take 1 Cap by mouth three (3) times daily. , Print, Disp-90 Cap, R-1      levothyroxine (SYNTHROID) 112 mcg tablet Take 112 mcg by mouth Daily (before breakfast). , Historical Med      metoprolol succinate (TOPROL XL) 25 mg XL tablet Take 25 mg by mouth daily. , Historical Med      omeprazole (PRILOSEC) 20 mg capsule 40 mg, Oral, DAILY, Until Discontinued, Historical Med      topiramate (TOPAMAX) 200 mg tablet Take 200 mg by mouth nightly., Historical Med      simvastatin (ZOCOR) 20 mg tablet 20 mg, Oral, EVERY BEDTIME, Until Discontinued, Historical Med         STOP taking these medications       acetaminophen (TYLENOL) 325 mg tablet Comments:   Reason for Stopping:         predniSONE (STERAPRED DS) 10 mg dose pack Comments:   Reason for Stopping:         oxyCODONE-acetaminophen (PERCOCET) 5-325 mg per tablet Comments:   Reason for Stoppin.   Follow-up Information     Follow up With Specialties Details Why Contact Olivia Aviles,  Family Practice Schedule an appointment as soon as possible for a visit for follow up this week [address] [place] [address]  [phone]          Return to ED if worse     Disposition:  home      Please note, this dictation was completed with [employer], the [employer] voice recognition software.  Quite often unanticipated grammatical, syntax, homophones, and other interpretive errors are inadvertently transcribed by the computer software. Please disregard these errors. Please excuse any errors that have escaped final proof reading.

## 2019-07-26 NOTE — ED NOTES
Bedside and Verbal shift change report given to Ella Rangel RN (oncoming nurse) by Diamond Rolle RN (offgoing nurse). Report included the following information SBAR, Kardex, ED Summary, Procedure Summary and MAR     Per Lucrecia Cee pt is still unable to void at this time.  Pt aware we need urine, will attempt straight cath at this time

## 2019-07-27 NOTE — PROGRESS NOTES
Pt is discharged on Zithromax. Only growing in 1 bottle to date. Suspect contaminant. Final results and sensitivities pending.

## 2019-07-28 LAB
BACTERIA SPEC CULT: ABNORMAL
BACTERIA SPEC CULT: ABNORMAL
SERVICE CMNT-IMP: ABNORMAL

## 2019-08-01 LAB
BACTERIA SPEC CULT: NORMAL
SERVICE CMNT-IMP: NORMAL

## 2019-08-20 ENCOUNTER — APPOINTMENT (OUTPATIENT)
Dept: CT IMAGING | Age: 59
End: 2019-08-20
Attending: STUDENT IN AN ORGANIZED HEALTH CARE EDUCATION/TRAINING PROGRAM
Payer: COMMERCIAL

## 2019-08-20 ENCOUNTER — APPOINTMENT (OUTPATIENT)
Dept: GENERAL RADIOLOGY | Age: 59
End: 2019-08-20
Attending: EMERGENCY MEDICINE
Payer: COMMERCIAL

## 2019-08-20 ENCOUNTER — HOSPITAL ENCOUNTER (OUTPATIENT)
Age: 59
Setting detail: OBSERVATION
Discharge: HOME | End: 2019-08-22
Attending: EMERGENCY MEDICINE | Admitting: INTERNAL MEDICINE
Payer: COMMERCIAL

## 2019-08-20 DIAGNOSIS — M79.7 FIBROMYALGIA: ICD-10-CM

## 2019-08-20 DIAGNOSIS — R06.09 DYSPNEA ON EXERTION: ICD-10-CM

## 2019-08-20 DIAGNOSIS — J44.1 ACUTE EXACERBATION OF CHRONIC OBSTRUCTIVE PULMONARY DISEASE (COPD) (HCC): Primary | ICD-10-CM

## 2019-08-20 LAB
ALBUMIN SERPL-MCNC: 3.3 G/DL (ref 3.5–5)
ALBUMIN/GLOB SERPL: 1 {RATIO} (ref 1.1–2.2)
ALP SERPL-CCNC: 60 U/L (ref 45–117)
ALT SERPL-CCNC: 20 U/L (ref 12–78)
ANION GAP SERPL CALC-SCNC: 8 MMOL/L (ref 5–15)
APPEARANCE UR: CLEAR
AST SERPL-CCNC: 12 U/L (ref 15–37)
ATRIAL RATE: 89 BPM
BASOPHILS # BLD: 0 K/UL (ref 0–0.1)
BASOPHILS NFR BLD: 0 % (ref 0–1)
BILIRUB SERPL-MCNC: 0.2 MG/DL (ref 0.2–1)
BILIRUB UR QL: NEGATIVE
BUN SERPL-MCNC: 18 MG/DL (ref 6–20)
BUN/CREAT SERPL: 21 (ref 12–20)
CALCIUM SERPL-MCNC: 9.6 MG/DL (ref 8.5–10.1)
CALCULATED P AXIS, ECG09: 38 DEGREES
CALCULATED R AXIS, ECG10: 28 DEGREES
CALCULATED T AXIS, ECG11: 24 DEGREES
CHLORIDE SERPL-SCNC: 108 MMOL/L (ref 97–108)
CK SERPL-CCNC: 96 U/L (ref 26–192)
CO2 SERPL-SCNC: 25 MMOL/L (ref 21–32)
COLOR UR: NORMAL
CREAT SERPL-MCNC: 0.85 MG/DL (ref 0.55–1.02)
DIAGNOSIS, 93000: NORMAL
DIFFERENTIAL METHOD BLD: ABNORMAL
EOSINOPHIL # BLD: 0.2 K/UL (ref 0–0.4)
EOSINOPHIL NFR BLD: 3 % (ref 0–7)
ERYTHROCYTE [DISTWIDTH] IN BLOOD BY AUTOMATED COUNT: 17.7 % (ref 11.5–14.5)
GLOBULIN SER CALC-MCNC: 3.4 G/DL (ref 2–4)
GLUCOSE BLD STRIP.AUTO-MCNC: 108 MG/DL (ref 65–100)
GLUCOSE BLD STRIP.AUTO-MCNC: 196 MG/DL (ref 65–100)
GLUCOSE SERPL-MCNC: 84 MG/DL (ref 65–100)
GLUCOSE UR STRIP.AUTO-MCNC: NEGATIVE MG/DL
HCT VFR BLD AUTO: 36.7 % (ref 35–47)
HGB BLD-MCNC: 10.6 G/DL (ref 11.5–16)
HGB UR QL STRIP: NEGATIVE
IMM GRANULOCYTES # BLD AUTO: 0 K/UL (ref 0–0.04)
IMM GRANULOCYTES NFR BLD AUTO: 0 % (ref 0–0.5)
KETONES UR QL STRIP.AUTO: NEGATIVE MG/DL
LEUKOCYTE ESTERASE UR QL STRIP.AUTO: NEGATIVE
LYMPHOCYTES # BLD: 1.7 K/UL (ref 0.8–3.5)
LYMPHOCYTES NFR BLD: 23 % (ref 12–49)
MCH RBC QN AUTO: 22.4 PG (ref 26–34)
MCHC RBC AUTO-ENTMCNC: 28.9 G/DL (ref 30–36.5)
MCV RBC AUTO: 77.4 FL (ref 80–99)
MONOCYTES # BLD: 0.7 K/UL (ref 0–1)
MONOCYTES NFR BLD: 9 % (ref 5–13)
NEUTS SEG # BLD: 4.8 K/UL (ref 1.8–8)
NEUTS SEG NFR BLD: 65 % (ref 32–75)
NITRITE UR QL STRIP.AUTO: NEGATIVE
NRBC # BLD: 0 K/UL (ref 0–0.01)
NRBC BLD-RTO: 0 PER 100 WBC
P-R INTERVAL, ECG05: 144 MS
PH UR STRIP: 5 [PH] (ref 5–8)
PLATELET # BLD AUTO: 189 K/UL (ref 150–400)
PLATELET COMMENTS,PCOM: ABNORMAL
PMV BLD AUTO: 11.1 FL (ref 8.9–12.9)
POTASSIUM SERPL-SCNC: 4 MMOL/L (ref 3.5–5.1)
PROT SERPL-MCNC: 6.7 G/DL (ref 6.4–8.2)
PROT UR STRIP-MCNC: NEGATIVE MG/DL
Q-T INTERVAL, ECG07: 362 MS
QRS DURATION, ECG06: 80 MS
QTC CALCULATION (BEZET), ECG08: 440 MS
RBC # BLD AUTO: 4.74 M/UL (ref 3.8–5.2)
RBC MORPH BLD: ABNORMAL
SERVICE CMNT-IMP: ABNORMAL
SERVICE CMNT-IMP: ABNORMAL
SODIUM SERPL-SCNC: 141 MMOL/L (ref 136–145)
SP GR UR REFRACTOMETRY: 1.01 (ref 1–1.03)
TROPONIN I SERPL-MCNC: <0.05 NG/ML
UROBILINOGEN UR QL STRIP.AUTO: 0.2 EU/DL (ref 0.2–1)
VENTRICULAR RATE, ECG03: 89 BPM
WBC # BLD AUTO: 7.4 K/UL (ref 3.6–11)

## 2019-08-20 PROCEDURE — 36415 COLL VENOUS BLD VENIPUNCTURE: CPT

## 2019-08-20 PROCEDURE — 94640 AIRWAY INHALATION TREATMENT: CPT

## 2019-08-20 PROCEDURE — 99218 HC RM OBSERVATION: CPT

## 2019-08-20 PROCEDURE — 77030029684 HC NEB SM VOL KT MONA -A

## 2019-08-20 PROCEDURE — 74011250636 HC RX REV CODE- 250/636: Performed by: INTERNAL MEDICINE

## 2019-08-20 PROCEDURE — 94761 N-INVAS EAR/PLS OXIMETRY MLT: CPT

## 2019-08-20 PROCEDURE — 74011636320 HC RX REV CODE- 636/320: Performed by: EMERGENCY MEDICINE

## 2019-08-20 PROCEDURE — 74011250637 HC RX REV CODE- 250/637: Performed by: INTERNAL MEDICINE

## 2019-08-20 PROCEDURE — 65660000000 HC RM CCU STEPDOWN

## 2019-08-20 PROCEDURE — 96372 THER/PROPH/DIAG INJ SC/IM: CPT

## 2019-08-20 PROCEDURE — 74177 CT ABD & PELVIS W/CONTRAST: CPT

## 2019-08-20 PROCEDURE — 82962 GLUCOSE BLOOD TEST: CPT

## 2019-08-20 PROCEDURE — 84484 ASSAY OF TROPONIN QUANT: CPT

## 2019-08-20 PROCEDURE — 77010033678 HC OXYGEN DAILY

## 2019-08-20 PROCEDURE — 96374 THER/PROPH/DIAG INJ IV PUSH: CPT

## 2019-08-20 PROCEDURE — 71275 CT ANGIOGRAPHY CHEST: CPT

## 2019-08-20 PROCEDURE — 94760 N-INVAS EAR/PLS OXIMETRY 1: CPT

## 2019-08-20 PROCEDURE — 74011250636 HC RX REV CODE- 250/636: Performed by: STUDENT IN AN ORGANIZED HEALTH CARE EDUCATION/TRAINING PROGRAM

## 2019-08-20 PROCEDURE — 81003 URINALYSIS AUTO W/O SCOPE: CPT

## 2019-08-20 PROCEDURE — 99285 EMERGENCY DEPT VISIT HI MDM: CPT

## 2019-08-20 PROCEDURE — 74011000250 HC RX REV CODE- 250: Performed by: STUDENT IN AN ORGANIZED HEALTH CARE EDUCATION/TRAINING PROGRAM

## 2019-08-20 PROCEDURE — 82550 ASSAY OF CK (CPK): CPT

## 2019-08-20 PROCEDURE — 74011000250 HC RX REV CODE- 250: Performed by: INTERNAL MEDICINE

## 2019-08-20 PROCEDURE — 93005 ELECTROCARDIOGRAM TRACING: CPT

## 2019-08-20 PROCEDURE — 80053 COMPREHEN METABOLIC PANEL: CPT

## 2019-08-20 PROCEDURE — 85025 COMPLETE CBC W/AUTO DIFF WBC: CPT

## 2019-08-20 PROCEDURE — 74011250637 HC RX REV CODE- 250/637: Performed by: STUDENT IN AN ORGANIZED HEALTH CARE EDUCATION/TRAINING PROGRAM

## 2019-08-20 RX ORDER — SODIUM CHLORIDE 0.9 % (FLUSH) 0.9 %
5-40 SYRINGE (ML) INJECTION EVERY 8 HOURS
Status: DISCONTINUED | OUTPATIENT
Start: 2019-08-20 | End: 2019-08-22 | Stop reason: HOSPADM

## 2019-08-20 RX ORDER — IPRATROPIUM BROMIDE AND ALBUTEROL SULFATE 2.5; .5 MG/3ML; MG/3ML
3 SOLUTION RESPIRATORY (INHALATION)
Status: DISCONTINUED | OUTPATIENT
Start: 2019-08-20 | End: 2019-08-22 | Stop reason: HOSPADM

## 2019-08-20 RX ORDER — OXYCODONE AND ACETAMINOPHEN 5; 325 MG/1; MG/1
1 TABLET ORAL
Status: DISCONTINUED | OUTPATIENT
Start: 2019-08-20 | End: 2019-08-22 | Stop reason: HOSPADM

## 2019-08-20 RX ORDER — ACETAMINOPHEN 325 MG/1
650 TABLET ORAL
Status: DISCONTINUED | OUTPATIENT
Start: 2019-08-20 | End: 2019-08-20 | Stop reason: SDUPTHER

## 2019-08-20 RX ORDER — HEPARIN SODIUM 5000 [USP'U]/ML
5000 INJECTION, SOLUTION INTRAVENOUS; SUBCUTANEOUS EVERY 8 HOURS
Status: DISCONTINUED | OUTPATIENT
Start: 2019-08-20 | End: 2019-08-20 | Stop reason: DRUGHIGH

## 2019-08-20 RX ORDER — INSULIN LISPRO 100 [IU]/ML
INJECTION, SOLUTION INTRAVENOUS; SUBCUTANEOUS
Status: DISCONTINUED | OUTPATIENT
Start: 2019-08-20 | End: 2019-08-20 | Stop reason: SDUPTHER

## 2019-08-20 RX ORDER — SODIUM CHLORIDE 0.9 % (FLUSH) 0.9 %
5-40 SYRINGE (ML) INJECTION AS NEEDED
Status: DISCONTINUED | OUTPATIENT
Start: 2019-08-20 | End: 2019-08-22 | Stop reason: HOSPADM

## 2019-08-20 RX ORDER — DEXTROSE 50 % IN WATER (D50W) INTRAVENOUS SYRINGE
12.5-25 AS NEEDED
Status: DISCONTINUED | OUTPATIENT
Start: 2019-08-20 | End: 2019-08-20 | Stop reason: CLARIF

## 2019-08-20 RX ORDER — OXYCODONE AND ACETAMINOPHEN 5; 325 MG/1; MG/1
1 TABLET ORAL ONCE
Status: COMPLETED | OUTPATIENT
Start: 2019-08-20 | End: 2019-08-20

## 2019-08-20 RX ORDER — GUAIFENESIN 600 MG/1
600 TABLET, EXTENDED RELEASE ORAL EVERY 12 HOURS
Status: DISCONTINUED | OUTPATIENT
Start: 2019-08-20 | End: 2019-08-22 | Stop reason: HOSPADM

## 2019-08-20 RX ORDER — ATORVASTATIN CALCIUM 20 MG/1
20 TABLET, FILM COATED ORAL
Status: DISCONTINUED | OUTPATIENT
Start: 2019-08-20 | End: 2019-08-22 | Stop reason: HOSPADM

## 2019-08-20 RX ORDER — ONDANSETRON 2 MG/ML
4 INJECTION INTRAMUSCULAR; INTRAVENOUS
Status: DISCONTINUED | OUTPATIENT
Start: 2019-08-20 | End: 2019-08-22 | Stop reason: HOSPADM

## 2019-08-20 RX ORDER — GUAIFENESIN 100 MG/5ML
81 LIQUID (ML) ORAL DAILY
Status: DISCONTINUED | OUTPATIENT
Start: 2019-08-21 | End: 2019-08-22 | Stop reason: HOSPADM

## 2019-08-20 RX ORDER — BUSPIRONE HYDROCHLORIDE 5 MG/1
15 TABLET ORAL 3 TIMES DAILY
Status: DISCONTINUED | OUTPATIENT
Start: 2019-08-20 | End: 2019-08-22 | Stop reason: HOSPADM

## 2019-08-20 RX ORDER — PRAZOSIN HYDROCHLORIDE 1 MG/1
4 CAPSULE ORAL
Status: DISCONTINUED | OUTPATIENT
Start: 2019-08-20 | End: 2019-08-22 | Stop reason: HOSPADM

## 2019-08-20 RX ORDER — METOPROLOL SUCCINATE 25 MG/1
25 TABLET, EXTENDED RELEASE ORAL DAILY
Status: DISCONTINUED | OUTPATIENT
Start: 2019-08-21 | End: 2019-08-22 | Stop reason: HOSPADM

## 2019-08-20 RX ORDER — SODIUM CHLORIDE 0.9 % (FLUSH) 0.9 %
10 SYRINGE (ML) INJECTION
Status: COMPLETED | OUTPATIENT
Start: 2019-08-20 | End: 2019-08-20

## 2019-08-20 RX ORDER — NALOXONE HYDROCHLORIDE 0.4 MG/ML
0.4 INJECTION, SOLUTION INTRAMUSCULAR; INTRAVENOUS; SUBCUTANEOUS AS NEEDED
Status: DISCONTINUED | OUTPATIENT
Start: 2019-08-20 | End: 2019-08-22 | Stop reason: HOSPADM

## 2019-08-20 RX ORDER — MAGNESIUM SULFATE 100 %
4 CRYSTALS MISCELLANEOUS AS NEEDED
Status: DISCONTINUED | OUTPATIENT
Start: 2019-08-20 | End: 2019-08-20 | Stop reason: SDUPTHER

## 2019-08-20 RX ORDER — BUPROPION HYDROCHLORIDE 150 MG/1
300 TABLET ORAL
Status: DISCONTINUED | OUTPATIENT
Start: 2019-08-20 | End: 2019-08-22 | Stop reason: HOSPADM

## 2019-08-20 RX ORDER — GABAPENTIN 100 MG/1
100 CAPSULE ORAL 4 TIMES DAILY
Status: DISCONTINUED | OUTPATIENT
Start: 2019-08-20 | End: 2019-08-22 | Stop reason: HOSPADM

## 2019-08-20 RX ORDER — LEVOTHYROXINE SODIUM 112 UG/1
112 TABLET ORAL
Status: DISCONTINUED | OUTPATIENT
Start: 2019-08-21 | End: 2019-08-22 | Stop reason: HOSPADM

## 2019-08-20 RX ORDER — IPRATROPIUM BROMIDE AND ALBUTEROL SULFATE 2.5; .5 MG/3ML; MG/3ML
3 SOLUTION RESPIRATORY (INHALATION)
Status: COMPLETED | OUTPATIENT
Start: 2019-08-20 | End: 2019-08-20

## 2019-08-20 RX ORDER — METRONIDAZOLE 7.5 MG/G
1 GEL VAGINAL
Status: DISCONTINUED | OUTPATIENT
Start: 2019-08-20 | End: 2019-08-22 | Stop reason: HOSPADM

## 2019-08-20 RX ORDER — INSULIN LISPRO 100 [IU]/ML
INJECTION, SOLUTION INTRAVENOUS; SUBCUTANEOUS
Status: DISCONTINUED | OUTPATIENT
Start: 2019-08-20 | End: 2019-08-22 | Stop reason: HOSPADM

## 2019-08-20 RX ORDER — FLUTICASONE FUROATE AND VILANTEROL 200; 25 UG/1; UG/1
1 POWDER RESPIRATORY (INHALATION) DAILY
Status: DISCONTINUED | OUTPATIENT
Start: 2019-08-21 | End: 2019-08-22 | Stop reason: HOSPADM

## 2019-08-20 RX ORDER — ACETAMINOPHEN 325 MG/1
650 TABLET ORAL
Status: DISCONTINUED | OUTPATIENT
Start: 2019-08-20 | End: 2019-08-22 | Stop reason: HOSPADM

## 2019-08-20 RX ORDER — NYSTATIN 100000 [USP'U]/G
POWDER TOPICAL 2 TIMES DAILY
Status: DISCONTINUED | OUTPATIENT
Start: 2019-08-20 | End: 2019-08-22 | Stop reason: HOSPADM

## 2019-08-20 RX ORDER — ONDANSETRON 2 MG/ML
4 INJECTION INTRAMUSCULAR; INTRAVENOUS
Status: DISCONTINUED | OUTPATIENT
Start: 2019-08-20 | End: 2019-08-20 | Stop reason: SDUPTHER

## 2019-08-20 RX ORDER — MAGNESIUM SULFATE 100 %
4 CRYSTALS MISCELLANEOUS AS NEEDED
Status: DISCONTINUED | OUTPATIENT
Start: 2019-08-20 | End: 2019-08-22 | Stop reason: HOSPADM

## 2019-08-20 RX ORDER — HEPARIN SODIUM 5000 [USP'U]/ML
7500 INJECTION, SOLUTION INTRAVENOUS; SUBCUTANEOUS EVERY 8 HOURS
Status: DISCONTINUED | OUTPATIENT
Start: 2019-08-20 | End: 2019-08-22 | Stop reason: HOSPADM

## 2019-08-20 RX ORDER — TOPIRAMATE 100 MG/1
200 TABLET, FILM COATED ORAL
Status: DISCONTINUED | OUTPATIENT
Start: 2019-08-20 | End: 2019-08-22 | Stop reason: HOSPADM

## 2019-08-20 RX ADMIN — TOPIRAMATE 200 MG: 100 TABLET, FILM COATED ORAL at 21:28

## 2019-08-20 RX ADMIN — GUAIFENESIN 600 MG: 600 TABLET, EXTENDED RELEASE ORAL at 20:33

## 2019-08-20 RX ADMIN — Medication 10 ML: at 11:32

## 2019-08-20 RX ADMIN — HEPARIN SODIUM 7500 UNITS: 5000 INJECTION INTRAVENOUS; SUBCUTANEOUS at 14:13

## 2019-08-20 RX ADMIN — QUETIAPINE FUMARATE 600 MG: 400 TABLET ORAL at 21:28

## 2019-08-20 RX ADMIN — IPRATROPIUM BROMIDE AND ALBUTEROL SULFATE 3 ML: .5; 3 SOLUTION RESPIRATORY (INHALATION) at 19:05

## 2019-08-20 RX ADMIN — METHYLPREDNISOLONE SODIUM SUCCINATE 125 MG: 125 INJECTION, POWDER, FOR SOLUTION INTRAMUSCULAR; INTRAVENOUS at 14:11

## 2019-08-20 RX ADMIN — NYSTATIN: 100000 POWDER TOPICAL at 18:11

## 2019-08-20 RX ADMIN — OXYCODONE HYDROCHLORIDE AND ACETAMINOPHEN 1 TABLET: 5; 325 TABLET ORAL at 20:33

## 2019-08-20 RX ADMIN — IPRATROPIUM BROMIDE AND ALBUTEROL SULFATE 3 ML: .5; 3 SOLUTION RESPIRATORY (INHALATION) at 11:04

## 2019-08-20 RX ADMIN — HEPARIN SODIUM 7500 UNITS: 5000 INJECTION INTRAVENOUS; SUBCUTANEOUS at 21:27

## 2019-08-20 RX ADMIN — Medication 10 ML: at 16:20

## 2019-08-20 RX ADMIN — OXYCODONE HYDROCHLORIDE AND ACETAMINOPHEN 1 TABLET: 5; 325 TABLET ORAL at 16:25

## 2019-08-20 RX ADMIN — ATORVASTATIN CALCIUM 20 MG: 20 TABLET, FILM COATED ORAL at 21:27

## 2019-08-20 RX ADMIN — GUAIFENESIN 600 MG: 600 TABLET, EXTENDED RELEASE ORAL at 14:11

## 2019-08-20 RX ADMIN — PRAZOSIN HYDROCHLORIDE 4 MG: 1 CAPSULE ORAL at 21:27

## 2019-08-20 RX ADMIN — GABAPENTIN 100 MG: 100 CAPSULE ORAL at 21:26

## 2019-08-20 RX ADMIN — OXYCODONE HYDROCHLORIDE AND ACETAMINOPHEN 1 TABLET: 5; 325 TABLET ORAL at 11:13

## 2019-08-20 RX ADMIN — BUSPIRONE HYDROCHLORIDE 15 MG: 5 TABLET ORAL at 21:27

## 2019-08-20 RX ADMIN — IPRATROPIUM BROMIDE AND ALBUTEROL SULFATE 3 ML: .5; 3 SOLUTION RESPIRATORY (INHALATION) at 11:14

## 2019-08-20 RX ADMIN — IOPAMIDOL 200 ML: 755 INJECTION, SOLUTION INTRAVENOUS at 11:31

## 2019-08-20 RX ADMIN — IPRATROPIUM BROMIDE AND ALBUTEROL SULFATE 3 ML: .5; 3 SOLUTION RESPIRATORY (INHALATION) at 14:15

## 2019-08-20 RX ADMIN — BUSPIRONE HYDROCHLORIDE 15 MG: 5 TABLET ORAL at 16:19

## 2019-08-20 RX ADMIN — ACETAMINOPHEN 650 MG: 325 TABLET ORAL at 18:50

## 2019-08-20 RX ADMIN — IPRATROPIUM BROMIDE AND ALBUTEROL SULFATE 3 ML: .5; 3 SOLUTION RESPIRATORY (INHALATION) at 11:11

## 2019-08-20 RX ADMIN — Medication 10 ML: at 21:28

## 2019-08-20 RX ADMIN — BUPROPION HYDROCHLORIDE 300 MG: 150 TABLET, FILM COATED, EXTENDED RELEASE ORAL at 20:33

## 2019-08-20 RX ADMIN — GABAPENTIN 100 MG: 100 CAPSULE ORAL at 17:12

## 2019-08-20 NOTE — PROGRESS NOTES
TRANSFER - IN REPORT:    Verbal report received from Marianela Og (name) on Ciro Conrad  being received from ER(unit) for routine progression of care      Report consisted of patients Situation, Background, Assessment and   Recommendations(SBAR). Information from the following report(s) SBAR, Kardex, Intake/Output, MAR and Recent Results was reviewed with the receiving nurse. Opportunity for questions and clarification was provided. Assessment completed upon patients arrival to unit and care assumed.

## 2019-08-20 NOTE — ED NOTES
TRANSFER - OUT REPORT:    Verbal report given to Bety Martinez RN(name) on Nile Phillips  being transferred to [facility](unit) for routine progression of care       Report consisted of patients Situation, Background, Assessment and   Recommendations(SBAR). Information from the following report(s) SBAR, ED Summary and MAR was reviewed with the receiving nurse. Lines:   Peripheral IV 08/20/19 Right Antecubital (Active)   Site Assessment Clean, dry, & intact 8/20/2019 11:01 AM   Phlebitis Assessment 0 8/20/2019 11:01 AM   Infiltration Assessment 0 8/20/2019 11:01 AM   Dressing Status Clean, dry, & intact 8/20/2019 11:01 AM   Dressing Type Transparent;Tape 8/20/2019 11:01 AM        Opportunity for questions and clarification was provided.       Patient transported with:   [employer]

## 2019-08-20 NOTE — PROGRESS NOTES
Bedside and Verbal shift change report given to Misael Leahy (oncoming nurse) by Sarah Bell (offgoing nurse). Report included the following information SBAR, Kardex, Intake/Output, MAR and Recent Results.

## 2019-08-20 NOTE — ED PROVIDER NOTES
EMERGENCY DEPARTMENT HISTORY AND PHYSICAL EXAM      Date: 8/20/2019  Patient Name: Ciro Conrad    History of Presenting Illness     Chief Complaint   Patient presents with    Shortness of Breath     SOB since last night, hx of COPD    Back Pain     Low back pain to right side, seen by PCP x2, pain worse today. Denies urinary sx       History Provided By: Patient    HPI: Ciro Conrad, 61 y.o. female  presents to the ED with cc of dyspnea and abdominal pain. Patient states last night she began have increasing dyspnea. Patient admits to increased cough and sputum and chills but denies fever, and recent viral illness. Patient not on any home O2 at this time but has been using her breathing treatment which has not helped with her symptoms. Patient also admits to having right back, flank, and abdominal/pelvic pain for the past week. States she has seen her PCP twice for these symptoms and PCP believed it may be due to previous cyst vs nephrolithiasis. Patient does admit to dysuria and difficulty urinating. Patient has had some diarrhea over the past few days with nausea. Patient denies vomiting, numbness, weakness, or hematochezia. She also denies vaginal bleeding but admits to having a yeast infection which she began treatment yesterday after seeing PCP. Of note patient was on steroids last week and stopped Monday due to her back pain. There are no other complaints, changes, or physical findings at this time. PCP: Rhiannon Contreras, DO    No current facility-administered medications on file prior to encounter. Current Outpatient Medications on File Prior to Encounter   Medication Sig Dispense Refill    fluticasone furoate-vilanterol (BREO ELLIPTA) 200-25 mcg/dose inhaler Take 1 Puff by inhalation daily. 1 Inhaler 0    busPIRone (BUSPAR) 15 mg tablet Take 15 mg by mouth three (3) times daily.  QUEtiapine (SEROQUEL) 400 mg tablet Take 600 mg by mouth nightly.       nystatin, bulk, 15 billion unit powd 1 Units by Does Not Apply route three (3) times daily. To skin folds, ventral fold 2 Each 1    albuterol (PROVENTIL HFA, VENTOLIN HFA, PROAIR HFA) 90 mcg/actuation inhaler Take 2 Puffs by inhalation every four (4) hours as needed for Wheezing. 1 Inhaler 0    buPROPion XL (WELLBUTRIN XL) 150 mg tablet Take 300 mg by mouth nightly.  prazosin (MINIPRESS) 1 mg capsule Take 4 mg by mouth nightly.  ergocalciferol (VITAMIN D2) 50,000 unit capsule Take 50,000 Units by mouth every seven (7) days. Twice a week Sunday and Wednesday      metFORMIN (GLUCOPHAGE) 500 mg tablet Take 1,000 mg by mouth daily (with dinner).  estradiol (ESTRACE) 0.5 mg tablet Take 0.5 mg by mouth daily.  terconazole (TERAZOL 7) 0.4 % vaginal cream Insert 1 Applicator into vagina nightly. has refill for 7 day supply      miconazole (MICOTIN) 2 % topical cream Apply 1 Each to affected area two (2) times a day. Apply ointment to fungal infection under abdominal pannus and groin twice a day for 14 days.  aspirin 81 mg chewable tablet Take 81 mg by mouth daily.  gabapentin (NEURONTIN) 100 mg capsule Take 1 Cap by mouth three (3) times daily. (Patient taking differently: Take 1 Cap by mouth four (4) times daily.) 90 Cap 1    levothyroxine (SYNTHROID) 112 mcg tablet Take 112 mcg by mouth Daily (before breakfast).  metoprolol succinate (TOPROL XL) 25 mg XL tablet Take 25 mg by mouth daily.  omeprazole (PRILOSEC) 20 mg capsule Take 40 mg by mouth daily.  topiramate (TOPAMAX) 200 mg tablet Take 200 mg by mouth nightly.  simvastatin (ZOCOR) 20 mg tablet Take 20 mg by mouth nightly.          Past History     Past Medical History:  Past Medical History:   Diagnosis Date    Adverse effect of anesthesia     slow to wake up       Anxiety and depression     Arrhythmia     Arthritis     Chronic pain     djd    Diabetes (HCC)     GERD (gastroesophageal reflux disease)     Hypertension     Hypothyroidism     Kidney stone     Liver disease     Obesity, morbid, BMI 50 or higher ([facility] [address]     Seizures (HCC)        Past Surgical History:  Past Surgical History:   Procedure Laterality Date    CARDIAC CATHETERIZATION  11/18/2010         HX APPENDECTOMY      HX CHOLECYSTECTOMY      HX HYSTERECTOMY      HX TUBAL LIGATION         Family History:  Family History   Problem Relation Age of Onset    Heart Disease Mother     Hypertension Mother     Cancer Mother     Heart Disease Father     Hypertension Father     Cancer Brother        Social History:  Social History     Tobacco Use    Smoking status: Former Smoker    Smokeless tobacco: Never Used    Tobacco comment: quit smoking  about 34  years ago      Substance Use Topics    Alcohol use: No    Drug use: Yes     Types: Prescription, OTC       Allergies: Allergies   Allergen Reactions    Celebrex [Celecoxib] Nausea Only    Morphine Nausea Only    Sulfa (Sulfonamide Antibiotics) Nausea Only    Adhesive Other (comments)     Red and bumpy      Codeine Other (comments)     Hyper activity         Review of Systems   Review of Systems   Constitutional: Positive for chills. Negative for fatigue and fever. HENT: Negative for sore throat. Eyes: Negative for visual disturbance. Respiratory: Positive for cough and shortness of breath. Cardiovascular: Negative for chest pain and leg swelling. Gastrointestinal: Positive for abdominal pain, diarrhea and nausea. Negative for blood in stool and vomiting. Endocrine: Negative for polyuria. Genitourinary: Positive for difficulty urinating, dysuria, flank pain, pelvic pain and vaginal discharge. Negative for hematuria and vaginal bleeding. Skin: Negative for rash. Neurological: Negative for weakness and numbness. Physical Exam   Physical Exam   Constitutional: She is oriented to person, place, and time. She appears well-developed and well-nourished. No distress.    HENT:   Head: Normocephalic and atraumatic. Eyes: No scleral icterus. Neck: No tracheal deviation present. Cardiovascular: Normal rate and regular rhythm. Exam reveals no gallop and no friction rub. No murmur heard. Pulmonary/Chest: Effort normal. No respiratory distress. She has wheezes (Minimal wheezing in b/l lung fields). She has no rales. Abdominal: Soft. She exhibits no distension. There is tenderness (R flank and RLQ tenderness to palpation. ). There is guarding. There is no rebound. Musculoskeletal: She exhibits no edema, tenderness or deformity. Neurological: She is alert and oriented to person, place, and time. Skin: Skin is warm and dry. Psychiatric: She has a normal mood and affect.        Diagnostic Study Results     Labs -     Recent Results (from the past 24 hour(s))   EKG, 12 LEAD, INITIAL    Collection Time: 08/20/19 10:32 AM   Result Value Ref Range    Ventricular Rate 89 BPM    Atrial Rate 89 BPM    P-R Interval 144 ms    QRS Duration 80 ms    Q-T Interval 362 ms    QTC Calculation (Bezet) 440 ms    Calculated P Axis 38 degrees    Calculated R Axis 28 degrees    Calculated T Axis 24 degrees    Diagnosis       ** Poor data quality, interpretation may be adversely affected  Normal sinus rhythm  Normal ECG  When compared with ECG of 25-JUL-2019 21:59,  Criteria for Septal infarct are no longer present     CBC WITH AUTOMATED DIFF    Collection Time: 08/20/19 10:52 AM   Result Value Ref Range    WBC 7.4 3.6 - 11.0 K/uL    RBC 4.74 3.80 - 5.20 M/uL    HGB 10.6 (L) 11.5 - 16.0 g/dL    HCT 36.7 35.0 - 47.0 %    MCV 77.4 (L) 80.0 - 99.0 FL    MCH 22.4 (L) 26.0 - 34.0 PG    MCHC 28.9 (L) 30.0 - 36.5 g/dL    RDW 17.7 (H) 11.5 - 14.5 %    PLATELET 097 709 - 156 K/uL    MPV 11.1 8.9 - 12.9 FL    NRBC 0.0 0  WBC    ABSOLUTE NRBC 0.00 0.00 - 0.01 K/uL    NEUTROPHILS 65 32 - 75 %    LYMPHOCYTES 23 12 - 49 %    MONOCYTES 9 5 - 13 %    EOSINOPHILS 3 0 - 7 %    BASOPHILS 0 0 - 1 %    IMMATURE GRANULOCYTES 0 0.0 - 0.5 %    ABS. NEUTROPHILS 4.8 1.8 - 8.0 K/UL    ABS. LYMPHOCYTES 1.7 0.8 - 3.5 K/UL    ABS. MONOCYTES 0.7 0.0 - 1.0 K/UL    ABS. EOSINOPHILS 0.2 0.0 - 0.4 K/UL    ABS. BASOPHILS 0.0 0.0 - 0.1 K/UL    ABS. IMM. GRANS. 0.0 0.00 - 0.04 K/UL    DF AUTOMATED      PLATELET COMMENTS Large Platelets      RBC COMMENTS OVALOCYTES  PRESENT        RBC COMMENTS TEARDROP CELLS  PRESENT        RBC COMMENTS ANISOCYTOSIS  1+        RBC COMMENTS MICROCYTOSIS  PRESENT        RBC COMMENTS HYPOCHROMIA  1+       METABOLIC PANEL, COMPREHENSIVE    Collection Time: 08/20/19 10:52 AM   Result Value Ref Range    Sodium 141 136 - 145 mmol/L    Potassium 4.0 3.5 - 5.1 mmol/L    Chloride 108 97 - 108 mmol/L    CO2 25 21 - 32 mmol/L    Anion gap 8 5 - 15 mmol/L    Glucose 84 65 - 100 mg/dL    BUN 18 6 - 20 MG/DL    Creatinine 0.85 0.55 - 1.02 MG/DL    BUN/Creatinine ratio 21 (H) 12 - 20      GFR est AA >60 >60 ml/min/1.73m2    GFR est non-AA >60 >60 ml/min/1.73m2    Calcium 9.6 8.5 - 10.1 MG/DL    Bilirubin, total 0.2 0.2 - 1.0 MG/DL    ALT (SGPT) 20 12 - 78 U/L    AST (SGOT) 12 (L) 15 - 37 U/L    Alk. phosphatase 60 45 - 117 U/L    Protein, total 6.7 6.4 - 8.2 g/dL    Albumin 3.3 (L) 3.5 - 5.0 g/dL    Globulin 3.4 2.0 - 4.0 g/dL    A-G Ratio 1.0 (L) 1.1 - 2.2     CK W/ REFLX CKMB    Collection Time: 08/20/19 10:52 AM   Result Value Ref Range    CK 96 26 - 192 U/L   TROPONIN I    Collection Time: 08/20/19 10:52 AM   Result Value Ref Range    Troponin-I, Qt. <0.05 <0.05 ng/mL       Radiologic Studies -   CT ABD PELV W CONT   Final Result   IMPRESSION:      1. Technical factors as above regarding pulmonary embolus evaluation. No large   or central pulmonary embolus. 2. Mosaic attenuation of the lung parenchyma suggests small to medium airways   disease. 3. No acute process in the abdomen or pelvis. Unchanged pelvic cyst, possibly   ovarian in origin. CTA CHEST W OR W WO CONT   Final Result   IMPRESSION:      1.  Technical factors as above regarding pulmonary embolus evaluation. No large   or central pulmonary embolus. 2. Mosaic attenuation of the lung parenchyma suggests small to medium airways   disease. 3. No acute process in the abdomen or pelvis. Unchanged pelvic cyst, possibly   ovarian in origin. CT Results  (Last 48 hours)               08/20/19 1203  CT ABD PELV W CONT Final result    Impression:  IMPRESSION:       1. Technical factors as above regarding pulmonary embolus evaluation. No large   or central pulmonary embolus. 2. Mosaic attenuation of the lung parenchyma suggests small to medium airways   disease. 3. No acute process in the abdomen or pelvis. Unchanged pelvic cyst, possibly   ovarian in origin. Narrative:  INDICATION: eval for PE       COMPARISON: July 26, 2019       TECHNIQUE:  CTA imaging of the chest,  and CT imaging of the abdomen and pelvis   was performed after the uneventful intravenous administration of contrast   material.  Coronal and sagittal reconstructions were generated. 3D image   postprocessing was performed. CT dose reduction was achieved through use of a   standardized protocol tailored for this examination and automatic exposure   control for dose modulation. FINDINGS:       THYROID: Unremarkable. MEDIASTINUM/AMARILYS: No mass or lymphadenopathy. HEART/PERICARDIUM: Upper normal for size. No pericardial effusion. AORTA: No aneurysm. PULMONARY ARTERIES: No large or central pulmonary embolus. Despite scanning the   patient twice, contrast opacification in the segmental and subsegmental arteries   is suboptimal.   LUNGS/PLEURA: Mosaic attenuation of the lung parenchyma suggests small to medium   airways disease. No focal consolidation. No pleural effusion or pneumothorax. No   suspicious pulmonary nodule. BONES: No destructive bone lesion. ADDITIONAL COMMENTS: N/A       LIVER: No mass or biliary dilatation. GALLBLADDER: Surgically absent.    SPLEEN: No enlargement or lesion. PANCREAS: No mass or ductal dilatation. ADRENALS: Mild fullness left adrenal gland may be due to hyperplasia. No   discrete mass. KIDNEYS: No mass, calculus, or hydronephrosis. GI TRACT: No bowel obstruction or wall thickening   PERITONEUM: No free air or free fluid. APPENDIX: Surgically absent. RETROPERITONEUM: No lymphadenopathy or aortic aneurysm. URINARY BLADDER: No mass or calculus. LYMPH NODES: None enlarged. REPRODUCTIVE ORGANS: Prior hysterectomy. 5.2 x 5.1 cm cystic structure in the   right pelvis unchanged may be adnexal.   FREE FLUID: None. BONES: No destructive bone lesion. ADDITIONAL COMMENTS: N/A.           08/20/19 1203  CTA CHEST W OR W WO CONT Final result    Impression:  IMPRESSION:       1. Technical factors as above regarding pulmonary embolus evaluation. No large   or central pulmonary embolus. 2. Mosaic attenuation of the lung parenchyma suggests small to medium airways   disease. 3. No acute process in the abdomen or pelvis. Unchanged pelvic cyst, possibly   ovarian in origin. Narrative:  INDICATION: eval for PE       COMPARISON: July 26, 2019       TECHNIQUE:  CTA imaging of the chest,  and CT imaging of the abdomen and pelvis   was performed after the uneventful intravenous administration of contrast   material.  Coronal and sagittal reconstructions were generated. 3D image   postprocessing was performed. CT dose reduction was achieved through use of a   standardized protocol tailored for this examination and automatic exposure   control for dose modulation. FINDINGS:       THYROID: Unremarkable. MEDIASTINUM/AMARILYS: No mass or lymphadenopathy. HEART/PERICARDIUM: Upper normal for size. No pericardial effusion. AORTA: No aneurysm. PULMONARY ARTERIES: No large or central pulmonary embolus.  Despite scanning the   patient twice, contrast opacification in the segmental and subsegmental arteries   is suboptimal.   LUNGS/PLEURA: Mosaic attenuation of the lung parenchyma suggests small to medium   airways disease. No focal consolidation. No pleural effusion or pneumothorax. No   suspicious pulmonary nodule. BONES: No destructive bone lesion. ADDITIONAL COMMENTS: N/A       LIVER: No mass or biliary dilatation. GALLBLADDER: Surgically absent. SPLEEN: No enlargement or lesion. PANCREAS: No mass or ductal dilatation. ADRENALS: Mild fullness left adrenal gland may be due to hyperplasia. No   discrete mass. KIDNEYS: No mass, calculus, or hydronephrosis. GI TRACT: No bowel obstruction or wall thickening   PERITONEUM: No free air or free fluid. APPENDIX: Surgically absent. RETROPERITONEUM: No lymphadenopathy or aortic aneurysm. URINARY BLADDER: No mass or calculus. LYMPH NODES: None enlarged. REPRODUCTIVE ORGANS: Prior hysterectomy. 5.2 x 5.1 cm cystic structure in the   right pelvis unchanged may be adnexal.   FREE FLUID: None. BONES: No destructive bone lesion. ADDITIONAL COMMENTS: N/A. CXR Results  (Last 48 hours)    None            Medical Decision Making   I am the first provider for this patient. I reviewed the vital signs, available nursing notes, past medical history, past surgical history, family history and social history. Vital Signs-Reviewed the patient's vital signs. Patient Vitals for the past 24 hrs:   Temp Pulse Resp BP SpO2   08/20/19 1039     96 %   08/20/19 1020 97.8 °F (36.6 °C) (!) 101 18 121/53 97 %       Pulse Oximetry Analysis - 96% on RA    Cardiac Monitor:   Rate: 101 bpm  Rhythm: Sinus Tachycardia      EKG interpretation: (Preliminary)  Rhythm: normal sinus rhythm; and regular . Rate (approx.): 89; Axis: normal; GA interval: normal; QRS interval: normal ; ST/T wave: non-specific changes;  Other findings: normal.    Records Reviewed: Nursing Notes, Old Medical Records, Previous Radiology Studies and Previous Laboratory Studies    Provider Notes (Medical Decision Making): This is a 61year old female who comes into the ED due to dyspnea and abdominal pain. Suspect patient may be having an acute COPD exacerbation at this time. Patient does not appear to be in distress however. Will provide patient with duonebs x3 for treatment. Patient initially tachycardic upon presentation to the ED and does take estrogen for hysterectomy performed 20+ years ago. Modified wells score is 0 at this time. Patient does have significant guarding to palpation of the abdomen with urinary complaints as well. Due to patients symptoms will obtain blood work, CTA of the chest, and CT of the abdomen and pelvis for further evaluation of her symptoms. Previous CT of the ab/pelvis show possible ovarian cyst and nephrolithiasis. Will work up patient for acute COPD exacerbation, and evaluate her for hemorrhagic cyst, nephrolithiasis, ACS, and other intraabdominal processes. Will provide patient with percocet for pain as she says that has worked for her in the past. Will continue to monitor and evaluate patient while in the ED.     1:02 PM  Patient became subjectively dyspneic walking to bedside commode. While walking with pulse ox patient dipped to low 80% range just moving approx 5 feet. Patients Hgb is 10.6 which is around her baseline, troponin is negative, otherwise labs are grossly WNL. CT of the chest and abdomen do not show any acute processes. Patient continue to have stable size of pelvic mass likely a cyst. Will admit patient to hospital for further evaluation and treatment. ED Course:   Initial assessment performed. The patients presenting problems have been discussed, and they are in agreement with the care plan formulated and outlined with them. I have encouraged them to ask questions as they arise throughout their visit.          Orders Placed This Encounter    CT ABD PELV W CONT    CTA CHEST W AND W/O CONTRAST (use for PE)    CBC WITH AUTOMATED DIFF    METABOLIC PANEL, COMPREHENSIVE    CK W/ REFLX CKMB    TROPONIN I    URINALYSIS W/ RFLX MICROSCOPIC    SOB PANEL TRACKING (DO NOT DESELECT)    OXYGEN CANNULA (To maintain O2 sat greater than 92%) Consult MD if Hx. of COPD    PULSE OXIMETRY SPOT CHECK    CARDIAC MONITOR (if Clinically indicated)    EKG 12 LEAD INITIAL    SALINE LOCK IV ONE TIME STAT    albuterol-ipratropium (DUO-NEB) 2.5 MG-0.5 MG/3 ML    albuterol-ipratropium (DUO-NEB) 2.5 MG-0.5 MG/3 ML    albuterol-ipratropium (DUO-NEB) 2.5 MG-0.5 MG/3 ML    sodium chloride (NS) flush 10 mL    iopamidol (ISOVUE-370) 76 % injection 100 mL    oxyCODONE-acetaminophen (PERCOCET) 5-325 mg per tablet 1 Tab    methylPREDNISolone (PF) (Solu-MEDROL) injection 125 mg         Critical Care Time:   0    Disposition:  Admit to hospital    PLAN:  1. Current Discharge Medication List        2. Follow-up Information    None       Return to ED if worse     Diagnosis     Clinical Impression:   1. Acute exacerbation of chronic obstructive pulmonary disease (COPD) ([facility] [address]    2. Dyspnea on exertion            This note will not be viewable in [employer].

## 2019-08-20 NOTE — PROGRESS NOTES
Pharmacy auto-substitution: heparin dose adjusted to 7500 units Q8H per protocol for BMI >40.     PREMA CowartD

## 2019-08-20 NOTE — H&P
Hospitalist Admission Note    NAME: Kelvin Leon   :  1960   MRN:  [id number]     Date/Time:  2019 1:46 PM    Patient PCP: Didier Crawley, DO  ______________________________________________________________________  Given the patient's current clinical presentation, I have a high level of concern for decompensation if discharged from the emergency department. Complex decision making was performed, which includes reviewing the patient's available past medical records, laboratory results, and x-ray films. My assessment of this patient's clinical condition and my plan of care is as follows. Assessment / Plan:  Acute hypoxic RF, POA due to COPD exacerbation  Fibromyalgia/chronic pain syndrome  Obesity Hypoventilation Syndrome POA  Morbid obesity  -CT chest/ a/p showed technical factors as above regarding pulmonary embolus evaluation. No large or central pulmonary embolus. Mosaic attenuation of the lung parenchyma suggests small to medium airways disease. No acute process in the abdomen or pelvis. Unchanged pelvic cyst, possibly ovarian in origin.  -start duonebs, IV steroids, mucinex, Breo, Hold abx due to no productive cough  -O2 suppl, wean as tolerated, need O2 challenge prior to discharge    T2DM  -SSI, hold metformin    GERD  -con't PPI    Anxiety/depression/fibromyalgia/chronic pain syndrome  -resume home meds    Code Status: Full  Surrogate Decision Maker: daughter  DVT Prophylaxis: lovenox  GI Prophylaxis: not indicated  Baseline: from home      Subjective:   CHIEF COMPLAINT: sob    HISTORY OF PRESENT ILLNESS:     Sanjana Seo is a 61 y.o.  female with PMHx significant for T2DM, fibromyalgia, cornice pain syndrome, COPD, present to the ER c/o dyspnea started last night. Pt states her SOB is worst with exertion, improved with rest. Associated symptoms including dry cough. Pt denies fever, chills, cp, sob, palpitations, n/v/d.     In the ER, vitals T97.8, P101, /53  Labs/images reviewed. We were asked to admit for work up and evaluation of the above problems. Past Medical History:   Diagnosis Date    Adverse effect of anesthesia     slow to wake up       Anxiety and depression     Arrhythmia     Arthritis     Chronic pain     djd    Diabetes (HCC)     GERD (gastroesophageal reflux disease)     Hypertension     Hypothyroidism     Kidney stone     Liver disease     Obesity, morbid, BMI 50 or higher (HCC)     Seizures (HCC)         Past Surgical History:   Procedure Laterality Date    CARDIAC CATHETERIZATION  11/18/2010         HX APPENDECTOMY      HX CHOLECYSTECTOMY      HX HYSTERECTOMY      HX TUBAL LIGATION         Social History     Tobacco Use    Smoking status: Former Smoker    Smokeless tobacco: Never Used    Tobacco comment: quit smoking  about 34  years ago      Substance Use Topics    Alcohol use: No        Family History   Problem Relation Age of Onset    Heart Disease Mother     Hypertension Mother     Cancer Mother     Heart Disease Father     Hypertension Father     Cancer Brother      Allergies   Allergen Reactions    Celebrex [Celecoxib] Nausea Only    Morphine Nausea Only    Sulfa (Sulfonamide Antibiotics) Nausea Only    Adhesive Other (comments)     Red and bumpy      Codeine Other (comments)     Hyper activity        Prior to Admission medications    Medication Sig Start Date End Date Taking? Authorizing Provider   fluticasone furoate-vilanterol (BREO ELLIPTA) 200-25 mcg/dose inhaler Take 1 Puff by inhalation daily. 6/14/19   Uyen Thorpe, NP   busPIRone (BUSPAR) 15 mg tablet Take 15 mg by mouth three (3) times daily. Provider, Historical   QUEtiapine (SEROQUEL) 400 mg tablet Take 600 mg by mouth nightly. Provider, Historical   nystatin, bulk, 15 billion unit powd 1 Units by Does Not Apply route three (3) times daily.  To skin folds, ventral fold 12/26/18   Reyna Black MD   albuterol (PROVENTIL HFA, VENTOLIN HFA, PROAIR HFA) 90 mcg/actuation inhaler Take 2 Puffs by inhalation every four (4) hours as needed for Wheezing. 12/26/18   Reyna Black MD   buPROPion XL (WELLBUTRIN XL) 150 mg tablet Take 300 mg by mouth nightly. Provider, Historical   prazosin (MINIPRESS) 1 mg capsule Take 4 mg by mouth nightly. Provider, Historical   ergocalciferol (VITAMIN D2) 50,000 unit capsule Take 50,000 Units by mouth every seven (7) days. Twice a week Sunday and Wednesday    Provider, Historical   metFORMIN (GLUCOPHAGE) 500 mg tablet Take 1,000 mg by mouth daily (with dinner). Provider, Historical   estradiol (ESTRACE) 0.5 mg tablet Take 0.5 mg by mouth daily. Provider, Historical   terconazole (TERAZOL 7) 0.4 % vaginal cream Insert 1 Applicator into vagina nightly. has refill for 7 day supply    Provider, Historical   miconazole (MICOTIN) 2 % topical cream Apply 1 Each to affected area two (2) times a day. Apply ointment to fungal infection under abdominal pannus and groin twice a day for 14 days. Provider, Historical   aspirin 81 mg chewable tablet Take 81 mg by mouth daily. Shannan Mckeon, KERRI   gabapentin (NEURONTIN) 100 mg capsule Take 1 Cap by mouth three (3) times daily. Patient taking differently: Take 1 Cap by mouth four (4) times daily. 11/24/17   Demetra Zaragoza MD   levothyroxine (SYNTHROID) 112 mcg tablet Take 112 mcg by mouth Daily (before breakfast). Provider, Historical   metoprolol succinate (TOPROL XL) 25 mg XL tablet Take 25 mg by mouth daily. Provider, Historical   omeprazole (PRILOSEC) 20 mg capsule Take 40 mg by mouth daily. Provider, Historical   topiramate (TOPAMAX) 200 mg tablet Take 200 mg by mouth nightly. 12/8/10   Provider, Historical   simvastatin (ZOCOR) 20 mg tablet Take 20 mg by mouth nightly. Other, MD Ashish       REVIEW OF SYSTEMS:     I am not able to complete the review of systems because:    The patient is intubated and sedated    The patient has altered mental status due to his acute medical problems    The patient has baseline aphasia from prior stroke(s)    The patient has baseline dementia and is not reliable historian    The patient is in acute medical distress and unable to provide information           Total of 12 systems reviewed as follows:       POSITIVE= BOLD text  Negative = text not BOLD  General:  fever, chills, sweats, generalized weakness, weight loss/gain,      loss of appetite   Eyes:    blurred vision, eye pain, loss of vision, double vision  ENT:    rhinorrhea, pharyngitis   Respiratory:   cough, sputum production, SOB, DIOP, wheezing, pleuritic pain   Cardiology:   chest pain, palpitations, orthopnea, PND, edema, syncope   Gastrointestinal:  abdominal pain , N/V, diarrhea, dysphagia, constipation, bleeding   Genitourinary:  frequency, urgency, dysuria, hematuria, incontinence   Muskuloskeletal :  arthralgia, myalgia, back pain  Hematology:  easy bruising, nose or gum bleeding, lymphadenopathy   Dermatological: rash, ulceration, pruritis, color change / jaundice  Endocrine:   hot flashes or polydipsia   Neurological:  headache, dizziness, confusion, focal weakness, paresthesia,     Speech difficulties, memory loss, gait difficulty  Psychological: Feelings of anxiety, depression, agitation    Objective:   VITALS:    Visit Vitals  /53   Pulse (!) 101   Temp 97.8 °F (36.6 °C)   Resp 18   Ht 5' 2\" (1.575 m)   Wt (!) 166.9 kg (368 lb)   SpO2 96%   BMI 67.31 kg/m²       PHYSICAL EXAM:    General:    Alert, cooperative, no distress, appears stated age. HEENT: Atraumatic, anicteric sclerae, pink conjunctivae     No oral ulcers, mucosa moist, throat clear  Neck:  Supple, symmetrical,  thyroid: non tender  Lungs:   CTA b/l. No wheezing or Rhonchi. No rales. Chest wall:  No tenderness. No accessory muscle use. Heart:   Regular  rhythm,  No  Murmur. No edema  Abdomen:   Soft, NT. ND  BS+  Extremities: No cyanosis.   No clubbing,      Skin turgor normal, Radial dial pulse 2+. Capillary refill normal  Skin:     Not pale. Not Jaundiced  No rashes   Psych:  Not depressed. Not anxious or agitated. Neurologic: No facial asymmetry. No aphasia or slurred speech. Symmetrical strength, Sensation grossly intact. AAOx4.     _______________________________________________________________________  Care Plan discussed with:    Comments   Patient x    Family      RN x    Care Manager                    Consultant:  [facility] ED physician   _______________________________________________________________________  Expected  Disposition:   Home with Family    HH/PT/OT/RN x   SNF/LTC    S[facility]A    ________________________________________________________________________  TOTAL TIME:  72 Minutes    Critical Care Provided     Minutes non procedure based      Comments    x Reviewed previous records   >50% of visit spent in counseling and coordination of care x Discussion with patient and/or family and questions answered       ________________________________________________________________________  Signed: Dacia Gonzalez MD    Procedures: see electronic medical records for all procedures/Xrays and details which were not copied into this note but were reviewed prior to creation of Plan.     LAB DATA REVIEWED:    Recent Results (from the past 24 hour(s))   EKG, 12 LEAD, INITIAL    Collection Time: 08/20/19 10:32 AM   Result Value Ref Range    Ventricular Rate 89 BPM    Atrial Rate 89 BPM    P-R Interval 144 ms    QRS Duration 80 ms    Q-T Interval 362 ms    QTC Calculation (Bezet) 440 ms    Calculated P Axis 38 degrees    Calculated R Axis 28 degrees    Calculated T Axis 24 degrees    Diagnosis       ** Poor data quality, interpretation may be adversely affected  Normal sinus rhythm  Normal ECG  When compared with ECG of 25-JUL-2019 21:59,  Criteria for Septal infarct are no longer present     CBC WITH AUTOMATED DIFF    Collection Time: 08/20/19 10:52 AM   Result Value Ref Range    WBC 7.4 3.6 - 11.0 K/uL    RBC 4.74 3.80 - 5.20 M/uL    HGB 10.6 (L) 11.5 - 16.0 g/dL    HCT 36.7 35.0 - 47.0 %    MCV 77.4 (L) 80.0 - 99.0 FL    MCH 22.4 (L) 26.0 - 34.0 PG    MCHC 28.9 (L) 30.0 - 36.5 g/dL    RDW 17.7 (H) 11.5 - 14.5 %    PLATELET 618 957 - 429 K/uL    MPV 11.1 8.9 - 12.9 FL    NRBC 0.0 0  WBC    ABSOLUTE NRBC 0.00 0.00 - 0.01 K/uL    NEUTROPHILS 65 32 - 75 %    LYMPHOCYTES 23 12 - 49 %    MONOCYTES 9 5 - 13 %    EOSINOPHILS 3 0 - 7 %    BASOPHILS 0 0 - 1 %    IMMATURE GRANULOCYTES 0 0.0 - 0.5 %    ABS. NEUTROPHILS 4.8 1.8 - 8.0 K/UL    ABS. LYMPHOCYTES 1.7 0.8 - 3.5 K/UL    ABS. MONOCYTES 0.7 0.0 - 1.0 K/UL    ABS. EOSINOPHILS 0.2 0.0 - 0.4 K/UL    ABS. BASOPHILS 0.0 0.0 - 0.1 K/UL    ABS. IMM. GRANS. 0.0 0.00 - 0.04 K/UL    DF AUTOMATED      PLATELET COMMENTS Large Platelets      RBC COMMENTS OVALOCYTES  PRESENT        RBC COMMENTS TEARDROP CELLS  PRESENT        RBC COMMENTS ANISOCYTOSIS  1+        RBC COMMENTS MICROCYTOSIS  PRESENT        RBC COMMENTS HYPOCHROMIA  1+       METABOLIC PANEL, COMPREHENSIVE    Collection Time: 08/20/19 10:52 AM   Result Value Ref Range    Sodium 141 136 - 145 mmol/L    Potassium 4.0 3.5 - 5.1 mmol/L    Chloride 108 97 - 108 mmol/L    CO2 25 21 - 32 mmol/L    Anion gap 8 5 - 15 mmol/L    Glucose 84 65 - 100 mg/dL    BUN 18 6 - 20 MG/DL    Creatinine 0.85 0.55 - 1.02 MG/DL    BUN/Creatinine ratio 21 (H) 12 - 20      GFR est AA >60 >60 ml/min/1.73m2    GFR est non-AA >60 >60 ml/min/1.73m2    Calcium 9.6 8.5 - 10.1 MG/DL    Bilirubin, total 0.2 0.2 - 1.0 MG/DL    ALT (SGPT) 20 12 - 78 U/L    AST (SGOT) 12 (L) 15 - 37 U/L    Alk.  phosphatase 60 45 - 117 U/L    Protein, total 6.7 6.4 - 8.2 g/dL    Albumin 3.3 (L) 3.5 - 5.0 g/dL    Globulin 3.4 2.0 - 4.0 g/dL    A-G Ratio 1.0 (L) 1.1 - 2.2     CK W/ REFLX CKMB    Collection Time: 08/20/19 10:52 AM   Result Value Ref Range    CK 96 26 - 192 U/L   TROPONIN I    Collection Time: 08/20/19 10:52 AM   Result Value Ref Range    Troponin-I, Qt. <0.05 <0.05 ng/mL   URINALYSIS W/ RFLX MICROSCOPIC    Collection Time: 08/20/19 12:46 PM   Result Value Ref Range    Color YELLOW/STRAW      Appearance CLEAR CLEAR      Specific gravity 1.010 1.003 - 1.030      pH (UA) 5.0 5.0 - 8.0      Protein NEGATIVE  NEG mg/dL    Glucose NEGATIVE  NEG mg/dL    Ketone NEGATIVE  NEG mg/dL    Bilirubin NEGATIVE  NEG      Blood NEGATIVE  NEG      Urobilinogen 0.2 0.2 - 1.0 EU/dL    Nitrites NEGATIVE  NEG      Leukocyte Esterase NEGATIVE  NEG

## 2019-08-20 NOTE — ACP (ADVANCE CARE PLANNING)
Advance Care Planning Note    Name: Briana Benitez  YOB: 1960  MRN: [id number]  Admission Date: 8/20/2019 10:25 AM    Date of discussion: 8/20/2019    Active Diagnoses:    Hospital Problems  Date Reviewed: 6/11/2019          Codes Class Noted POA    COPD exacerbation ([facility] [address] ICD-10-CM: J44.1  ICD-9-CM: 491.21  6/11/2019 Unknown                These active diagnoses are of sufficient risk that focused discussion on advance care planning is indicated in order to allow the patient to thoughtfully consider personal goals of care, and if situations arise that prevent the ability to personally give input, to ensure appropriate representation of their personal desires for different levels and aggressiveness of care. Persons present and participating in discussion: Kaia Joyce     Discussion: Code status addressed due to above mentioned medical problems and also chronic comorbidity /advance age. Patient wants to be a Full Code. Patient  would like to assign Korin Moretnsen (daughter)  as the surrogate decision maker. Time Spent:   Total time spent face-to-face in education and discussion:16 minutes.      Peyton Arenas MD  8/20/2019  1:48 PM

## 2019-08-20 NOTE — ED NOTES
Pt reports lower back pain x1 week with urinary pain and frequency, reports pain in in right pelvic area, pt reports intermittent shortness of breath, worse since last night, worse with exertion, was seen by PCP yesterday, had urine tested

## 2019-08-20 NOTE — PROGRESS NOTES
Bedside and Verbal shift change report given to Momo Caraballo (oncoming nurse) by Marlene Jim (offgoing nurse). Report included the following information SBAR, Kardex, Intake/Output, MAR and Recent Results.

## 2019-08-21 LAB
ANION GAP SERPL CALC-SCNC: 9 MMOL/L (ref 5–15)
BASOPHILS # BLD: 0 K/UL (ref 0–0.1)
BASOPHILS NFR BLD: 0 % (ref 0–1)
BUN SERPL-MCNC: 16 MG/DL (ref 6–20)
BUN/CREAT SERPL: 19 (ref 12–20)
CALCIUM SERPL-MCNC: 9.5 MG/DL (ref 8.5–10.1)
CHLORIDE SERPL-SCNC: 107 MMOL/L (ref 97–108)
CO2 SERPL-SCNC: 22 MMOL/L (ref 21–32)
CREAT SERPL-MCNC: 0.84 MG/DL (ref 0.55–1.02)
DIFFERENTIAL METHOD BLD: ABNORMAL
EOSINOPHIL # BLD: 0 K/UL (ref 0–0.4)
EOSINOPHIL NFR BLD: 0 % (ref 0–7)
ERYTHROCYTE [DISTWIDTH] IN BLOOD BY AUTOMATED COUNT: 17.3 % (ref 11.5–14.5)
GLUCOSE BLD STRIP.AUTO-MCNC: 109 MG/DL (ref 65–100)
GLUCOSE BLD STRIP.AUTO-MCNC: 124 MG/DL (ref 65–100)
GLUCOSE BLD STRIP.AUTO-MCNC: 156 MG/DL (ref 65–100)
GLUCOSE BLD STRIP.AUTO-MCNC: 186 MG/DL (ref 65–100)
GLUCOSE SERPL-MCNC: 188 MG/DL (ref 65–100)
HCT VFR BLD AUTO: 34 % (ref 35–47)
HGB BLD-MCNC: 10 G/DL (ref 11.5–16)
IMM GRANULOCYTES # BLD AUTO: 0.1 K/UL (ref 0–0.04)
IMM GRANULOCYTES NFR BLD AUTO: 1 % (ref 0–0.5)
LYMPHOCYTES # BLD: 0.9 K/UL (ref 0.8–3.5)
LYMPHOCYTES NFR BLD: 12 % (ref 12–49)
MCH RBC QN AUTO: 22.6 PG (ref 26–34)
MCHC RBC AUTO-ENTMCNC: 29.4 G/DL (ref 30–36.5)
MCV RBC AUTO: 76.9 FL (ref 80–99)
MONOCYTES # BLD: 0.4 K/UL (ref 0–1)
MONOCYTES NFR BLD: 5 % (ref 5–13)
NEUTS SEG # BLD: 6.3 K/UL (ref 1.8–8)
NEUTS SEG NFR BLD: 82 % (ref 32–75)
NRBC # BLD: 0 K/UL (ref 0–0.01)
NRBC BLD-RTO: 0 PER 100 WBC
PLATELET # BLD AUTO: 206 K/UL (ref 150–400)
PMV BLD AUTO: 12.3 FL (ref 8.9–12.9)
POTASSIUM SERPL-SCNC: 4.1 MMOL/L (ref 3.5–5.1)
RBC # BLD AUTO: 4.42 M/UL (ref 3.8–5.2)
SERVICE CMNT-IMP: ABNORMAL
SODIUM SERPL-SCNC: 138 MMOL/L (ref 136–145)
WBC # BLD AUTO: 7.6 K/UL (ref 3.6–11)

## 2019-08-21 PROCEDURE — 74011250637 HC RX REV CODE- 250/637: Performed by: INTERNAL MEDICINE

## 2019-08-21 PROCEDURE — 74011000250 HC RX REV CODE- 250: Performed by: INTERNAL MEDICINE

## 2019-08-21 PROCEDURE — 77010033678 HC OXYGEN DAILY

## 2019-08-21 PROCEDURE — 94760 N-INVAS EAR/PLS OXIMETRY 1: CPT

## 2019-08-21 PROCEDURE — 74011250636 HC RX REV CODE- 250/636: Performed by: FAMILY MEDICINE

## 2019-08-21 PROCEDURE — 82962 GLUCOSE BLOOD TEST: CPT

## 2019-08-21 PROCEDURE — 85025 COMPLETE CBC W/AUTO DIFF WBC: CPT

## 2019-08-21 PROCEDURE — 94640 AIRWAY INHALATION TREATMENT: CPT

## 2019-08-21 PROCEDURE — 74011250636 HC RX REV CODE- 250/636: Performed by: INTERNAL MEDICINE

## 2019-08-21 PROCEDURE — 74011636637 HC RX REV CODE- 636/637: Performed by: INTERNAL MEDICINE

## 2019-08-21 PROCEDURE — 74011000250 HC RX REV CODE- 250: Performed by: NURSE PRACTITIONER

## 2019-08-21 PROCEDURE — 99218 HC RM OBSERVATION: CPT

## 2019-08-21 PROCEDURE — 74011250637 HC RX REV CODE- 250/637: Performed by: NURSE PRACTITIONER

## 2019-08-21 PROCEDURE — 80048 BASIC METABOLIC PNL TOTAL CA: CPT

## 2019-08-21 PROCEDURE — 36415 COLL VENOUS BLD VENIPUNCTURE: CPT

## 2019-08-21 PROCEDURE — 96372 THER/PROPH/DIAG INJ SC/IM: CPT

## 2019-08-21 PROCEDURE — 74011250636 HC RX REV CODE- 250/636: Performed by: NURSE PRACTITIONER

## 2019-08-21 PROCEDURE — 96375 TX/PRO/DX INJ NEW DRUG ADDON: CPT

## 2019-08-21 PROCEDURE — 65660000000 HC RM CCU STEPDOWN

## 2019-08-21 RX ORDER — LIDOCAINE 4 G/100G
1 PATCH TOPICAL EVERY 24 HOURS
Status: DISCONTINUED | OUTPATIENT
Start: 2019-08-21 | End: 2019-08-22 | Stop reason: HOSPADM

## 2019-08-21 RX ORDER — METFORMIN HYDROCHLORIDE 1000 MG/1
1000 TABLET ORAL 2 TIMES DAILY
COMMUNITY

## 2019-08-21 RX ORDER — LEVALBUTEROL INHALATION SOLUTION 0.63 MG/3ML
0.63 SOLUTION RESPIRATORY (INHALATION)
COMMUNITY
End: 2019-08-22

## 2019-08-21 RX ORDER — OXYCODONE AND ACETAMINOPHEN 5; 325 MG/1; MG/1
1 TABLET ORAL
Status: ON HOLD | COMMUNITY
End: 2019-08-22 | Stop reason: SDUPTHER

## 2019-08-21 RX ORDER — FLUTICASONE PROPIONATE 50 MCG
2 SPRAY, SUSPENSION (ML) NASAL DAILY
COMMUNITY

## 2019-08-21 RX ORDER — FENTANYL CITRATE 50 UG/ML
25 INJECTION, SOLUTION INTRAMUSCULAR; INTRAVENOUS ONCE
Status: COMPLETED | OUTPATIENT
Start: 2019-08-21 | End: 2019-08-21

## 2019-08-21 RX ORDER — OXYCODONE AND ACETAMINOPHEN 5; 325 MG/1; MG/1
1 TABLET ORAL ONCE
Status: COMPLETED | OUTPATIENT
Start: 2019-08-21 | End: 2019-08-21

## 2019-08-21 RX ORDER — LOSARTAN POTASSIUM 25 MG/1
25 TABLET ORAL DAILY
Status: ON HOLD | COMMUNITY
End: 2021-11-24 | Stop reason: CLARIF

## 2019-08-21 RX ORDER — OXYCODONE AND ACETAMINOPHEN 5; 325 MG/1; MG/1
1 TABLET ORAL ONCE
Status: DISCONTINUED | OUTPATIENT
Start: 2019-08-21 | End: 2019-08-21

## 2019-08-21 RX ADMIN — OXYCODONE HYDROCHLORIDE AND ACETAMINOPHEN 1 TABLET: 5; 325 TABLET ORAL at 04:59

## 2019-08-21 RX ADMIN — BUSPIRONE HYDROCHLORIDE 15 MG: 5 TABLET ORAL at 09:08

## 2019-08-21 RX ADMIN — UMECLIDINIUM 1 PUFF: 62.5 AEROSOL, POWDER ORAL at 12:44

## 2019-08-21 RX ADMIN — QUETIAPINE FUMARATE 600 MG: 400 TABLET ORAL at 20:51

## 2019-08-21 RX ADMIN — ATORVASTATIN CALCIUM 20 MG: 20 TABLET, FILM COATED ORAL at 17:53

## 2019-08-21 RX ADMIN — PRAZOSIN HYDROCHLORIDE 4 MG: 1 CAPSULE ORAL at 18:34

## 2019-08-21 RX ADMIN — FLUTICASONE FUROATE AND VILANTEROL TRIFENATATE 1 PUFF: 200; 25 POWDER RESPIRATORY (INHALATION) at 09:08

## 2019-08-21 RX ADMIN — OXYCODONE HYDROCHLORIDE AND ACETAMINOPHEN 1 TABLET: 5; 325 TABLET ORAL at 18:38

## 2019-08-21 RX ADMIN — OXYCODONE HYDROCHLORIDE AND ACETAMINOPHEN 1 TABLET: 5; 325 TABLET ORAL at 14:05

## 2019-08-21 RX ADMIN — METOPROLOL SUCCINATE 25 MG: 25 TABLET, EXTENDED RELEASE ORAL at 09:08

## 2019-08-21 RX ADMIN — LEVOTHYROXINE SODIUM 112 MCG: 112 TABLET ORAL at 05:01

## 2019-08-21 RX ADMIN — HEPARIN SODIUM 7500 UNITS: 5000 INJECTION INTRAVENOUS; SUBCUTANEOUS at 05:01

## 2019-08-21 RX ADMIN — HEPARIN SODIUM 7500 UNITS: 5000 INJECTION INTRAVENOUS; SUBCUTANEOUS at 13:49

## 2019-08-21 RX ADMIN — OXYCODONE HYDROCHLORIDE AND ACETAMINOPHEN 1 TABLET: 5; 325 TABLET ORAL at 00:28

## 2019-08-21 RX ADMIN — FENTANYL CITRATE 25 MCG: 50 INJECTION, SOLUTION INTRAMUSCULAR; INTRAVENOUS at 02:24

## 2019-08-21 RX ADMIN — NYSTATIN: 100000 POWDER TOPICAL at 09:07

## 2019-08-21 RX ADMIN — BUPROPION HYDROCHLORIDE 300 MG: 150 TABLET, FILM COATED, EXTENDED RELEASE ORAL at 18:34

## 2019-08-21 RX ADMIN — GABAPENTIN 100 MG: 100 CAPSULE ORAL at 12:03

## 2019-08-21 RX ADMIN — Medication 10 ML: at 05:00

## 2019-08-21 RX ADMIN — TOPIRAMATE 200 MG: 100 TABLET, FILM COATED ORAL at 18:38

## 2019-08-21 RX ADMIN — Medication 10 ML: at 21:27

## 2019-08-21 RX ADMIN — GUAIFENESIN 600 MG: 600 TABLET, EXTENDED RELEASE ORAL at 09:08

## 2019-08-21 RX ADMIN — Medication 10 ML: at 13:49

## 2019-08-21 RX ADMIN — OXYCODONE HYDROCHLORIDE AND ACETAMINOPHEN 1 TABLET: 5; 325 TABLET ORAL at 09:22

## 2019-08-21 RX ADMIN — BUSPIRONE HYDROCHLORIDE 15 MG: 5 TABLET ORAL at 21:26

## 2019-08-21 RX ADMIN — IPRATROPIUM BROMIDE AND ALBUTEROL SULFATE 3 ML: .5; 3 SOLUTION RESPIRATORY (INHALATION) at 21:06

## 2019-08-21 RX ADMIN — NYSTATIN: 100000 POWDER TOPICAL at 17:53

## 2019-08-21 RX ADMIN — FENTANYL CITRATE 25 MCG: 50 INJECTION, SOLUTION INTRAMUSCULAR; INTRAVENOUS at 17:54

## 2019-08-21 RX ADMIN — OXYCODONE HYDROCHLORIDE AND ACETAMINOPHEN 1 TABLET: 5; 325 TABLET ORAL at 22:33

## 2019-08-21 RX ADMIN — IPRATROPIUM BROMIDE AND ALBUTEROL SULFATE 3 ML: .5; 3 SOLUTION RESPIRATORY (INHALATION) at 07:22

## 2019-08-21 RX ADMIN — GABAPENTIN 100 MG: 100 CAPSULE ORAL at 22:00

## 2019-08-21 RX ADMIN — OXYCODONE HYDROCHLORIDE AND ACETAMINOPHEN 1 TABLET: 5; 325 TABLET ORAL at 14:34

## 2019-08-21 RX ADMIN — GABAPENTIN 100 MG: 100 CAPSULE ORAL at 17:53

## 2019-08-21 RX ADMIN — BUSPIRONE HYDROCHLORIDE 15 MG: 5 TABLET ORAL at 15:58

## 2019-08-21 RX ADMIN — INSULIN LISPRO 2 UNITS: 100 INJECTION, SOLUTION INTRAVENOUS; SUBCUTANEOUS at 12:03

## 2019-08-21 RX ADMIN — IPRATROPIUM BROMIDE AND ALBUTEROL SULFATE 3 ML: .5; 3 SOLUTION RESPIRATORY (INHALATION) at 02:00

## 2019-08-21 RX ADMIN — HEPARIN SODIUM 7500 UNITS: 5000 INJECTION INTRAVENOUS; SUBCUTANEOUS at 21:26

## 2019-08-21 RX ADMIN — GUAIFENESIN 600 MG: 600 TABLET, EXTENDED RELEASE ORAL at 20:50

## 2019-08-21 RX ADMIN — ASPIRIN 81 MG 81 MG: 81 TABLET ORAL at 09:08

## 2019-08-21 RX ADMIN — GABAPENTIN 100 MG: 100 CAPSULE ORAL at 09:08

## 2019-08-21 NOTE — CONSULTS
[facility]  Pulmonary, Critical Care, and Sleep Medicine    Initial Patient Consult    Name: Chepe Melot MRN: [id number]   : 1960 Hospital: [facility]   Date: 2019        IMPRESSION:   · No evidence of hypoxemia  · No evidence of PE  · COPD  · Dyspnea  · Obesity Body mass index is 67.31 kg/m². · CRYSTAL  · OHS      RECOMMENDATIONS:   · No need for O2. Adequate RA sats  · Does not look like a copd exacerbation  · No need to change out pt regimen of trelegy, on xopenex jet neb alone, add atrovent  · No mention of pulmonary HTN on recent ECHO  · SOB most likely due to obesity  · Has an appt to see me Monday and has an appt to see our sleep lab in September  · Nothing more to add  · Will sign off     Subjective: This patient has been seen and evaluated at the request of Dr. Chyna Ramos for SOB. Patient is a 61 y.o. female morbidly obese admitted with sob, back pain, not hypoxic on admission. No evidence of low O2 sats on VS on admission  Started on jet nebs, IV steroids that she refused  Today in no distress, NOT wheezing      Past Medical History:   Diagnosis Date    Adverse effect of anesthesia     slow to wake up       Anxiety and depression     Arrhythmia     Arthritis     Chronic pain     djd    [facility]     GERD (gastroesophageal reflux disease)     Hypertension     Hypothyroidism     Kidney stone     Liver disease     Obesity, morbid, BMI 50 or higher ([facility] [facility]     Seizures ([facility] [address]       Past Surgical History:   Procedure Laterality Date    CARDIAC CATHETERIZATION  2010         HX APPENDECTOMY      HX CHOLECYSTECTOMY      HX HYSTERECTOMY      HX TUBAL LIGATION        Prior to Admission medications    Medication Sig Start Date End Date Taking? Authorizing Provider   fluticasone furoate-vilanterol (BREO ELLIPTA) 200-25 mcg/dose inhaler Take 1 Puff by inhalation daily.  19   Uyen Thorpe NP   busPIRone (BUSPAR) 15 mg tablet Take 15 mg by mouth three (3) times daily. Provider, Historical   QUEtiapine (SEROQUEL) 400 mg tablet Take 600 mg by mouth nightly. Provider, Historical   nystatin, bulk, 15 billion unit powd 1 Units by Does Not Apply route three (3) times daily. To skin folds, ventral fold 12/26/18   Reyna Black MD   albuterol (PROVENTIL HFA, VENTOLIN HFA, PROAIR HFA) 90 mcg/actuation inhaler Take 2 Puffs by inhalation every four (4) hours as needed for Wheezing. 12/26/18   Reyna Black MD   buPROPion XL (WELLBUTRIN XL) 150 mg tablet Take 300 mg by mouth nightly. Provider, Historical   prazosin (MINIPRESS) 1 mg capsule Take 4 mg by mouth nightly. Provider, Historical   ergocalciferol (VITAMIN D2) 50,000 unit capsule Take 50,000 Units by mouth every seven (7) days. Twice a week Sunday and Wednesday    Provider, Historical   metFORMIN (GLUCOPHAGE) 500 mg tablet Take 1,000 mg by mouth daily (with dinner). Provider, Historical   estradiol (ESTRACE) 0.5 mg tablet Take 0.5 mg by mouth daily. Provider, Historical   terconazole (TERAZOL 7) 0.4 % vaginal cream Insert 1 Applicator into vagina nightly. has refill for 7 day supply    Provider, Historical   miconazole (MICOTIN) 2 % topical cream Apply 1 Each to affected area two (2) times a day. Apply ointment to fungal infection under abdominal pannus and groin twice a day for 14 days. Provider, Historical   aspirin 81 mg chewable tablet Take 81 mg by mouth daily. Shannan Mckeon NP   gabapentin (NEURONTIN) 100 mg capsule Take 1 Cap by mouth three (3) times daily. Patient taking differently: Take 1 Cap by mouth four (4) times daily. 11/24/17   Demetra Zaragoza MD   levothyroxine (SYNTHROID) 112 mcg tablet Take 112 mcg by mouth Daily (before breakfast). Provider, Historical   metoprolol succinate (TOPROL XL) 25 mg XL tablet Take 25 mg by mouth daily. Provider, Historical   omeprazole (PRILOSEC) 20 mg capsule Take 40 mg by mouth daily.     Provider, Historical   topiramate (TOPAMAX) 200 mg tablet Take 200 mg by mouth nightly. 12/8/10   Provider, Historical   simvastatin (ZOCOR) 20 mg tablet Take 20 mg by mouth nightly.     Other, MD Ashish     Allergies   Allergen Reactions    Celebrex [Celecoxib] Nausea Only    Morphine Nausea Only    Sulfa (Sulfonamide Antibiotics) Nausea Only    Adhesive Other (comments)     Red and bumpy      Codeine Other (comments)     Hyper activity      Social History     Tobacco Use    Smoking status: Former Smoker    Smokeless tobacco: Never Used    Tobacco comment: quit smoking  about 34  years ago      Substance Use Topics    Alcohol use: No      Family History   Problem Relation Age of Onset    Heart Disease Mother     Hypertension Mother     Cancer Mother     Heart Disease Father     Hypertension Father     Cancer Brother         Current Facility-Administered Medications   Medication Dose Route Frequency    lidocaine 4 % patch 1 Patch  1 Patch TransDERmal Q24H    guaiFENesin ER (MUCINEX) tablet 600 mg  600 mg Oral Q12H    albuterol-ipratropium (DUO-NEB) 2.5 MG-0.5 MG/3 ML  3 mL Nebulization Q6H RT    aspirin chewable tablet 81 mg  81 mg Oral DAILY    buPROPion XL (WELLBUTRIN XL) tablet 300 mg  300 mg Oral QHS    busPIRone (BUSPAR) tablet 15 mg  15 mg Oral TID    fluticasone-vilanterol (BREO ELLIPTA) 200mcg-25mcg/puff  1 Puff Inhalation DAILY    gabapentin (NEURONTIN) capsule 100 mg  100 mg Oral QID    levothyroxine (SYNTHROID) tablet 112 mcg  112 mcg Oral 6am    metoprolol succinate (TOPROL-XL) XL tablet 25 mg  25 mg Oral DAILY    prazosin (MINIPRESS) capsule 4 mg  4 mg Oral QHS    QUEtiapine (SEROquel) tablet 600 mg  600 mg Oral QHS    metroNIDAZOLE (METROGEL) 0.75 % vaginal gel 5 g  1 Applicator Vaginal QHS    atorvastatin (LIPITOR) tablet 20 mg  20 mg Oral QHS    topiramate (TOPAMAX) tablet 200 mg  200 mg Oral QHS    insulin lispro (HUMALOG) injection   SubCUTAneous AC&HS    sodium chloride (NS) flush 5-40 mL  5-40 mL IntraVENous Q8H    heparin (porcine) injection 7,500 Units  7,500 Units SubCUTAneous Q8H    methylPREDNISolone (PF) (SOLU-MEDROL) injection 40 mg  40 mg IntraVENous Q8H    nystatin (MYCOSTATIN) 100,000 unit/gram powder   Topical BID       Review of Systems:  A comprehensive review of systems was negative except for: Respiratory: positive for dyspnea on exertion    Objective:   Vital Signs:    Visit Vitals  /87 (BP 1 Location: Right arm, BP Patient Position: At rest)   Pulse 71   Temp 98 °F (36.7 °C) Comment: STUDENT NURSE   Resp 18   Ht 5' 2\" (1.575 m)   Wt (!) 166.9 kg (368 lb)   SpO2 97%   Breastfeeding? No   BMI 67.31 kg/m²       O2 Device: Nasal cannula   O2 Flow Rate (L/min): 2 l/min   Temp (24hrs), Av.4 °F (36.9 °C), Min:97.8 °F (36.6 °C), Max:99 °F (37.2 °C)       Intake/Output:   Last shift:      No intake/output data recorded. Last 3 shifts: No intake/output data recorded. No intake or output data in the 24 hours ending 19 0920   Physical Exam:   General:  Alert, cooperative, no distress, appears stated age. Head:  Normocephalic, without obvious abnormality, atraumatic. Eyes:  Conjunctivae/corneas clear. PERRL, EOMs intact. Nose: Nares normal. Septum midline. Mucosa normal. No drainage or sinus tenderness. Throat: Lips, mucosa, and tongue normal. Teeth and gums normal.   Neck: Supple, symmetrical, trachea midline, no adenopathy, thyroid: no enlargment/tenderness/nodules, no carotid bruit and no JVD. Back:   Symmetric, no curvature. ROM normal.   Lungs:   Clear to auscultation bilaterally. NOT WHEEZING   Chest wall:  No tenderness or deformity. Heart:  Regular rate and rhythm, S1, S2 normal, no murmur, click, rub or gallop. Abdomen:   Soft, non-tender. Bowel sounds normal. No masses,  No organomegaly. Extremities: Extremities normal, atraumatic, no cyanosis, + edema. Pulses: 2+ and symmetric all extremities.    Skin: Skin color, texture, turgor normal. No rashes or lesions   Lymph nodes: Cervical, supraclavicular, and axillary nodes normal.   Neurologic: Grossly nonfocal     Data review:     Recent Results (from the past 24 hour(s))   EKG, 12 LEAD, INITIAL    Collection Time: 08/20/19 10:32 AM   Result Value Ref Range    Ventricular Rate 89 BPM    Atrial Rate 89 BPM    P-R Interval 144 ms    QRS Duration 80 ms    Q-T Interval 362 ms    QTC Calculation (Bezet) 440 ms    Calculated P Axis 38 degrees    Calculated R Axis 28 degrees    Calculated T Axis 24 degrees    Diagnosis       Normal sinus rhythm  Normal ECG  When compared with ECG of 25-JUL-2019 21:59,  Criteria for Septal infarct are no longer present  Confirmed by Tyesha Gayle ([id number]) on 8/20/2019 2:18:50 PM     CBC WITH AUTOMATED DIFF    Collection Time: 08/20/19 10:52 AM   Result Value Ref Range    WBC 7.4 3.6 - 11.0 K/uL    RBC 4.74 3.80 - 5.20 M/uL    HGB 10.6 (L) 11.5 - 16.0 g/dL    HCT 36.7 35.0 - 47.0 %    MCV 77.4 (L) 80.0 - 99.0 FL    MCH 22.4 (L) 26.0 - 34.0 PG    MCHC 28.9 (L) 30.0 - 36.5 g/dL    RDW 17.7 (H) 11.5 - 14.5 %    PLATELET 539 794 - 994 K/uL    MPV 11.1 8.9 - 12.9 FL    NRBC 0.0 0  WBC    ABSOLUTE NRBC 0.00 0.00 - 0.01 K/uL    NEUTROPHILS 65 32 - 75 %    LYMPHOCYTES 23 12 - 49 %    MONOCYTES 9 5 - 13 %    EOSINOPHILS 3 0 - 7 %    BASOPHILS 0 0 - 1 %    IMMATURE GRANULOCYTES 0 0.0 - 0.5 %    ABS. NEUTROPHILS 4.8 1.8 - 8.0 K/UL    ABS. LYMPHOCYTES 1.7 0.8 - 3.5 K/UL    ABS. MONOCYTES 0.7 0.0 - 1.0 K/UL    ABS. EOSINOPHILS 0.2 0.0 - 0.4 K/UL    ABS. BASOPHILS 0.0 0.0 - 0.1 K/UL    ABS. IMM.  GRANS. 0.0 0.00 - 0.04 K/UL    DF AUTOMATED      PLATELET COMMENTS Large Platelets      RBC COMMENTS OVALOCYTES  PRESENT        RBC COMMENTS TEARDROP CELLS  PRESENT        RBC COMMENTS ANISOCYTOSIS  1+        RBC COMMENTS MICROCYTOSIS  PRESENT        RBC COMMENTS HYPOCHROMIA  1+       METABOLIC PANEL, COMPREHENSIVE    Collection Time: 08/20/19 10:52 AM   Result Value Ref Range    Sodium 141 136 - 145 mmol/L    Potassium 4.0 3.5 - 5.1 mmol/L    Chloride 108 97 - 108 mmol/L    CO2 25 21 - 32 mmol/L    Anion gap 8 5 - 15 mmol/L    Glucose 84 65 - 100 mg/dL    BUN 18 6 - 20 MG/DL    Creatinine 0.85 0.55 - 1.02 MG/DL    BUN/Creatinine ratio 21 (H) 12 - 20      GFR est AA >60 >60 ml/min/1.73m2    GFR est non-AA >60 >60 ml/min/1.73m2    Calcium 9.6 8.5 - 10.1 MG/DL    Bilirubin, total 0.2 0.2 - 1.0 MG/DL    ALT (SGPT) 20 12 - 78 U/L    AST (SGOT) 12 (L) 15 - 37 U/L    Alk.  phosphatase 60 45 - 117 U/L    Protein, total 6.7 6.4 - 8.2 g/dL    Albumin 3.3 (L) 3.5 - 5.0 g/dL    Globulin 3.4 2.0 - 4.0 g/dL    A-G Ratio 1.0 (L) 1.1 - 2.2     CK W/ REFLX CKMB    Collection Time: 08/20/19 10:52 AM   Result Value Ref Range    CK 96 26 - 192 U/L   TROPONIN I    Collection Time: 08/20/19 10:52 AM   Result Value Ref Range    Troponin-I, Qt. <0.05 <0.05 ng/mL   URINALYSIS W/ RFLX MICROSCOPIC    Collection Time: 08/20/19 12:46 PM   Result Value Ref Range    Color YELLOW/STRAW      Appearance CLEAR CLEAR      Specific gravity 1.010 1.003 - 1.030      pH (UA) 5.0 5.0 - 8.0      Protein NEGATIVE  NEG mg/dL    Glucose NEGATIVE  NEG mg/dL    Ketone NEGATIVE  NEG mg/dL    Bilirubin NEGATIVE  NEG      Blood NEGATIVE  NEG      Urobilinogen 0.2 0.2 - 1.0 EU/dL    Nitrites NEGATIVE  NEG      Leukocyte Esterase NEGATIVE  NEG     GLUCOSE, POC    Collection Time: 08/20/19  4:06 PM   Result Value Ref Range    Glucose (POC) 108 (H) 65 - 100 mg/dL    Performed by Paulo Bajwa (PCT)    GLUCOSE, POC    Collection Time: 08/20/19  8:14 PM   Result Value Ref Range    Glucose (POC) 196 (H) 65 - 100 mg/dL    Performed by [facility]    METABOLIC PANEL, BASIC    Collection Time: 08/21/19  2:05 AM   Result Value Ref Range    Sodium 138 136 - 145 mmol/L    Potassium 4.1 3.5 - 5.1 mmol/L    Chloride 107 97 - 108 mmol/L    CO2 22 21 - 32 mmol/L    Anion gap 9 5 - 15 mmol/L    Glucose 188 (H) 65 - 100 mg/dL    BUN 16 6 - 20 MG/DL    Creatinine 0. 84 0.55 - 1.02 MG/DL    BUN/Creatinine ratio 19 12 - 20      GFR est AA >60 >60 ml/min/1.73m2    GFR est non-AA >60 >60 ml/min/1.73m2    Calcium 9.5 8.5 - 10.1 MG/DL   CBC WITH AUTOMATED DIFF    Collection Time: 08/21/19  2:05 AM   Result Value Ref Range    WBC 7.6 3.6 - 11.0 K/uL    RBC 4.42 3.80 - 5.20 M/uL    HGB 10.0 (L) 11.5 - 16.0 g/dL    HCT 34.0 (L) 35.0 - 47.0 %    MCV 76.9 (L) 80.0 - 99.0 FL    MCH 22.6 (L) 26.0 - 34.0 PG    MCHC 29.4 (L) 30.0 - 36.5 g/dL    RDW 17.3 (H) 11.5 - 14.5 %    PLATELET 020 373 - 283 K/uL    MPV 12.3 8.9 - 12.9 FL    NRBC 0.0 0  WBC    ABSOLUTE NRBC 0.00 0.00 - 0.01 K/uL    NEUTROPHILS 82 (H) 32 - 75 %    LYMPHOCYTES 12 12 - 49 %    MONOCYTES 5 5 - 13 %    EOSINOPHILS 0 0 - 7 %    BASOPHILS 0 0 - 1 %    IMMATURE GRANULOCYTES 1 (H) 0.0 - 0.5 %    ABS. NEUTROPHILS 6.3 1.8 - 8.0 K/UL    ABS. LYMPHOCYTES 0.9 0.8 - 3.5 K/UL    ABS. MONOCYTES 0.4 0.0 - 1.0 K/UL    ABS. EOSINOPHILS 0.0 0.0 - 0.4 K/UL    ABS. BASOPHILS 0.0 0.0 - 0.1 K/UL    ABS. IMM.  GRANS. 0.1 (H) 0.00 - 0.04 K/UL    DF AUTOMATED     GLUCOSE, POC    Collection Time: 08/21/19  7:40 AM   Result Value Ref Range    Glucose (POC) 109 (H) 65 - 100 mg/dL    Performed by [employer]        Imaging:  I have personally reviewed the patients radiographs and have reviewed the reports:  CXR: clear  Chest CT: no PE        Micheline Leon MD

## 2019-08-21 NOTE — PROGRESS NOTES
Hospitalist Progress Note    NAME: Briana Benitez   :  1960   MRN:  [id number]       Interim Hospital Summary: 61 y.o. female whom presented on 2019 with shortness of breath      Assessment / Plan:    19 If she passes oxygen challenge she will be discharged. F/U appts. already made with Pulmonologist       Acute hypoxic Respiratory Failure  due to COPD exacerbation unlikely true diagnosis more likely to be  Obesity Hypoventilation Syndrome POA  Morbid obesity BMI 67.31 kg/m²  CRYSTAL POA  Dyspnea Resolved   -CT chest/ a/p showed technical factors as above regarding pulmonary embolus evaluation. No large or central pulmonary embolus. Mosaic attenuation of the lung parenchyma suggests small to medium airways disease. No acute process in the abdomen or pelvis. Unchanged pelvic cyst, possibly ovarian in origin.  -Continue duonebs, refused IV steroids, continue mucinex, Breo, Hold abx due to no productive cough  -O2 challenge ordered  -Seen by Pulmonologist today; per Pulmonolgy assessment current symptoms not consistent with a COPD exacerbation  -She has a previously made appt. with pulmonology as a new patient this upcoming Monday    -Highest Temp 99.0 last night; denies chills   -WBC 7.6  -If passes oxygen challenge will D/C and have her f/u with Pulmonology and Sleep medicine per previous appts.   -Discussed portion control and increase physical activity      T2DM  -SSI, hold metformin       GERD  -con't PPI       Fibromyalgia/chronic pain syndrome POA  -Continue home meds      Anxiety/depression/fibromyalgia/chronic pain syndrome  -Continue  home meds            40 or above Morbid obesity / Body mass index is 67.31 kg/m². Code status: Full  Prophylaxis: Hep SQ  Recommended Disposition: Home w/Family     Subjective:     Chief Complaint / Reason for Physician Visit  F/U on shortness of breath. \" I'm not taking any steroids\". Discussed with RN events overnight.      Review of Systems:  Symptom Y/N Comments  Symptom Y/N Comments   Fever/Chills N   Chest Pain N    Poor Appetite N   Edema     Cough N   Abdominal Pain     Sputum    Joint Pain  Lower Back pain    SOB/DIOP N   Pruritis/Rash     Nausea/vomit N   Tolerating PT/OT     Diarrhea N   Tolerating Diet Y    Constipation N   Other       Could NOT obtain due to:      Objective:     VITALS:   Last 24hrs VS reviewed since prior progress note. Most recent are:  Patient Vitals for the past 24 hrs:   Temp Pulse Resp BP SpO2   08/21/19 1208 98.6 °F (37 °C) 83 18 128/63 95 %   08/21/19 0751 98 °F (36.7 °C) 71 18 140/87 97 %   08/21/19 0722     95 %   08/21/19 0224 98.6 °F (37 °C) 86 20 116/69 95 %   08/21/19 0200     96 %   08/20/19 2359 98.1 °F (36.7 °C) 88 20 109/67 95 %   08/20/19 2032 99 °F (37.2 °C) 87 22 138/74 96 %   08/20/19 1906     93 %   08/20/19 1503 99 °F (37.2 °C) 87 17 93/65 96 %     No intake or output data in the 24 hours ending 08/21/19 1325     PHYSICAL EXAM:  General: Obese female, cooperative, in no acute distress    EENT:  EOMI. Anicteric sclerae. MMM  Resp:  CTA bilaterally, no wheezing or rales. No accessory muscle use  CV:  Regular  rhythm,  No edema  GI:  Soft, Non distended, Non tender. +Bowel sounds  Neurologic:  Alert and oriented X 3, normal speech,   Psych:   Good insight. Not anxious nor agitated  Skin:  No rashes. No jaundice    Reviewed most current lab test results and cultures  YES  Reviewed most current radiology test results   YES  Review and summation of old records today    NO  Reviewed patient's current orders and MAR    YES  PMH/SH reviewed - no change compared to H&P  ________________________________________________________________________  Care Plan discussed with:    Comments   Patient Maribell Tonia Suarez team rounds were held today with , nursing, pharmacist and clinical coordinator.   Patient's plan of care was discussed; medications were reviewed and discharge planning was addressed. ________________________________________________________________________          Comments   >50% of visit spent in counseling and coordination of care     ________________________________________________________________________  Lisa Matta NP     Procedures: see electronic medical records for all procedures/Xrays and details which were not copied into this note but were reviewed prior to creation of Plan. LABS:  I reviewed today's most current labs and imaging studies.   Pertinent labs include:  Recent Labs     08/21/19 0205 08/20/19  1052   WBC 7.6 7.4   HGB 10.0* 10.6*   HCT 34.0* 36.7    189     Recent Labs     08/21/19 0205 08/20/19  1052    141   K 4.1 4.0    108   CO2 22 25   * 84   BUN 16 18   CREA 0.84 0.85   CA 9.5 9.6   ALB  --  3.3*   TBILI  --  0.2   SGOT  --  12*   ALT  --  20       Signed: )Lisa Matta NP

## 2019-08-21 NOTE — PROGRESS NOTES
ADULT PROTOCOL: JET AEROSOL ASSESSMENT    Patient  Patrizia Quiñones     61 y.o.   female     8/21/2019  7:38 AM    Breath Sounds Pre Procedure: Right Breath Sounds: Clear, Lower, Diminished                               Left Breath Sounds: Clear, Lower, Diminished    Breath Sounds Post Procedure:                                        Breathing pattern: Pre procedure Breathing Pattern: Regular          Post procedure Breathing Pattern: Regular    Heart Rate: Pre procedure Pulse: 72           Post procedure Pulse: 73    Resp Rate: Pre procedure Respirations: 18           Post procedure Respirations: 18          Oxygen: O2 Device: Nasal cannula   2L    SpO2: Pre procedure SpO2: 95 %                 Post procedure SpO2: 98 %      Nebulizer Therapy: Current medications Aerosolized Medications: DuoNeb          Smoking History:  Former    Problem List:   Patient Active Problem List   Diagnosis Code    SOB (shortness of breath) R06.02    DIOP (dyspnea on exertion) R06.09    Diabetes (HCC) E11.9    Hypertension I10    Normal coronary arteries Z03.89    Panniculitis M79.3    Right anterior knee pain M25.561    Cyst, baker's knee, right M71.21    Obesity, morbid ([address] E66.01    Ureteral stone with hydronephrosis N13.2    COPD exacerbation ([address] J44.1    Chronic respiratory failure with hypoxia ([address] J96.11    Obesity hypoventilation syndrome ([address] E66.2    Fibromyalgia M79.7    Bronchitis J40       Respiratory Therapist: Kaia Majano

## 2019-08-21 NOTE — PROGRESS NOTES
11:12am - Pt states that there are home medications she normally takes that we don't have on her MAR here. Contacted pharmacy to do med reconciliation.

## 2019-08-21 NOTE — PROGRESS NOTES
0030: Messaged on call MD about pt. Still in 10/10 chronic back pain after giving Percocet. She is requesting something else but still wants the percocet now while I message MD. Informed pt. That I would not be able to give her what the MD orders right away if she took the percocet and she understood. 6725: MD placed one time order for Fentanyl due at 0100. Pt. Will give at 0200 to space out pain meds. Pt. Does not appear to be in distress at this time. 0700: Bedside shift change report given to ALYCIA Singh (oncoming nurse) by Nerissa Wong (offgoing nurse). Report included the following information SBAR, Kardex, Procedure Summary, Intake/Output, MAR and Recent Results.

## 2019-08-21 NOTE — PROGRESS NOTES
Home Oxygen Test  Date of test: 8/21/19  Time of test: 1600    Sa02 97 % on room air AT REST. Sa02 90 % on room air DURING AMBULATION. Sa02 89 % on 2 Liters DURING AMBULATION. Sa02 98 % on 2 Liters AT REST/AFTER AMBULATION. Weaned pt down to 1L. Will continue to monitor.

## 2019-08-21 NOTE — PROGRESS NOTES
Bedside shift change report given to Anne-Marie Mathew (oncoming nurse) by Aspen Syed (offgoing nurse). Report included the following information SBAR, Kardex, MAR, Accordion, Recent Results and Med Rec Status.

## 2019-08-21 NOTE — PROGRESS NOTES
Pharmacy Medication Reconciliation     The patient was interviewed regarding current PTA medication list, use and drug allergies; Patient present in room and obtained permission from patient to discuss drug regimen with visitor(s) present. The patient was questioned regarding use of any other inhalers, topical products, over the counter medications, herbal medications, vitamin products or ophthalmic/nasal/otic medication use. Allergy Update: Celebrex [celecoxib]; Morphine; Sulfa (sulfonamide antibiotics); Adhesive; and Codeine    Recommendations/Findings: The following amendments were made to the patient's active medication list on file at [facility]:   1) Additions: fluticasone nasal spray, oxycodone/acetaminophen 5/325, victoza, levalbuterol nebs, losartan, trelegy ellipta    2) Deletions: breo ellipta    3) Changes: vitamin d2 (twice weekly->weekly), metformin(daily -> BID)      -Clarified PTA med list with Patient and Fill history. PTA medication list was corrected to the following:     Prior to Admission Medications   Prescriptions Last Dose Informant Patient Reported? Taking? QUEtiapine (SEROQUEL) 400 mg tablet 8/14/2019 at Unknown time  Yes Yes   Sig: Take 600 mg by mouth nightly. albuterol (PROVENTIL HFA, VENTOLIN HFA, PROAIR HFA) 90 mcg/actuation inhaler Unknown at Unknown time  No No   Sig: Take 2 Puffs by inhalation every four (4) hours as needed for Wheezing. aspirin 81 mg chewable tablet 8/14/2019 at Unknown time  Yes Yes   Sig: Take 81 mg by mouth daily. buPROPion XL (WELLBUTRIN XL) 150 mg tablet 8/14/2019 at Unknown time  Yes Yes   Sig: Take 300 mg by mouth nightly. busPIRone (BUSPAR) 15 mg tablet 8/14/2019 at Unknown time  Yes Yes   Sig: Take 15 mg by mouth three (3) times daily. ergocalciferol (VITAMIN D2) 50,000 unit capsule 8/14/2019 at Unknown time  Yes Yes   Sig: Take 50,000 Units by mouth every seven (7) days.    estradiol (ESTRACE) 0.5 mg tablet 8/14/2019 at Unknown time  Yes Yes Sig: Take 0.5 mg by mouth daily. fluticasone propionate (FLONASE) 50 mcg/actuation nasal spray   Yes Yes   Si Sprays by Both Nostrils route daily. fluticasone-umeclidinium-vilanterol (TRELEGY ELLIPTA) 100-62.5-25 mcg inhaler 2019 at Unknown time  Yes Yes   Sig: Take 1 Puff by inhalation daily. gabapentin (NEURONTIN) 100 mg capsule 2019 at Unknown time Self No Yes   Sig: Take 1 Cap by mouth three (3) times daily. levalbuterol (XOPENEX) 0.63 mg/3 mL nebu 2019 at Unknown time  Yes Yes   Si.63 mg by Nebulization route every four (4) hours as needed. levothyroxine (SYNTHROID) 112 mcg tablet 2019 at Unknown time  Yes Yes   Sig: Take 112 mcg by mouth Daily (before breakfast). liraglutide (VICTOZA) 0.6 mg/0.1 mL (18 mg/3 mL) pnij 2019 at Unknown time  Yes Yes   Si.8 mg by SubCUTAneous route daily. losartan (COZAAR) 25 mg tablet 2019 at Unknown time  Yes Yes   Sig: Take 25 mg by mouth daily. metFORMIN (GLUCOPHAGE) 1,000 mg tablet 2019 at Unknown time  Yes Yes   Sig: Take 1,000 mg by mouth two (2) times a day. metoprolol succinate (TOPROL XL) 25 mg XL tablet 2019 at Unknown time  Yes Yes   Sig: Take 25 mg by mouth daily. miconazole (MICOTIN) 2 % topical cream 2019 at Unknown time  Yes Yes   Sig: Apply 1 Each to affected area two (2) times a day. Apply ointment to fungal infection under abdominal pannus and groin twice a day for 14 days. nystatin, bulk, 15 billion unit powd 2019 at Unknown time  No Yes   Si Units by Does Not Apply route three (3) times daily. To skin folds, ventral fold   omeprazole (PRILOSEC) 20 mg capsule 2019 at Unknown time  Yes Yes   Sig: Take 40 mg by mouth daily.    oxyCODONE-acetaminophen (PERCOCET) 5-325 mg per tablet 2019 at Unknown time  Yes Yes   Sig: Take 1 Tab by mouth every twelve (12) hours as needed for Pain.   prazosin (MINIPRESS) 1 mg capsule 2019 at Unknown time  Yes Yes   Sig: Take 4 mg by mouth nightly. simvastatin (ZOCOR) 20 mg tablet 8/14/2019 at Unknown time  Yes Yes   Sig: Take 20 mg by mouth nightly. terconazole (TERAZOL 7) 0.4 % vaginal cream 8/14/2019 at Unknown time  Yes Yes   Sig: Insert 1 Applicator into vagina nightly. has refill for 7 day supply   topiramate (TOPAMAX) 200 mg tablet 8/14/2019 at Unknown time  Yes Yes   Sig: Take 200 mg by mouth nightly.       Facility-Administered Medications: None          Thank you,  Troy Tomas, PHARMD

## 2019-08-21 NOTE — PROGRESS NOTES
Reason for Admission:   [facility]                   RRAT Score:          11           Plan for utilizing home health: May benefit                    Current Advanced Directive/Advance Care Plan:   Not in CC                         Transition of Care Plan:                  Pt lives in [facility], has [facility], full code, sees DO [place], morbidly obese, independent with adls, cane, shower chair, rolling walker and no oxygen at home. Please order PT consult if appropriate. Staff-please do oxygen challenge and document today. Thanks    Care Management Interventions  PCP Verified by CM:  Yes  Transition of Care Consult (CM Consult): Discharge Planning  Physical Therapy Consult: No  Occupational Therapy Consult: No  Speech Therapy Consult: No  Current Support Network: Family Lives Nearby  Confirm Follow Up Transport: Family  Plan discussed with Pt/Family/Caregiver: Yes  Freedom of Choice Offered: Yes  Discharge Location  Discharge Placement: Home

## 2019-08-22 VITALS
BODY MASS INDEX: 53.92 KG/M2 | TEMPERATURE: 97.9 F | HEART RATE: 77 BPM | WEIGHT: 293 LBS | RESPIRATION RATE: 18 BRPM | SYSTOLIC BLOOD PRESSURE: 112 MMHG | DIASTOLIC BLOOD PRESSURE: 90 MMHG | HEIGHT: 62 IN | OXYGEN SATURATION: 96 %

## 2019-08-22 PROBLEM — J44.9 COPD (CHRONIC OBSTRUCTIVE PULMONARY DISEASE) (HCC): Status: ACTIVE | Noted: 2019-08-22

## 2019-08-22 LAB
GLUCOSE BLD STRIP.AUTO-MCNC: 104 MG/DL (ref 65–100)
GLUCOSE BLD STRIP.AUTO-MCNC: 98 MG/DL (ref 65–100)
SERVICE CMNT-IMP: ABNORMAL
SERVICE CMNT-IMP: NORMAL

## 2019-08-22 PROCEDURE — 99218 HC RM OBSERVATION: CPT

## 2019-08-22 PROCEDURE — 74011000250 HC RX REV CODE- 250: Performed by: INTERNAL MEDICINE

## 2019-08-22 PROCEDURE — 82962 GLUCOSE BLOOD TEST: CPT

## 2019-08-22 PROCEDURE — 74011250636 HC RX REV CODE- 250/636: Performed by: INTERNAL MEDICINE

## 2019-08-22 PROCEDURE — 94640 AIRWAY INHALATION TREATMENT: CPT

## 2019-08-22 PROCEDURE — 96372 THER/PROPH/DIAG INJ SC/IM: CPT

## 2019-08-22 PROCEDURE — 74011250637 HC RX REV CODE- 250/637: Performed by: INTERNAL MEDICINE

## 2019-08-22 PROCEDURE — 74011000250 HC RX REV CODE- 250: Performed by: NURSE PRACTITIONER

## 2019-08-22 RX ORDER — METRONIDAZOLE 7.5 MG/G
1 GEL VAGINAL
Qty: 25 G | Refills: 0 | Status: SHIPPED | OUTPATIENT
Start: 2019-08-22 | End: 2019-08-27

## 2019-08-22 RX ORDER — OXYCODONE AND ACETAMINOPHEN 5; 325 MG/1; MG/1
1 TABLET ORAL
Qty: 4 TAB | Refills: 0 | Status: SHIPPED | OUTPATIENT
Start: 2019-08-22 | End: 2019-08-24

## 2019-08-22 RX ORDER — IPRATROPIUM BROMIDE AND ALBUTEROL SULFATE 2.5; .5 MG/3ML; MG/3ML
3 SOLUTION RESPIRATORY (INHALATION)
Qty: 30 NEBULE | Refills: 0 | Status: SHIPPED | OUTPATIENT
Start: 2019-08-22

## 2019-08-22 RX ADMIN — GABAPENTIN 100 MG: 100 CAPSULE ORAL at 08:44

## 2019-08-22 RX ADMIN — METOPROLOL SUCCINATE 25 MG: 25 TABLET, EXTENDED RELEASE ORAL at 08:44

## 2019-08-22 RX ADMIN — UMECLIDINIUM 1 PUFF: 62.5 AEROSOL, POWDER ORAL at 08:49

## 2019-08-22 RX ADMIN — OXYCODONE HYDROCHLORIDE AND ACETAMINOPHEN 1 TABLET: 5; 325 TABLET ORAL at 14:01

## 2019-08-22 RX ADMIN — Medication 10 ML: at 06:26

## 2019-08-22 RX ADMIN — NYSTATIN: 100000 POWDER TOPICAL at 08:48

## 2019-08-22 RX ADMIN — OXYCODONE HYDROCHLORIDE AND ACETAMINOPHEN 1 TABLET: 5; 325 TABLET ORAL at 02:28

## 2019-08-22 RX ADMIN — ASPIRIN 81 MG 81 MG: 81 TABLET ORAL at 08:45

## 2019-08-22 RX ADMIN — IPRATROPIUM BROMIDE AND ALBUTEROL SULFATE 3 ML: .5; 3 SOLUTION RESPIRATORY (INHALATION) at 06:44

## 2019-08-22 RX ADMIN — GABAPENTIN 100 MG: 100 CAPSULE ORAL at 14:01

## 2019-08-22 RX ADMIN — BUSPIRONE HYDROCHLORIDE 15 MG: 5 TABLET ORAL at 08:45

## 2019-08-22 RX ADMIN — OXYCODONE HYDROCHLORIDE AND ACETAMINOPHEN 1 TABLET: 5; 325 TABLET ORAL at 06:25

## 2019-08-22 RX ADMIN — OXYCODONE HYDROCHLORIDE AND ACETAMINOPHEN 1 TABLET: 5; 325 TABLET ORAL at 10:37

## 2019-08-22 RX ADMIN — FLUTICASONE FUROATE AND VILANTEROL TRIFENATATE 1 PUFF: 200; 25 POWDER RESPIRATORY (INHALATION) at 08:49

## 2019-08-22 RX ADMIN — HEPARIN SODIUM 7500 UNITS: 5000 INJECTION INTRAVENOUS; SUBCUTANEOUS at 06:27

## 2019-08-22 RX ADMIN — GUAIFENESIN 600 MG: 600 TABLET, EXTENDED RELEASE ORAL at 08:45

## 2019-08-22 RX ADMIN — LEVOTHYROXINE SODIUM 112 MCG: 112 TABLET ORAL at 06:25

## 2019-08-22 NOTE — PROGRESS NOTES
ADULT PROTOCOL: JET AEROSOL  REASSESSMENT    Patient  Romi Valente     61 y.o.   female     8/21/2019  9:23 PM    Breath Sounds Pre Procedure: Right Breath Sounds: Clear, Lower, Diminished                               Left Breath Sounds: Clear, Lower, Diminished    Breath Sounds Post Procedure:  CLEAR; DIMINISHED                                  CLEAR;DIMINISHED    Breathing pattern: Pre procedure Breathing Pattern: Dyspnea at rest          Post procedure Breathing Pattern: Regular    Heart Rate: Pre procedure Pulse: 82           Post procedure Pulse: 82    Resp Rate: Pre procedure Respirations: 15           Post procedure Respirations: 19    Oxygen: O2 Device: Nasal cannula  2LPM     Changed: NO    SpO2: Pre procedure SpO2: 98 %   WITH oxygen              Post procedure SpO2: 97 %  WITH oxygen    Nebulizer Therapy: Current medications Aerosolized Medications: DuoNeb      Changed: NO      Problem List:   Patient Active Problem List   Diagnosis Code    SOB (shortness of breath) R06.02    DIOP (dyspnea on exertion) R06.09    Diabetes ([facility]) E11.9    Hypertension I10    Normal coronary arteries Z03.89    Panniculitis M79.3    Right anterior knee pain M25.561    Cyst, baker's knee, right M71.21    Obesity, morbid ([facility]) E66.01    Ureteral stone with hydronephrosis N13.2    COPD exacerbation ([facility]) J44.1    Chronic respiratory failure with hypoxia ([facility]) J96.11    Obesity hypoventilation syndrome ([facility]) E66.2    Fibromyalgia M79.7    Bronchitis J40       Respiratory Therapist: Darlin Ruiz

## 2019-08-22 NOTE — PROGRESS NOTES
Home Oxygen Test  Date of test: 8/22/19  Time of test: 0830    Sa02 96 % on room air AT REST. Sa02 96% on room air DURING AMBULATION. Sa02 98 % on 1 Liters DURING AMBULATION. Sa02 98 % on 1 Liters AT REST/AFTER AMBULATION. Pt on Room air.  Will continue to monitor

## 2019-08-22 NOTE — DISCHARGE INSTRUCTIONS
Patient Discharge Instructions     Pt Name  Patrizia Quiñones   Date of Birth 1960   Age  61 y.o. Medical Record Number  [id number]   PCP Kofi Miguel DO    Admit date:  8/20/2019 @    [place] [address]    Room Number  3253/01   Date of Discharge 8/22/2019     Admission Diagnoses:     COPD exacerbation ([facility]          Allergies   Allergen Reactions    Celebrex [Celecoxib] Nausea Only    Morphine Nausea Only    Sulfa (Sulfonamide Antibiotics) Nausea Only    Adhesive Other (comments)     Red and bumpy      Codeine Other (comments)     Hyper activity        You were admitted to [address] for  COPD exacerbation ([facility]    YOUR OTHER MEDICAL DIAGNOSES INCLUDE (BUT NOT LIMITED TO ):  Present on Admission:   COPD exacerbation ([facility]   DIOP (dyspnea on exertion)   SOB (shortness of breath)   Diabetes ([facility]   Hypertension   Obesity, morbid (HCC)   Obesity hypoventilation syndrome (HCC)      DIET:  Diabetic Diet   Oral Nutritional Supplements: No Oral Supplement prescribed  Supplement Frequency: N/A    Recommended activity: Activity as tolerated  Follow up : Follow-up Information     Follow up With Specialties Details Why Contact Info    Kofi Miguel DO Family Practice In 1 week  [address] [facility] [address]  [phone]      Nancy Sow MD Pulmonary Disease On 8/26/2019 go to previously scheduled appt  [facility]  [address]  [phone]             Skilled nursing facility MD responsible for above upon discharge. · It is important that you take the medication exactly as they are prescribed. · Keep your medication in the bottles provided by the pharmacist and keep a list of the medication names, dosages, and times to be taken in your wallet. · Do not take other medications without consulting your doctor.        ADDITIONAL INFORMATION: If you experience any of the following symptoms or have any health problem not listed below, then please call your primary care physician or return to the emergency room if you cannot get hold of your doctor: Fever, chills, nausea, vomiting, diarrhea, change in mentation, falling, bleeding, shortness of breath. I understand that if any problems occur once I am discharged, I am supposed to call my Primary care physician for further care or seek help in the Emergency Department at the nearest Healthcare facility. I have had an opportunity to discuss my clinical issues with my doctor and nursing staff. I understand and acknowledge receipt of the above instructions.                                                                                                                                            Physician's or R.N.'s Signature                                                            Date/Time                                                                                                                                              Patient or Representative Signature                                                 Date/Time

## 2019-08-22 NOTE — PROGRESS NOTES
Pt discharged home via family transportation. IV and telemetry discontinued. Discharge instructions, hard scripts, and follow up appts given and explained to pt. Pt received her PRN dose of pain medication before discharge.

## 2019-08-22 NOTE — PROGRESS NOTES
Bedside shift change report given to Sancho Saenz (oncoming nurse) by Rosalie Andre (offgoing nurse). Report included the following information SBAR, Kardex, Intake/Output, MAR and Recent Results.

## 2019-08-22 NOTE — DISCHARGE SUMMARY
Hospitalist Discharge Summary     Patient ID:  Patrizia Quiñones  [id number]  8 y.o.  1960 8/20/2019    PCP on record: Kofi Miguel DO    Admit date: 8/20/2019  Discharge date and time: 8/22/2019    DISCHARGE DIAGNOSIS:    Acute hypoxic Respiratory Failure, Resolved  Obesity Hypoventilation Syndrome POA  Morbid obesity BMI 67.31 kg/m²  CRYSTAL POA  Dyspnea Resolved   T2DM POA  GERD POA  Fibromyalgia/chronic pain syndrome POA  Anxiety/depression POA    CONSULTATIONS:  IP CONSULT TO HOSPITALIST  IP CONSULT TO PULMONOLOGY    Excerpted HPI from H&P of Yesenia Pearson MD:  Kasi Fraga is a 61 y.o.  female with PMHx significant for T2DM, fibromyalgia, chronic pain syndrome, COPD, present to the ER c/o dyspnea started last night. Pt states her SOB is worst with exertion, improved with rest. Associated symptoms including dry cough. Pt denies fever, chills, cp, sob, palpitations, n/v/d. In the ER, vitals T97.8, P101, /53. Labs/images reviewed. We were asked to admit for work up and evaluation of the above problems. ______________________________________________________________________  DISCHARGE SUMMARY/HOSPITAL COURSE:  for full details see H&P, daily progress notes, labs, consult notes. Ms. Sánchez Ridley was admitted on 8/20/19 with shortness of breath, dyspnea on exertion improved with rest. She also had a dry cough. She was initially admitted with COPD exacerbation. Seen by Pulmonologist and COPD exacerbation not felt to be the problem. She refused steroids. She was placed on NC 02 with satisfactory oxygen saturation. An oxygen challenge proved oxygen saturation to maintain greater than 94% on and off oxygen. She had a new patient appointment on 8/26/19. with Dr. Truman Shin, Pulmonologist to establish care for COPD and CRYSTAL. She was encouraged to make that appointment and she agreed.      During her admission she complained of back and leg pain due to fibromyalgia, which is a chronic condition for her. She received 2 doses of fentanyl for increased back and leg pain related to fibromyalgia. I encouraged her to follow-up with  PCP and determine if she needs to see a pain specialists. She will be discharged on her home pain medication regimen. She has DM that was treated with SSI. She will be discharged back on her home regimen. GERD stable with PPI. For hx of Anxiety and Depression she was maintained on her home regimen throughout admission. She is clinically stable for discharge. Acute hypoxic Respiratory Failure, Resolved  Obesity Hypoventilation Syndrome POA  Morbid obesity BMI 67.31 kg/m²  CRYSTAL POA  Dyspnea Resolved   -F/U with Dr. China Maguire on Monday   -F/U with Sleep medicine in September      T2DM POA Stable  -hResume metformin    GERD POA Stable  - Continue PPI       Fibromyalgia/chronic pain syndrome POA Stable  Anxiety/depression POA Stable  -Continue current home pain manangment   -F/U with PCP to discuss increased pain   -Continue all home meds for anxiety and depression       _______________________________________________________________________  Patient seen and examined by me on discharge day. Pertinent Findings:  Gen:    Obese, Not in distress  Chest: Clear lungs, No cough  CVS:   Regular rhythm. No edema  Abd:  Soft,  Large abdomen, not tender  Neuro:  Alert, Oriented x4   _______________________________________________________________________  DISCHARGE MEDICATIONS:   Current Discharge Medication List      START taking these medications    Details   metroNIDAZOLE (METROGEL) 0.75 % gel Insert 5 g into vagina nightly for 5 days. Qty: 25 g, Refills: 0      albuterol-ipratropium (DUO-NEB) 2.5 mg-0.5 mg/3 ml nebu 3 mL by Nebulization route every four (4) hours as needed (COPD).   Qty: 30 Nebule, Refills: 0         CONTINUE these medications which have NOT CHANGED    Details   fluticasone-umeclidinium-vilanterol (TRELEGY ELLIPTA) 100-62.5-25 mcg inhaler Take 1 Puff by inhalation daily. metFORMIN (GLUCOPHAGE) 1,000 mg tablet Take 1,000 mg by mouth two (2) times a day. fluticasone propionate (FLONASE) 50 mcg/actuation nasal spray 2 Sprays by Both Nostrils route daily. oxyCODONE-acetaminophen (PERCOCET) 5-325 mg per tablet Take 1 Tab by mouth every twelve (12) hours as needed for Pain.      liraglutide (VICTOZA) 0.6 mg/0.1 mL (18 mg/3 mL) pnij 1.8 mg by SubCUTAneous route daily. losartan (COZAAR) 25 mg tablet Take 25 mg by mouth daily. busPIRone (BUSPAR) 15 mg tablet Take 15 mg by mouth three (3) times daily. QUEtiapine (SEROQUEL) 400 mg tablet Take 600 mg by mouth nightly. nystatin, bulk, 15 billion unit powd 1 Units by Does Not Apply route three (3) times daily. To skin folds, ventral fold  Qty: 2 Each, Refills: 1      buPROPion XL (WELLBUTRIN XL) 150 mg tablet Take 300 mg by mouth nightly. prazosin (MINIPRESS) 1 mg capsule Take 4 mg by mouth nightly.      ergocalciferol (VITAMIN D2) 50,000 unit capsule Take 50,000 Units by mouth every seven (7) days. estradiol (ESTRACE) 0.5 mg tablet Take 0.5 mg by mouth daily. miconazole (MICOTIN) 2 % topical cream Apply 1 Each to affected area two (2) times a day. Apply ointment to fungal infection under abdominal pannus and groin twice a day for 14 days. aspirin 81 mg chewable tablet Take 81 mg by mouth daily. gabapentin (NEURONTIN) 100 mg capsule Take 1 Cap by mouth three (3) times daily. Qty: 90 Cap, Refills: 1      levothyroxine (SYNTHROID) 112 mcg tablet Take 112 mcg by mouth Daily (before breakfast). metoprolol succinate (TOPROL XL) 25 mg XL tablet Take 25 mg by mouth daily. omeprazole (PRILOSEC) 20 mg capsule Take 40 mg by mouth daily. topiramate (TOPAMAX) 200 mg tablet Take 200 mg by mouth nightly. simvastatin (ZOCOR) 20 mg tablet Take 20 mg by mouth nightly.       albuterol (PROVENTIL HFA, VENTOLIN HFA, PROAIR HFA) 90 mcg/actuation inhaler Take 2 Puffs by inhalation every four (4) hours as needed for Wheezing. Qty: 1 Inhaler, Refills: 0         STOP taking these medications       terconazole (TERAZOL 7) 0.4 % vaginal cream Comments:   Reason for Stopping:                 Patient Follow Up Instructions: Activity: Activity as tolerated  Diet: Diabetic Diet  Wound Care: None needed    Follow-up with Pulmonologist  in 4 days.   Follow-up tests/labs per PCP and Pulmonology  Follow-up Information     Follow up With Specialties Details Why Contact Olivia Lockwood DO Family Practice In 1 week  [address] [address]  [phone]      Conrado Vasquez MD Pulmonary Disease On 8/26/2019 go to previously scheduled appt  [address] [facility]  [facility]  [phone]          ________________________________________________________________    Risk of deterioration: Low    Condition at Discharge:  Stable  __________________________________________________________________    Disposition  Home with family, no needs    ____________________________________________________________________    Code Status: Full Code  ___________________________________________________________________      Total time in minutes spent coordinating this discharge (includes going over instructions, follow-up, prescriptions, and preparing report for sign off to her PCP) :  45 minutes    Signed:  Kristi Luciano NP

## 2019-08-26 ENCOUNTER — APPOINTMENT (OUTPATIENT)
Dept: GENERAL RADIOLOGY | Age: 59
End: 2019-08-26
Attending: EMERGENCY MEDICINE
Payer: COMMERCIAL

## 2019-08-26 ENCOUNTER — APPOINTMENT (OUTPATIENT)
Dept: CT IMAGING | Age: 59
End: 2019-08-26
Attending: EMERGENCY MEDICINE
Payer: COMMERCIAL

## 2019-08-26 ENCOUNTER — HOSPITAL ENCOUNTER (EMERGENCY)
Age: 59
Discharge: HOME | End: 2019-08-26
Attending: EMERGENCY MEDICINE
Payer: COMMERCIAL

## 2019-08-26 VITALS
HEIGHT: 63 IN | WEIGHT: 293 LBS | SYSTOLIC BLOOD PRESSURE: 112 MMHG | BODY MASS INDEX: 51.91 KG/M2 | OXYGEN SATURATION: 96 % | RESPIRATION RATE: 21 BRPM | HEART RATE: 95 BPM | TEMPERATURE: 99.1 F | DIASTOLIC BLOOD PRESSURE: 66 MMHG

## 2019-08-26 DIAGNOSIS — R06.02 SOB (SHORTNESS OF BREATH): Primary | ICD-10-CM

## 2019-08-26 DIAGNOSIS — E66.2 OBESITY HYPOVENTILATION SYNDROME (HCC): ICD-10-CM

## 2019-08-26 LAB
ALBUMIN SERPL-MCNC: 3.1 G/DL (ref 3.5–5)
ALBUMIN/GLOB SERPL: 0.9 {RATIO} (ref 1.1–2.2)
ALP SERPL-CCNC: 60 U/L (ref 45–117)
ALT SERPL-CCNC: 22 U/L (ref 12–78)
ANION GAP SERPL CALC-SCNC: 8 MMOL/L (ref 5–15)
APPEARANCE UR: CLEAR
AST SERPL-CCNC: 17 U/L (ref 15–37)
ATRIAL RATE: 119 BPM
BACTERIA URNS QL MICRO: NEGATIVE /HPF
BASOPHILS # BLD: 0.1 K/UL (ref 0–0.1)
BASOPHILS NFR BLD: 1 % (ref 0–1)
BILIRUB SERPL-MCNC: 0.2 MG/DL (ref 0.2–1)
BILIRUB UR QL: NEGATIVE
BNP SERPL-MCNC: 25 PG/ML
BUN SERPL-MCNC: 12 MG/DL (ref 6–20)
BUN/CREAT SERPL: 13 (ref 12–20)
CALCIUM SERPL-MCNC: 9.6 MG/DL (ref 8.5–10.1)
CALCULATED P AXIS, ECG09: 55 DEGREES
CALCULATED R AXIS, ECG10: 44 DEGREES
CALCULATED T AXIS, ECG11: 49 DEGREES
CHLORIDE SERPL-SCNC: 112 MMOL/L (ref 97–108)
CO2 SERPL-SCNC: 22 MMOL/L (ref 21–32)
COLOR UR: NORMAL
CREAT SERPL-MCNC: 0.91 MG/DL (ref 0.55–1.02)
DIAGNOSIS, 93000: NORMAL
DIFFERENTIAL METHOD BLD: ABNORMAL
EOSINOPHIL # BLD: 0.2 K/UL (ref 0–0.4)
EOSINOPHIL NFR BLD: 3 % (ref 0–7)
EPITH CASTS URNS QL MICRO: NORMAL /LPF
ERYTHROCYTE [DISTWIDTH] IN BLOOD BY AUTOMATED COUNT: 17.7 % (ref 11.5–14.5)
GLOBULIN SER CALC-MCNC: 3.6 G/DL (ref 2–4)
GLUCOSE SERPL-MCNC: 95 MG/DL (ref 65–100)
GLUCOSE UR STRIP.AUTO-MCNC: NEGATIVE MG/DL
HCT VFR BLD AUTO: 36.1 % (ref 35–47)
HGB BLD-MCNC: 10.6 G/DL (ref 11.5–16)
HGB UR QL STRIP: NEGATIVE
HYALINE CASTS URNS QL MICRO: NORMAL /LPF (ref 0–5)
IMM GRANULOCYTES # BLD AUTO: 0.1 K/UL (ref 0–0.04)
IMM GRANULOCYTES NFR BLD AUTO: 1 % (ref 0–0.5)
KETONES UR QL STRIP.AUTO: NEGATIVE MG/DL
LACTATE BLD-SCNC: 1.75 MMOL/L (ref 0.4–2)
LEUKOCYTE ESTERASE UR QL STRIP.AUTO: NEGATIVE
LYMPHOCYTES # BLD: 0.9 K/UL (ref 0.8–3.5)
LYMPHOCYTES NFR BLD: 16 % (ref 12–49)
MCH RBC QN AUTO: 22.6 PG (ref 26–34)
MCHC RBC AUTO-ENTMCNC: 29.4 G/DL (ref 30–36.5)
MCV RBC AUTO: 76.8 FL (ref 80–99)
MONOCYTES # BLD: 0.7 K/UL (ref 0–1)
MONOCYTES NFR BLD: 12 % (ref 5–13)
NEUTS SEG # BLD: 3.8 K/UL (ref 1.8–8)
NEUTS SEG NFR BLD: 67 % (ref 32–75)
NITRITE UR QL STRIP.AUTO: NEGATIVE
NRBC # BLD: 0 K/UL (ref 0–0.01)
NRBC BLD-RTO: 0 PER 100 WBC
P-R INTERVAL, ECG05: 138 MS
PH UR STRIP: 6 [PH] (ref 5–8)
PLATELET # BLD AUTO: 221 K/UL (ref 150–400)
PMV BLD AUTO: 11.2 FL (ref 8.9–12.9)
POTASSIUM SERPL-SCNC: 4 MMOL/L (ref 3.5–5.1)
PROT SERPL-MCNC: 6.7 G/DL (ref 6.4–8.2)
PROT UR STRIP-MCNC: NEGATIVE MG/DL
Q-T INTERVAL, ECG07: 316 MS
QRS DURATION, ECG06: 70 MS
QTC CALCULATION (BEZET), ECG08: 444 MS
RBC # BLD AUTO: 4.7 M/UL (ref 3.8–5.2)
RBC #/AREA URNS HPF: NORMAL /HPF (ref 0–5)
SODIUM SERPL-SCNC: 142 MMOL/L (ref 136–145)
SP GR UR REFRACTOMETRY: 1.02 (ref 1–1.03)
TROPONIN I SERPL-MCNC: <0.05 NG/ML
UA: UC IF INDICATED,UAUC: NORMAL
UROBILINOGEN UR QL STRIP.AUTO: 0.2 EU/DL (ref 0.2–1)
VENTRICULAR RATE, ECG03: 119 BPM
WBC # BLD AUTO: 5.7 K/UL (ref 3.6–11)
WBC URNS QL MICRO: NORMAL /HPF (ref 0–4)

## 2019-08-26 PROCEDURE — 80053 COMPREHEN METABOLIC PANEL: CPT

## 2019-08-26 PROCEDURE — 84484 ASSAY OF TROPONIN QUANT: CPT

## 2019-08-26 PROCEDURE — 83880 ASSAY OF NATRIURETIC PEPTIDE: CPT

## 2019-08-26 PROCEDURE — 71275 CT ANGIOGRAPHY CHEST: CPT

## 2019-08-26 PROCEDURE — 96375 TX/PRO/DX INJ NEW DRUG ADDON: CPT

## 2019-08-26 PROCEDURE — 81001 URINALYSIS AUTO W/SCOPE: CPT

## 2019-08-26 PROCEDURE — 71046 X-RAY EXAM CHEST 2 VIEWS: CPT

## 2019-08-26 PROCEDURE — 83605 ASSAY OF LACTIC ACID: CPT

## 2019-08-26 PROCEDURE — 99284 EMERGENCY DEPT VISIT MOD MDM: CPT

## 2019-08-26 PROCEDURE — 74011636320 HC RX REV CODE- 636/320: Performed by: EMERGENCY MEDICINE

## 2019-08-26 PROCEDURE — 77030029684 HC NEB SM VOL KT MONA -A

## 2019-08-26 PROCEDURE — 96376 TX/PRO/DX INJ SAME DRUG ADON: CPT

## 2019-08-26 PROCEDURE — 96374 THER/PROPH/DIAG INJ IV PUSH: CPT

## 2019-08-26 PROCEDURE — 74011250636 HC RX REV CODE- 250/636: Performed by: EMERGENCY MEDICINE

## 2019-08-26 PROCEDURE — 85025 COMPLETE CBC W/AUTO DIFF WBC: CPT

## 2019-08-26 PROCEDURE — 36415 COLL VENOUS BLD VENIPUNCTURE: CPT

## 2019-08-26 PROCEDURE — 87040 BLOOD CULTURE FOR BACTERIA: CPT

## 2019-08-26 PROCEDURE — 74011000250 HC RX REV CODE- 250: Performed by: EMERGENCY MEDICINE

## 2019-08-26 PROCEDURE — 93005 ELECTROCARDIOGRAM TRACING: CPT

## 2019-08-26 RX ORDER — IPRATROPIUM BROMIDE 0.5 MG/2.5ML
0.5 SOLUTION RESPIRATORY (INHALATION)
Status: COMPLETED | OUTPATIENT
Start: 2019-08-26 | End: 2019-08-26

## 2019-08-26 RX ORDER — FENTANYL CITRATE 50 UG/ML
50 INJECTION, SOLUTION INTRAMUSCULAR; INTRAVENOUS ONCE
Status: COMPLETED | OUTPATIENT
Start: 2019-08-26 | End: 2019-08-26

## 2019-08-26 RX ORDER — ONDANSETRON 2 MG/ML
4 INJECTION INTRAMUSCULAR; INTRAVENOUS
Status: COMPLETED | OUTPATIENT
Start: 2019-08-26 | End: 2019-08-26

## 2019-08-26 RX ORDER — ALBUTEROL SULFATE 2.5 MG/.5ML
5 SOLUTION RESPIRATORY (INHALATION)
Status: COMPLETED | OUTPATIENT
Start: 2019-08-26 | End: 2019-08-26

## 2019-08-26 RX ORDER — OXYCODONE AND ACETAMINOPHEN 5; 325 MG/1; MG/1
1 TABLET ORAL
COMMUNITY
End: 2021-11-29

## 2019-08-26 RX ORDER — BENZONATATE 100 MG/1
100 CAPSULE ORAL
Qty: 30 CAP | Refills: 0 | Status: SHIPPED | OUTPATIENT
Start: 2019-08-26 | End: 2019-09-02

## 2019-08-26 RX ORDER — SODIUM CHLORIDE 0.9 % (FLUSH) 0.9 %
10 SYRINGE (ML) INJECTION
Status: COMPLETED | OUTPATIENT
Start: 2019-08-26 | End: 2019-08-26

## 2019-08-26 RX ADMIN — FENTANYL CITRATE 50 MCG: 50 INJECTION, SOLUTION INTRAMUSCULAR; INTRAVENOUS at 16:27

## 2019-08-26 RX ADMIN — ONDANSETRON 4 MG: 2 INJECTION INTRAMUSCULAR; INTRAVENOUS at 15:22

## 2019-08-26 RX ADMIN — IOPAMIDOL 100 ML: 755 INJECTION, SOLUTION INTRAVENOUS at 17:00

## 2019-08-26 RX ADMIN — SODIUM CHLORIDE 1000 ML: 900 INJECTION, SOLUTION INTRAVENOUS at 13:12

## 2019-08-26 RX ADMIN — IPRATROPIUM BROMIDE 0.5 MG: 0.5 SOLUTION RESPIRATORY (INHALATION) at 14:00

## 2019-08-26 RX ADMIN — ALBUTEROL SULFATE 5 MG: 2.5 SOLUTION RESPIRATORY (INHALATION) at 14:00

## 2019-08-26 RX ADMIN — ONDANSETRON 4 MG: 2 INJECTION INTRAMUSCULAR; INTRAVENOUS at 13:12

## 2019-08-26 RX ADMIN — Medication 10 ML: at 17:00

## 2019-08-26 NOTE — DISCHARGE INSTRUCTIONS

## 2019-08-26 NOTE — ED NOTES
Dr. Lazar Both has reviewed discharge instructions with the patient. The patient verbalized understanding. Pt taken in wheel chair out to family for discharge.

## 2019-08-26 NOTE — ED NOTES
Pt arrives to ED with c/o of SOB. Pt states that she has COPD and was recently admitted for similar reasons. Pt also reports nausea at this time. Pt is a/o x4. Monitor x3. Call bell within reach.

## 2019-08-31 LAB
BACTERIA SPEC CULT: NORMAL
SERVICE CMNT-IMP: NORMAL

## 2020-07-27 ENCOUNTER — HOSPITAL ENCOUNTER (OUTPATIENT)
Dept: MRI IMAGING | Age: 60
Discharge: HOME | End: 2020-07-27
Attending: PAIN MEDICINE

## 2020-07-27 DIAGNOSIS — M51.16 INTERVERTEBRAL DISC DISORDER WITH RADICULOPATHY OF LUMBAR REGION: ICD-10-CM

## 2020-07-27 DIAGNOSIS — M43.10 ACQUIRED SPONDYLOLISTHESIS: ICD-10-CM

## 2020-09-10 ENCOUNTER — HOSPITAL ENCOUNTER (OUTPATIENT)
Dept: MRI IMAGING | Age: 60
Discharge: HOME | End: 2020-09-10
Attending: PAIN MEDICINE
Payer: COMMERCIAL

## 2020-09-10 VITALS
SYSTOLIC BLOOD PRESSURE: 117 MMHG | TEMPERATURE: 98 F | HEIGHT: 62 IN | RESPIRATION RATE: 19 BRPM | OXYGEN SATURATION: 96 % | WEIGHT: 293 LBS | HEART RATE: 82 BPM | DIASTOLIC BLOOD PRESSURE: 70 MMHG | BODY MASS INDEX: 53.92 KG/M2

## 2020-09-10 DIAGNOSIS — M51.16 INTERVERTEBRAL DISC DISORDER WITH RADICULOPATHY OF LUMBAR REGION: ICD-10-CM

## 2020-09-10 PROCEDURE — 74011250636 HC RX REV CODE- 250/636

## 2020-09-10 PROCEDURE — 72148 MRI LUMBAR SPINE W/O DYE: CPT

## 2020-09-10 PROCEDURE — 74011250636 HC RX REV CODE- 250/636: Performed by: RADIOLOGY

## 2020-09-10 RX ORDER — MIDAZOLAM HYDROCHLORIDE 1 MG/ML
INJECTION, SOLUTION INTRAMUSCULAR; INTRAVENOUS
Status: COMPLETED
Start: 2020-09-10 | End: 2020-09-10

## 2020-09-10 RX ORDER — FENTANYL CITRATE 50 UG/ML
100 INJECTION, SOLUTION INTRAMUSCULAR; INTRAVENOUS
Status: DISCONTINUED | OUTPATIENT
Start: 2020-09-10 | End: 2020-09-10 | Stop reason: ALTCHOICE

## 2020-09-10 RX ORDER — SODIUM CHLORIDE 9 MG/ML
25 INJECTION, SOLUTION INTRAVENOUS CONTINUOUS
Status: DISCONTINUED | OUTPATIENT
Start: 2020-09-10 | End: 2020-09-10 | Stop reason: ALTCHOICE

## 2020-09-10 RX ORDER — FENTANYL CITRATE 50 UG/ML
INJECTION, SOLUTION INTRAMUSCULAR; INTRAVENOUS
Status: COMPLETED
Start: 2020-09-10 | End: 2020-09-10

## 2020-09-10 RX ORDER — MIDAZOLAM HYDROCHLORIDE 1 MG/ML
5 INJECTION, SOLUTION INTRAMUSCULAR; INTRAVENOUS
Status: DISCONTINUED | OUTPATIENT
Start: 2020-09-10 | End: 2020-09-10 | Stop reason: ALTCHOICE

## 2020-09-10 RX ADMIN — SODIUM CHLORIDE 25 ML/HR: 900 INJECTION, SOLUTION INTRAVENOUS at 12:15

## 2020-09-10 RX ADMIN — FENTANYL CITRATE 25 MCG: 50 INJECTION, SOLUTION INTRAMUSCULAR; INTRAVENOUS at 13:05

## 2020-09-10 RX ADMIN — MIDAZOLAM HYDROCHLORIDE 2 MG: 1 INJECTION, SOLUTION INTRAMUSCULAR; INTRAVENOUS at 13:16

## 2020-09-10 RX ADMIN — MIDAZOLAM HYDROCHLORIDE 2 MG: 1 INJECTION, SOLUTION INTRAMUSCULAR; INTRAVENOUS at 13:05

## 2020-09-10 NOTE — PROGRESS NOTES
Dr. Leonard Alert at bedside for pre procedure consult and consent. Questions reviewed with understanding. Consent obtained and witnessed by RN.

## 2020-09-10 NOTE — DISCHARGE INSTRUCTIONS
[facility]  Radiology Department  [phone]    Radiologist:  Dr. Erica Bunn    Date: 9/10/2020        MRI With Sedation Discharge Instructions      Go home and rest and restrict your activity the next 24 hours. You have been given sedating medications, so do not drive or drink alcohol today. Resume your previous diet and medications. You may return to work and resume normal activities tomorrow. Results of your MRI will be sent to your physician as soon as they become available.

## 2020-09-10 NOTE — PROGRESS NOTES
Discharge instructions have been reviewed with patient and present family. Copy given to patient. Pt verbalized understand of instructions and was given  the opportunity to ask questions and receive answers prior to leaving. Pt discharged with daughter and assisted out by RN in wheelchair.

## 2020-11-07 ENCOUNTER — TRANSCRIBE ORDER (OUTPATIENT)
Dept: SCHEDULING | Age: 60
End: 2020-11-07

## 2020-11-07 DIAGNOSIS — Z12.39 BREAST CANCER SCREENING, HIGH RISK PATIENT: Primary | ICD-10-CM

## 2020-11-10 ENCOUNTER — TRANSCRIBE ORDER (OUTPATIENT)
Dept: SCHEDULING | Age: 60
End: 2020-11-10

## 2020-11-10 DIAGNOSIS — Z12.39 BREAST CANCER SCREENING, HIGH RISK PATIENT: Primary | ICD-10-CM

## 2021-05-05 NOTE — ED NOTES
TRANSFER - OUT REPORT: 
 
Verbal report given to Minna Almanzar RN(name) on Angela Scott  being transferred to general surgery(unit) for routine progression of care Report consisted of patients Situation, Background, Assessment and  
Recommendations(SBAR). Information from the following report(s) SBAR, Kardex, ED Summary, [facility] and Recent Results was reviewed with the receiving nurse. Lines:    
 
Opportunity for questions and clarification was provided. Patient transported with: 
 [employer] Posterior Auricular Interpolation Flap Text: A decision was made to reconstruct the defect utilizing an interpolation axial flap and a staged reconstruction.  A telfa template was made of the defect.  This telfa template was then used to outline the posterior auricular interpolation flap.  The donor area for the pedicle flap was then injected with anesthesia.  The flap was excised through the skin and subcutaneous tissue down to the layer of the underlying musculature.  The pedicle flap was carefully excised within this deep plane to maintain its blood supply.  The edges of the donor site were undermined.   The donor site was closed in a primary fashion.  The pedicle was then rotated into position and sutured.  Once the tube was sutured into place, adequate blood supply was confirmed with blanching and refill.  The pedicle was then wrapped with xeroform gauze and dressed appropriately with a telfa and gauze bandage to ensure continued blood supply and protect the attached pedicle.

## 2021-07-15 ENCOUNTER — HOSPITAL ENCOUNTER (OUTPATIENT)
Dept: ULTRASOUND IMAGING | Age: 61
Discharge: HOME | End: 2021-07-15
Attending: FAMILY MEDICINE

## 2021-07-15 ENCOUNTER — HOSPITAL ENCOUNTER (OUTPATIENT)
Dept: MAMMOGRAPHY | Age: 61
Discharge: HOME | End: 2021-07-15
Attending: FAMILY MEDICINE

## 2021-07-15 DIAGNOSIS — Z12.39 BREAST CANCER SCREENING, HIGH RISK PATIENT: ICD-10-CM

## 2021-08-19 ENCOUNTER — HOSPITAL ENCOUNTER (OUTPATIENT)
Dept: MAMMOGRAPHY | Age: 61
Discharge: HOME | End: 2021-08-19
Attending: FAMILY MEDICINE
Payer: COMMERCIAL

## 2021-08-19 ENCOUNTER — TRANSCRIBE ORDER (OUTPATIENT)
Dept: SCHEDULING | Age: 61
End: 2021-08-19

## 2021-08-19 ENCOUNTER — HOSPITAL ENCOUNTER (OUTPATIENT)
Dept: ULTRASOUND IMAGING | Age: 61
Discharge: HOME | End: 2021-08-19
Attending: FAMILY MEDICINE
Payer: COMMERCIAL

## 2021-08-19 DIAGNOSIS — Z12.39 BREAST CANCER SCREENING, HIGH RISK PATIENT: ICD-10-CM

## 2021-08-19 DIAGNOSIS — N39.0 URINARY TRACT INFECTION, SITE NOT SPECIFIED: Primary | ICD-10-CM

## 2021-08-19 DIAGNOSIS — N63.10 LUMP OF RIGHT BREAST: ICD-10-CM

## 2021-08-19 PROCEDURE — 77066 DX MAMMO INCL CAD BI: CPT

## 2021-08-19 PROCEDURE — 76642 ULTRASOUND BREAST LIMITED: CPT

## 2021-10-05 NOTE — PROGRESS NOTES
I have reviewed discharge instructions with the patient. The patient verbalized understanding. AVS reviewed in completion. No new complaints. O2 assessment completed and documented (per NP [facility], see note). Education provided regarding medications and room safety. Patient did not have an IV upon discharge. The opportunity for clarification and questions provided. Patient left via wheel chair and transported home via her daughter. all other ROS negative except as per HPI

## 2021-11-19 ENCOUNTER — APPOINTMENT (OUTPATIENT)
Dept: CT IMAGING | Age: 61
DRG: 551 | End: 2021-11-19
Attending: EMERGENCY MEDICINE
Payer: COMMERCIAL

## 2021-11-19 ENCOUNTER — APPOINTMENT (OUTPATIENT)
Dept: ULTRASOUND IMAGING | Age: 61
DRG: 551 | End: 2021-11-19
Attending: EMERGENCY MEDICINE
Payer: COMMERCIAL

## 2021-11-19 ENCOUNTER — HOSPITAL ENCOUNTER (INPATIENT)
Age: 61
LOS: 10 days | Discharge: HOME HEALTH | DRG: 551 | End: 2021-11-29
Attending: EMERGENCY MEDICINE | Admitting: GENERAL ACUTE CARE HOSPITAL
Payer: COMMERCIAL

## 2021-11-19 DIAGNOSIS — M54.9 INTRACTABLE BACK PAIN: Primary | ICD-10-CM

## 2021-11-19 DIAGNOSIS — M79.605 LEFT LEG PAIN: ICD-10-CM

## 2021-11-19 LAB
ALBUMIN SERPL-MCNC: 3 G/DL (ref 3.5–5)
ALBUMIN/GLOB SERPL: 0.8 {RATIO} (ref 1.1–2.2)
ALP SERPL-CCNC: 56 U/L (ref 45–117)
ALT SERPL-CCNC: 18 U/L (ref 12–78)
ANION GAP SERPL CALC-SCNC: 6 MMOL/L (ref 5–15)
APPEARANCE UR: CLEAR
AST SERPL-CCNC: 10 U/L (ref 15–37)
BACTERIA URNS QL MICRO: NEGATIVE /HPF
BASOPHILS # BLD: 0.1 K/UL (ref 0–0.1)
BASOPHILS NFR BLD: 1 % (ref 0–1)
BILIRUB SERPL-MCNC: 0.2 MG/DL (ref 0.2–1)
BILIRUB UR QL: NEGATIVE
BUN SERPL-MCNC: 15 MG/DL (ref 6–20)
BUN/CREAT SERPL: 18 (ref 12–20)
CALCIUM SERPL-MCNC: 10 MG/DL (ref 8.5–10.1)
CHLORIDE SERPL-SCNC: 110 MMOL/L (ref 97–108)
CO2 SERPL-SCNC: 24 MMOL/L (ref 21–32)
COLOR UR: ABNORMAL
CREAT SERPL-MCNC: 0.84 MG/DL (ref 0.55–1.02)
DIFFERENTIAL METHOD BLD: ABNORMAL
EOSINOPHIL # BLD: 0.1 K/UL (ref 0–0.4)
EOSINOPHIL NFR BLD: 1 % (ref 0–7)
EPITH CASTS URNS QL MICRO: ABNORMAL /LPF
ERYTHROCYTE [DISTWIDTH] IN BLOOD BY AUTOMATED COUNT: 20.7 % (ref 11.5–14.5)
GLOBULIN SER CALC-MCNC: 4 G/DL (ref 2–4)
GLUCOSE SERPL-MCNC: 120 MG/DL (ref 65–100)
GLUCOSE UR STRIP.AUTO-MCNC: NEGATIVE MG/DL
HCT VFR BLD AUTO: 31.2 % (ref 35–47)
HGB BLD-MCNC: 8.4 G/DL (ref 11.5–16)
HGB UR QL STRIP: ABNORMAL
HYALINE CASTS URNS QL MICRO: ABNORMAL /LPF (ref 0–5)
IMM GRANULOCYTES # BLD AUTO: 0 K/UL (ref 0–0.04)
IMM GRANULOCYTES NFR BLD AUTO: 0 % (ref 0–0.5)
KETONES UR QL STRIP.AUTO: NEGATIVE MG/DL
LEUKOCYTE ESTERASE UR QL STRIP.AUTO: ABNORMAL
LIPASE SERPL-CCNC: 115 U/L (ref 73–393)
LYMPHOCYTES # BLD: 1.4 K/UL (ref 0.8–3.5)
LYMPHOCYTES NFR BLD: 14 % (ref 12–49)
MCH RBC QN AUTO: 18.9 PG (ref 26–34)
MCHC RBC AUTO-ENTMCNC: 26.9 G/DL (ref 30–36.5)
MCV RBC AUTO: 70.1 FL (ref 80–99)
MONOCYTES # BLD: 0.7 K/UL (ref 0–1)
MONOCYTES NFR BLD: 7 % (ref 5–13)
NEUTS SEG # BLD: 7.6 K/UL (ref 1.8–8)
NEUTS SEG NFR BLD: 77 % (ref 32–75)
NITRITE UR QL STRIP.AUTO: NEGATIVE
NRBC # BLD: 0 K/UL (ref 0–0.01)
NRBC BLD-RTO: 0 PER 100 WBC
PH UR STRIP: 6.5 [PH] (ref 5–8)
PLATELET # BLD AUTO: 242 K/UL (ref 150–400)
PLATELET COMMENTS,PCOM: ABNORMAL
POTASSIUM SERPL-SCNC: 3.9 MMOL/L (ref 3.5–5.1)
PROT SERPL-MCNC: 7 G/DL (ref 6.4–8.2)
PROT UR STRIP-MCNC: NEGATIVE MG/DL
RBC # BLD AUTO: 4.45 M/UL (ref 3.8–5.2)
RBC #/AREA URNS HPF: ABNORMAL /HPF (ref 0–5)
RBC MORPH BLD: ABNORMAL
SODIUM SERPL-SCNC: 140 MMOL/L (ref 136–145)
SP GR UR REFRACTOMETRY: 1.02 (ref 1–1.03)
UA: UC IF INDICATED,UAUC: ABNORMAL
UROBILINOGEN UR QL STRIP.AUTO: 0.2 EU/DL (ref 0.2–1)
WBC # BLD AUTO: 9.9 K/UL (ref 3.6–11)
WBC MORPH BLD: ABNORMAL
WBC URNS QL MICRO: ABNORMAL /HPF (ref 0–4)

## 2021-11-19 PROCEDURE — 74011000636 HC RX REV CODE- 636: Performed by: EMERGENCY MEDICINE

## 2021-11-19 PROCEDURE — 96375 TX/PRO/DX INJ NEW DRUG ADDON: CPT

## 2021-11-19 PROCEDURE — 51702 INSERT TEMP BLADDER CATH: CPT

## 2021-11-19 PROCEDURE — 74176 CT ABD & PELVIS W/O CONTRAST: CPT

## 2021-11-19 PROCEDURE — 65270000029 HC RM PRIVATE

## 2021-11-19 PROCEDURE — 81001 URINALYSIS AUTO W/SCOPE: CPT

## 2021-11-19 PROCEDURE — 74011000250 HC RX REV CODE- 250: Performed by: EMERGENCY MEDICINE

## 2021-11-19 PROCEDURE — 74011000258 HC RX REV CODE- 258: Performed by: EMERGENCY MEDICINE

## 2021-11-19 PROCEDURE — 80053 COMPREHEN METABOLIC PANEL: CPT

## 2021-11-19 PROCEDURE — 96376 TX/PRO/DX INJ SAME DRUG ADON: CPT

## 2021-11-19 PROCEDURE — 96365 THER/PROPH/DIAG IV INF INIT: CPT

## 2021-11-19 PROCEDURE — 99284 EMERGENCY DEPT VISIT MOD MDM: CPT

## 2021-11-19 PROCEDURE — 85025 COMPLETE CBC W/AUTO DIFF WBC: CPT

## 2021-11-19 PROCEDURE — 74011250636 HC RX REV CODE- 250/636: Performed by: EMERGENCY MEDICINE

## 2021-11-19 PROCEDURE — 74177 CT ABD & PELVIS W/CONTRAST: CPT

## 2021-11-19 PROCEDURE — 74011250637 HC RX REV CODE- 250/637: Performed by: EMERGENCY MEDICINE

## 2021-11-19 PROCEDURE — 93971 EXTREMITY STUDY: CPT

## 2021-11-19 PROCEDURE — 83690 ASSAY OF LIPASE: CPT

## 2021-11-19 PROCEDURE — 87086 URINE CULTURE/COLONY COUNT: CPT

## 2021-11-19 RX ORDER — ONDANSETRON 2 MG/ML
4 INJECTION INTRAMUSCULAR; INTRAVENOUS
Status: COMPLETED | OUTPATIENT
Start: 2021-11-19 | End: 2021-11-19

## 2021-11-19 RX ORDER — HYDROCODONE BITARTRATE AND ACETAMINOPHEN 5; 325 MG/1; MG/1
1 TABLET ORAL
Status: COMPLETED | OUTPATIENT
Start: 2021-11-19 | End: 2021-11-19

## 2021-11-19 RX ORDER — HYDROMORPHONE HYDROCHLORIDE 1 MG/ML
0.5 INJECTION, SOLUTION INTRAMUSCULAR; INTRAVENOUS; SUBCUTANEOUS
Status: COMPLETED | OUTPATIENT
Start: 2021-11-19 | End: 2021-11-19

## 2021-11-19 RX ORDER — HYDROMORPHONE HYDROCHLORIDE 1 MG/ML
1 INJECTION, SOLUTION INTRAMUSCULAR; INTRAVENOUS; SUBCUTANEOUS
Status: COMPLETED | OUTPATIENT
Start: 2021-11-19 | End: 2021-11-19

## 2021-11-19 RX ORDER — LIDOCAINE 4 G/100G
1 PATCH TOPICAL
Status: COMPLETED | OUTPATIENT
Start: 2021-11-19 | End: 2021-11-20

## 2021-11-19 RX ORDER — ONDANSETRON 4 MG/1
4 TABLET, ORALLY DISINTEGRATING ORAL
Status: COMPLETED | OUTPATIENT
Start: 2021-11-19 | End: 2021-11-19

## 2021-11-19 RX ORDER — DIAZEPAM 10 MG/2ML
5 INJECTION INTRAMUSCULAR
Status: COMPLETED | OUTPATIENT
Start: 2021-11-19 | End: 2021-11-19

## 2021-11-19 RX ADMIN — HYDROMORPHONE HYDROCHLORIDE 0.5 MG: 1 INJECTION, SOLUTION INTRAMUSCULAR; INTRAVENOUS; SUBCUTANEOUS at 22:47

## 2021-11-19 RX ADMIN — PROCHLORPERAZINE EDISYLATE 10 MG: 5 INJECTION INTRAMUSCULAR; INTRAVENOUS at 22:47

## 2021-11-19 RX ADMIN — CEFTRIAXONE 1 G: 1 INJECTION, POWDER, FOR SOLUTION INTRAMUSCULAR; INTRAVENOUS at 19:54

## 2021-11-19 RX ADMIN — DIAZEPAM 5 MG: 5 INJECTION, SOLUTION INTRAMUSCULAR; INTRAVENOUS at 17:55

## 2021-11-19 RX ADMIN — ONDANSETRON 4 MG: 2 INJECTION INTRAMUSCULAR; INTRAVENOUS at 19:54

## 2021-11-19 RX ADMIN — HYDROMORPHONE HYDROCHLORIDE 0.5 MG: 1 INJECTION, SOLUTION INTRAMUSCULAR; INTRAVENOUS; SUBCUTANEOUS at 16:55

## 2021-11-19 RX ADMIN — HYDROCODONE BITARTRATE AND ACETAMINOPHEN 1 TABLET: 5; 325 TABLET ORAL at 15:56

## 2021-11-19 RX ADMIN — HYDROMORPHONE HYDROCHLORIDE 1 MG: 1 INJECTION, SOLUTION INTRAMUSCULAR; INTRAVENOUS; SUBCUTANEOUS at 19:54

## 2021-11-19 RX ADMIN — SODIUM CHLORIDE 500 ML: 9 INJECTION, SOLUTION INTRAVENOUS at 17:49

## 2021-11-19 RX ADMIN — ONDANSETRON 4 MG: 4 TABLET, ORALLY DISINTEGRATING ORAL at 15:56

## 2021-11-19 RX ADMIN — IOPAMIDOL 100 ML: 755 INJECTION, SOLUTION INTRAVENOUS at 19:01

## 2021-11-19 NOTE — ED NOTES
[address] Overton MD at bedside for eval;;    1830 - bladder scan showcased 800 ml - pt remains unable to void at this time;; Francie Garcia MD made aware - will order concepcion cath placement;;    2000 - MRI tech at bedside for eval and MRI screening checklist review;;

## 2021-11-19 NOTE — ED PROVIDER NOTES
EMERGENCY DEPARTMENT HISTORY AND PHYSICAL EXAM      Date: 11/19/2021  Patient Name: Alexia Prater    History of Presenting Illness     Chief Complaint   Patient presents with    Back Pain     onset with back pain since 0500, Hx of back pain    Urinary Pain     onset with urinary pain today       History Provided By: Patient    HPI: Alexia Prater, 64 y.o. female presents to the ED with cc of back pain and urinary pain. Patient symptoms started at 5 AM.  She has a history of sciatica, but she states that usually is on the right side. Her back pain on the left side radiating down to the left ankle. Pain is an 8 out of 10 in severity, and is making it very difficult for her to move. She denies trauma, fever or chills. She denies abdominal pain, numbness or tingling. She does state that her left leg feels weak. She denies chest pain, cough. She denies headache or dizziness. She has nausea and chronic shortness of breath, secondary to COPD. There are no other complaints, changes, or physical findings at this time. PCP: Chaparrita Collazo, DO    No current facility-administered medications on file prior to encounter. Current Outpatient Medications on File Prior to Encounter   Medication Sig Dispense Refill    oxyCODONE-acetaminophen (PERCOCET) 5-325 mg per tablet Take 1 Tab by mouth two (2) times a day. Indications: pain      albuterol-ipratropium (DUO-NEB) 2.5 mg-0.5 mg/3 ml nebu 3 mL by Nebulization route every four (4) hours as needed (COPD). 30 Nebule 0    fluticasone-umeclidinium-vilanterol (TRELEGY ELLIPTA) 100-62.5-25 mcg inhaler Take 1 Puff by inhalation daily.  metFORMIN (GLUCOPHAGE) 1,000 mg tablet Take 1,000 mg by mouth two (2) times a day.  fluticasone propionate (FLONASE) 50 mcg/actuation nasal spray 2 Sprays by Both Nostrils route daily.  liraglutide (VICTOZA) 0.6 mg/0.1 mL (18 mg/3 mL) pnij 1.8 mg by SubCUTAneous route daily.       losartan (COZAAR) 25 mg tablet Take 25 mg by mouth daily.  busPIRone (BUSPAR) 15 mg tablet Take 15 mg by mouth three (3) times daily.  QUEtiapine (SEROQUEL) 400 mg tablet Take 600 mg by mouth nightly.  nystatin, bulk, 15 billion unit powd 1 Units by Does Not Apply route three (3) times daily. To skin folds, ventral fold 2 Each 1    albuterol (PROVENTIL HFA, VENTOLIN HFA, PROAIR HFA) 90 mcg/actuation inhaler Take 2 Puffs by inhalation every four (4) hours as needed for Wheezing. 1 Inhaler 0    buPROPion XL (WELLBUTRIN XL) 150 mg tablet Take 300 mg by mouth nightly.  prazosin (MINIPRESS) 1 mg capsule Take 4 mg by mouth nightly.  ergocalciferol (VITAMIN D2) 50,000 unit capsule Take 50,000 Units by mouth every seven (7) days.  estradiol (ESTRACE) 0.5 mg tablet Take 0.5 mg by mouth daily.  miconazole (MICOTIN) 2 % topical cream Apply 1 Each to affected area two (2) times a day. Apply ointment to fungal infection under abdominal pannus and groin twice a day for 14 days.  aspirin 81 mg chewable tablet Take 81 mg by mouth daily.  gabapentin (NEURONTIN) 100 mg capsule Take 1 Cap by mouth three (3) times daily. 90 Cap 1    levothyroxine (SYNTHROID) 112 mcg tablet Take 112 mcg by mouth Daily (before breakfast).  metoprolol succinate (TOPROL XL) 25 mg XL tablet Take 25 mg by mouth daily.  omeprazole (PRILOSEC) 20 mg capsule Take 40 mg by mouth daily.  topiramate (TOPAMAX) 200 mg tablet Take 200 mg by mouth nightly.  simvastatin (ZOCOR) 20 mg tablet Take 20 mg by mouth nightly.          Past History     Past Medical History:  Past Medical History:   Diagnosis Date    Adverse effect of anesthesia     slow to wake up       Anxiety and depression     Arrhythmia     Arthritis     Chronic pain     djd    Diabetes ([facility] [address]     GERD (gastroesophageal reflux disease)     Hypertension     Hypothyroidism     Kidney stone     Liver disease     Obesity, morbid, BMI 50 or higher ([facility] [address]     Seizures ([facility] [address]        Past Surgical History:  Past Surgical History:   Procedure Laterality Date    CARDIAC CATHETERIZATION  11/18/2010         HX APPENDECTOMY      HX BREAST BIOPSY Right     about 15 years ago, neg    HX CHOLECYSTECTOMY      HX HYSTERECTOMY      HX TUBAL LIGATION         Family History:  Family History   Problem Relation Age of Onset    Heart Disease Mother     Hypertension Mother     Cancer Mother     Breast Cancer Mother     Heart Disease Father     Hypertension Father     Cancer Brother        Social History:  Social History     Tobacco Use    Smoking status: Former Smoker    Smokeless tobacco: Never Used    Tobacco comment: quit smoking  about 34  years ago      Substance Use Topics    Alcohol use: No    Drug use: Yes     Types: Prescription, OTC       Allergies: Allergies   Allergen Reactions    Celebrex [Celecoxib] Nausea Only    Ibuprofen Other (comments)     Does not want r/t COPD history.  Methocarbamol Shortness of Breath    Morphine Nausea Only    Sulfa (Sulfonamide Antibiotics) Nausea Only    Tramadol Other (comments)     Does not want r/t COPD    Adhesive Other (comments)     Red and bumpy      Codeine Other (comments)     Hyper activity         Review of Systems   Review of Systems   Constitutional: Negative for fever. HENT: Negative for congestion. Eyes: Negative. Respiratory: Positive for shortness of breath. Cardiovascular: Negative for chest pain. Gastrointestinal: Positive for nausea. Negative for abdominal pain. Endocrine: Negative for heat intolerance. Genitourinary: Positive for dysuria. Musculoskeletal: Positive for back pain. Skin: Negative for rash. Allergic/Immunologic: Negative for immunocompromised state. Neurological: Positive for weakness. Negative for dizziness. Hematological: Does not bruise/bleed easily. Psychiatric/Behavioral: Negative. All other systems reviewed and are negative.       Physical Exam Physical Exam  Vitals and nursing note reviewed. Constitutional:       General: She is in acute distress. Appearance: She is well-developed. She is obese. HENT:      Head: Normocephalic. Cardiovascular:      Rate and Rhythm: Normal rate and regular rhythm. Pulses: Normal pulses. Heart sounds: Normal heart sounds. Pulmonary:      Effort: Pulmonary effort is normal.      Breath sounds: Normal breath sounds. Abdominal:      General: Bowel sounds are normal.      Palpations: Abdomen is soft. Tenderness: There is no abdominal tenderness. Comments: Left lower back tenderness, left hip tenderness   Genitourinary:     General: Normal vulva. Comments: There is no evidence of a fullness or abscess on the left labia majora  Musculoskeletal:         General: Normal range of motion. Cervical back: Normal range of motion and neck supple. Skin:     General: Skin is warm and dry. Neurological:      General: No focal deficit present. Mental Status: She is alert and oriented to person, place, and time. Psychiatric:         Mood and Affect: Mood normal.         Behavior: Behavior normal.         Diagnostic Study Results     Labs -     Recent Results (from the past 12 hour(s))   CBC WITH AUTOMATED DIFF    Collection Time: 11/19/21  4:13 PM   Result Value Ref Range    WBC 9.9 3.6 - 11.0 K/uL    RBC 4.45 3.80 - 5.20 M/uL    HGB 8.4 (L) 11.5 - 16.0 g/dL    HCT 31.2 (L) 35.0 - 47.0 %    MCV 70.1 (L) 80.0 - 99.0 FL    MCH 18.9 (L) 26.0 - 34.0 PG    MCHC 26.9 (L) 30.0 - 36.5 g/dL    RDW 20.7 (H) 11.5 - 14.5 %    PLATELET 523 339 - 949 K/uL    NRBC 0.0 0  WBC    ABSOLUTE NRBC 0.00 0.00 - 0.01 K/uL    NEUTROPHILS 77 (H) 32 - 75 %    LYMPHOCYTES 14 12 - 49 %    MONOCYTES 7 5 - 13 %    EOSINOPHILS 1 0 - 7 %    BASOPHILS 1 0 - 1 %    IMMATURE GRANULOCYTES 0 0.0 - 0.5 %    ABS. NEUTROPHILS 7.6 1.8 - 8.0 K/UL    ABS. LYMPHOCYTES 1.4 0.8 - 3.5 K/UL    ABS.  MONOCYTES 0.7 0.0 - 1.0 K/UL ABS. EOSINOPHILS 0.1 0.0 - 0.4 K/UL    ABS. BASOPHILS 0.1 0.0 - 0.1 K/UL    ABS. IMM. GRANS. 0.0 0.00 - 0.04 K/UL    DF SMEAR SCANNED      PLATELET COMMENTS Large Platelets      RBC COMMENTS ANISOCYTOSIS  2+        RBC COMMENTS MICROCYTOSIS  1+        RBC COMMENTS POIKILOCYTOSIS  PRESENT        RBC COMMENTS OVALOCYTES  PRESENT        RBC COMMENTS POLYCHROMASIA  PRESENT        RBC COMMENTS HYPOCHROMIA  2+        WBC COMMENTS ATYPICAL LYMPHOCYTES PRESENT     LIPASE    Collection Time: 11/19/21  4:13 PM   Result Value Ref Range    Lipase 115 73 - 243 U/L   METABOLIC PANEL, COMPREHENSIVE    Collection Time: 11/19/21  4:13 PM   Result Value Ref Range    Sodium 140 136 - 145 mmol/L    Potassium 3.9 3.5 - 5.1 mmol/L    Chloride 110 (H) 97 - 108 mmol/L    CO2 24 21 - 32 mmol/L    Anion gap 6 5 - 15 mmol/L    Glucose 120 (H) 65 - 100 mg/dL    BUN 15 6 - 20 MG/DL    Creatinine 0.84 0.55 - 1.02 MG/DL    BUN/Creatinine ratio 18 12 - 20      GFR est AA >60 >60 ml/min/1.73m2    GFR est non-AA >60 >60 ml/min/1.73m2    Calcium 10.0 8.5 - 10.1 MG/DL    Bilirubin, total 0.2 0.2 - 1.0 MG/DL    ALT (SGPT) 18 12 - 78 U/L    AST (SGOT) 10 (L) 15 - 37 U/L    Alk.  phosphatase 56 45 - 117 U/L    Protein, total 7.0 6.4 - 8.2 g/dL    Albumin 3.0 (L) 3.5 - 5.0 g/dL    Globulin 4.0 2.0 - 4.0 g/dL    A-G Ratio 0.8 (L) 1.1 - 2.2     URINALYSIS W/ REFLEX CULTURE    Collection Time: 11/19/21  6:34 PM    Specimen: Urine   Result Value Ref Range    Color YELLOW/STRAW      Appearance CLEAR CLEAR      Specific gravity 1.018 1.003 - 1.030      pH (UA) 6.5 5.0 - 8.0      Protein Negative NEG mg/dL    Glucose Negative NEG mg/dL    Ketone Negative NEG mg/dL    Bilirubin Negative NEG      Blood SMALL (A) NEG      Urobilinogen 0.2 0.2 - 1.0 EU/dL    Nitrites Negative NEG      Leukocyte Esterase SMALL (A) NEG      WBC 20-50 0 - 4 /hpf    RBC 20-50 0 - 5 /hpf    Epithelial cells FEW FEW /lpf    Bacteria Negative NEG /hpf    UA:UC IF INDICATED URINE CULTURE ORDERED (A) CNI      Hyaline cast 2-5 0 - 5 /lpf       Radiologic Studies -   CT ABD PELV W CONT   Final Result   1. No significant change. DUPLEX LOWER EXT VENOUS LEFT   Final Result   :     No deep venous thrombosis identified. CT ABD PELV WO CONT   Final Result   1. Partial staghorn calculus in the lower pole of the right kidney. 2. Degenerative changes in the spine. Minimal anterior listhesis of L4 on L5   with disc osteophyte complex formation/degenerative disc disease at L5/S1. 3. There is fullness in the left side of the labia/left of the introitus. Recommend physical exam and possible referral to gynecology. 4. Incidental findings as above. MRI LUMB SPINE W WO CONT    (Results Pending)   MRI [facility] SPINE W WO CONT    (Results Pending)     CT Results  (Last 48 hours)    None        CXR Results  (Last 48 hours)    None          Medical Decision Making   I am the first provider for this patient. I reviewed the vital signs, available nursing notes, past medical history, past surgical history, family history and social history. Vital Signs-Reviewed the patient's vital signs. Patient Vitals for the past 12 hrs:   Temp Pulse Resp BP SpO2   11/19/21 1310 99.1 °F (37.3 °C) 96 18 (!) 169/87 99 %       Records Reviewed: Nursing Notes, Old Medical Records and Ambulance Run Sheet    Provider Notes (Medical Decision Making): , kidney stone, UTI, pyelonephritis, DVT, Baker's cyst, sciatica    ED Course:   Initial assessment performed. The patients presenting problems have been discussed, and they are in agreement with the care plan formulated and outlined with them. I have encouraged them to ask questions as they arise throughout their visit. Progress note:    Patient developed left lower quadrant pain while in the emergency department. CT with contrast was added on at that time. Her nurse was able to do a bladder scan, which revealed 800 cc of urine in the bladder.   Archana has been placed. Progress note:    Patient has had no significant improvement in her pain. She is now stating that her right leg feels weak, but she denies right leg pain. I ordered an MRI, but the patient requires conscious sedation to obtain MRIs, and the staff is not able to do that on the weekend. Consult note:    Discussed the case with , who left. She will admit the patient. Critical Care Time:   0    Disposition:  admit    DISCHARGE PLAN:  1. Current Discharge Medication List        2. Follow-up Information    None       3. Return to ED if worse     Diagnosis     Clinical Impression:   1. Intractable back pain    2. Left leg pain        Attestations:    Ludmila Harrison MD    Please note that this dictation was completed with [employer], the computer voice recognition software. Quite often unanticipated grammatical, syntax, homophones, and other interpretive errors are inadvertently transcribed by the computer software. Please disregard these errors. Please excuse any errors that have escaped final proofreading. Thank you.

## 2021-11-20 LAB
ALBUMIN SERPL-MCNC: 2.7 G/DL (ref 3.5–5)
ALBUMIN/GLOB SERPL: 0.8 {RATIO} (ref 1.1–2.2)
ALP SERPL-CCNC: 53 U/L (ref 45–117)
ALT SERPL-CCNC: 18 U/L (ref 12–78)
ANION GAP SERPL CALC-SCNC: 7 MMOL/L (ref 5–15)
AST SERPL-CCNC: 10 U/L (ref 15–37)
BACTERIA SPEC CULT: NORMAL
BASOPHILS # BLD: 0.1 K/UL (ref 0–0.1)
BASOPHILS NFR BLD: 1 % (ref 0–1)
BILIRUB SERPL-MCNC: 0.2 MG/DL (ref 0.2–1)
BUN SERPL-MCNC: 13 MG/DL (ref 6–20)
BUN/CREAT SERPL: 15 (ref 12–20)
CALCIUM SERPL-MCNC: 9.4 MG/DL (ref 8.5–10.1)
CHLORIDE SERPL-SCNC: 109 MMOL/L (ref 97–108)
CO2 SERPL-SCNC: 23 MMOL/L (ref 21–32)
CREAT SERPL-MCNC: 0.85 MG/DL (ref 0.55–1.02)
DIFFERENTIAL METHOD BLD: ABNORMAL
EOSINOPHIL # BLD: 0.1 K/UL (ref 0–0.4)
EOSINOPHIL NFR BLD: 2 % (ref 0–7)
ERYTHROCYTE [DISTWIDTH] IN BLOOD BY AUTOMATED COUNT: 20.4 % (ref 11.5–14.5)
GLOBULIN SER CALC-MCNC: 3.5 G/DL (ref 2–4)
GLUCOSE BLD STRIP.AUTO-MCNC: 138 MG/DL (ref 65–117)
GLUCOSE BLD STRIP.AUTO-MCNC: 141 MG/DL (ref 65–117)
GLUCOSE BLD STRIP.AUTO-MCNC: 173 MG/DL (ref 65–117)
GLUCOSE BLD STRIP.AUTO-MCNC: 181 MG/DL (ref 65–117)
GLUCOSE SERPL-MCNC: 163 MG/DL (ref 65–100)
HCT VFR BLD AUTO: 28.6 % (ref 35–47)
HGB BLD-MCNC: 8.1 G/DL (ref 11.5–16)
IMM GRANULOCYTES # BLD AUTO: 0 K/UL (ref 0–0.04)
IMM GRANULOCYTES NFR BLD AUTO: 0 % (ref 0–0.5)
LYMPHOCYTES # BLD: 1.5 K/UL (ref 0.8–3.5)
LYMPHOCYTES NFR BLD: 21 % (ref 12–49)
MAGNESIUM SERPL-MCNC: 1.5 MG/DL (ref 1.6–2.4)
MCH RBC QN AUTO: 19.3 PG (ref 26–34)
MCHC RBC AUTO-ENTMCNC: 28.3 G/DL (ref 30–36.5)
MCV RBC AUTO: 68.1 FL (ref 80–99)
MONOCYTES # BLD: 0.4 K/UL (ref 0–1)
MONOCYTES NFR BLD: 6 % (ref 5–13)
NEUTS SEG # BLD: 5.1 K/UL (ref 1.8–8)
NEUTS SEG NFR BLD: 70 % (ref 32–75)
NRBC # BLD: 0 K/UL (ref 0–0.01)
NRBC BLD-RTO: 0 PER 100 WBC
PLATELET # BLD AUTO: 206 K/UL (ref 150–400)
POTASSIUM SERPL-SCNC: 3.6 MMOL/L (ref 3.5–5.1)
PROT SERPL-MCNC: 6.2 G/DL (ref 6.4–8.2)
RBC # BLD AUTO: 4.2 M/UL (ref 3.8–5.2)
RBC MORPH BLD: ABNORMAL
SERVICE CMNT-IMP: ABNORMAL
SERVICE CMNT-IMP: NORMAL
SODIUM SERPL-SCNC: 139 MMOL/L (ref 136–145)
WBC # BLD AUTO: 7.2 K/UL (ref 3.6–11)

## 2021-11-20 PROCEDURE — 74011000250 HC RX REV CODE- 250: Performed by: INTERNAL MEDICINE

## 2021-11-20 PROCEDURE — 36415 COLL VENOUS BLD VENIPUNCTURE: CPT

## 2021-11-20 PROCEDURE — 80053 COMPREHEN METABOLIC PANEL: CPT

## 2021-11-20 PROCEDURE — 51798 US URINE CAPACITY MEASURE: CPT

## 2021-11-20 PROCEDURE — 82962 GLUCOSE BLOOD TEST: CPT

## 2021-11-20 PROCEDURE — 74011636637 HC RX REV CODE- 636/637: Performed by: GENERAL ACUTE CARE HOSPITAL

## 2021-11-20 PROCEDURE — 94640 AIRWAY INHALATION TREATMENT: CPT

## 2021-11-20 PROCEDURE — 74011250637 HC RX REV CODE- 250/637: Performed by: GENERAL ACUTE CARE HOSPITAL

## 2021-11-20 PROCEDURE — 83735 ASSAY OF MAGNESIUM: CPT

## 2021-11-20 PROCEDURE — 85025 COMPLETE CBC W/AUTO DIFF WBC: CPT

## 2021-11-20 PROCEDURE — 74011250636 HC RX REV CODE- 250/636: Performed by: NURSE PRACTITIONER

## 2021-11-20 PROCEDURE — 65270000029 HC RM PRIVATE

## 2021-11-20 PROCEDURE — 74011250637 HC RX REV CODE- 250/637: Performed by: INTERNAL MEDICINE

## 2021-11-20 PROCEDURE — 2709999900 HC NON-CHARGEABLE SUPPLY

## 2021-11-20 PROCEDURE — 74011000250 HC RX REV CODE- 250: Performed by: GENERAL ACUTE CARE HOSPITAL

## 2021-11-20 RX ORDER — BUSPIRONE HYDROCHLORIDE 5 MG/1
15 TABLET ORAL 3 TIMES DAILY
Status: DISCONTINUED | OUTPATIENT
Start: 2021-11-20 | End: 2021-11-29 | Stop reason: HOSPADM

## 2021-11-20 RX ORDER — PRAZOSIN HYDROCHLORIDE 1 MG/1
4 CAPSULE ORAL
Status: DISCONTINUED | OUTPATIENT
Start: 2021-11-20 | End: 2021-11-29 | Stop reason: HOSPADM

## 2021-11-20 RX ORDER — ACETAMINOPHEN 650 MG/1
650 SUPPOSITORY RECTAL
Status: DISCONTINUED | OUTPATIENT
Start: 2021-11-20 | End: 2021-11-29 | Stop reason: HOSPADM

## 2021-11-20 RX ORDER — ARFORMOTEROL TARTRATE 15 UG/2ML
15 SOLUTION RESPIRATORY (INHALATION)
Status: DISCONTINUED | OUTPATIENT
Start: 2021-11-20 | End: 2021-11-20

## 2021-11-20 RX ORDER — INSULIN LISPRO 100 [IU]/ML
INJECTION, SOLUTION INTRAVENOUS; SUBCUTANEOUS
Status: DISCONTINUED | OUTPATIENT
Start: 2021-11-20 | End: 2021-11-29 | Stop reason: HOSPADM

## 2021-11-20 RX ORDER — GABAPENTIN 100 MG/1
100 CAPSULE ORAL 3 TIMES DAILY
Status: DISCONTINUED | OUTPATIENT
Start: 2021-11-20 | End: 2021-11-29 | Stop reason: HOSPADM

## 2021-11-20 RX ORDER — LOSARTAN POTASSIUM 25 MG/1
25 TABLET ORAL DAILY
Status: DISCONTINUED | OUTPATIENT
Start: 2021-11-20 | End: 2021-11-29 | Stop reason: HOSPADM

## 2021-11-20 RX ORDER — ENOXAPARIN SODIUM 100 MG/ML
40 INJECTION SUBCUTANEOUS EVERY 12 HOURS
Status: DISCONTINUED | OUTPATIENT
Start: 2021-11-21 | End: 2021-11-29 | Stop reason: HOSPADM

## 2021-11-20 RX ORDER — IPRATROPIUM BROMIDE 0.5 MG/2.5ML
0.5 SOLUTION RESPIRATORY (INHALATION)
Status: DISCONTINUED | OUTPATIENT
Start: 2021-11-20 | End: 2021-11-27

## 2021-11-20 RX ORDER — BUPROPION HYDROCHLORIDE 150 MG/1
300 TABLET ORAL
Status: DISCONTINUED | OUTPATIENT
Start: 2021-11-20 | End: 2021-11-29 | Stop reason: HOSPADM

## 2021-11-20 RX ORDER — ATORVASTATIN CALCIUM 10 MG/1
10 TABLET, FILM COATED ORAL
Status: DISCONTINUED | OUTPATIENT
Start: 2021-11-20 | End: 2021-11-29 | Stop reason: HOSPADM

## 2021-11-20 RX ORDER — ONDANSETRON 2 MG/ML
4 INJECTION INTRAMUSCULAR; INTRAVENOUS
Status: DISCONTINUED | OUTPATIENT
Start: 2021-11-20 | End: 2021-11-29 | Stop reason: HOSPADM

## 2021-11-20 RX ORDER — ACETAMINOPHEN 325 MG/1
650 TABLET ORAL
Status: DISCONTINUED | OUTPATIENT
Start: 2021-11-20 | End: 2021-11-29 | Stop reason: HOSPADM

## 2021-11-20 RX ORDER — QUETIAPINE FUMARATE 100 MG/1
600 TABLET, FILM COATED ORAL
Status: DISCONTINUED | OUTPATIENT
Start: 2021-11-20 | End: 2021-11-24

## 2021-11-20 RX ORDER — LEVOTHYROXINE SODIUM 112 UG/1
112 TABLET ORAL
Status: DISCONTINUED | OUTPATIENT
Start: 2021-11-20 | End: 2021-11-29 | Stop reason: HOSPADM

## 2021-11-20 RX ORDER — GUAIFENESIN 100 MG/5ML
81 LIQUID (ML) ORAL DAILY
Status: DISCONTINUED | OUTPATIENT
Start: 2021-11-20 | End: 2021-11-29 | Stop reason: HOSPADM

## 2021-11-20 RX ORDER — SODIUM CHLORIDE 0.9 % (FLUSH) 0.9 %
5-40 SYRINGE (ML) INJECTION EVERY 8 HOURS
Status: DISCONTINUED | OUTPATIENT
Start: 2021-11-20 | End: 2021-11-29 | Stop reason: HOSPADM

## 2021-11-20 RX ORDER — ESTRADIOL 1 MG/1
0.5 TABLET ORAL DAILY
Status: DISCONTINUED | OUTPATIENT
Start: 2021-11-20 | End: 2021-11-29 | Stop reason: HOSPADM

## 2021-11-20 RX ORDER — METOPROLOL SUCCINATE 25 MG/1
25 TABLET, EXTENDED RELEASE ORAL DAILY
Status: DISCONTINUED | OUTPATIENT
Start: 2021-11-20 | End: 2021-11-28

## 2021-11-20 RX ORDER — BUDESONIDE 0.25 MG/2ML
250 INHALANT ORAL
Status: DISCONTINUED | OUTPATIENT
Start: 2021-11-20 | End: 2021-11-27

## 2021-11-20 RX ORDER — OXYCODONE AND ACETAMINOPHEN 5; 325 MG/1; MG/1
1 TABLET ORAL 2 TIMES DAILY
Status: DISCONTINUED | OUTPATIENT
Start: 2021-11-20 | End: 2021-11-21

## 2021-11-20 RX ORDER — MAGNESIUM SULFATE 100 %
4 CRYSTALS MISCELLANEOUS AS NEEDED
Status: DISCONTINUED | OUTPATIENT
Start: 2021-11-20 | End: 2021-11-29 | Stop reason: HOSPADM

## 2021-11-20 RX ORDER — IPRATROPIUM BROMIDE 0.5 MG/2.5ML
0.5 SOLUTION RESPIRATORY (INHALATION)
Status: DISCONTINUED | OUTPATIENT
Start: 2021-11-20 | End: 2021-11-20

## 2021-11-20 RX ORDER — HYDROMORPHONE HYDROCHLORIDE 1 MG/ML
1 INJECTION, SOLUTION INTRAMUSCULAR; INTRAVENOUS; SUBCUTANEOUS
Status: DISCONTINUED | OUTPATIENT
Start: 2021-11-20 | End: 2021-11-27

## 2021-11-20 RX ORDER — TOPIRAMATE 100 MG/1
200 TABLET, FILM COATED ORAL
Status: DISCONTINUED | OUTPATIENT
Start: 2021-11-20 | End: 2021-11-29 | Stop reason: HOSPADM

## 2021-11-20 RX ORDER — ENOXAPARIN SODIUM 100 MG/ML
40 INJECTION SUBCUTANEOUS DAILY
Status: DISCONTINUED | OUTPATIENT
Start: 2021-11-20 | End: 2021-11-20

## 2021-11-20 RX ORDER — BUDESONIDE 0.25 MG/2ML
250 INHALANT ORAL
Status: DISCONTINUED | OUTPATIENT
Start: 2021-11-20 | End: 2021-11-20

## 2021-11-20 RX ORDER — DEXTROSE 50 % IN WATER (D50W) INTRAVENOUS SYRINGE
12.5-25 AS NEEDED
Status: DISCONTINUED | OUTPATIENT
Start: 2021-11-20 | End: 2021-11-29 | Stop reason: HOSPADM

## 2021-11-20 RX ORDER — POLYETHYLENE GLYCOL 3350 17 G/17G
17 POWDER, FOR SOLUTION ORAL DAILY PRN
Status: DISCONTINUED | OUTPATIENT
Start: 2021-11-20 | End: 2021-11-22

## 2021-11-20 RX ORDER — SODIUM CHLORIDE 0.9 % (FLUSH) 0.9 %
5-40 SYRINGE (ML) INJECTION AS NEEDED
Status: DISCONTINUED | OUTPATIENT
Start: 2021-11-20 | End: 2021-11-29 | Stop reason: HOSPADM

## 2021-11-20 RX ORDER — ARFORMOTEROL TARTRATE 15 UG/2ML
15 SOLUTION RESPIRATORY (INHALATION)
Status: DISCONTINUED | OUTPATIENT
Start: 2021-11-20 | End: 2021-11-27

## 2021-11-20 RX ORDER — LANOLIN ALCOHOL/MO/W.PET/CERES
400 CREAM (GRAM) TOPICAL DAILY
Status: DISCONTINUED | OUTPATIENT
Start: 2021-11-20 | End: 2021-11-29 | Stop reason: HOSPADM

## 2021-11-20 RX ORDER — ONDANSETRON 4 MG/1
4 TABLET, ORALLY DISINTEGRATING ORAL
Status: DISCONTINUED | OUTPATIENT
Start: 2021-11-20 | End: 2021-11-29 | Stop reason: HOSPADM

## 2021-11-20 RX ADMIN — IPRATROPIUM BROMIDE 0.5 MG: 0.5 SOLUTION RESPIRATORY (INHALATION) at 07:42

## 2021-11-20 RX ADMIN — ATORVASTATIN CALCIUM 10 MG: 10 TABLET, FILM COATED ORAL at 21:33

## 2021-11-20 RX ADMIN — HYDROMORPHONE HYDROCHLORIDE 1 MG: 1 INJECTION, SOLUTION INTRAMUSCULAR; INTRAVENOUS; SUBCUTANEOUS at 23:02

## 2021-11-20 RX ADMIN — HYDROMORPHONE HYDROCHLORIDE 1 MG: 1 INJECTION, SOLUTION INTRAMUSCULAR; INTRAVENOUS; SUBCUTANEOUS at 13:42

## 2021-11-20 RX ADMIN — HYDROMORPHONE HYDROCHLORIDE 1 MG: 1 INJECTION, SOLUTION INTRAMUSCULAR; INTRAVENOUS; SUBCUTANEOUS at 16:11

## 2021-11-20 RX ADMIN — QUETIAPINE FUMARATE 600 MG: 100 TABLET ORAL at 21:33

## 2021-11-20 RX ADMIN — OXYCODONE AND ACETAMINOPHEN 1 TABLET: 5; 325 TABLET ORAL at 18:22

## 2021-11-20 RX ADMIN — IPRATROPIUM BROMIDE 0.5 MG: 0.5 SOLUTION RESPIRATORY (INHALATION) at 19:58

## 2021-11-20 RX ADMIN — HYDROMORPHONE HYDROCHLORIDE 1 MG: 1 INJECTION, SOLUTION INTRAMUSCULAR; INTRAVENOUS; SUBCUTANEOUS at 06:53

## 2021-11-20 RX ADMIN — GABAPENTIN 100 MG: 100 CAPSULE ORAL at 16:11

## 2021-11-20 RX ADMIN — BUSPIRONE HYDROCHLORIDE 15 MG: 5 TABLET ORAL at 21:32

## 2021-11-20 RX ADMIN — Medication 10 ML: at 21:34

## 2021-11-20 RX ADMIN — BUPROPION HYDROCHLORIDE 300 MG: 150 TABLET, EXTENDED RELEASE ORAL at 21:32

## 2021-11-20 RX ADMIN — ASPIRIN 81 MG CHEWABLE TABLET 81 MG: 81 TABLET CHEWABLE at 08:18

## 2021-11-20 RX ADMIN — BUSPIRONE HYDROCHLORIDE 15 MG: 5 TABLET ORAL at 16:11

## 2021-11-20 RX ADMIN — HYDROMORPHONE HYDROCHLORIDE 1 MG: 1 INJECTION, SOLUTION INTRAMUSCULAR; INTRAVENOUS; SUBCUTANEOUS at 01:20

## 2021-11-20 RX ADMIN — ARFORMOTEROL TARTRATE 15 MCG: 15 SOLUTION RESPIRATORY (INHALATION) at 19:58

## 2021-11-20 RX ADMIN — ARFORMOTEROL TARTRATE 15 MCG: 15 SOLUTION RESPIRATORY (INHALATION) at 07:42

## 2021-11-20 RX ADMIN — ATORVASTATIN CALCIUM 10 MG: 10 TABLET, FILM COATED ORAL at 00:47

## 2021-11-20 RX ADMIN — BUDESONIDE 250 MCG: 0.25 INHALANT RESPIRATORY (INHALATION) at 19:58

## 2021-11-20 RX ADMIN — PRAZOSIN HYDROCHLORIDE 4 MG: 1 CAPSULE ORAL at 04:31

## 2021-11-20 RX ADMIN — HYDROMORPHONE HYDROCHLORIDE 1 MG: 1 INJECTION, SOLUTION INTRAMUSCULAR; INTRAVENOUS; SUBCUTANEOUS at 04:00

## 2021-11-20 RX ADMIN — PRAZOSIN HYDROCHLORIDE 4 MG: 1 CAPSULE ORAL at 21:41

## 2021-11-20 RX ADMIN — GABAPENTIN 100 MG: 100 CAPSULE ORAL at 21:32

## 2021-11-20 RX ADMIN — QUETIAPINE FUMARATE 600 MG: 100 TABLET ORAL at 04:31

## 2021-11-20 RX ADMIN — INSULIN LISPRO 2 UNITS: 100 INJECTION, SOLUTION INTRAVENOUS; SUBCUTANEOUS at 16:17

## 2021-11-20 RX ADMIN — INSULIN LISPRO 2 UNITS: 100 INJECTION, SOLUTION INTRAVENOUS; SUBCUTANEOUS at 08:17

## 2021-11-20 RX ADMIN — BUSPIRONE HYDROCHLORIDE 15 MG: 5 TABLET ORAL at 08:18

## 2021-11-20 RX ADMIN — TOPIRAMATE 200 MG: 100 TABLET, FILM COATED ORAL at 00:47

## 2021-11-20 RX ADMIN — Medication 400 MG: at 13:42

## 2021-11-20 RX ADMIN — Medication 10 ML: at 05:07

## 2021-11-20 RX ADMIN — Medication 10 ML: at 13:43

## 2021-11-20 RX ADMIN — HYDROMORPHONE HYDROCHLORIDE 1 MG: 1 INJECTION, SOLUTION INTRAMUSCULAR; INTRAVENOUS; SUBCUTANEOUS at 20:12

## 2021-11-20 RX ADMIN — TOPIRAMATE 200 MG: 100 TABLET, FILM COATED ORAL at 21:32

## 2021-11-20 RX ADMIN — BUDESONIDE 250 MCG: 0.25 INHALANT RESPIRATORY (INHALATION) at 07:43

## 2021-11-20 RX ADMIN — ACETAMINOPHEN 650 MG: 325 TABLET ORAL at 16:11

## 2021-11-20 RX ADMIN — OXYCODONE AND ACETAMINOPHEN 1 TABLET: 5; 325 TABLET ORAL at 08:18

## 2021-11-20 RX ADMIN — ACETAMINOPHEN 650 MG: 325 TABLET ORAL at 21:33

## 2021-11-20 RX ADMIN — Medication 10 ML: at 00:47

## 2021-11-20 RX ADMIN — LEVOTHYROXINE SODIUM 112 MCG: 0.11 TABLET ORAL at 05:07

## 2021-11-20 RX ADMIN — BUPROPION HYDROCHLORIDE 300 MG: 150 TABLET, EXTENDED RELEASE ORAL at 00:47

## 2021-11-20 RX ADMIN — ESTRADIOL 0.5 MG: 1 TABLET ORAL at 08:19

## 2021-11-20 RX ADMIN — HYDROMORPHONE HYDROCHLORIDE 1 MG: 1 INJECTION, SOLUTION INTRAMUSCULAR; INTRAVENOUS; SUBCUTANEOUS at 10:46

## 2021-11-20 RX ADMIN — GABAPENTIN 100 MG: 100 CAPSULE ORAL at 08:19

## 2021-11-20 NOTE — PROGRESS NOTES
Hospitalist Progress Note    NAME: Pedro Luis Clemons   :  1960   MRN:  [id number]       Assessment / Plan:  Acute on chronic lower back pain POA- L sided per pt    CT Abdo/Pelv w/o contrast: IMPRESSION  1. Partial staghorn calculus in the lower pole of the right kidney. 2. Degenerative changes in the spine. Minimal anterior listhesis of L4 on L5  with disc osteophyte complex formation/degenerative disc disease at L5/S1. 3. There is fullness in the left side of the labia/left of the introitus. Recommend physical exam and possible referral to gynecology. 4. Incidental findings as above.     CT Abdo/Pelv w/ contrast: IMPRESSION  1. No significant change. IP Orthopedics Consulted- awaited input  Pt requires conscious sedation for MRI which will happen on Monday  Analgesics PRN  PT/OT evals- pending  WBC wnl, afebrile  UA negative  In regards to CT findings of left sided \"fullness\" - would give pt outpt GYN referral unless Orthopedic recommends inpatient eval as pain is on the same side of the Pelvic fullness noted on imaging      HTN  Seizure disorder  Hypothyroidism  DMII  Anxiety  Depression  Continue home meds     Code Status: Full  Surrogate Decision Maker: Hillary Tanner  DVT Prophylaxis: Lovenox  GI Prophylaxis: not indicated  Baseline: Independent      40 or above Morbid obesity / Body mass index is 72.46 kg/m². Weight loss recommended    Estimated discharge date:   Barriers:    Recommended Disposition:  PT, OT, RN versus SNF pending PT/OT eval pending MRI, ortho clearance     Subjective:     Chief Complaint / Reason for Physician Visit :F/U L sided back pain, L pelvic fullness  \"I am ok\". Discussed with RN events overnight. Review of Systems:  Symptom Y/N Comments  Symptom Y/N Comments   Fever/Chills n   Chest Pain n    Poor Appetite n   Edema n    Cough n   Abdominal Pain n    Sputum n   Joint Pain y L back pain   SOB/DIOP n   Pruritis/Rash     Nausea/vomit    Tolerating PT/OT ? no   Diarrhea    Tolerating Diet y    Constipation    Other       Could NOT obtain due to:      Objective:     VITALS:   Last 24hrs VS reviewed since prior progress note. Most recent are:  Patient Vitals for the past 24 hrs:   Temp Pulse Resp BP SpO2   11/20/21 1122 98.4 °F (36.9 °C) 87 20 (!) 106/56 95 %   11/20/21 0745     99 %   11/20/21 0743 97.4 °F (36.3 °C) 86 20 (!) 109/44 99 %   11/20/21 0428 98.3 °F (36.8 °C) 75 20 117/83 95 %   11/20/21 0051 98.5 °F (36.9 °C) 83 18 129/72 94 %   11/19/21 2131 98.3 °F (36.8 °C) 81 18 (!) 141/75 99 %   11/19/21 1658 99.2 °F (37.3 °C) 88 18 134/89 99 %   11/19/21 1310 99.1 °F (37.3 °C) 96 18 (!) 169/87 99 %       Intake/Output Summary (Last 24 hours) at 11/20/2021 1207  Last data filed at 11/20/2021 0848  Gross per 24 hour   Intake 550 ml   Output 950 ml   Net -400 ml        I had a face to face encounter and independently examined this patient on 11/20/2021, as outlined below:  PHYSICAL EXAM:  General: WD, WN. Alert, cooperative, no acute distress ,Obese +    EENT:  EOMI. Anicteric sclerae. MMM  Resp:  CTA bilaterally, no wheezing or rales. No accessory muscle use  CV:  Regular  rhythm,  No edema  GI:  Soft, Non distended, Non tender. +Bowel sounds  Neurologic:  Alert and oriented X 3, normal speech,   Psych:   Good insight. Not anxious nor agitated  Skin:  No rashes. No jaundice    Reviewed most current lab test results and cultures  YES  Reviewed most current radiology test results   YES  Review and summation of old records today    NO  Reviewed patient's current orders and MAR    YES  PMH/SH reviewed - no change compared to H&P  ________________________________________________________________________  Care Plan discussed with:    Comments   Patient x    Family      RN x    Care Manager     Consultant                        Multidiciplinary team rounds were held today with , nursing, pharmacist and clinical coordinator.   Patient's plan of care was discussed; medications were reviewed and discharge planning was addressed. ________________________________________________________________________  Total NON critical care TIME:  36   Minutes    Total CRITICAL CARE TIME Spent:   Minutes non procedure based      Comments   >50% of visit spent in counseling and coordination of care     ________________________________________________________________________  Thi Hawkins MD     Procedures: see electronic medical records for all procedures/Xrays and details which were not copied into this note but were reviewed prior to creation of Plan. LABS:  I reviewed today's most current labs and imaging studies.   Pertinent labs include:  Recent Labs     11/20/21  0945 11/19/21  1613   WBC 7.2 9.9   HGB 8.1* 8.4*   HCT 28.6* 31.2*    242     Recent Labs     11/20/21  0945 11/19/21  1613    140   K 3.6 3.9   * 110*   CO2 23 24   * 120*   BUN 13 15   CREA 0.85 0.84   CA 9.4 10.0   MG 1.5*  --    ALB 2.7* 3.0*   TBILI 0.2 0.2   ALT 18 18       Signed: Thi Hawkins MD

## 2021-11-20 NOTE — ED NOTES
2140 - Bedside shift change report given to Maryanne TONG (oncoming nurse) by Michael Andrew (offgoing nurse). Report included the following information SBAR, Kardex, ED Summary, Intake/Output and MAR.

## 2021-11-20 NOTE — PROGRESS NOTES
Received message from patient's nurse stating:    patient is complaining of pain level 10/10, only has 1 Percocet for pain which she states does not work. Can she please have back the dilaudid 1mg Q3? she states it helps the most.        Discussion / orders:    Dilaudid 1 mg IV every 3 hours as needed for severe pain         Please note that this note was dictated using Dragon computer voice recognition software. Quite often unanticipated grammatical, syntax, homophones, and other interpretive errors are inadvertently transcribed by the computer software. Please disregard these errors. Please excuse any errors that have escaped final proofreading.

## 2021-11-20 NOTE — CONSULTS
ORTHOPAEDIC CONSULT NOTE    Subjective:     Date of Consultation:  November 20, 2021      Mac Yee is a 64 y.o. female who is being seen for left leg weakness and chronic with acute flare of back pain. Pt reports she woke up Friday with pain in her back and weakness in her left leg. She states she had to present to the hospital due to difficulty weight bearing. She does state she was able to slightly weight bear which relieved some back pain. Workup has revealed degenerative changes throughout the spine. Minimal anterior listhesis  of L4 and L5 with degenerative disc disease L5/S1. Ambulates at baseline with walker and cane. Pt. Denies head trauma/LOC during injury. Denies loss of sensation or irregular bladder changes.     Patient Active Problem List    Diagnosis Date Noted    Intractable back pain 11/19/2021    COPD (chronic obstructive pulmonary disease) ([address] 08/22/2019    Fibromyalgia 06/13/2019    Bronchitis 06/13/2019    COPD exacerbation ([address] 06/11/2019    Chronic respiratory failure with hypoxia ([address] 06/11/2019    Obesity hypoventilation syndrome ([address] 06/11/2019    Ureteral stone with hydronephrosis 12/31/2018    Obesity, morbid ([address] 11/08/2018    Right anterior knee pain 11/22/2017    Cyst, baker's knee, right 11/22/2017    Panniculitis 11/21/2017    Normal coronary arteries 07/30/2014    Diabetes ([address]     Hypertension     SOB (shortness of breath) 07/16/2014    DIOP (dyspnea on exertion) 07/16/2014     Family History   Problem Relation Age of Onset    Heart Disease Mother     Hypertension Mother     Cancer Mother     Breast Cancer Mother     Heart Disease Father     Hypertension Father     Cancer Brother       Social History     Tobacco Use    Smoking status: Former Smoker    Smokeless tobacco: Never Used    Tobacco comment: quit smoking  about 34  years ago      Substance Use Topics    Alcohol use: No     Past Medical History:   Diagnosis Date    Adverse effect of anesthesia     slow to wake up       Anxiety and depression     Arrhythmia     Arthritis     Chronic pain     djd    [facility]     GERD (gastroesophageal reflux disease)     Hypertension     Hypothyroidism     Kidney stone     Liver disease     Obesity, morbid, BMI 50 or higher ([facility]     Seizures ([address]       Past Surgical History:   Procedure Laterality Date    CARDIAC CATHETERIZATION  11/18/2010         HX APPENDECTOMY      HX BREAST BIOPSY Right     about 15 years ago, neg    HX CHOLECYSTECTOMY      HX HYSTERECTOMY      HX TUBAL LIGATION        Prior to Admission medications    Medication Sig Start Date End Date Taking? Authorizing Provider   oxyCODONE-acetaminophen (PERCOCET) 5-325 mg per tablet Take 1 Tab by mouth two (2) times a day. Indications: pain    Other, MD Ashish   albuterol-ipratropium (DUO-NEB) 2.5 mg-0.5 mg/3 ml nebu 3 mL by Nebulization route every four (4) hours as needed (COPD). 8/22/19   Yang Jones NP   fluticasone-umeclidinium-vilanterol (TRELEGY ELLIPTA) 100-62.5-25 mcg inhaler Take 1 Puff by inhalation daily. Provider, Historical   metFORMIN (GLUCOPHAGE) 1,000 mg tablet Take 1,000 mg by mouth two (2) times a day. Provider, Historical   fluticasone propionate (FLONASE) 50 mcg/actuation nasal spray 2 Sprays by Both Nostrils route daily. Provider, Historical   liraglutide (VICTOZA) 0.6 mg/0.1 mL (18 mg/3 mL) pnij 1.8 mg by SubCUTAneous route daily. Provider, Historical   losartan (COZAAR) 25 mg tablet Take 25 mg by mouth daily. Provider, Historical   busPIRone (BUSPAR) 15 mg tablet Take 15 mg by mouth three (3) times daily. Provider, Historical   QUEtiapine (SEROQUEL) 400 mg tablet Take 600 mg by mouth nightly. Provider, Historical   nystatin, bulk, 15 billion unit powd 1 Units by Does Not Apply route three (3) times daily.  To skin folds, ventral fold 12/26/18   Reyna Black MD   albuterol (PROVENTIL HFA, VENTOLIN HFA, PROAIR HFA) 90 mcg/actuation inhaler Take 2 Puffs by inhalation every four (4) hours as needed for Wheezing. 12/26/18   Reyna Black MD   buPROPion XL (WELLBUTRIN XL) 150 mg tablet Take 300 mg by mouth nightly. Provider, Historical   prazosin (MINIPRESS) 1 mg capsule Take 4 mg by mouth nightly. Provider, Historical   ergocalciferol (VITAMIN D2) 50,000 unit capsule Take 50,000 Units by mouth every seven (7) days. Provider, Historical   estradiol (ESTRACE) 0.5 mg tablet Take 0.5 mg by mouth daily. Provider, Historical   miconazole (MICOTIN) 2 % topical cream Apply 1 Each to affected area two (2) times a day. Apply ointment to fungal infection under abdominal pannus and groin twice a day for 14 days. Provider, Historical   aspirin 81 mg chewable tablet Take 81 mg by mouth daily. Inderjit Looney NP   gabapentin (NEURONTIN) 100 mg capsule Take 1 Cap by mouth three (3) times daily. 11/24/17   Peggy Correa MD   levothyroxine (SYNTHROID) 112 mcg tablet Take 112 mcg by mouth Daily (before breakfast). Provider, Historical   metoprolol succinate (TOPROL XL) 25 mg XL tablet Take 25 mg by mouth daily. Provider, Historical   omeprazole (PRILOSEC) 20 mg capsule Take 40 mg by mouth daily. Provider, Historical   topiramate (TOPAMAX) 200 mg tablet Take 200 mg by mouth nightly. 12/8/10   Provider, Historical   simvastatin (ZOCOR) 20 mg tablet Take 20 mg by mouth nightly.     Other, MD Ashish     Current Facility-Administered Medications   Medication Dose Route Frequency    aspirin chewable tablet 81 mg  81 mg Oral DAILY    buPROPion XL (WELLBUTRIN XL) tablet 300 mg  300 mg Oral QHS    busPIRone (BUSPAR) tablet 15 mg  15 mg Oral TID    estradioL (ESTRACE) tablet 0.5 mg  0.5 mg Oral DAILY    gabapentin (NEURONTIN) capsule 100 mg  100 mg Oral TID    levothyroxine (SYNTHROID) tablet 112 mcg  112 mcg Oral ACB    losartan (COZAAR) tablet 25 mg  25 mg Oral DAILY    metoprolol succinate (TOPROL-XL) XL tablet 25 mg 25 mg Oral DAILY    oxyCODONE-acetaminophen (PERCOCET) 5-325 mg per tablet 1 Tablet  1 Tablet Oral BID    prazosin (MINIPRESS) capsule 4 mg  4 mg Oral QHS    atorvastatin (LIPITOR) tablet 10 mg  10 mg Oral QHS    topiramate (TOPAMAX) tablet 200 mg  200 mg Oral QHS    sodium chloride (NS) flush 5-40 mL  5-40 mL IntraVENous Q8H    sodium chloride (NS) flush 5-40 mL  5-40 mL IntraVENous PRN    acetaminophen (TYLENOL) tablet 650 mg  650 mg Oral Q6H PRN    Or    acetaminophen (TYLENOL) suppository 650 mg  650 mg Rectal Q6H PRN    polyethylene glycol (MIRALAX) packet 17 g  17 g Oral DAILY PRN    ondansetron (ZOFRAN ODT) tablet 4 mg  4 mg Oral Q8H PRN    Or    ondansetron (ZOFRAN) injection 4 mg  4 mg IntraVENous Q6H PRN    insulin lispro (HUMALOG) injection   SubCUTAneous AC&HS    glucose chewable tablet 16 g  4 Tablet Oral PRN    dextrose (D50W) injection syrg 12.5-25 g  12.5-25 g IntraVENous PRN    glucagon (GLUCAGEN) injection 1 mg  1 mg IntraMUSCular PRN    [START ON 11/21/2021] enoxaparin (LOVENOX) injection 40 mg  40 mg SubCUTAneous Q12H    HYDROmorphone (DILAUDID) injection 1 mg  1 mg IntraVENous Q3H PRN    QUEtiapine (SEROquel) tablet 600 mg  600 mg Oral QHS    ipratropium (ATROVENT) 0.02 % nebulizer solution 0.5 mg  0.5 mg Nebulization BID RT    And    arformoteroL (BROVANA) neb solution 15 mcg  15 mcg Nebulization BID RT    And    budesonide (PULMICORT) 250 mcg/2ml nebulizer susp  250 mcg Nebulization BID RT    magnesium oxide (MAG-OX) tablet 400 mg  400 mg Oral DAILY      Allergies   Allergen Reactions    Celebrex [Celecoxib] Nausea Only    Ibuprofen Other (comments)     Does not want r/t COPD history.      Methocarbamol Shortness of Breath    Morphine Nausea Only    Sulfa (Sulfonamide Antibiotics) Nausea Only    Tramadol Other (comments)     Does not want r/t COPD    Adhesive Other (comments)     Red and bumpy      Codeine Other (comments)     Hyper activity        Review of Systems:  A comprehensive review of systems was negative except for that written in the HPI. Mental Status: no dementia    Objective:     Patient Vitals for the past 8 hrs:   BP Temp Pulse Resp SpO2   21 1122 (!) 106/56 98.4 °F (36.9 °C) 87 20 95 %   21 0745     99 %   21 0743 (!) 109/44 97.4 °F (36.3 °C) 86 20 99 %     Temp (24hrs), Av.4 °F (36.9 °C), Min:97.4 °F (36.3 °C), Max:99.2 °F (37.3 °C)      Gen: Morbidly obese,  in no acute distress; resting comfortably in bed. HEENT: Pink conjunctivae, hearing intact to voice, moist mucous membranes   Neck: Supple  Resp: No respiratory distress   Card:  palpable distal pulse-equal bilaterally  Abd: non-distended  Musc: Palpation illicit minimal pain over the area of the SI joint bilateral and throughout the paraspinous musculature of the lumbar spine. Able to straight leg raise bilaterally with no pain. 4/5 strength BLE. Skin: No skin breakdown noted. Skin warm, pink, dry  Neuro: Cranial nerves are grossly intact, no focal motor weakness, follows commands appropriately   Psych: Good insight, oriented to person, place and time, alert    Imaging Review: EXAM:  CT ABDOMEN PELVIS WITHOUT CONTRAST  INDICATION:  Left back pain. Additional history:  COMPARISON: CT of the abdomen and pelvis, 2019. Graylon Danial TECHNIQUE:   Unenhanced multislice helical CT was performed from the diaphragm to the  symphysis pubis without intravenous contrast administration. Contiguous 5 mm  axial images were reconstructed and lung and soft tissue windows were generated. Coronal and sagittal reformations were generated. CT dose reduction was achieved through use of a standardized protocol tailored  for this examination and automatic exposure control for dose modulation. Graylon Danial FINDINGS:  INCIDENTALLY IMAGED CHEST:  Heart/vessels: Calcifications in the right coronary artery. Lungs/Pleura: Within normal limits. .  ABDOMEN:  Liver:  Within normal limits. Gallbladder/Biliary: Status post cholecystectomy. Spleen: Splenomegaly. Pancreas: Within normal limits. Adrenals: Within normal limits. Kidneys: Partial staghorn calculus in the lower pole of the right kidney. Peritoneum/Mesenteries: Within normal limits. Extraperitoneum: Within normal limits. Gastrointestinal tract: Within normal limits. Vascular: Within normal limits. Ruthie Calhoun PELVIS:  Extraperitoneum: Calculi in the floor the pelvis suggesting phleboliths. No  visible inguinal or pelvic adenopathy. Ureters: Within normal limits. Bladder: Within normal limits. Reproductive System: Status post hysterectomy. Fullness in the left side of the  labia/introitus measuring 4.9 x 3.5 cm. .  MSK:   Obesity. Degenerative changes throughout the spine. Minimal anterior listhesis  of L4 and L5 with degenerative disc disease L5/S1. Ruthie Calhoun IMPRESSION  1. Partial staghorn calculus in the lower pole of the right kidney. 2. Degenerative changes in the spine. Minimal anterior listhesis of L4 on L5  with disc osteophyte complex formation/degenerative disc disease at L5/S1. 3. There is fullness in the left side of the labia/left of the introitus. Recommend physical exam and possible referral to gynecology. 4. Incidental findings as above. Labs:   Recent Results (from the past 24 hour(s))   CBC WITH AUTOMATED DIFF    Collection Time: 11/19/21  4:13 PM   Result Value Ref Range    WBC 9.9 3.6 - 11.0 K/uL    RBC 4.45 3.80 - 5.20 M/uL    HGB 8.4 (L) 11.5 - 16.0 g/dL    HCT 31.2 (L) 35.0 - 47.0 %    MCV 70.1 (L) 80.0 - 99.0 FL    MCH 18.9 (L) 26.0 - 34.0 PG    MCHC 26.9 (L) 30.0 - 36.5 g/dL    RDW 20.7 (H) 11.5 - 14.5 %    PLATELET 838 944 - 742 K/uL    NRBC 0.0 0  WBC    ABSOLUTE NRBC 0.00 0.00 - 0.01 K/uL    NEUTROPHILS 77 (H) 32 - 75 %    LYMPHOCYTES 14 12 - 49 %    MONOCYTES 7 5 - 13 %    EOSINOPHILS 1 0 - 7 %    BASOPHILS 1 0 - 1 %    IMMATURE GRANULOCYTES 0 0.0 - 0.5 %    ABS.  NEUTROPHILS 7.6 1.8 - 8.0 K/UL ABS. LYMPHOCYTES 1.4 0.8 - 3.5 K/UL    ABS. MONOCYTES 0.7 0.0 - 1.0 K/UL    ABS. EOSINOPHILS 0.1 0.0 - 0.4 K/UL    ABS. BASOPHILS 0.1 0.0 - 0.1 K/UL    ABS. IMM. GRANS. 0.0 0.00 - 0.04 K/UL    DF SMEAR SCANNED      PLATELET COMMENTS Large Platelets      RBC COMMENTS ANISOCYTOSIS  2+        RBC COMMENTS MICROCYTOSIS  1+        RBC COMMENTS POIKILOCYTOSIS  PRESENT        RBC COMMENTS OVALOCYTES  PRESENT        RBC COMMENTS POLYCHROMASIA  PRESENT        RBC COMMENTS HYPOCHROMIA  2+        WBC COMMENTS ATYPICAL LYMPHOCYTES PRESENT     LIPASE    Collection Time: 11/19/21  4:13 PM   Result Value Ref Range    Lipase 115 73 - 727 U/L   METABOLIC PANEL, COMPREHENSIVE    Collection Time: 11/19/21  4:13 PM   Result Value Ref Range    Sodium 140 136 - 145 mmol/L    Potassium 3.9 3.5 - 5.1 mmol/L    Chloride 110 (H) 97 - 108 mmol/L    CO2 24 21 - 32 mmol/L    Anion gap 6 5 - 15 mmol/L    Glucose 120 (H) 65 - 100 mg/dL    BUN 15 6 - 20 MG/DL    Creatinine 0.84 0.55 - 1.02 MG/DL    BUN/Creatinine ratio 18 12 - 20      GFR est AA >60 >60 ml/min/1.73m2    GFR est non-AA >60 >60 ml/min/1.73m2    Calcium 10.0 8.5 - 10.1 MG/DL    Bilirubin, total 0.2 0.2 - 1.0 MG/DL    ALT (SGPT) 18 12 - 78 U/L    AST (SGOT) 10 (L) 15 - 37 U/L    Alk.  phosphatase 56 45 - 117 U/L    Protein, total 7.0 6.4 - 8.2 g/dL    Albumin 3.0 (L) 3.5 - 5.0 g/dL    Globulin 4.0 2.0 - 4.0 g/dL    A-G Ratio 0.8 (L) 1.1 - 2.2     URINALYSIS W/ REFLEX CULTURE    Collection Time: 11/19/21  6:34 PM    Specimen: Urine   Result Value Ref Range    Color YELLOW/STRAW      Appearance CLEAR CLEAR      Specific gravity 1.018 1.003 - 1.030      pH (UA) 6.5 5.0 - 8.0      Protein Negative NEG mg/dL    Glucose Negative NEG mg/dL    Ketone Negative NEG mg/dL    Bilirubin Negative NEG      Blood SMALL (A) NEG      Urobilinogen 0.2 0.2 - 1.0 EU/dL    Nitrites Negative NEG      Leukocyte Esterase SMALL (A) NEG      WBC 20-50 0 - 4 /hpf    RBC 20-50 0 - 5 /hpf    Epithelial cells FEW FEW /lpf    Bacteria Negative NEG /hpf    UA:UC IF INDICATED URINE CULTURE ORDERED (A) CNI      Hyaline cast 2-5 0 - 5 /lpf   GLUCOSE, POC    Collection Time: 11/20/21  7:42 AM   Result Value Ref Range    Glucose (POC) 141 (H) 65 - 117 mg/dL    Performed by Buddy Gill (PCT)    METABOLIC PANEL, COMPREHENSIVE    Collection Time: 11/20/21  9:45 AM   Result Value Ref Range    Sodium 139 136 - 145 mmol/L    Potassium 3.6 3.5 - 5.1 mmol/L    Chloride 109 (H) 97 - 108 mmol/L    CO2 23 21 - 32 mmol/L    Anion gap 7 5 - 15 mmol/L    Glucose 163 (H) 65 - 100 mg/dL    BUN 13 6 - 20 MG/DL    Creatinine 0.85 0.55 - 1.02 MG/DL    BUN/Creatinine ratio 15 12 - 20      GFR est AA >60 >60 ml/min/1.73m2    GFR est non-AA >60 >60 ml/min/1.73m2    Calcium 9.4 8.5 - 10.1 MG/DL    Bilirubin, total 0.2 0.2 - 1.0 MG/DL    ALT (SGPT) 18 12 - 78 U/L    AST (SGOT) 10 (L) 15 - 37 U/L    Alk. phosphatase 53 45 - 117 U/L    Protein, total 6.2 (L) 6.4 - 8.2 g/dL    Albumin 2.7 (L) 3.5 - 5.0 g/dL    Globulin 3.5 2.0 - 4.0 g/dL    A-G Ratio 0.8 (L) 1.1 - 2.2     MAGNESIUM    Collection Time: 11/20/21  9:45 AM   Result Value Ref Range    Magnesium 1.5 (L) 1.6 - 2.4 mg/dL   CBC WITH AUTOMATED DIFF    Collection Time: 11/20/21  9:45 AM   Result Value Ref Range    WBC 7.2 3.6 - 11.0 K/uL    RBC 4.20 3.80 - 5.20 M/uL    HGB 8.1 (L) 11.5 - 16.0 g/dL    HCT 28.6 (L) 35.0 - 47.0 %    MCV 68.1 (L) 80.0 - 99.0 FL    MCH 19.3 (L) 26.0 - 34.0 PG    MCHC 28.3 (L) 30.0 - 36.5 g/dL    RDW 20.4 (H) 11.5 - 14.5 %    PLATELET 615 014 - 548 K/uL    NRBC 0.0 0  WBC    ABSOLUTE NRBC 0.00 0.00 - 0.01 K/uL    NEUTROPHILS 70 32 - 75 %    LYMPHOCYTES 21 12 - 49 %    MONOCYTES 6 5 - 13 %    EOSINOPHILS 2 0 - 7 %    BASOPHILS 1 0 - 1 %    IMMATURE GRANULOCYTES 0 0.0 - 0.5 %    ABS. NEUTROPHILS 5.1 1.8 - 8.0 K/UL    ABS. LYMPHOCYTES 1.5 0.8 - 3.5 K/UL    ABS. MONOCYTES 0.4 0.0 - 1.0 K/UL    ABS. EOSINOPHILS 0.1 0.0 - 0.4 K/UL    ABS.  BASOPHILS 0.1 0.0 - 0.1 K/UL    ABS. IMM. GRANS. 0.0 0.00 - 0.04 K/UL    DF SMEAR SCANNED      RBC COMMENTS MICROCYTOSIS  2+        RBC COMMENTS HYPOCHROMIA  3+        RBC COMMENTS POLYCHROMASIA  1+        RBC COMMENTS OVALOCYTES  2+        RBC COMMENTS ANISOCYTOSIS  2+       GLUCOSE, POC    Collection Time: 11/20/21 11:25 AM   Result Value Ref Range    Glucose (POC) 138 (H) 65 - 117 mg/dL    Performed by Debora Esqueda (PCT)          Impression:     Patient Active Problem List    Diagnosis Date Noted    Intractable back pain 11/19/2021    COPD (chronic obstructive pulmonary disease) ([address] 08/22/2019    Fibromyalgia 06/13/2019    Bronchitis 06/13/2019    COPD exacerbation ([address] 06/11/2019    Chronic respiratory failure with hypoxia ([address] 06/11/2019    Obesity hypoventilation syndrome ([address] 06/11/2019    Ureteral stone with hydronephrosis 12/31/2018    Obesity, morbid ([address] 11/08/2018    Right anterior knee pain 11/22/2017    Cyst, baker's knee, right 11/22/2017    Panniculitis 11/21/2017    Normal coronary arteries 07/30/2014    Diabetes ([address]     Hypertension     SOB (shortness of breath) 07/16/2014    DIOP (dyspnea on exertion) 07/16/2014     Active Problems:    Intractable back pain (11/19/2021)        Plan:     Chronic back pain with acute flare  - CT imaging reviewed showing degenerative changes   - In pain management outpatient for back pain  -unable to ambulate due to weakness; Plan for MRI Monday once able to have sedation due to claustrophobia. - Patient is complaining of pain which could correlate to pain noted of her labia on CT; do not think this correlates to her back pain or leg weakness. Dr. Wyatt Nevarez aware and agrees with plan as above.         DELMAR Gonzalez  [employer]

## 2021-11-20 NOTE — PROGRESS NOTES
PT orders received and chart reviewed for evaluation. At time of attempt, nursing is about to assess/medicate patient. Will defer and try back later today, if time, or continue to follow as appropriate for PT Evaluation.

## 2021-11-20 NOTE — PROGRESS NOTES
Patient's MRI is ON HOLD until further notice. Pt. had previous MRI under conscious sedation in 9/2020 and will not do today's ordered MRI's without sedation. Dr. Natali Sanchez is aware.  prettyg

## 2021-11-20 NOTE — H&P
Hospitalist Admission Note    NAME: Ángel Benjamin   :  1960   MRN:  [id number]     Date/Time:  2021 11:04 PM    Patient PCP: Brandon Harrison, DO  ______________________________________________________________________  Given the patient's current clinical presentation, I have a high level of concern for decompensation if discharged from the emergency department. Complex decision making was performed, which includes reviewing the patient's available past medical records, laboratory results, and x-ray films. My assessment of this patient's clinical condition and my plan of care is as follows. Assessment / Plan:  Acute on chronic lower back pain  Admit to Orthopedics floor  Orthopedics Consult  Pt requires conscious sedation for MRI which will happen on Monday  Analgesics PRN  PT/OT evals  WBC wnl, afebrile  UA negative  Remove concepcion and bladder scan q8hrs. Replace concepcion if pt retaining > 350mL. Speak to MD first prior to replacing concepcion. In regards to CT findings of left sided \"fullness\" - would give pt outpt GYN referrall for further eval    CT Abdo/Pelv w/o contrast: IMPRESSION  1. Partial staghorn calculus in the lower pole of the right kidney. 2. Degenerative changes in the spine. Minimal anterior listhesis of L4 on L5  with disc osteophyte complex formation/degenerative disc disease at L5/S1. 3. There is fullness in the left side of the labia/left of the introitus. Recommend physical exam and possible referral to gynecology. 4. Incidental findings as above. CT Abdo/Pelv w/ contrast: IMPRESSION  1. No significant change. HTN  Seizure disorder  Hypothyroidism  DMII  Anxiety  Depression  Continue home meds    Code Status: Full  Surrogate Decision Maker: Sheryle Danas  DVT Prophylaxis: Lovenox  GI Prophylaxis: not indicated  Baseline:  Independent      Subjective:   CHIEF COMPLAINT: back pain, left leg pain    HISTORY OF PRESENT ILLNESS:     Kiel Celestin is a 64 y.o.   female who presents with CC listed above. Pt states that she awoke this morning at approximately 5am with lower back pain that radiated down her left leg. She also endorsed having difficulty urinating. She denies any lower extremity numbness. She states that she can only stand for a few seconds and then has to sit back down due to \"feeling weak. \" She denies any loss of sensation at her buttocks, perineum or inner thighs. We were asked to admit for work up and evaluation of the above problems. Past Medical History:   Diagnosis Date    Adverse effect of anesthesia     slow to wake up       Anxiety and depression     Arrhythmia     Arthritis     Chronic pain     djd    Diabetes ([address]     GERD (gastroesophageal reflux disease)     Hypertension     Hypothyroidism     Kidney stone     Liver disease     Obesity, morbid, BMI 50 or higher ([address]     Seizures (HCC)         Past Surgical History:   Procedure Laterality Date    CARDIAC CATHETERIZATION  11/18/2010         HX APPENDECTOMY      HX BREAST BIOPSY Right     about 15 years ago, neg    HX CHOLECYSTECTOMY      HX HYSTERECTOMY      HX TUBAL LIGATION         Social History     Tobacco Use    Smoking status: Former Smoker    Smokeless tobacco: Never Used    Tobacco comment: quit smoking  about 34  years ago      Substance Use Topics    Alcohol use: No        Family History   Problem Relation Age of Onset    Heart Disease Mother     Hypertension Mother     Cancer Mother     Breast Cancer Mother     Heart Disease Father     Hypertension Father     Cancer Brother      Allergies   Allergen Reactions    Celebrex [Celecoxib] Nausea Only    Ibuprofen Other (comments)     Does not want r/t COPD history.      Methocarbamol Shortness of Breath    Morphine Nausea Only    Sulfa (Sulfonamide Antibiotics) Nausea Only    Tramadol Other (comments)     Does not want r/t COPD    Adhesive Other (comments)     Red and bumpy      Codeine Other (comments)     Hyper activity        Prior to Admission medications    Medication Sig Start Date End Date Taking? Authorizing Provider   oxyCODONE-acetaminophen (PERCOCET) 5-325 mg per tablet Take 1 Tab by mouth two (2) times a day. Indications: pain    Other, MD Ashish   albuterol-ipratropium (DUO-NEB) 2.5 mg-0.5 mg/3 ml nebu 3 mL by Nebulization route every four (4) hours as needed (COPD). 8/22/19   Hank York NP   fluticasone-umeclidinium-vilanterol (TRELEGY ELLIPTA) 100-62.5-25 mcg inhaler Take 1 Puff by inhalation daily. Provider, Historical   metFORMIN (GLUCOPHAGE) 1,000 mg tablet Take 1,000 mg by mouth two (2) times a day. Provider, Historical   fluticasone propionate (FLONASE) 50 mcg/actuation nasal spray 2 Sprays by Both Nostrils route daily. Provider, Historical   liraglutide (VICTOZA) 0.6 mg/0.1 mL (18 mg/3 mL) pnij 1.8 mg by SubCUTAneous route daily. Provider, Historical   losartan (COZAAR) 25 mg tablet Take 25 mg by mouth daily. Provider, Historical   busPIRone (BUSPAR) 15 mg tablet Take 15 mg by mouth three (3) times daily. Provider, Historical   QUEtiapine (SEROQUEL) 400 mg tablet Take 600 mg by mouth nightly. Provider, Historical   nystatin, bulk, 15 billion unit powd 1 Units by Does Not Apply route three (3) times daily. To skin folds, ventral fold 12/26/18   Reyna Black MD   albuterol (PROVENTIL HFA, VENTOLIN HFA, PROAIR HFA) 90 mcg/actuation inhaler Take 2 Puffs by inhalation every four (4) hours as needed for Wheezing. 12/26/18   Reyna Black MD   buPROPion XL (WELLBUTRIN XL) 150 mg tablet Take 300 mg by mouth nightly. Provider, Historical   prazosin (MINIPRESS) 1 mg capsule Take 4 mg by mouth nightly. Provider, Historical   ergocalciferol (VITAMIN D2) 50,000 unit capsule Take 50,000 Units by mouth every seven (7) days. Provider, Historical   estradiol (ESTRACE) 0.5 mg tablet Take 0.5 mg by mouth daily.     Provider, Historical   miconazole (MICOTIN) 2 % topical cream Apply 1 Each to affected area two (2) times a day. Apply ointment to fungal infection under abdominal pannus and groin twice a day for 14 days. Provider, Historical   aspirin 81 mg chewable tablet Take 81 mg by mouth daily. Mark Rachel NP   gabapentin (NEURONTIN) 100 mg capsule Take 1 Cap by mouth three (3) times daily. 11/24/17   Janak Fofana MD   levothyroxine (SYNTHROID) 112 mcg tablet Take 112 mcg by mouth Daily (before breakfast). Provider, Historical   metoprolol succinate (TOPROL XL) 25 mg XL tablet Take 25 mg by mouth daily. Provider, Historical   omeprazole (PRILOSEC) 20 mg capsule Take 40 mg by mouth daily. Provider, Historical   topiramate (TOPAMAX) 200 mg tablet Take 200 mg by mouth nightly. 12/8/10   Provider, Historical   simvastatin (ZOCOR) 20 mg tablet Take 20 mg by mouth nightly. Other, MD Ashish       REVIEW OF SYSTEMS:     I am not able to complete the review of systems because:    The patient is intubated and sedated    The patient has altered mental status due to his acute medical problems    The patient has baseline aphasia from prior stroke(s)    The patient has baseline dementia and is not reliable historian    The patient is in acute medical distress and unable to provide information           Total of 12 systems reviewed as follows:       POSITIVE= underlined text  Negative = text not underlined  General:  fever, chills, sweats, generalized weakness, weight loss/gain,      loss of appetite   Eyes:    blurred vision, eye pain, loss of vision, double vision  ENT:    rhinorrhea, pharyngitis   Respiratory:   cough, sputum production, SOB, DIOP, wheezing, pleuritic pain   Cardiology:   chest pain, palpitations, orthopnea, PND, edema, syncope   Gastrointestinal:  abdominal pain , N/V, diarrhea, dysphagia, constipation, bleeding   Genitourinary:  frequency, urgency, dysuria, hematuria, incontinence   Muskuloskeletal :  arthralgia, myalgia, back pain  Hematology:  easy bruising, nose or gum bleeding, lymphadenopathy   Dermatological: rash, ulceration, pruritis, color change / jaundice  Endocrine:   hot flashes or polydipsia   Neurological:  headache, dizziness, confusion, focal weakness, paresthesia,     Speech difficulties, memory loss, gait difficulty  Psychological: Feelings of anxiety, depression, agitation    Objective:   VITALS:    Visit Vitals  BP (!) 141/75 (BP 1 Location: Right arm, BP Patient Position: At rest)   Pulse 81   Temp 98.3 °F (36.8 °C)   Resp 18   SpO2 99%       PHYSICAL EXAM:    General:    Alert, cooperative, no distress, appears stated age. HEENT: Atraumatic, anicteric sclerae, pink conjunctivae  Neck:  Supple, symmetrical  Lungs:   Clear to auscultation bilaterally. No Wheezing or Rhonchi. No rales. Chest wall:  No tenderness  No Accessory muscle use. Heart:   Regular  rhythm,  No  murmur   No edema  Abdomen:   Soft, non-tender. Not distended. Bowel sounds normal  Extremities: No cyanosis. No clubbing,      Skin turgor normal, Capillary refill normal, Radial dial pulse 2+  Skin:     Not pale. Not Jaundiced  No rashes   Psych:  Good insight. Not depressed. Not anxious or agitated. Neurologic: EOMs intact. No facial asymmetry. No aphasia or slurred speech. Symmetrical strength, Sensation grossly intact. Alert and oriented X 4.    Back:  Left lower back TTP, back pain upon lifting left leg    _______________________________________________________________________  Care Plan discussed with:    Comments   Patient x    Family      RN x    Care Manager                    Consultant:      _______________________________________________________________________  Expected  Disposition:   Home with Family    HH/PT/OT/RN x   SNF/LTC    S[facility]    ________________________________________________________________________  TOTAL TIME:  54 Minutes    Critical Care Provided     Minutes non procedure based      Comments     Reviewed previous records   >50% of visit spent in counseling and coordination of care  Discussion with patient and/or family and questions answered       ________________________________________________________________________  Signed: Joana Butler MD    Procedures: see electronic medical records for all procedures/Xrays and details which were not copied into this note but were reviewed prior to creation of Plan. LAB DATA REVIEWED:    Recent Results (from the past 24 hour(s))   CBC WITH AUTOMATED DIFF    Collection Time: 11/19/21  4:13 PM   Result Value Ref Range    WBC 9.9 3.6 - 11.0 K/uL    RBC 4.45 3.80 - 5.20 M/uL    HGB 8.4 (L) 11.5 - 16.0 g/dL    HCT 31.2 (L) 35.0 - 47.0 %    MCV 70.1 (L) 80.0 - 99.0 FL    MCH 18.9 (L) 26.0 - 34.0 PG    MCHC 26.9 (L) 30.0 - 36.5 g/dL    RDW 20.7 (H) 11.5 - 14.5 %    PLATELET 695 807 - 843 K/uL    NRBC 0.0 0  WBC    ABSOLUTE NRBC 0.00 0.00 - 0.01 K/uL    NEUTROPHILS 77 (H) 32 - 75 %    LYMPHOCYTES 14 12 - 49 %    MONOCYTES 7 5 - 13 %    EOSINOPHILS 1 0 - 7 %    BASOPHILS 1 0 - 1 %    IMMATURE GRANULOCYTES 0 0.0 - 0.5 %    ABS. NEUTROPHILS 7.6 1.8 - 8.0 K/UL    ABS. LYMPHOCYTES 1.4 0.8 - 3.5 K/UL    ABS. MONOCYTES 0.7 0.0 - 1.0 K/UL    ABS. EOSINOPHILS 0.1 0.0 - 0.4 K/UL    ABS. BASOPHILS 0.1 0.0 - 0.1 K/UL    ABS. IMM.  GRANS. 0.0 0.00 - 0.04 K/UL    DF SMEAR SCANNED      PLATELET COMMENTS Large Platelets      RBC COMMENTS ANISOCYTOSIS  2+        RBC COMMENTS MICROCYTOSIS  1+        RBC COMMENTS POIKILOCYTOSIS  PRESENT        RBC COMMENTS OVALOCYTES  PRESENT        RBC COMMENTS POLYCHROMASIA  PRESENT        RBC COMMENTS HYPOCHROMIA  2+        WBC COMMENTS ATYPICAL LYMPHOCYTES PRESENT     LIPASE    Collection Time: 11/19/21  4:13 PM   Result Value Ref Range    Lipase 115 73 - 867 U/L   METABOLIC PANEL, COMPREHENSIVE    Collection Time: 11/19/21  4:13 PM   Result Value Ref Range    Sodium 140 136 - 145 mmol/L    Potassium 3.9 3.5 - 5.1 mmol/L    Chloride 110 (H) 97 - 108 mmol/L    CO2 24 21 - 32 mmol/L    Anion gap 6 5 - 15 mmol/L    Glucose 120 (H) 65 - 100 mg/dL    BUN 15 6 - 20 MG/DL    Creatinine 0.84 0.55 - 1.02 MG/DL    BUN/Creatinine ratio 18 12 - 20      GFR est AA >60 >60 ml/min/1.73m2    GFR est non-AA >60 >60 ml/min/1.73m2    Calcium 10.0 8.5 - 10.1 MG/DL    Bilirubin, total 0.2 0.2 - 1.0 MG/DL    ALT (SGPT) 18 12 - 78 U/L    AST (SGOT) 10 (L) 15 - 37 U/L    Alk.  phosphatase 56 45 - 117 U/L    Protein, total 7.0 6.4 - 8.2 g/dL    Albumin 3.0 (L) 3.5 - 5.0 g/dL    Globulin 4.0 2.0 - 4.0 g/dL    A-G Ratio 0.8 (L) 1.1 - 2.2     URINALYSIS W/ REFLEX CULTURE    Collection Time: 11/19/21  6:34 PM    Specimen: Urine   Result Value Ref Range    Color YELLOW/STRAW      Appearance CLEAR CLEAR      Specific gravity 1.018 1.003 - 1.030      pH (UA) 6.5 5.0 - 8.0      Protein Negative NEG mg/dL    Glucose Negative NEG mg/dL    Ketone Negative NEG mg/dL    Bilirubin Negative NEG      Blood SMALL (A) NEG      Urobilinogen 0.2 0.2 - 1.0 EU/dL    Nitrites Negative NEG      Leukocyte Esterase SMALL (A) NEG      WBC 20-50 0 - 4 /hpf    RBC 20-50 0 - 5 /hpf    Epithelial cells FEW FEW /lpf    Bacteria Negative NEG /hpf    UA:UC IF INDICATED URINE CULTURE ORDERED (A) CNI      Hyaline cast 2-5 0 - 5 /lpf

## 2021-11-20 NOTE — PROGRESS NOTES
Occupational Therapy    Chart review, spoke to PT and RN, unable to see for OT evaluation, patient continues with severe back pain, noted with new urinary incontinence and pending MRI Monday due to needed sedation, will hold as patient is not appropriate, will follow up tomorrow and Monday pending imaging. [facility].  MS Parmjit OTR/L

## 2021-11-20 NOTE — PROGRESS NOTES
ADULT PROTOCOL: JET AEROSOL ASSESSMENT    Patient  Harmony Velasquez     64 y.o.   female     11/20/2021  10:04 AM    Breath Sounds Pre Procedure: Right Breath Sounds: Diminished                               Left Breath Sounds: Diminished    Breath Sounds Post Procedure: Right Breath Sounds: Diminished                                 Left Breath Sounds: Diminished    Breathing pattern: Pre procedure Breathing Pattern: Regular          Post procedure Breathing Pattern: Regular    Heart Rate: Pre procedure             Post procedure      Resp Rate: Pre procedure Respirations: 18           Post procedure Respirations: 18      Oxygen: Room air  Changed: NO    SpO2: Pre procedure SpO2: 99 %               Post procedure SpO2: 99 %      Nebulizer Therapy: Current medications Aerosolized Medications: Brovana, Ipratropium bromide, Pulmicort      Changed: NO        Problem List:   Patient Active Problem List   Diagnosis Code    SOB (shortness of breath) R06.02    DIOP (dyspnea on exertion) R06.00    Diabetes ([facility]) E11.9    Hypertension I10    Normal coronary arteries [id number]    Panniculitis M79.3    Right anterior knee pain M25.561    Cyst, baker's knee, right M71.21    Obesity, morbid ([facility]) E66.01    Ureteral stone with hydronephrosis N13.2    COPD exacerbation ([facility]) J44.1    Chronic respiratory failure with hypoxia ([facility]) J96.11    Obesity hypoventilation syndrome ([facility]) E66.2    Fibromyalgia M79.7    Bronchitis J40    COPD (chronic obstructive pulmonary disease) ([facility]) J44.9    Intractable back pain M54.9       Respiratory Therapist: Anthony Ramos RT

## 2021-11-21 LAB
GLUCOSE BLD STRIP.AUTO-MCNC: 118 MG/DL (ref 65–117)
GLUCOSE BLD STRIP.AUTO-MCNC: 129 MG/DL (ref 65–117)
GLUCOSE BLD STRIP.AUTO-MCNC: 137 MG/DL (ref 65–117)
GLUCOSE BLD STRIP.AUTO-MCNC: 154 MG/DL (ref 65–117)
SERVICE CMNT-IMP: ABNORMAL

## 2021-11-21 PROCEDURE — 97165 OT EVAL LOW COMPLEX 30 MIN: CPT | Performed by: OCCUPATIONAL THERAPIST

## 2021-11-21 PROCEDURE — 82962 GLUCOSE BLOOD TEST: CPT

## 2021-11-21 PROCEDURE — 97530 THERAPEUTIC ACTIVITIES: CPT | Performed by: OCCUPATIONAL THERAPIST

## 2021-11-21 PROCEDURE — 74011250637 HC RX REV CODE- 250/637: Performed by: GENERAL ACUTE CARE HOSPITAL

## 2021-11-21 PROCEDURE — 74011250636 HC RX REV CODE- 250/636: Performed by: NURSE PRACTITIONER

## 2021-11-21 PROCEDURE — 97530 THERAPEUTIC ACTIVITIES: CPT

## 2021-11-21 PROCEDURE — 74011250637 HC RX REV CODE- 250/637: Performed by: INTERNAL MEDICINE

## 2021-11-21 PROCEDURE — 74011000250 HC RX REV CODE- 250: Performed by: INTERNAL MEDICINE

## 2021-11-21 PROCEDURE — 94760 N-INVAS EAR/PLS OXIMETRY 1: CPT

## 2021-11-21 PROCEDURE — 74011250636 HC RX REV CODE- 250/636: Performed by: GENERAL ACUTE CARE HOSPITAL

## 2021-11-21 PROCEDURE — 97161 PT EVAL LOW COMPLEX 20 MIN: CPT

## 2021-11-21 PROCEDURE — 65270000029 HC RM PRIVATE

## 2021-11-21 PROCEDURE — 77030005518 HC CATH URETH FOL 2W BARD -B

## 2021-11-21 PROCEDURE — 51798 US URINE CAPACITY MEASURE: CPT

## 2021-11-21 PROCEDURE — 94640 AIRWAY INHALATION TREATMENT: CPT

## 2021-11-21 RX ORDER — OXYCODONE AND ACETAMINOPHEN 5; 325 MG/1; MG/1
1 TABLET ORAL
Status: DISCONTINUED | OUTPATIENT
Start: 2021-11-21 | End: 2021-11-27

## 2021-11-21 RX ADMIN — HYDROMORPHONE HYDROCHLORIDE 1 MG: 1 INJECTION, SOLUTION INTRAMUSCULAR; INTRAVENOUS; SUBCUTANEOUS at 02:28

## 2021-11-21 RX ADMIN — TOPIRAMATE 200 MG: 100 TABLET, FILM COATED ORAL at 21:36

## 2021-11-21 RX ADMIN — HYDROMORPHONE HYDROCHLORIDE 1 MG: 1 INJECTION, SOLUTION INTRAMUSCULAR; INTRAVENOUS; SUBCUTANEOUS at 09:35

## 2021-11-21 RX ADMIN — IPRATROPIUM BROMIDE 0.5 MG: 0.5 SOLUTION RESPIRATORY (INHALATION) at 07:44

## 2021-11-21 RX ADMIN — OXYCODONE AND ACETAMINOPHEN 1 TABLET: 5; 325 TABLET ORAL at 15:04

## 2021-11-21 RX ADMIN — OXYCODONE AND ACETAMINOPHEN 1 TABLET: 5; 325 TABLET ORAL at 21:45

## 2021-11-21 RX ADMIN — Medication 10 ML: at 05:29

## 2021-11-21 RX ADMIN — OXYCODONE AND ACETAMINOPHEN 1 TABLET: 5; 325 TABLET ORAL at 08:11

## 2021-11-21 RX ADMIN — ARFORMOTEROL TARTRATE 15 MCG: 15 SOLUTION RESPIRATORY (INHALATION) at 07:44

## 2021-11-21 RX ADMIN — HYDROMORPHONE HYDROCHLORIDE 1 MG: 1 INJECTION, SOLUTION INTRAMUSCULAR; INTRAVENOUS; SUBCUTANEOUS at 05:28

## 2021-11-21 RX ADMIN — Medication 400 MG: at 08:11

## 2021-11-21 RX ADMIN — BUPROPION HYDROCHLORIDE 300 MG: 150 TABLET, EXTENDED RELEASE ORAL at 21:36

## 2021-11-21 RX ADMIN — Medication 10 ML: at 21:45

## 2021-11-21 RX ADMIN — ATORVASTATIN CALCIUM 10 MG: 10 TABLET, FILM COATED ORAL at 21:37

## 2021-11-21 RX ADMIN — HYDROMORPHONE HYDROCHLORIDE 1 MG: 1 INJECTION, SOLUTION INTRAMUSCULAR; INTRAVENOUS; SUBCUTANEOUS at 12:36

## 2021-11-21 RX ADMIN — ESTRADIOL 0.5 MG: 1 TABLET ORAL at 08:11

## 2021-11-21 RX ADMIN — BUDESONIDE 250 MCG: 0.25 INHALANT RESPIRATORY (INHALATION) at 20:49

## 2021-11-21 RX ADMIN — IPRATROPIUM BROMIDE 0.5 MG: 0.5 SOLUTION RESPIRATORY (INHALATION) at 20:49

## 2021-11-21 RX ADMIN — ONDANSETRON 4 MG: 4 TABLET, ORALLY DISINTEGRATING ORAL at 19:32

## 2021-11-21 RX ADMIN — BUSPIRONE HYDROCHLORIDE 15 MG: 5 TABLET ORAL at 15:08

## 2021-11-21 RX ADMIN — BUSPIRONE HYDROCHLORIDE 15 MG: 5 TABLET ORAL at 21:36

## 2021-11-21 RX ADMIN — HYDROMORPHONE HYDROCHLORIDE 1 MG: 1 INJECTION, SOLUTION INTRAMUSCULAR; INTRAVENOUS; SUBCUTANEOUS at 16:57

## 2021-11-21 RX ADMIN — PRAZOSIN HYDROCHLORIDE 4 MG: 1 CAPSULE ORAL at 21:45

## 2021-11-21 RX ADMIN — BUDESONIDE 250 MCG: 0.25 INHALANT RESPIRATORY (INHALATION) at 07:47

## 2021-11-21 RX ADMIN — GABAPENTIN 100 MG: 100 CAPSULE ORAL at 08:11

## 2021-11-21 RX ADMIN — Medication 10 ML: at 15:05

## 2021-11-21 RX ADMIN — LEVOTHYROXINE SODIUM 112 MCG: 0.11 TABLET ORAL at 05:28

## 2021-11-21 RX ADMIN — GABAPENTIN 100 MG: 100 CAPSULE ORAL at 21:37

## 2021-11-21 RX ADMIN — ARFORMOTEROL TARTRATE 15 MCG: 15 SOLUTION RESPIRATORY (INHALATION) at 20:49

## 2021-11-21 RX ADMIN — ASPIRIN 81 MG CHEWABLE TABLET 81 MG: 81 TABLET CHEWABLE at 08:12

## 2021-11-21 RX ADMIN — ENOXAPARIN SODIUM 40 MG: 100 INJECTION SUBCUTANEOUS at 08:10

## 2021-11-21 RX ADMIN — QUETIAPINE FUMARATE 600 MG: 100 TABLET ORAL at 21:36

## 2021-11-21 RX ADMIN — BUSPIRONE HYDROCHLORIDE 15 MG: 5 TABLET ORAL at 08:12

## 2021-11-21 RX ADMIN — ENOXAPARIN SODIUM 40 MG: 100 INJECTION SUBCUTANEOUS at 21:37

## 2021-11-21 RX ADMIN — METOPROLOL SUCCINATE 25 MG: 25 TABLET, EXTENDED RELEASE ORAL at 08:11

## 2021-11-21 RX ADMIN — HYDROMORPHONE HYDROCHLORIDE 1 MG: 1 INJECTION, SOLUTION INTRAMUSCULAR; INTRAVENOUS; SUBCUTANEOUS at 23:12

## 2021-11-21 RX ADMIN — GABAPENTIN 100 MG: 100 CAPSULE ORAL at 15:08

## 2021-11-21 RX ADMIN — HYDROMORPHONE HYDROCHLORIDE 1 MG: 1 INJECTION, SOLUTION INTRAMUSCULAR; INTRAVENOUS; SUBCUTANEOUS at 19:32

## 2021-11-21 NOTE — ROUTINE PROCESS
End of Shift Note    Bedside shift change report given to Julián Cervantes (oncoming nurse) by Martina Juarez RN (offgoing nurse). Report included the following information SBAR, Kardex, Intake/Output, MAR and Accordion    Shift worked:  7-7     Shift summary and any significant changes:          Concerns for physician to address:       Zone phone for oncoming shift:   [phone]       Activity:  Activity Level: Up with Assistance  Number times ambulated in hallways past shift: 0  Number of times OOB to chair past shift: 0    Cardiac:   Cardiac Monitoring: No           Access:   Current line(s): PIV     Genitourinary:   Urinary status: concepcion    Respiratory:   O2 Device: None (Room air)  Chronic home O2 use?: NO  Incentive spirometer at bedside: NO     GI:  Last Bowel Movement Date: 11/19/21  Current diet:  ADULT DIET Easy to Chew; 4 carb choices (60 gm/meal)  DIET NPO  Passing flatus: YES  Tolerating current diet: YES       Pain Management:   Patient states pain is manageable on current regimen: YES    Skin:  Marin Score: 17  Interventions: increase time out of bed, foam dressing and PT/OT consult    Patient Safety:  Fall Score:  Total Score: 3  Interventions: assistive device (walker, cane, etc), gripper socks and pt to call before getting OOB  High Fall Risk: Yes    Length of Stay:  Expected LOS: - - -  Actual LOS: 2      Martina Juarez RN

## 2021-11-21 NOTE — PROGRESS NOTES
ADULT PROTOCOL: JET AEROSOL  REASSESSMENT    Patient  Dayan Peña     64 y.o.   female     11/21/2021  10:51 AM    Breath Sounds Pre Procedure: Right Breath Sounds: Diminished                               Left Breath Sounds: Diminished    Breath Sounds Post Procedure: Right Breath Sounds: Diminished                                 Left Breath Sounds: Diminished    Breathing pattern: Pre procedure Breathing Pattern: Regular          Post procedure Breathing Pattern: Regular    Heart Rate: Pre procedure Pulse: 88           Post procedure Pulse: 88    Resp Rate: Pre procedure Respirations: 18           Post procedure Respirations: 18    Oxygen: Room Air     Changed:NO    SpO2: Pre procedure SpO2: 93 %                 Post procedure SpO2: 96 %      Nebulizer Therapy: Current medications Aerosolized Medications: Brovana, Ipratropium bromide, Pulmicort      Changed: NO          Problem List:   Patient Active Problem List   Diagnosis Code    SOB (shortness of breath) R06.02    DIOP (dyspnea on exertion) R06.00    Diabetes ([facility]) E11.9    Hypertension I10    Normal coronary arteries [id number]    Panniculitis M79.3    Right anterior knee pain M25.561    Cyst, baker's knee, right M71.21    Obesity, morbid ([facility]) E66.01    Ureteral stone with hydronephrosis N13.2    COPD exacerbation ([facility]) J44.1    Chronic respiratory failure with hypoxia ([facility]) J96.11    Obesity hypoventilation syndrome ([facility]) E66.2    Fibromyalgia M79.7    Bronchitis J40    COPD (chronic obstructive pulmonary disease) ([facility]) J44.9    Intractable back pain M54.9       Respiratory Therapist: Roland Topete RT

## 2021-11-21 NOTE — PROGRESS NOTES
Transition of Care Plan:    RUR: 14%  Disposition: Pending PT/OT evaluations  Follow up appointments: PCP  DME needed: TBD - has shower chair, RW, cane at home  Transportation at Discharge: Laura Camilo ([phone])  Vena Oats or means to access home: Yes     IM Medicare Letter: N/A  Is patient a BCPI-A Bundle: N/A          If yes, was Bundle Letter given?: N/A   Caregiver Contact: Laura Camilo ([phone])                                     Amrit Barrera, [address] [place] [address] ([phone])  Discharge Caregiver contacted prior to discharge? Pt to contact            Reason for Admission:  Intractable Back Pain                   RUR Score: 14%                    Plan for utilizing home health: Pending PT/OT evaluations        PCP: First and Last name:  Jeremy Dumont DO     Name of Practice:    Are you a current patient: Yes/No: Yes   Approximate date of last visit: 6 months ago   Can you participate in a virtual visit with your PCP: NO                    Current Advanced Directive/Advance Care Plan: Full Code - No ACP on file at this time. Spouse is legal NOK. CM updated pt's chart to add spouse's contact information as only listed is daughter. Declined to complete ACP at this time. Healthcare Decision Maker: Laura Camilo ([phone])  Click here to complete [address] including selection of the Healthcare Decision Maker Relationship (ie \"Primary\")                        Transition of Care Plan:       - Pending PT/OT recommendations  - Follow-up Care Appointments  - Spouse or daughter to transport home    63 YO White Female admitted on 11/19/21 for Intractable Back Pain. Lives in [place] ([address] w/ bilateral handrails) with spouse and mother. Spouse works full-time. Hx of [facility]), SNF ([facility]). Has shower chair, RW, and cane at home. Reports at baseline, she is independent with ADLs/IADLs to include driving. Preferred Rx is [facility] ([address]).  Has [employer]. Demographic info verified, assessment completed at bedside. PT/OT consulted, but pt reported they had not been by yet today as she deferred them due to pain. MRI is scheduled for tomorrow. If pt needs any time of rehab, will require insurance authorization. CM will continue to remain available to assist with d/c planning              Care Management Interventions  PCP Verified by CM: Yes  Palliative Care Criteria Met (RRAT>21 & CHF Dx)?: No  Mode of Transport at Discharge:  Other (see comment) (spouse)  Transition of Care Consult (CM Consult): Discharge Planning  Discharge Durable Medical Equipment: No (shower chair, RW, and cane at home)  Health Maintenance Reviewed: Yes  Physical Therapy Consult: Yes  Occupational Therapy Consult: Yes  Speech Therapy Consult: No  Support Systems: Spouse/Significant Other, Parent(s)  Confirm Follow Up Transport: Self  The Plan for Transition of Care is Related to the Following Treatment Goals : Pending PT/OT recommendations  The Patient and/or Patient Representative was Provided with a Choice of Provider and Agrees with the Discharge Plan?: Yes  Name of the Patient Representative Who was Provided with a Choice of Provider and Agrees with the Discharge Plan: Eddie Garcia (pt)  Discharge Location  Discharge Placement: Unable to determine at this time    Eben Bryantawyg [address] Manager  [phone]

## 2021-11-21 NOTE — PROGRESS NOTES
Problem: Self Care Deficits Care Plan (Adult)  Goal: *Acute Goals and Plan of Care (Insert Text)  Description: FUNCTIONAL STATUS PRIOR TO ADMISSION: Patient was modified independent using a rolling walker for functional mobility. Sits and sponge bathes in a chair next to her bed and then stands in the shower to rinse off, only wears slip on shoes and family assists with socks if pt wears them, performed all other ADLS on her own, family performs [address]: The patient lived with  and mother whom assist as needed. Occupational Therapy Goals:  Initiated 11/21/2021  1. Patient will perform grooming seated with modified independence within 7 days. 2. Patient will perform lower body dressing with excluding socks modified independence within 7 days. 3. Patient will perform toileting with modified independence within 7 days. 4. Patient will transfer from toilet with modified independence using the least restrictive device and appropriate durable medical equipment within 7 days. Outcome: Not Met   OCCUPATIONAL THERAPY EVALUATION  Patient: Dane Thompson (17 y.o. female)  Date: 11/21/2021  Primary Diagnosis: Intractable back pain [M54.9]        Precautions:   Fall    ASSESSMENT  Based on the objective data described below, the patient presents with no pain at rest and increased pain to 8-9/10 in right lower back and leg with mobility attempts. She reports that the pain travels. Pt was ambulatory and able to perform the vast majority of ADLS prior to admit. Currently pt is able to perform bed mobility with min to moderate assist and was only able to tolerate sit to stand and side stepping with RW. She reports that she pulls up on her walker to achieve standing at baseline. One seated rest break needed for pt to side step fully to head of bed due to pain. No incontinence noted this session with activity.   She is mainly limited by back pain in regards to ADLS at mobility. Pt however is not at her baseline and is not able to functionally perform tasks on her own due to pain. Current Level of Function Impacting Discharge (ADLs/self-care): min assist supine to sit and max assist for LE back to supine, min assist sit to stand and to side step head of bed with RW    Feeding: Independent    Oral Facial Hygiene/Grooming: Setup    Bathing: Maximum assistance    Upper Body Dressing: Minimum assistance    Lower Body Dressing: Total assistance    Toileting: Total assistance    Functional Outcome Measure: The patient scored 50/100 on the barthel outcome measure which is indicative of significant decline in mobility and ADLs. Other factors to consider for discharge: uncontrolled back pain     Patient will benefit from skilled therapy intervention to address the above noted impairments. PLAN :  Recommendations and Planned Interventions: self care training, functional mobility training, therapeutic exercise, balance training, therapeutic activities, patient education, home safety training, and family training/education    Frequency/Duration: Patient will be followed by occupational therapy 3 times a week to address goals. Recommendation for discharge: (in order for the patient to meet his/her long term goals)  Therapy up to 5 days/week in SNF setting vs. Home with home health pending progress with mobility and ADLs    This discharge recommendation:  Has not yet been discussed the attending provider and/or case management    IF patient discharges home will need the following DME: bariatric wheelchair, bariatric bedside commode       SUBJECTIVE:   Patient stated My pain is better than it was when I first came in here.     OBJECTIVE DATA SUMMARY:   HISTORY:   Past Medical History:   Diagnosis Date    Adverse effect of anesthesia     slow to wake up       Anxiety and depression     Arrhythmia     Arthritis     Chronic pain     djd    Diabetes ([facility] [address]     GERD (gastroesophageal reflux disease)     Hypertension     Hypothyroidism     Kidney stone     Liver disease     Obesity, morbid, BMI 50 or higher (HCC)     Seizures ([facility] [address]      Past Surgical History:   Procedure Laterality Date    CARDIAC CATHETERIZATION  11/18/2010         HX APPENDECTOMY      HX BREAST BIOPSY Right     about 15 years ago, neg    HX CHOLECYSTECTOMY      HX HYSTERECTOMY      HX TUBAL LIGATION         Expanded or extensive additional review of patient history:     Home Situation  Home Environment: Private residence  # Steps to Enter: 3  One/Two Story Residence: One story  Living Alone: No  Support Systems: Spouse/Significant Other, Parent(s)  Patient Expects to be Discharged to[de-identified] [employer]  Current DME Used/Available at Home: Shower chair, Walker, rolling  Tub or Shower Type: Shower    Hand dominance: Right    EXAMINATION OF PERFORMANCE DEFICITS:  Cognitive/Behavioral Status:  Neurologic State: Alert  Orientation Level: Oriented X4  Cognition: Follows commands  Perception: Appears intact  Perseveration: No perseveration noted  Safety/Judgement: Fall prevention; Home safety      Hearing: Auditory  Auditory Impairment: None    Vision/Perceptual:                                Corrective Lenses: Glasses    Range of Motion:    AROM: Generally decreased, functional                         Strength:    Strength: Generally decreased, functional                Coordination:  Coordination: Within functional limits  Fine Motor Skills-Upper: Right Intact; Left Intact    Gross Motor Skills-Upper: Left Intact; Right Intact    Tone & Sensation:    Tone: Normal                         Balance:  Sitting: Intact  Standing: Intact;  With support (RW)    Functional Mobility and Transfers for ADLs:  Bed Mobility:  Rolling: Minimum assistance (log roll)  Supine to Sit: Minimum assistance  Sit to Supine: Maximum assistance (to lift BLE onto bed)  Scooting: Minimum assistance    Transfers:  Sit to Stand: Stand-by assistance (pull to stand on RW)  Stand to Sit: Stand-by assistance  Bed to Chair:  (only able to side step today with RW, SBA additional time)  Bathroom Mobility:  (unable)  Toilet Transfer :  (unable)    ADL Assessment:  Feeding: Independent    Oral Facial Hygiene/Grooming: Setup    Bathing: Maximum assistance    Upper Body Dressing: Minimum assistance    Lower Body Dressing: Total assistance    Toileting: Total assistance                ADL Intervention and task modifications:     See assessment    Cognitive Retraining  Safety/Judgement: Fall prevention; Home safety   Functional Measure:    Barthel Index:  Bathin  Bladder: 10  Bowels: 10  Groomin  Dressin  Feeding: 10  Mobility: 0  Stairs: 0  Toilet Use: 5  Transfer (Bed to Chair and Back): 10  Total: 50/100      The Barthel ADL Index: Guidelines  1. The index should be used as a record of what a patient does, not as a record of what a patient could do. 2. The main aim is to establish degree of independence from any help, physical or verbal, however minor and for whatever reason. 3. The need for supervision renders the patient not independent. 4. A patient's performance should be established using the best available evidence. Asking the patient, friends/relatives and nurses are the usual sources, but direct observation and common sense are also important. However direct testing is not needed. 5. Usually the patient's performance over the preceding 24-48 hours is important, but occasionally longer periods will be relevant. 6. Middle categories imply that the patient supplies over 50 per cent of the effort. 7. Use of aids to be independent is allowed. Score Interpretation (from [address] [address])    Independent   60-79 Minimally independent   40-59 Partially dependent   20-39 Very dependent   <20 Totally dependent     -Rufina Aldana., Barthel, D.W. (1965). Functional evaluation: the Barthel Index. [facility] ([address] ().  The Barthel activities of daily living index: self-reporting versus actual performance in the old (> or = 75 years). Journal of [address] [place] [address] 457), [facility], ...F, Chani Palmer., Monica Swartz. (1999). Measuring the change in disability after inpatient rehabilitation; comparison of the responsiveness of the Barthel Index and Functional [place] Measure. Journal of Neurology, Neurosurgery, and Psychiatry, 66(4), 958-595. BOBBI Amaya, FANI Villasenor, & Carlos Crawley M.A. (2004) Assessment of post-stroke quality of life in cost-effectiveness studies: The usefulness of the Barthel Index and the EuroQoL-5D. Quality of Life Research, 15, 328-80         Occupational Therapy Evaluation Charge Determination   History Examination Decision-Making   LOW Complexity : Brief history review  LOW Complexity : 1-3 performance deficits relating to physical, cognitive , or psychosocial skils that result in activity limitations and / or participation restrictions  LOW Complexity : No comorbidities that affect functional and no verbal or physical assistance needed to complete eval tasks       Based on the above components, the patient evaluation is determined to be of the following complexity level: LOW   Pain Ratin-9/10 right lower back with activity    Activity Tolerance:   Fair, Poor, and requires frequent rest breaks    After treatment patient left in no apparent distress:    Supine in bed, Call bell within reach, and Side rails x 3    COMMUNICATION/EDUCATION:   The patients plan of care was discussed with: Physical therapist, Registered nurse, and patient . Patient/family have participated as able in goal setting and plan of care. and Patient/family agree to work toward stated goals and plan of care. This patients plan of care is appropriate for delegation to [facility].     Thank you for this referral.  Arnaldo Munoz, OTR/L  Time Calculation: 16 mins

## 2021-11-21 NOTE — PROGRESS NOTES
Hospitalist Progress Note    NAME: Pedro Luis Clemons   :  1960   MRN:  [id number]       Assessment / Plan:  Acute on chronic lower back pain POA- L sided per pt    CT Abdo/Pelv w/o contrast: IMPRESSION  1. Partial staghorn calculus in the lower pole of the right kidney. 2. Degenerative changes in the spine. Minimal anterior listhesis of L4 on L5  with disc osteophyte complex formation/degenerative disc disease at L5/S1. 3. There is fullness in the left side of the labia/left of the introitus. Recommend physical exam and possible referral to gynecology. 4. Incidental findings as above.     CT Abdo/Pelv w/ contrast: IMPRESSION  1. No significant change. IP Orthopedics Consulted- awaited input  Pt requires conscious sedation for MRI which will happen on Monday  Analgesics PRN  PT/OT evals- pending  WBC wnl, afebrile  UA negative  In regards to CT findings of left sided \"fullness\" - would give pt outpt GYN referral unless Orthopedic recommends inpatient eval as pain is on the same side of the Pelvic fullness noted on imaging  Patient noticed to have urine retention again today, [place] catheter inserted    Awaiting MRI to be done under conscious sedation tomorrow    HTN  Seizure disorder  Hypothyroidism  DMII  Anxiety  Depression  Continue home meds     Code Status: Full  Surrogate Decision Maker: Hillary Tanner  DVT Prophylaxis: Lovenox  GI Prophylaxis: not indicated  Baseline: Independent      40 or above Morbid obesity / Body mass index is 72.46 kg/m². Weight loss recommended    Estimated discharge date:   Barriers:    Recommended Disposition:  PT, OT, RN versus SNF pending PT/OT eval pending MRI, ortho clearance     Subjective:     Chief Complaint / Reason for Physician Visit :F/U L sided back pain, L pelvic fullness  \"I am ok\". Discussed with RN events overnight.      Review of Systems:  Symptom Y/N Comments  Symptom Y/N Comments   Fever/Chills n   Chest Pain n    Poor Appetite n   Edema n Cough n   Abdominal Pain n    Sputum n   Joint Pain y L back pain   SOB/DIOP n   Pruritis/Rash     Nausea/vomit    Tolerating PT/OT ? no   Diarrhea    Tolerating Diet y    Constipation    Other       Could NOT obtain due to:      Objective:     VITALS:   Last 24hrs VS reviewed since prior progress note. Most recent are:  Patient Vitals for the past 24 hrs:   Temp Pulse Resp BP SpO2   11/21/21 1135 98 °F (36.7 °C) 76 18 (!) 106/59 91 %   11/21/21 0810 98.1 °F (36.7 °C) 83 18 (!) 113/50 96 %   11/21/21 0747     93 %   11/21/21 0318 97.6 °F (36.4 °C) 81 18 114/62 95 %   11/20/21 2314 98 °F (36.7 °C) 89 18 (!) 102/53 94 %   11/20/21 1957     97 %   11/20/21 1936 98.8 °F (37.1 °C) 88 18 (!) 143/108 95 %   11/20/21 1521 98.9 °F (37.2 °C) 92 20 97/61 94 %       Intake/Output Summary (Last 24 hours) at 11/21/2021 1243  Last data filed at 11/21/2021 1100  Gross per 24 hour   Intake    Output 700 ml   Net -700 ml        I had a face to face encounter and independently examined this patient on 11/21/2021, as outlined below:  PHYSICAL EXAM:  General: WD, WN. Alert, cooperative, no acute distress ,Obese +    EENT:  EOMI. Anicteric sclerae. MMM  Resp:  CTA bilaterally, no wheezing or rales. No accessory muscle use  CV:  Regular  rhythm,  No edema  GI:  Soft, Non distended, Non tender. +Bowel sounds  Neurologic:  Alert and oriented X 3, normal speech,   Psych:   Good insight. Not anxious nor agitated  Skin:  No rashes.   No jaundice    Reviewed most current lab test results and cultures  YES  Reviewed most current radiology test results   YES  Review and summation of old records today    NO  Reviewed patient's current orders and MAR    YES  PMH/SH reviewed - no change compared to H&P  ________________________________________________________________________  Care Plan discussed with:    Comments   Patient x    Family      RN x    Care Manager     Consultant                        Multidiciplinary team rounds were held today with , nursing, pharmacist and clinical coordinator. Patient's plan of care was discussed; medications were reviewed and discharge planning was addressed. ________________________________________________________________________  Total NON critical care TIME:  36   Minutes    Total CRITICAL CARE TIME Spent:   Minutes non procedure based      Comments   >50% of visit spent in counseling and coordination of care     ________________________________________________________________________  Sukh Nieves MD     Procedures: see electronic medical records for all procedures/Xrays and details which were not copied into this note but were reviewed prior to creation of Plan. LABS:  I reviewed today's most current labs and imaging studies.   Pertinent labs include:  Recent Labs     11/20/21  0945 11/19/21  1613   WBC 7.2 9.9   HGB 8.1* 8.4*   HCT 28.6* 31.2*    242     Recent Labs     11/20/21  0945 11/19/21  1613    140   K 3.6 3.9   * 110*   CO2 23 24   * 120*   BUN 13 15   CREA 0.85 0.84   CA 9.4 10.0   MG 1.5*  --    ALB 2.7* 3.0*   TBILI 0.2 0.2   ALT 18 18       Signed: Sukh Nieves MD

## 2021-11-21 NOTE — PROGRESS NOTES
End of Shift Note    Bedside shift change report given to татьяна miramontes (oncoming nurse) by Lance Posadas RN (offgoing nurse). Report included the following information SBAR, Kardex, Intake/Output, MAR and Recent Results    Shift worked:  7p-7a     Shift summary and any significant changes:     pts had a one time straight catheter at 2:19 . Concerns for physician to address:       Zone phone for oncoming shift:   [phone]       Activity:  Activity Level: Up with Assistance  Number times ambulated in hallways past shift: 0  Number of times OOB to chair past shift: 0    Cardiac:   Cardiac Monitoring: No           Access:   Current line(s): PIV     Genitourinary:   Urinary status: straight cath    Respiratory:   O2 Device: None (Room air)  Chronic home O2 use?: NO  Incentive spirometer at bedside: YES     GI:  Last Bowel Movement Date: 11/19/21  Current diet:  ADULT DIET Easy to Chew; 4 carb choices (60 gm/meal)  Passing flatus: YES  Tolerating current diet: YES       Pain Management:   Patient states pain is manageable on current regimen: YES    Skin:  Marin Score: 16  Interventions: float heels and PT/OT consult    Patient Safety:  Fall Score:  Total Score: 3  Interventions: assistive device (walker, cane, etc), gripper socks and pt to call before getting OOB  High Fall Risk: Yes    Length of Stay:  Expected LOS: - - -  Actual LOS: 2      Billy Barajas RN

## 2021-11-21 NOTE — PROGRESS NOTES
Problem: Mobility Impaired (Adult and Pediatric)  Goal: *Acute Goals and Plan of Care (Insert Text)  Description: FUNCTIONAL STATUS PRIOR TO ADMISSION: Patient was modified independent using a rolling walker for functional mobility. HOME SUPPORT PRIOR TO ADMISSION: The patient lived with her  and Mother and didn't require assistance with adls. Physical Therapy Goals  Initiated 11/21/2021  1. Patient will move from supine to sit and sit to supine , scoot up and down, and roll side to side in bed with modified independence within 7 day(s). 2.  Patient will transfer from bed to chair and chair to bed with modified independence using the least restrictive device within 7 day(s). 3.  Patient will perform sit to stand with modified independence within 7 day(s). 4.  Patient will ambulate with modified independence for 100 feet with the least restrictive device within 7 day(s). 5.  Patient will ascend/descend 3 stairs with  handrail(s) with modified independence within 7 day(s). Outcome: Progressing Towards Goal   PHYSICAL THERAPY EVALUATION  Patient: Ángel Benjamin (35 y.o. female)  Date: 11/21/2021  Primary Diagnosis: Intractable back pain [M54.9]        Precautions:       ASSESSMENT  Based on the objective data described below, the patient presents with generalized weakness, impaired mobility, decreased standing tolerance, decreased ambulation, and decreased activity tolerance due to back pain. Pt received in bed, agreeable to PT eval.  Pt instructed in log roll and assisted to sitting eob. Once eob, pt able to stand at [facility] with pulling on RW. She needed to sit due to increased back pain. Second stand trial she was able to side step toward the hob without difficulty. Pt assisted back to supine in bed. She should progress once her pain is better controlled, but will need to recommend SNF vs HH, pending progress in the acute setting at this time.     Current Level of Function Impacting Discharge (mobility/balance):   Bed Mobility:  Rolling: Minimum assistance (log roll)  Supine to Sit: Minimum assistance  Sit to Supine: Maximum assistance (to lift BLE onto bed)  Scooting: Minimum assistance  Transfers:  Sit to Stand: Stand-by assistance (pull to stand on RW)  Stand to Sit: Stand-by assistance          Functional Outcome Measure: The patient scored 50/100 on the Barthel Index outcome measure which is indicative of 50% impaired function/adls     Other factors to consider for discharge: below baseline, significant pain     Patient will benefit from skilled therapy intervention to address the above noted impairments. PLAN :  Recommendations and Planned Interventions: bed mobility training, transfer training, gait training, therapeutic exercises, patient and family training/education, and therapeutic activities      Frequency/Duration: Patient will be followed by physical therapy:  3 times a week to address goals.     Recommendation for discharge: (in order for the patient to meet his/her long term goals)  Therapy up to 5 days/week in SNF setting vs , pending progress    This discharge recommendation:  A follow-up discussion with the attending provider and/or case management is planned    IF patient discharges home will need the following DME: patient owns DME required for discharge         SUBJECTIVE:   Patient stated it hurts    OBJECTIVE DATA SUMMARY:   HISTORY:    Past Medical History:   Diagnosis Date    Adverse effect of anesthesia     slow to wake up       Anxiety and depression     Arrhythmia     Arthritis     Chronic pain     djd    Diabetes ([facility]     GERD (gastroesophageal reflux disease)     Hypertension     Hypothyroidism     Kidney stone     Liver disease     Obesity, morbid, BMI 50 or higher ([facility]     Seizures ([facility]      Past Surgical History:   Procedure Laterality Date    CARDIAC CATHETERIZATION  11/18/2010         HX APPENDECTOMY      HX BREAST BIOPSY Right     about 15 years ago, neg    HX CHOLECYSTECTOMY      HX HYSTERECTOMY      HX TUBAL LIGATION         Personal factors and/or comorbidities impacting plan of care: back pain    Home Situation  Home Environment: Private residence  # Steps to Enter: 3  One/Two Story Residence: One story  Living Alone: No  Support Systems: Spouse/Significant Other, Parent(s)  Patient Expects to be Discharged to[de-identified] [employer]  Current DME Used/Available at Home: Shower chair    EXAMINATION/PRESENTATION/DECISION MAKING:   Critical Behavior:  Neurologic State: Alert  Orientation Level: Oriented X4  Cognition: Follows commands     Hearing: Auditory  Auditory Impairment: None  Range Of Motion:  AROM: Generally decreased, functional      Strength:    Strength: Generally decreased, functional           Tone & Sensation:   Tone: Normal          Coordination:  Coordination: Within functional limits     Functional Mobility:  Bed Mobility:  Rolling: Minimum assistance (log roll)  Supine to Sit: Minimum assistance  Sit to Supine: Maximum assistance (to lift BLE onto bed)  Scooting: Minimum assistance  Transfers:  Sit to Stand: Stand-by assistance (pull to stand on RW)  Stand to Sit: Stand-by assistance            Balance:   Sitting: Intact  Standing: Intact; With support (RW)  Ambulation/Gait Training:       Gait Description (WDL):  (unable due to pain)            Functional Measure:  Barthel Index:    Bathin  Bladder: 10  Bowels: 10  Groomin  Dressin  Feeding: 10  Mobility: 0  Stairs: 0  Toilet Use: 5  Transfer (Bed to Chair and Back): 10  Total: 50/100       The Barthel ADL Index: Guidelines  1. The index should be used as a record of what a patient does, not as a record of what a patient could do. 2. The main aim is to establish degree of independence from any help, physical or verbal, however minor and for whatever reason. 3. The need for supervision renders the patient not independent.   4. A patient's performance should be established using the best available evidence. Asking the patient, friends/relatives and nurses are the usual sources, but direct observation and common sense are also important. However direct testing is not needed. 5. Usually the patient's performance over the preceding 24-48 hours is important, but occasionally longer periods will be relevant. 6. Middle categories imply that the patient supplies over 50 per cent of the effort. 7. Use of aids to be independent is allowed. Score Interpretation (from [address] [facility])    Independent   60-79 Minimally independent   40-59 Partially dependent   20-39 Very dependent   <20 Totally dependent     -Rufina Aldana., Barthel, D.W. (1965). Functional evaluation: the Barthel Index. [address] ([address] (). The Barthel activities of daily living index: self-reporting versus actual performance in the old (> or = 75 years). Journal of [address] [place] [address] 45(7), [address] [facility], RAYRAY, Lo Chappell., Satish Berg. (1999). Measuring the change in disability after inpatient rehabilitation; comparison of the responsiveness of the Barthel Index and Functional [place] Measure. Journal of Neurology, Neurosurgery, and Psychiatry, 66(4), 238-129. Momo Gomes, N.J.A, FANI Villasenor, & Surekha Reynoso MImtiazA. (2004) Assessment of post-stroke quality of life in cost-effectiveness studies: The usefulness of the Barthel Index and the EuroQoL-5D. Quality of Life Research, 13, 427-43      Pain Ratin/10    Activity Tolerance:   Fair, Poor, and requires rest breaks, due to pain    After treatment patient left in no apparent distress:   Supine in bed, Call bell within reach, and Side rails x 3    COMMUNICATION/EDUCATION:   The patients plan of care was discussed with: Occupational therapist and Registered nurse.      Fall prevention education was provided and the patient/caregiver indicated understanding., Patient/family have participated as able in goal setting and plan of care. , and Patient/family agree to work toward stated goals and plan of care.     Thank you for this referral.  Karlie Keating, PT

## 2021-11-21 NOTE — PROGRESS NOTES
Problem: Pressure Injury - Risk of  Goal: *Prevention of pressure injury  Description: Document Marin Scale and appropriate interventions in the flowsheet.   Outcome: Progressing Towards Goal  Note: Pressure Injury Interventions:  Sensory Interventions: Assess changes in LOC    Moisture Interventions: Absorbent underpads    Activity Interventions: Increase time out of bed    Mobility Interventions: Float heels, HOB 30 degrees or less    Nutrition Interventions: Document food/fluid/supplement intake    Friction and Shear Interventions: HOB 30 degrees or less                Problem: Patient Education: Go to Patient Education Activity  Goal: Patient/Family Education  Outcome: Progressing Towards Goal     Problem: Pain  Goal: *Control of Pain  Outcome: Progressing Towards Goal     Problem: Patient Education: Go to Patient Education Activity  Goal: Patient/Family Education  Outcome: Progressing Towards Goal

## 2021-11-22 LAB
GLUCOSE BLD STRIP.AUTO-MCNC: 102 MG/DL (ref 65–117)
GLUCOSE BLD STRIP.AUTO-MCNC: 118 MG/DL (ref 65–117)
GLUCOSE BLD STRIP.AUTO-MCNC: 188 MG/DL (ref 65–117)
SERVICE CMNT-IMP: ABNORMAL
SERVICE CMNT-IMP: ABNORMAL
SERVICE CMNT-IMP: NORMAL

## 2021-11-22 PROCEDURE — 2709999900 HC NON-CHARGEABLE SUPPLY

## 2021-11-22 PROCEDURE — 97110 THERAPEUTIC EXERCISES: CPT

## 2021-11-22 PROCEDURE — 77030019905 HC CATH URETH INTMIT MDII -A

## 2021-11-22 PROCEDURE — 74011250637 HC RX REV CODE- 250/637: Performed by: GENERAL ACUTE CARE HOSPITAL

## 2021-11-22 PROCEDURE — 94760 N-INVAS EAR/PLS OXIMETRY 1: CPT

## 2021-11-22 PROCEDURE — 74011000250 HC RX REV CODE- 250: Performed by: INTERNAL MEDICINE

## 2021-11-22 PROCEDURE — 74011250636 HC RX REV CODE- 250/636: Performed by: NURSE PRACTITIONER

## 2021-11-22 PROCEDURE — 74011250637 HC RX REV CODE- 250/637: Performed by: INTERNAL MEDICINE

## 2021-11-22 PROCEDURE — 65270000015 HC RM PRIVATE ONCOLOGY

## 2021-11-22 PROCEDURE — 94640 AIRWAY INHALATION TREATMENT: CPT

## 2021-11-22 PROCEDURE — 82962 GLUCOSE BLOOD TEST: CPT

## 2021-11-22 PROCEDURE — 97530 THERAPEUTIC ACTIVITIES: CPT

## 2021-11-22 PROCEDURE — 74011250636 HC RX REV CODE- 250/636: Performed by: GENERAL ACUTE CARE HOSPITAL

## 2021-11-22 PROCEDURE — 51798 US URINE CAPACITY MEASURE: CPT

## 2021-11-22 RX ORDER — POLYETHYLENE GLYCOL 3350 17 G/17G
17 POWDER, FOR SOLUTION ORAL DAILY
Status: DISCONTINUED | OUTPATIENT
Start: 2021-11-22 | End: 2021-11-29 | Stop reason: HOSPADM

## 2021-11-22 RX ADMIN — PRAZOSIN HYDROCHLORIDE 4 MG: 1 CAPSULE ORAL at 21:17

## 2021-11-22 RX ADMIN — HYDROMORPHONE HYDROCHLORIDE 1 MG: 1 INJECTION, SOLUTION INTRAMUSCULAR; INTRAVENOUS; SUBCUTANEOUS at 03:53

## 2021-11-22 RX ADMIN — BUSPIRONE HYDROCHLORIDE 15 MG: 5 TABLET ORAL at 21:16

## 2021-11-22 RX ADMIN — OXYCODONE AND ACETAMINOPHEN 1 TABLET: 5; 325 TABLET ORAL at 05:48

## 2021-11-22 RX ADMIN — ENOXAPARIN SODIUM 40 MG: 100 INJECTION SUBCUTANEOUS at 09:22

## 2021-11-22 RX ADMIN — IPRATROPIUM BROMIDE 0.5 MG: 0.5 SOLUTION RESPIRATORY (INHALATION) at 09:13

## 2021-11-22 RX ADMIN — ARFORMOTEROL TARTRATE 15 MCG: 15 SOLUTION RESPIRATORY (INHALATION) at 19:13

## 2021-11-22 RX ADMIN — BUPROPION HYDROCHLORIDE 300 MG: 150 TABLET, EXTENDED RELEASE ORAL at 21:18

## 2021-11-22 RX ADMIN — OXYCODONE AND ACETAMINOPHEN 1 TABLET: 5; 325 TABLET ORAL at 12:08

## 2021-11-22 RX ADMIN — ESTRADIOL 0.5 MG: 1 TABLET ORAL at 09:22

## 2021-11-22 RX ADMIN — GABAPENTIN 100 MG: 100 CAPSULE ORAL at 21:16

## 2021-11-22 RX ADMIN — METOPROLOL SUCCINATE 25 MG: 25 TABLET, EXTENDED RELEASE ORAL at 09:21

## 2021-11-22 RX ADMIN — IPRATROPIUM BROMIDE 0.5 MG: 0.5 SOLUTION RESPIRATORY (INHALATION) at 19:12

## 2021-11-22 RX ADMIN — BUDESONIDE 250 MCG: 0.25 INHALANT RESPIRATORY (INHALATION) at 19:16

## 2021-11-22 RX ADMIN — HYDROMORPHONE HYDROCHLORIDE 1 MG: 1 INJECTION, SOLUTION INTRAMUSCULAR; INTRAVENOUS; SUBCUTANEOUS at 13:49

## 2021-11-22 RX ADMIN — ATORVASTATIN CALCIUM 10 MG: 10 TABLET, FILM COATED ORAL at 21:18

## 2021-11-22 RX ADMIN — ASPIRIN 81 MG CHEWABLE TABLET 81 MG: 81 TABLET CHEWABLE at 09:20

## 2021-11-22 RX ADMIN — HYDROMORPHONE HYDROCHLORIDE 1 MG: 1 INJECTION, SOLUTION INTRAMUSCULAR; INTRAVENOUS; SUBCUTANEOUS at 17:48

## 2021-11-22 RX ADMIN — HYDROMORPHONE HYDROCHLORIDE 1 MG: 1 INJECTION, SOLUTION INTRAMUSCULAR; INTRAVENOUS; SUBCUTANEOUS at 10:01

## 2021-11-22 RX ADMIN — QUETIAPINE FUMARATE 600 MG: 100 TABLET ORAL at 21:16

## 2021-11-22 RX ADMIN — HYDROMORPHONE HYDROCHLORIDE 1 MG: 1 INJECTION, SOLUTION INTRAMUSCULAR; INTRAVENOUS; SUBCUTANEOUS at 07:07

## 2021-11-22 RX ADMIN — POLYETHYLENE GLYCOL 3350 17 G: 17 POWDER, FOR SOLUTION ORAL at 12:08

## 2021-11-22 RX ADMIN — BUDESONIDE 250 MCG: 0.25 INHALANT RESPIRATORY (INHALATION) at 09:13

## 2021-11-22 RX ADMIN — Medication 10 ML: at 05:48

## 2021-11-22 RX ADMIN — ARFORMOTEROL TARTRATE 15 MCG: 15 SOLUTION RESPIRATORY (INHALATION) at 09:13

## 2021-11-22 RX ADMIN — HYDROMORPHONE HYDROCHLORIDE 1 MG: 1 INJECTION, SOLUTION INTRAMUSCULAR; INTRAVENOUS; SUBCUTANEOUS at 21:11

## 2021-11-22 RX ADMIN — TOPIRAMATE 200 MG: 100 TABLET, FILM COATED ORAL at 21:15

## 2021-11-22 RX ADMIN — BUSPIRONE HYDROCHLORIDE 15 MG: 5 TABLET ORAL at 17:48

## 2021-11-22 RX ADMIN — GABAPENTIN 100 MG: 100 CAPSULE ORAL at 17:48

## 2021-11-22 RX ADMIN — ONDANSETRON 4 MG: 2 INJECTION INTRAMUSCULAR; INTRAVENOUS at 20:24

## 2021-11-22 RX ADMIN — Medication 10 ML: at 21:19

## 2021-11-22 RX ADMIN — Medication 400 MG: at 09:21

## 2021-11-22 RX ADMIN — Medication 10 ML: at 13:49

## 2021-11-22 RX ADMIN — GABAPENTIN 100 MG: 100 CAPSULE ORAL at 09:22

## 2021-11-22 RX ADMIN — BUSPIRONE HYDROCHLORIDE 15 MG: 5 TABLET ORAL at 09:22

## 2021-11-22 RX ADMIN — LEVOTHYROXINE SODIUM 112 MCG: 0.11 TABLET ORAL at 05:48

## 2021-11-22 RX ADMIN — ENOXAPARIN SODIUM 40 MG: 100 INJECTION SUBCUTANEOUS at 21:15

## 2021-11-22 NOTE — PROGRESS NOTES
Problem: Self Care Deficits Care Plan (Adult)  Goal: *Acute Goals and Plan of Care (Insert Text)  Description: FUNCTIONAL STATUS PRIOR TO ADMISSION: Patient was modified independent using a rolling walker for functional mobility. Sits and sponge bathes in a chair next to her bed and then stands in the shower to rinse off, only wears slip on shoes and family assists with socks if pt wears them, performed all other ADLS on her own, family performs [address]: The patient lived with  and mother whom assist as needed. Occupational Therapy Goals:  Initiated 11/21/2021  1. Patient will perform grooming seated with modified independence within 7 days. 2. Patient will perform lower body dressing with excluding socks modified independence within 7 days. 3. Patient will perform toileting with modified independence within 7 days. 4. Patient will transfer from toilet with modified independence using the least restrictive device and appropriate durable medical equipment within 7 days. Outcome: Progressing Towards Goal     OCCUPATIONAL THERAPY TREATMENT  Patient: Naseem Rogers (65 y.o. female)  Date: 11/22/2021  Diagnosis: Intractable back pain [M54.9]   <principal problem not specified>       Precautions: Fall  Chart, occupational therapy assessment, plan of care, and goals were reviewed. ASSESSMENT  Patient continues with skilled OT services and is progressing towards goals. Pt rc'd in side lying in bed agreeable to in bed UB therband therex. Pt with good participation, however fatiguing quickly. Educated pt on joint protection and safe shoulder positioning when completing exercises, pt with good understanding and carryover. Pt provided with yellow theraband on this date. Recommend SNF level of rehab avs. Home with home health pending progress with mobility and ADLs.      Current Level of Function Impacting Discharge (ADLs): max - total A    Other factors to consider for discharge: pt is below baseline          PLAN :  Patient continues to benefit from skilled intervention to address the above impairments. Continue treatment per established plan of care to address goals. Recommend with staff: chair position for grooming and meals. Recommend next OT session: seated grooming     Recommendation for discharge: (in order for the patient to meet his/her long term goals)  Therapy up to 5 days/week in SNF setting vs. Home with home health pending progress with mobility and ADLs    This discharge recommendation:  Has been made in collaboration with the attending provider and/or case management    IF patient discharges home will need the following DME: TBD       SUBJECTIVE:   Patient stated I haven't gotten to make it to the chair yet but I will at some point.     OBJECTIVE DATA SUMMARY:   Cognitive/Behavioral Status:  Neurologic State: Alert; Appropriate for age  Orientation Level: Oriented X4  Cognition: Follows commands; Appropriate safety awareness; Appropriate for age attention/concentration               Therapeutic Exercises:     EXERCISE   Sets   Reps   Active Active Assist   Passive   Comments   Tricep ext 1 20 [x]           []           []              Shoulder flex 1 10 R 10 L [x]           []           []              Horizontal shoulder abduction 1 10 [x]           []           []              Bicep flexion 1 10 R & 10 L [x]           []           []                   Pain:  Pt endorsing back pain, did not quantify    Activity Tolerance:   Good    After treatment patient left in no apparent distress:   Supine in bed and Call bell within reach    COMMUNICATION/COLLABORATION:   The patients plan of care was discussed with: Physical therapist, Occupational therapist, and Registered nurse.      Trent Owens, TREVOR  Time Calculation: 10 mins

## 2021-11-22 NOTE — PROGRESS NOTES
Hospitalist Progress Note    NAME: Rachael Thorne   :  1960   MRN:  [id number]       Assessment / Plan:  Acute on chronic lower back pain POA- L sided per pt    CT Abdo/Pelv w/o contrast: IMPRESSION  1. Partial staghorn calculus in the lower pole of the right kidney. 2. Degenerative changes in the spine. Minimal anterior listhesis of L4 on L5  with disc osteophyte complex formation/degenerative disc disease at L5/S1. 3. There is fullness in the left side of the labia/left of the introitus. Recommend physical exam and possible referral to gynecology. 4. Incidental findings as above.     CT Abdo/Pelv w/ contrast: IMPRESSION  1. No significant change. IP Orthopedics Consulted-input noted   plan for MRI under sedation tomorrow around 1 PM  Analgesics PRN  PT/OT recommended SNF versus home health  WBC wnl, afebrile  UA negative   will get gynecology input regarding the fullness in the left side of the labia    HTN  Seizure disorder  Hypothyroidism  DMII  Anxiety  Depression  Continue home meds     Code Status: Full  Surrogate Decision Maker: Jeannine Hillman  DVT Prophylaxis: Lovenox  GI Prophylaxis: not indicated  Baseline: Independent      40 or above Morbid obesity / Body mass index is 72.46 kg/m². Weight loss recommended    Estimated discharge date:   Barriers:    Recommended Disposition:  PT, OT, RN versus SNF      Subjective:     Chief Complaint / Reason for Physician Visit :F/U L sided back pain, L pelvic fullness  Patient continues to complain of back pain, reported 10 out of 10 despite getting IV Dilaudid. But clinically looks comfortable despite reporting that she is in pain    Review of Systems:  Symptom Y/N Comments  Symptom Y/N Comments   Fever/Chills n   Chest Pain n    Poor Appetite n   Edema n    Cough n   Abdominal Pain n    Sputum n   Joint Pain y L back pain   SOB/DIOP n   Pruritis/Rash     Nausea/vomit    Tolerating PT/OT ?  no   Diarrhea    Tolerating Diet y Constipation    Other       Could NOT obtain due to:      Objective:     VITALS:   Last 24hrs VS reviewed since prior progress note. Most recent are:  Patient Vitals for the past 24 hrs:   Temp Pulse Resp BP SpO2   11/22/21 0913     98 %   11/22/21 0753 98.4 °F (36.9 °C) 84 18 (!) 105/56 100 %   11/22/21 0315 97.6 °F (36.4 °C) 82 18 (!) 127/54 99 %   11/21/21 2325 98.4 °F (36.9 °C) 100 18 (!) 139/97 99 %   11/21/21 2048     96 %   11/21/21 1931 98.4 °F (36.9 °C) 84 17 (!) 148/49 93 %   11/21/21 1527 98.6 °F (37 °C) 81 18 (!) 109/40 95 %   11/21/21 1135 98 °F (36.7 °C) 76 18 (!) 106/59 91 %       Intake/Output Summary (Last 24 hours) at 11/22/2021 0988  Last data filed at 11/21/2021 2347  Gross per 24 hour   Intake    Output 720 ml   Net -720 ml        I had a face to face encounter and independently examined this patient on 11/22/2021, as outlined below:  PHYSICAL EXAM:  General: WD, WN. Alert, cooperative, no acute distress ,Obese +    EENT:  EOMI. Anicteric sclerae. MMM  Resp:  CTA bilaterally, no wheezing or rales. No accessory muscle use  CV:  Regular  rhythm,  No edema  GI:  Soft, Non distended, Non tender. +Bowel sounds  Neurologic:  Alert and oriented X 3, normal speech,   Psych:   fair insight. Not anxious nor agitated  Skin:  No rashes. No jaundice    Reviewed most current lab test results and cultures  YES  Reviewed most current radiology test results   YES  Review and summation of old records today    NO  Reviewed patient's current orders and MAR    YES  PMH/SH reviewed - no change compared to H&P  ________________________________________________________________________  Care Plan discussed with:    Comments   Patient x    Family      RN x    Care Manager     Consultant                        Multidiciplinary team rounds were held today with , nursing, pharmacist and clinical coordinator.   Patient's plan of care was discussed; medications were reviewed and discharge planning was addressed. ________________________________________________________________________  Total NON critical care TIME:  35Minutes    Total CRITICAL CARE TIME Spent:   Minutes non procedure based      Comments   >50% of visit spent in counseling and coordination of care     ________________________________________________________________________  John Betancourt MD     Procedures: see electronic medical records for all procedures/Xrays and details which were not copied into this note but were reviewed prior to creation of Plan. LABS:  I reviewed today's most current labs and imaging studies.   Pertinent labs include:  Recent Labs     11/20/21  0945 11/19/21  1613   WBC 7.2 9.9   HGB 8.1* 8.4*   HCT 28.6* 31.2*    242     Recent Labs     11/20/21  0945 11/19/21  1613    140   K 3.6 3.9   * 110*   CO2 23 24   * 120*   BUN 13 15   CREA 0.85 0.84   CA 9.4 10.0   MG 1.5*  --    ALB 2.7* 3.0*   TBILI 0.2 0.2   ALT 18 18       Signed: John Betancourt MD

## 2021-11-22 NOTE — PROGRESS NOTES
TRANSFER - IN REPORT:    Verbal report received from Yamileth(name) on Rachael Thorne  being received from [facility](unit) for routine progression of care      Report consisted of patients Situation, Background, Assessment and   Recommendations(SBAR). Information from the following report(s) SBAR, Kardex and ED Summary was reviewed with the receiving nurse. Opportunity for questions and clarification was provided. Assessment completed upon patients arrival to unit and care assumed.

## 2021-11-22 NOTE — PROGRESS NOTES
TRANSFER - OUT REPORT:    Verbal report given to [facility], RN(name) on Nabeel Ochoa  being transferred to [facility]) for routine progression of care       Report consisted of patients Situation, Background, Assessment and   Recommendations(SBAR). Information from the following report(s) SBAR, Kardex, ED Summary, MAR, Accordion and Recent Results was reviewed with the receiving nurse. Lines:   Peripheral IV 11/21/21 Left; Posterior Hand (Active)   Site Assessment Clean, dry, & intact 11/22/21 1002   Phlebitis Assessment 0 11/22/21 1002   Infiltration Assessment 0 11/22/21 1002   Dressing Status Clean, dry, & intact 11/22/21 1002   Dressing Type Tape; Transparent 11/22/21 1002   Hub Color/Line Status Flushed; [facility] 11/22/21 1002        Opportunity for questions and clarification was provided.       Patient transported with:   Registered Nurse  Tech

## 2021-11-22 NOTE — PROGRESS NOTES
End of Shift Note    Bedside shift change report given to erin miramontes (oncoming nurse) by Erin Rust RN (offgoing nurse). Report included the following information SBAR, Kardex, Procedure Summary, Intake/Output and MAR    Shift worked:  7p-7a     Shift summary and any significant changes:     concepcion catheter removed by patient request.     Concerns for physician to address:       Zone phone for oncoming shift:   [phone]       Activity:  Activity Level: Up with Assistance  Number times ambulated in hallways past shift: 0  Number of times OOB to chair past shift: 0    Cardiac:   Cardiac Monitoring: No           Access:   Current line(s): PIV     Genitourinary:   Urinary status: due to void and external catheter    Respiratory:   O2 Device: None (Room air)  Chronic home O2 use?: NO  Incentive spirometer at bedside: YES     GI:  Last Bowel Movement Date: 11/19/21  Current diet:  DIET NPO  Passing flatus: YES  Tolerating current diet: YES       Pain Management:   Patient states pain is manageable on current regimen: YES    Skin:  Marin Score: 17  Interventions: increase time out of bed and PT/OT consult    Patient Safety:  Fall Score:  Total Score: 3  Interventions: assistive device (walker, cane, etc), gripper socks and pt to call before getting OOB  High Fall Risk: Yes    Length of Stay:  Expected LOS: - - -  Actual LOS: 3      Billy Barajas RN

## 2021-11-22 NOTE — PROGRESS NOTES
Problem: Falls - Risk of  Goal: *Absence of Falls  Description: Document Kenzie Eliud Fall Risk and appropriate interventions in the flowsheet. Outcome: Progressing Towards Goal  Note: Fall Risk Interventions:  Mobility Interventions: Utilize walker, cane, or other assistive device         Medication Interventions: Teach patient to arise slowly    Elimination Interventions: Call light in reach              Problem: Patient Education: Go to Patient Education Activity  Goal: Patient/Family Education  Outcome: Progressing Towards Goal     Problem: Pressure Injury - Risk of  Goal: *Prevention of pressure injury  Description: Document Marin Scale and appropriate interventions in the flowsheet.   Outcome: Progressing Towards Goal  Note: Pressure Injury Interventions:  Sensory Interventions: Assess changes in LOC    Moisture Interventions: Absorbent underpads    Activity Interventions: Increase time out of bed    Mobility Interventions: HOB 30 degrees or less    Nutrition Interventions: Document food/fluid/supplement intake    Friction and Shear Interventions: HOB 30 degrees or less                Problem: Patient Education: Go to Patient Education Activity  Goal: Patient/Family Education  Outcome: Progressing Towards Goal     Problem: Pain  Goal: *Control of Pain  Outcome: Progressing Towards Goal     Problem: Patient Education: Go to Patient Education Activity  Goal: Patient/Family Education  Outcome: Progressing Towards Goal     Problem: Patient Education: Go to Patient Education Activity  Goal: Patient/Family Education  Outcome: Progressing Towards Goal     Problem: Patient Education: Go to Patient Education Activity  Goal: Patient/Family Education  Outcome: Progressing Towards Goal

## 2021-11-22 NOTE — PROGRESS NOTES
Problem: Mobility Impaired (Adult and Pediatric)  Goal: *Acute Goals and Plan of Care (Insert Text)  Description: FUNCTIONAL STATUS PRIOR TO ADMISSION: Patient was modified independent using a rolling walker for functional mobility. HOME SUPPORT PRIOR TO ADMISSION: The patient lived with her  and Mother and didn't require assistance with adls. Physical Therapy Goals  Initiated 11/21/2021  1. Patient will move from supine to sit and sit to supine , scoot up and down, and roll side to side in bed with modified independence within 7 day(s). 2.  Patient will transfer from bed to chair and chair to bed with modified independence using the least restrictive device within 7 day(s). 3.  Patient will perform sit to stand with modified independence within 7 day(s). 4.  Patient will ambulate with modified independence for 100 feet with the least restrictive device within 7 day(s). 5.  Patient will ascend/descend 3 stairs with  handrail(s) with modified independence within 7 day(s). Outcome: Not Progressing Towards Goal   PHYSICAL THERAPY TREATMENT  Patient: Jagdish Fraser (47 y.o. female)  Date: 11/22/2021  Diagnosis: Intractable back pain [M54.9]   <principal problem not specified>       Precautions: Fall  Chart, physical therapy assessment, plan of care and goals were reviewed. ASSESSMENT  Patient continues with skilled PT services and is not progressing towards goals. Mobility remains limited by back pain and LE weakness. Unable to step away from bed and transfer into chair due to knees being unstable from weakness. Provided strengthening exercises to BLEs in bed prior to attempted mobilization. She was unable to generate much force in ankles and quads. She did stand at the edge of bed and side step toward head of bed with  RW and 2 person assistance. Returned to supine in bed and placed in chair position with all needs met when the session ended.     Current Level of Function Impacting Discharge (mobility/balance): mod a supine to sit; min/cg assist x 2 sit to stand; max a x 2 sit to supine  Min/mod a x 2 sidestepping with RW 2 feet    Other factors to consider for discharge: morbid obesity, djd in knees, high risk for falls due to LE weakness; had been modified independent with RW at home with spouse. PLAN :  Patient continues to benefit from skilled intervention to address the above impairments. Continue treatment per established plan of care. to address goals. Recommendation for discharge: (in order for the patient to meet his/her long term goals)  Therapy up to 5 days/week in SNF setting vs [facility] PT pending progress toward PT goals. This discharge recommendation:  Has been made in collaboration with the attending provider and/or case management    IF patient discharges home will need the following DME: wheelchair if she lacks one       SUBJECTIVE:   Patient stated My legs feel so weak.     OBJECTIVE DATA SUMMARY:   Critical Behavior:  Neurologic State: Alert  Orientation Level: Oriented X4  Cognition: Appropriate decision making, Appropriate for age attention/concentration, Appropriate safety awareness, Follows commands  Safety/Judgement: Fall prevention (LE weakness)  Functional Mobility Training:  Bed Mobility:  Rolling: Minimum assistance (log roll)  Supine to Sit: Moderate assistance (log roll)  Sit to Supine: Maximum assistance; Assist x2  Scooting: Contact guard assistance        Transfers:  Sit to Stand: Contact guard assistance; Minimum assistance; Assist x2  Stand to Sit: Contact guard assistance        Bed to Chair:  (deferred due to knees wobbly)                    Balance:  Sitting: Intact  Standing: Impaired  Standing - Static: Good; Constant support  Standing - Dynamic : Fair; Constant support  Ambulation/Gait Training:              Gait Description (WDL):  (deferred due to knees wobbly)     Right Side Weight Bearing: Full  Left Side Weight Bearing: Full Therapeutic Exercises: Ankle pumps, quad sets, heel slides, hip/knee extension  [place] and attempted manual resistive exercises used. Pain Ratin/10 LLE sciatica    Activity Tolerance:   Fair, SpO2 stable on RA, and requires rest breaks    After treatment patient left in no apparent distress:   Supine in bed, Heels elevated for pressure relief, Call bell within reach, and Side rails x 3    COMMUNICATION/COLLABORATION:   The patients plan of care was discussed with: Registered nurse and Rehabilitation technician.      Nakia Tan, PT   Time Calculation: 24 mins

## 2021-11-22 NOTE — PROGRESS NOTES
Patient  complained  Discomfort from concepcion and request  to removed concepcion out. I did perfectserve to Jorge castorena. I explained her about high risk of  UTI, about bladder train,ask her to try lidocaine jelly she refused  all intervention.   Removed urinary catheter on patient request at 4:40 am .

## 2021-11-23 ENCOUNTER — APPOINTMENT (OUTPATIENT)
Dept: MRI IMAGING | Age: 61
DRG: 551 | End: 2021-11-23
Attending: GENERAL ACUTE CARE HOSPITAL
Payer: COMMERCIAL

## 2021-11-23 LAB
GLUCOSE BLD STRIP.AUTO-MCNC: 105 MG/DL (ref 65–117)
GLUCOSE BLD STRIP.AUTO-MCNC: 135 MG/DL (ref 65–117)
GLUCOSE BLD STRIP.AUTO-MCNC: 141 MG/DL (ref 65–117)
GLUCOSE BLD STRIP.AUTO-MCNC: 192 MG/DL (ref 65–117)
SERVICE CMNT-IMP: ABNORMAL
SERVICE CMNT-IMP: NORMAL

## 2021-11-23 PROCEDURE — 51798 US URINE CAPACITY MEASURE: CPT

## 2021-11-23 PROCEDURE — 74011250636 HC RX REV CODE- 250/636: Performed by: NURSE PRACTITIONER

## 2021-11-23 PROCEDURE — 77030005513 HC CATH URETH FOL11 MDII -B

## 2021-11-23 PROCEDURE — 82962 GLUCOSE BLOOD TEST: CPT

## 2021-11-23 PROCEDURE — 74011000258 HC RX REV CODE- 258: Performed by: SPECIALIST

## 2021-11-23 PROCEDURE — 74011250636 HC RX REV CODE- 250/636: Performed by: GENERAL ACUTE CARE HOSPITAL

## 2021-11-23 PROCEDURE — 74011250637 HC RX REV CODE- 250/637: Performed by: INTERNAL MEDICINE

## 2021-11-23 PROCEDURE — 65270000015 HC RM PRIVATE ONCOLOGY

## 2021-11-23 PROCEDURE — 72148 MRI LUMBAR SPINE W/O DYE: CPT

## 2021-11-23 PROCEDURE — 74011250636 HC RX REV CODE- 250/636: Performed by: STUDENT IN AN ORGANIZED HEALTH CARE EDUCATION/TRAINING PROGRAM

## 2021-11-23 PROCEDURE — 74011636637 HC RX REV CODE- 636/637: Performed by: GENERAL ACUTE CARE HOSPITAL

## 2021-11-23 PROCEDURE — 97535 SELF CARE MNGMENT TRAINING: CPT | Performed by: OCCUPATIONAL THERAPIST

## 2021-11-23 PROCEDURE — 77030038269 HC DRN EXT URIN PURWCK BARD -A

## 2021-11-23 PROCEDURE — 74011250637 HC RX REV CODE- 250/637: Performed by: GENERAL ACUTE CARE HOSPITAL

## 2021-11-23 PROCEDURE — 97535 SELF CARE MNGMENT TRAINING: CPT

## 2021-11-23 PROCEDURE — 97530 THERAPEUTIC ACTIVITIES: CPT

## 2021-11-23 PROCEDURE — 74011250636 HC RX REV CODE- 250/636: Performed by: SPECIALIST

## 2021-11-23 RX ORDER — SODIUM CHLORIDE 9 MG/ML
25 INJECTION, SOLUTION INTRAVENOUS CONTINUOUS
Status: DISCONTINUED | OUTPATIENT
Start: 2021-11-23 | End: 2021-11-23

## 2021-11-23 RX ORDER — MIDAZOLAM HYDROCHLORIDE 1 MG/ML
5 INJECTION, SOLUTION INTRAMUSCULAR; INTRAVENOUS
Status: DISCONTINUED | OUTPATIENT
Start: 2021-11-23 | End: 2021-11-23

## 2021-11-23 RX ORDER — FENTANYL CITRATE 50 UG/ML
25-100 INJECTION, SOLUTION INTRAMUSCULAR; INTRAVENOUS
Status: DISCONTINUED | OUTPATIENT
Start: 2021-11-23 | End: 2021-11-23

## 2021-11-23 RX ORDER — DIPHENHYDRAMINE HYDROCHLORIDE 50 MG/ML
50 INJECTION, SOLUTION INTRAMUSCULAR; INTRAVENOUS ONCE
Status: COMPLETED | OUTPATIENT
Start: 2021-11-23 | End: 2021-11-23

## 2021-11-23 RX ADMIN — Medication 10 ML: at 14:30

## 2021-11-23 RX ADMIN — OXYCODONE AND ACETAMINOPHEN 1 TABLET: 5; 325 TABLET ORAL at 15:49

## 2021-11-23 RX ADMIN — Medication 10 ML: at 05:42

## 2021-11-23 RX ADMIN — OXYCODONE AND ACETAMINOPHEN 1 TABLET: 5; 325 TABLET ORAL at 06:40

## 2021-11-23 RX ADMIN — HYDROMORPHONE HYDROCHLORIDE 1 MG: 1 INJECTION, SOLUTION INTRAMUSCULAR; INTRAVENOUS; SUBCUTANEOUS at 11:45

## 2021-11-23 RX ADMIN — BUSPIRONE HYDROCHLORIDE 15 MG: 5 TABLET ORAL at 15:49

## 2021-11-23 RX ADMIN — HYDROMORPHONE HYDROCHLORIDE 1 MG: 1 INJECTION, SOLUTION INTRAMUSCULAR; INTRAVENOUS; SUBCUTANEOUS at 08:19

## 2021-11-23 RX ADMIN — PRAZOSIN HYDROCHLORIDE 4 MG: 1 CAPSULE ORAL at 21:55

## 2021-11-23 RX ADMIN — ONDANSETRON 4 MG: 4 TABLET, ORALLY DISINTEGRATING ORAL at 17:40

## 2021-11-23 RX ADMIN — POLYETHYLENE GLYCOL 3350 17 G: 17 POWDER, FOR SOLUTION ORAL at 08:22

## 2021-11-23 RX ADMIN — GABAPENTIN 100 MG: 100 CAPSULE ORAL at 15:49

## 2021-11-23 RX ADMIN — BUSPIRONE HYDROCHLORIDE 15 MG: 5 TABLET ORAL at 08:22

## 2021-11-23 RX ADMIN — HYDROMORPHONE HYDROCHLORIDE 1 MG: 1 INJECTION, SOLUTION INTRAMUSCULAR; INTRAVENOUS; SUBCUTANEOUS at 17:26

## 2021-11-23 RX ADMIN — TOPIRAMATE 200 MG: 100 TABLET, FILM COATED ORAL at 21:55

## 2021-11-23 RX ADMIN — BUSPIRONE HYDROCHLORIDE 15 MG: 5 TABLET ORAL at 21:57

## 2021-11-23 RX ADMIN — ENOXAPARIN SODIUM 40 MG: 100 INJECTION SUBCUTANEOUS at 08:21

## 2021-11-23 RX ADMIN — HYDROMORPHONE HYDROCHLORIDE 1 MG: 1 INJECTION, SOLUTION INTRAMUSCULAR; INTRAVENOUS; SUBCUTANEOUS at 23:39

## 2021-11-23 RX ADMIN — ASPIRIN 81 MG CHEWABLE TABLET 81 MG: 81 TABLET CHEWABLE at 08:22

## 2021-11-23 RX ADMIN — ENOXAPARIN SODIUM 40 MG: 100 INJECTION SUBCUTANEOUS at 20:43

## 2021-11-23 RX ADMIN — Medication 10 ML: at 21:57

## 2021-11-23 RX ADMIN — GABAPENTIN 100 MG: 100 CAPSULE ORAL at 08:20

## 2021-11-23 RX ADMIN — ESTRADIOL 0.5 MG: 1 TABLET ORAL at 08:19

## 2021-11-23 RX ADMIN — SODIUM CHLORIDE 25 ML/HR: 9 INJECTION, SOLUTION INTRAVENOUS at 09:08

## 2021-11-23 RX ADMIN — QUETIAPINE FUMARATE 600 MG: 100 TABLET ORAL at 21:56

## 2021-11-23 RX ADMIN — OXYCODONE AND ACETAMINOPHEN 1 TABLET: 5; 325 TABLET ORAL at 21:57

## 2021-11-23 RX ADMIN — PIPERACILLIN AND TAZOBACTAM 3.38 G: 3; .375 INJECTION, POWDER, LYOPHILIZED, FOR SOLUTION INTRAVENOUS at 18:05

## 2021-11-23 RX ADMIN — HYDROMORPHONE HYDROCHLORIDE 1 MG: 1 INJECTION, SOLUTION INTRAMUSCULAR; INTRAVENOUS; SUBCUTANEOUS at 05:42

## 2021-11-23 RX ADMIN — INSULIN LISPRO 2 UNITS: 100 INJECTION, SOLUTION INTRAVENOUS; SUBCUTANEOUS at 08:19

## 2021-11-23 RX ADMIN — HYDROMORPHONE HYDROCHLORIDE 1 MG: 1 INJECTION, SOLUTION INTRAMUSCULAR; INTRAVENOUS; SUBCUTANEOUS at 14:27

## 2021-11-23 RX ADMIN — LEVOTHYROXINE SODIUM 112 MCG: 0.11 TABLET ORAL at 05:43

## 2021-11-23 RX ADMIN — MIDAZOLAM 2 MG: 1 INJECTION INTRAMUSCULAR; INTRAVENOUS at 09:25

## 2021-11-23 RX ADMIN — Medication 400 MG: at 08:21

## 2021-11-23 RX ADMIN — ATORVASTATIN CALCIUM 10 MG: 10 TABLET, FILM COATED ORAL at 21:57

## 2021-11-23 RX ADMIN — GABAPENTIN 100 MG: 100 CAPSULE ORAL at 21:57

## 2021-11-23 RX ADMIN — BUPROPION HYDROCHLORIDE 300 MG: 150 TABLET, EXTENDED RELEASE ORAL at 20:44

## 2021-11-23 RX ADMIN — HYDROMORPHONE HYDROCHLORIDE 1 MG: 1 INJECTION, SOLUTION INTRAMUSCULAR; INTRAVENOUS; SUBCUTANEOUS at 20:44

## 2021-11-23 RX ADMIN — FENTANYL CITRATE 50 MCG: 50 INJECTION INTRAMUSCULAR; INTRAVENOUS at 09:25

## 2021-11-23 RX ADMIN — DIPHENHYDRAMINE HYDROCHLORIDE 50 MG: 50 INJECTION, SOLUTION INTRAMUSCULAR; INTRAVENOUS at 09:08

## 2021-11-23 NOTE — PROGRESS NOTES
Pt with chronic BP followed by pain management.   Images reviewed with DR Lauren Pruitt  No surgical intervention      Pain management  Follow up with her pain group as op  May need rehab

## 2021-11-23 NOTE — PROGRESS NOTES
Problem: Falls - Risk of  Goal: *Absence of Falls  Description: Document Tramaine Kowalski Fall Risk and appropriate interventions in the flowsheet. Outcome: Progressing Towards Goal  Note: Fall Risk Interventions:  Mobility Interventions: Bed/chair exit alarm, Communicate number of staff needed for ambulation/transfer, OT consult for ADLs, Patient to call before getting OOB, PT Consult for mobility concerns, PT Consult for assist device competence, Utilize walker, cane, or other assistive device, Utilize gait belt for transfers/ambulation         Medication Interventions: Assess postural VS orthostatic hypotension, Bed/chair exit alarm, Patient to call before getting OOB, Teach patient to arise slowly    Elimination Interventions: Bed/chair exit alarm, Call light in reach, Patient to call for help with toileting needs, Toilet paper/wipes in reach, Toileting schedule/hourly rounds              Problem: Patient Education: Go to Patient Education Activity  Goal: Patient/Family Education  Outcome: Progressing Towards Goal     Problem: Pressure Injury - Risk of  Goal: *Prevention of pressure injury  Description: Document Marin Scale and appropriate interventions in the flowsheet. Outcome: Progressing Towards Goal  Note: Pressure Injury Interventions:  Sensory Interventions: Assess changes in LOC, Assess need for specialty bed, Check visual cues for pain, Discuss PT/OT consult with provider, Float heels, Keep linens dry and wrinkle-free, Maintain/enhance activity level, Minimize linen layers, Monitor skin under medical devices, Pad between skin to skin, Pressure redistribution bed/mattress (bed type), Turn and reposition approx.  every two hours (pillows and wedges if needed)    Moisture Interventions: Absorbent underpads, Apply protective barrier, creams and emollients, Assess need for specialty bed, Check for incontinence Q2 hours and as needed, Internal/External urinary devices, Limit adult briefs, Maintain skin hydration (lotion/cream), Minimize layers, Offer toileting Q_hr    Activity Interventions: Assess need for specialty bed, Increase time out of bed, Pressure redistribution bed/mattress(bed type), PT/OT evaluation    Mobility Interventions: Assess need for specialty bed, Float heels, HOB 30 degrees or less, Pressure redistribution bed/mattress (bed type), PT/OT evaluation, Turn and reposition approx.  every two hours(pillow and wedges)    Nutrition Interventions: Document food/fluid/supplement intake, Offer support with meals,snacks and hydration    Friction and Shear Interventions: Apply protective barrier, creams and emollients, HOB 30 degrees or less, Lift sheet, Lift team/patient mobility team, Minimize layers, Transferring/repositioning devices                Problem: Patient Education: Go to Patient Education Activity  Goal: Patient/Family Education  Outcome: Progressing Towards Goal     Problem: Pain  Goal: *Control of Pain  Outcome: Progressing Towards Goal     Problem: Patient Education: Go to Patient Education Activity  Goal: Patient/Family Education  Outcome: Progressing Towards Goal     Problem: Patient Education: Go to Patient Education Activity  Goal: Patient/Family Education  Outcome: Progressing Towards Goal     Problem: Patient Education: Go to Patient Education Activity  Goal: Patient/Family Education  Outcome: Progressing Towards Goal

## 2021-11-23 NOTE — PROGRESS NOTES
Pt only able to complete lumbar MRI and not thoracic MRI due to size of pt's body habitus.  Receiving RN Ross Paul made aware

## 2021-11-23 NOTE — PROGRESS NOTES
.End of Shift Note    Bedside shift change report given to  (oncoming nurse) by Betito Decker RN (offgoing nurse). Report included the following information SBAR, Procedure Summary, Intake/Output and MAR    Shift worked:  7 a - 7 p     Shift summary and any significant changes:     Patient had difficulty voiding. Bladder scan done. 600ml was found. Magaña placed to relieve pressure and discomfort. Prescribed medications given along with PRN pain medications. Concerns for physician to address:  none     Zone phone for oncoming shift:   [phone]       Activity:  Activity Level: Up with Assistance  Number times ambulated in hallways past shift: 0  Number of times OOB to chair past shift: 0    Cardiac:   Cardiac Monitoring: No      Cardiac Rhythm: Sinus Rhythm    Access:   Current line(s): PIV     Genitourinary:   Urinary status: external catheter    Respiratory:   O2 Device: None (Room air)  Chronic home O2 use?: NO  Incentive spirometer at bedside: NO     GI:  Last Bowel Movement Date: 11/17/21  Current diet:  ADULT DIET Regular; 3 carb choices (45 gm/meal); Low Fat/Low Chol/High Fiber/KAMRON  Passing flatus: YES  Tolerating current diet: YES       Pain Management:   Patient states pain is manageable on current regimen: YES    Skin:  Marin Score: 16  Interventions: PT/OT consult    Patient Safety:  Fall Score:  Total Score: 3  Interventions: gripper socks  High Fall Risk: Yes    Length of Stay:  Expected LOS: 2d 21h  Actual LOS: [address], RN

## 2021-11-23 NOTE — PROGRESS NOTES
Problem: Mobility Impaired (Adult and Pediatric)  Goal: *Acute Goals and Plan of Care (Insert Text)  Description: FUNCTIONAL STATUS PRIOR TO ADMISSION: Patient was modified independent using a rolling walker for functional mobility. HOME SUPPORT PRIOR TO ADMISSION: The patient lived with her  and Mother and didn't require assistance with adls. Physical Therapy Goals  Initiated 11/21/2021  1. Patient will move from supine to sit and sit to supine , scoot up and down, and roll side to side in bed with modified independence within 7 day(s). 2.  Patient will transfer from bed to chair and chair to bed with modified independence using the least restrictive device within 7 day(s). 3.  Patient will perform sit to stand with modified independence within 7 day(s). 4.  Patient will ambulate with modified independence for 100 feet with the least restrictive device within 7 day(s). 5.  Patient will ascend/descend 3 stairs with  handrail(s) with modified independence within 7 day(s). Outcome: Progressing Towards Goal   PHYSICAL THERAPY TREATMENT  Patient: Elis Kevin (30 y.o. female)  Date: 11/23/2021  Diagnosis: Intractable back pain [M54.9]   <principal problem not specified>       Precautions: Fall  Chart, physical therapy assessment, plan of care and goals were reviewed. ASSESSMENT  Patient continues with skilled PT services and is progressing towards goals. Pt with improved mobility to edge of bed today able to scoot LEs to side and with elevated HOB and rail come to sitting with mod A using therapist's arm as stable pull point to finish move to sit. Pt's sitting balance is good. She was able to attempt sit to stand with max A of 2 with delayed recruitment of hip extension but able to come to near full stand and maintain for ~45 sec while linen of bed adjusted. Pt working on scooting laterally in sitting with mod A and limited scoot each time but able to repeat x4-5.  Pt also able to work in sidelying and supine with use of UEs on rail to scoot to [facility] with trendelenburg position of bed with CGA and vcs for technique. Pt placed in chair position of bed to facilitate upright tolerance and eating of lunch. Pt completed MRI spine with multiple areas of stenosis; she had been able to amb in her home with walker from room to room on her own; she would be appropriate for SNF rehab to return to baseline and allow safe return to home. Current Level of Function Impacting Discharge (mobility/balance): mod to max A of 1-2, see above details    Other factors to consider for discharge: fall risk, standing tolerance limited, not able yet to take steps with walker; very motivated to improve         PLAN :  Patient continues to benefit from skilled intervention to address the above impairments. Continue treatment per established plan of care. to address goals. Recommendation for discharge: (in order for the patient to meet his/her long term goals)  Therapy up to 5 days/week in SNF setting    This discharge recommendation:  Has been made in collaboration with the attending provider and/or case management    IF patient discharges home will need the following DME: to be determined (TBD)       SUBJECTIVE:   Patient stated My legs just give out.     OBJECTIVE DATA SUMMARY:   Critical Behavior:  Neurologic State: Alert, Eyes open spontaneously  Orientation Level: Oriented X4  Cognition: Follows commands  Safety/Judgement: Fall prevention (LE weakness)  Functional Mobility Training:  Bed Mobility:     Supine to Sit: Moderate assistance; Bed Modified; Other (comment) (HOB elev; pt using UE hand hold for stabilization on PT ar,)  Sit to Supine: Minimum assistance; Moderate assistance; Assist x2; Bed Modified  Scooting: Moderate assistance; Contact guard assistance; Bed Modified;  Other (comment) (vcs; sitting scoot mod A,supine/sidelying scoot CGA,see note)        Transfers:  Sit to Stand: Maximum assistance; Assist x2  Stand to Sit: Maximum assistance; Assist x2        Bed to Chair:  (unable to safely take steps to chair at this time)                    Balance:  Sitting: Intact  Standing: Impaired  Standing - Static: Constant support; Poor  Standing - Dynamic : Constant support; Poor  Ambulation/Gait Training:      Activity Tolerance:   Fair    After treatment patient left in no apparent distress:   Call bell within reach, Side rails x 3, and bed in chair position    COMMUNICATION/COLLABORATION:   The patients plan of care was discussed with: Occupational therapist and Case management.      Daya Nieves, PT   Time Calculation: 18 mins

## 2021-11-23 NOTE — PROGRESS NOTES
Problem: Self Care Deficits Care Plan (Adult)  Goal: *Acute Goals and Plan of Care (Insert Text)  Description: FUNCTIONAL STATUS PRIOR TO ADMISSION: Patient was modified independent using a rolling walker for functional mobility. Sits and sponge bathes in a chair next to her bed and then stands in the shower to rinse off, only wears slip on shoes and family assists with socks if pt wears them, performed all other ADLS on her own, family performs [address]: The patient lived with  and mother whom assist as needed. Occupational Therapy Goals:  Initiated 11/21/2021  1. Patient will perform grooming seated with modified independence within 7 days. 2. Patient will perform lower body dressing with excluding socks modified independence within 7 days. 3. Patient will perform toileting with modified independence within 7 days. 4. Patient will transfer from toilet with modified independence using the least restrictive device and appropriate durable medical equipment within 7 days. Outcome: Not Progressing Towards Goal     OCCUPATIONAL THERAPY TREATMENT  Patient: Flo Moritz (65 y.o. female)  Date: 11/23/2021  Diagnosis: Intractable back pain [M54.9]   <principal problem not specified>       Precautions: Fall, Back, Skin  Chart, occupational therapy assessment, plan of care, and goals were reviewed. ASSESSMENT  Patient continues with skilled OT services and is not progressing towards goals. Pt s/p MRI of L/s with significant stenosis with canal stenosis and neuroforaminal stenosis. Pt reports walking only room to room with RW at baseline. Pt agreeable to attempting to sit in bedside recliner. Pt able to sit EOB w/ HOB elevated with increased time. Pt reports difficulty in standing, but willing to attempt. Pt stood w/ maxA, but only able to stand ~15 seconds, before needing to sit.  Used reverse trendelenburg of bed for pt to attempt to move self in bed w/ increased time. Pt placed I chair position for simple grooming and self-feeding. Pt appreciative and agrees that she will benefit from Rehab. Current Level of Function Impacting Discharge (ADLs): max/total assist for LE and standing ADLs. Pt setup for seated ADLs. Pt limited by habitus    Other factors to consider for discharge: limited support system; pt reports spouse works and is in failing health and mother is in her [de-identified]         PLAN :  Patient continues to benefit from skilled intervention to address the above impairments. Continue treatment per established plan of care to address goals. Recommend with staff: chair position of bed for all meals    Recommend next OT session: seated EOB ADLs; attempt with SPT    Recommendation for discharge: (in order for the patient to meet his/her long term goals)  Therapy up to 5 days/week in SNF setting    This discharge recommendation:  Has been made in collaboration with the attending provider and/or case management    IF patient discharges home will need the following DME: To Be Determined (TBD) at next level of care        SUBJECTIVE:   Patient stated I'll try.     OBJECTIVE DATA SUMMARY:   Cognitive/Behavioral Status:  Neurologic State: Alert; Appropriate for age  Orientation Level: Appropriate for age; Oriented X4  Cognition: Follows commands; Appropriate decision making; Appropriate for age attention/concentration; Appropriate safety awareness  Perception: Appears intact  Perseveration: No perseveration noted  Safety/Judgement: Awareness of environment; Fall prevention; Insight into deficits    Functional Mobility and Transfers for ADLs:  Bed Mobility:  Supine to Sit: Moderate assistance; Bed Modified (HOB elevated)  Sit to Supine: Minimum assistance; Moderate assistance  Scooting: Moderate assistance; Bed Modified (reverse Trendelenberg)    Transfers:  Sit to Stand: Maximum assistance;  Adaptive equipment  Bed to Chair:  (unable to safely take steps to chair at this time)    Balance:  Sitting: Intact  Standing: Impaired  Standing - Static: Constant support; Poor  Standing - Dynamic : Constant support; Poor    ADL Intervention:  Feeding  Feeding Assistance: Modified independent  Drink to Mouth: Independent    Grooming  Washing Face: Set-up    Toileting  Bladder Hygiene: Total assistance (dependent) (concepcion)    Cognitive Retraining  Safety/Judgement: Awareness of environment; Fall prevention; Insight into deficits    Pain:  5/10    Activity Tolerance:   requires frequent rest breaks; fair tolerance for seated activities    After treatment patient left in no apparent distress:   Call bell within reach and Side rails x 3    COMMUNICATION/COLLABORATION:   The patients plan of care was discussed with: Physical therapist and Registered nurse.      Edith Conn  Time Calculation: 23 mins

## 2021-11-23 NOTE — PROGRESS NOTES
Occupational Therapy Treatment Attempt    Chart reviewed. Attempted Occupational Therapy Treatment, however, patient unable to be seen due to:  []  Nausea/vomiting  []  Eating  []  Pain  []  Patient too lethargic  [x]  Off Unit for testing/procedure/having sedated MRI  []  Dialysis treatment in progress  []  Telemetry Results  []  Other:     Will f/u later as patient's schedule allows.    Thank you for this referral.  Edith Chu, OTR/L, CSRS, CDCS, CFPS

## 2021-11-23 NOTE — PROGRESS NOTES
End of Shift Note    Bedside shift change report given to ALYCIA Palacios (oncoming nurse) by Rosa Isela Ellington RN (offgoing nurse).   Report included the following information SBAR, Kardex, Intake/Output and MAR    Shift worked:  7p-7a     Shift summary and any significant changes:     Pt stable, scheduled meds given, PRN pain meds given for pain in buttocks, hip and legs, pt did have 3 voids during the shift and bladder scanned her once (scan 266 mL)  Pt was able to ambulate *1 with walker to the bathroom (measurement hat present now) dyspnea on exertion  NPO since 0000 for MRI with conscious sedation today - will double check that MRI form is completed   Concerns for physician to address:  none     Zone phone for oncoming shift:   [phone]           Rosa Isela Ellington RN

## 2021-11-23 NOTE — PROGRESS NOTES
End of Shift Note    Bedside shift change report given to Lucy Miranda RN (oncoming nurse) by Jaspal Ann RN (offgoing nurse). Report included the following information SBAR, Kardex and MAR    Shift worked:  7a - 7p     Shift summary and any significant changes:     Pt transferred to Oncology. Meds given and education provided regarding all meds. PRN dilaudid given once for left leg pain and PRN zofran given once for nausea. Hourly rounding completed. Pt remained in bed. Purwick placed on pt. Concerns for physician to address:       Zone phone for oncoming shift:   [phone]       Activity:  Activity Level: Up with Assistance  Number times ambulated in hallways past shift: 0  Number of times OOB to chair past shift: 0    Cardiac:   Cardiac Monitoring: No           Access:   Current line(s): PIV     Genitourinary:   Urinary status: external catheter    Respiratory:   O2 Device: None (Room air)  Chronic home O2 use?: NO  Incentive spirometer at bedside: NO     GI:  Last Bowel Movement Date:  (pt unsure)  Current diet:  ADULT DIET Regular; Low Fat/Low Chol/High Fiber/2 gm Na  DIET NPO  Passing flatus: YES  Tolerating current diet: YES       Pain Management:   Patient states pain is manageable on current regimen: YES    Skin:  Marin Score: 16  Interventions: turn team, speciality bed, float heels, increase time out of bed, PT/OT consult, limit briefs, internal/external urinary devices and nutritional support     Patient Safety:  Fall Score:  Total Score: 3  Interventions: bed/chair alarm, assistive device (walker, cane, etc), gripper socks, pt to call before getting OOB and gait belt  High Fall Risk: Yes    Length of Stay:  Expected LOS: 2d 21h  Actual LOS: 3      Jaspal Ann, ALYCIA

## 2021-11-23 NOTE — PROGRESS NOTES
Physical Therapy    Pt unable to be seen at this time, YAKOV for MRI. Will follow.     Xiao Mendoza PT

## 2021-11-23 NOTE — PROGRESS NOTES
Transition of Care Plan:     RUR: 11%  Disposition: SNF placement   >insurance authorization required for placement   Follow up appointments: PCP  DME needed: TBD - has shower chair, RW, cane at home  Transportation at Discharge: Xiomara Gann ([phone])  Hoda Lopez or means to access home: Yes     IM Medicare Letter: N/A  Is patient a BCPI-A Bundle: N/A                     If yes, was Bundle Letter given?: N/A   Caregiver Contact: Xiomara Gann ([phone])                                     [address] ([phone])  Discharge Caregiver contacted prior to discharge? Pt to contact      Chart reviewed. CM continuing to follow for discharge planning. Noted recommendations for SNF placement at discharge. Met with pt at bedside to obtain choice. Pt has chosen [address] [place] [address], [address] [place] [address],[address] [facility], and [facility]. Referrals sent via CC link and [employer] which are pending review. Pt will require insurance authorization for placement. Will continue to follow.     Isabel Sow MSW   Care Manager, [address]  [phone]

## 2021-11-23 NOTE — PROGRESS NOTES
Name of procedure: MRI with moderate sedation     Sedation medications given: 2 mg Versed, 50 mcg fentanyl     Sedation tolerated: Yes    Total Sedation time: 15 minutes    Vital Signs: Stable    Fluids removed: No    Samples sent to lab: No    Any complications related to procedure: No    Post Procedure Care Needed/order sets placed in [facility] care.

## 2021-11-23 NOTE — PROGRESS NOTES
Hospitalist Progress Note    NAME: Afshin Liang   :  1960   MRN:  [id number]     I reviewed pertinent labs and imaging, and discussed /agreed on the plan of care with Dr. Felisha Figueroa. Assessment / Plan:  Acute on chronic lower back pain  Debilty r/t Super Morbid Obesity BMI 72  -CT Abdo/Pelv        1. Partial staghorn calculus in the lower pole of the right kidney. 2. Degenerative changes in the spine. Minimal anterior listhesis of L4 on L5  with disc osteophyte complex formation/degenerative disc disease at L5/S1. 3. There is fullness in the left side of the labia/left of the introitus. Recommend physical exam and possible referral to gynecology. 4. Incidental findings as above. -Patient here through the weekend for MRI with sedation   -MRI Lumbar Spine only partially completed with super morbid obesity   -MRI Lumbar Spine        1. Multilevel stable spondylitic changes as above, most severe at L3-S1.       2.  Severe canal stenosis at L3.       3.  Moderate canal stenosis with severe left and moderate right neuroforaminal  narrowing at L4-L5. 4.  Severe bilateral neuroforaminal narrowing at L5-S1.       5.  Additional findings as above. -Will need to follow up with GYN OP with fullness to left labia. -Appreciate Ortho input - No surgical intervention planned  -Appreciate PT/OT evaluation - recommend SNF   -Appreciate CM - sending referrals to SNF       Urinary Retention   -Patient has had difficulty with urination since admission. Suspect immobility is playing a role.    -Bladder scan with 600 mL   -Has been straight cathed several times   -Place concepcion   -Consult to Urology     Hypertension Continue ASA, losartan, metoprolol and prazosin   Hyperlipidemia Continue atorvastatin   Seizure disorder Continue topamax   Hypothyroidism Continue levothyroxine   Type II Diabetes with peripheral neuropathy Continue SSI and gabapentin   Anxiety  Depression Continue seroquel, buspar, wellbutrin     40 or above Morbid obesity / Body mass index is 72.68 kg/m². Estimated discharge date: November 24  Barriers: SNF placement/insurance Enzo Fernandes, Urology evaluation     Code status: Full  Prophylaxis: Lovenox  Recommended Disposition: SNF/LTC     Subjective:     Chief Complaint / Reason for Physician Visit  Patient seen at bedside after MRI. Reports that she is still having pain radiating down leg from back. Discussed plan of care, patient verbalized understanding/agreement. Discussed with RN events overnight. Review of Systems:  Symptom Y/N Comments  Symptom Y/N Comments   Fever/Chills n   Chest Pain n    Poor Appetite n   Edema n    Cough n   Abdominal Pain n    Sputum n   Joint Pain y    SOB/DIOP n   Pruritis/Rash n    Nausea/vomit n   Tolerating PT/OT y    Diarrhea n   Tolerating Diet y    Constipation n   Other       Could NOT obtain due to:      Objective:     VITALS:   Last 24hrs VS reviewed since prior progress note. Most recent are:  Patient Vitals for the past 24 hrs:   Temp Pulse Resp BP SpO2   11/23/21 0950  75 16 (!) 99/47 94 %   11/23/21 0940  84 16 (!) 156/51 99 %   11/23/21 0935  80 15 135/68 100 %   11/23/21 0930  80 18 138/69 99 %   11/23/21 0925  80 16 (!) 141/72 99 %   11/23/21 0843 98.7 °F (37.1 °C) 84 16 (!) 139/43 97 %   11/23/21 0803 98.5 °F (36.9 °C) 77 18 (!) 104/50 97 %   11/22/21 2257 98.6 °F (37 °C) 90 18 110/60 95 %   11/22/21 1942 99.5 °F (37.5 °C) 88 18 138/65 95 %   11/22/21 1914     94 %   11/22/21 1732 99 °F (37.2 °C) 77 18 134/61 100 %   11/22/21 1623 98.8 °F (37.1 °C) 80 18 117/63 98 %       Intake/Output Summary (Last 24 hours) at 11/23/2021 1415  Last data filed at 11/23/2021 1323  Gross per 24 hour   Intake    Output 1250 ml   Net -1250 ml        I had a face to face encounter and independently examined this patient on 11/23/2021, as outlined below:  PHYSICAL EXAM:  General: WD, WN.  Alert, cooperative, super morbidly obese female in no acute distress    EENT:  EOMI. Anicteric sclerae. MMM  Resp:  CTA bilaterally, no wheezing or rales. No accessory muscle use  CV:  Regular  rhythm,  No edema  GI:  Soft, obese, Non tender. +Bowel sounds  Neurologic:  Alert and oriented X 3, normal speech,   Psych:   Good insight. Not anxious nor agitated  Skin:  No rashes. No jaundice    Reviewed most current lab test results and cultures  YES  Reviewed most current radiology test results   YES  Review and summation of old records today    NO  Reviewed patient's current orders and MAR    YES  PMH/SH reviewed - no change compared to H&P  ________________________________________________________________________  Care Plan discussed with:    Comments   Patient x    Family      RN x    Care Manager x    Consultant  x Ortho                      Multidiciplinary team rounds were held today with , nursing, pharmacist and clinical coordinator. Patient's plan of care was discussed; medications were reviewed and discharge planning was addressed. ________________________________________________________________________  Total NON critical care TIME:  25   Minutes    Total CRITICAL CARE TIME Spent:   Minutes non procedure based      Comments   >50% of visit spent in counseling and coordination of care x    ________________________________________________________________________  Shaggy King NP     Procedures: see electronic medical records for all procedures/Xrays and details which were not copied into this note but were reviewed prior to creation of Plan. LABS:  I reviewed today's most current labs and imaging studies. Pertinent labs include:  No results for input(s): WBC, HGB, HCT, PLT, HGBEXT, HCTEXT, PLTEXT in the last 72 hours. No results for input(s): NA, K, CL, CO2, GLU, BUN, CREA, CA, MG, PHOS, ALB, TBIL, TBILI, ALT, INR, INREXT in the last 72 hours.     No lab exists for component: SGOT    Signed: Shaggy King, KERRI

## 2021-11-23 NOTE — CONSULTS
Urology Consult    Patient: Isabela Corley MRN: [id number]  SSN: [id number]    YOB: 1960  Age: 64 y.o. Sex: female          Date of Consultation:  November 23, 2021  Requesting Physician: Ivory Zimmerman MD  Reason for Consultation:            Assessment/Plan:  Urinary retention   Frequent UTIs- 11/19 urine cx NGTD  Partial staghorn calculus in the lower pole of the right kidney    -continue concepcion another 7 days. Likely pt going to SNF in the next day or so. Can continue concepcion through SNF admission and have arranged OP follow up with urology for voiding trial and stone work up  -nursing to provide concepcion teaching and daily perineal care  -will sign off, please call if need    Supervising MD, Dr. Krunal Beck        History of Present Illness:  Patient is a 64 y.o. female admitted 11/19/2021 to the hospital for Intractable back pain [M54.9]. Stay has been prolonged due to acute on chronic lower back pain with increased immobility due to morbid obesity. MRIs showing severe spinal stenosis however no inpatient ortho intervention. Pending GYN eval for labial swelling    Patient states having periods of incomplete urinary emptying since admission requiring straight caths intermittently. Pending MRI today. Pt states her retention today was 600ml and concepcion was placed. Urine at bedside draining clear yellow UA. Pt morbidly obese, poor mobility. Denies retention hx although is frustrated with frequent utis OP. Known pt of [facility]. Seen for frequent UTIs, with atrophic vaginitis by our uro/gyn however has a known hx of kidney stones. She was recently seen by our office. Reference last office note. Chart reviewed:  Af,vss  Good output from concepcion  HD stable  UA on admission with some leuks, 20-50 wbc and 20-50 rbc, urine cx NGTD  Creat wnl   CT abd 11/19/21 personally reviewed:  Donnia Meals noted.  No other urologic process    ==last office note below==  Jose Ramon Carrillo is a 64 year old female who presents today for \"recurrent UTI\". She returns for follow-up. I last saw her 2021 for recurrent UTIs. She has been asymptomatic for UTI since then. She saw Dr. Deny Becerra 10/5/2021 for vaginal yeast infection. A urine culture was sent that grew out Proteus resistant to tetracyclines. She states that she typically presents with lower back pain with UTIs, does feel some mildly today but does not suspect UTI. We plan to send a culture in case she develops clear symptoms over the next few days. She has a history of kidney stones and is status post cystoscopy, right retrograde pyelogram with right ureteroscopy, holmium laser lithotripsy and right double-J stent insertion under fluoroscopy 2019. PCP:Urine culture dated 21 + for Klebsiella and Proteus, urine culture dated 3/5/21 + for E. coli. She had a hysterectomy and BSO. She has chronic lower back pain. PMH also includes diabetes, hypertension. Problem: Recurrent UTIs  Location: Urinary tract  Onset: Months ago  Timin in 6 months  Severity: Moderate  Context: Postmenopausal  Modifying Factors: antibiotics  Associated Symptoms: Dysuria, frequency, nocturia    PAST MEDICAL HISTORY:    Allergies: PHENERGAN (Critical)  CODEINE (Severe)  BACTRIM (SULFAMETHOXAZOLE-TRIMETHOPRIM) (Severe)  * CELECOXIB (Severe)  SULFA (SULFADIAZINE) (Severe)  ADHESIVE TAPE (ADHESIVE TAPE) (Moderate)  * LATEX (Moderate)  DENIES: Shellfish, X-Ray Dye, Iodine.      Medications: estradiol 0.01% (0.1 mg/gram) cream (estradiol) Apply 1 a small amount into vagina three times a week , pea-sized amount fingertip application  ipratropium-albuterol 0.5 mg-3 mg(2.5 mg base)/3 mL solution for nebulization (ipratropium-albuterol)   quetiapine 300 mg tablet (quetiapine) 2 tablet   * ASPIR-81 81 MG ORAL TABLET DELAYED RELEASE once a day   ibuprofen 800 mg tablet (ibuprofen) as needed   albuterol sulfate 2.5 mg/3 mL (0.083 %) solution for nebulization (albuterol sulfate) as needed   Trelegy Ellipta 100-62.5-25 mcg blister with device (fluticasone-umeclidin-vilanter)   bupropion HCl 150 mg tablet extended release 24 hr (bupropion hcl)   buspirone 15 mg tablet (buspirone)   simvastatin 20 mg tablet (simvastatin)   potassium chloride 10 mEq tablet extended release (potassium chloride)   levothyroxine 112 mcg tablet (levothyroxine)   ergocalciferol (vitamin D2) 1,250 mcg (50,000 unit) capsule (ergocalciferol (vitamin d2))   prazosin 2 mg capsule (prazosin)   fluticasone propionate 50 mcg/actuation spray,suspension (fluticasone propionate)   ondansetron 4 mg tablet,disintegrating (ondansetron)   metoprolol succinate 25 mg tablet extended release 24 hr (metoprolol succinate)   gabapentin 100 mg capsule (gabapentin)   topiramate 200 mg tablet (topiramate)   montelukast 10 mg tablet (montelukast)   metformin 1,000 mg tablet (metformin)   omeprazole 40 mg capsule,delayed release(DR/EC) (omeprazole)   valsartan 80 mg tablet (valsartan)   hydrocodone-acetaminophen 5-325 mg tablet (hydrocodone-acetaminophen)     Problems: Vaginal itching (ICD-698.1) (OQE92-I48.8)  Postmenopausal atrophic vaginitis (ICD-627.3) (NVD99-S34.2)  Recurrent UTI (ICD-599.0) (BXO82-V84.0)  Pelvic mass (ICD-789.30) (STL44-D52.00)  Obesity (ICD-278.00) (KZE37-T02.9)  Renal Calculus (ICD-592.0) (PVM94-A04.0)  Generalized back pain (ICD-724.5) (ZPT48-F46.9)    Illnesses: Lung Disease, Diabetes, High Blood Pressure, and Kidney Problems. DENIES: Heart Disease, Pacemaker/Defibrillator, Bowel Problems, Stroke/Seizure, Bleeding Problems, HIV, Hepatitis, or Cancer. Surgeries: Gallbladder Surgery, Hysterectomy, Ovaries Removed (Both), and Tubal Ligation. Family History: Kidney Stones. DENIES: Kidney cancer, Kidney disease, Breast cancer, Uterine cancer, Cervical cancer, Ovarian cancer. Social History: Unemployed. . Smoking status: never smoker. Does not drink alcohol.      System Review: Admits to: Constipation, Difficulty Walking, Dry Mouth, Dry Skin, Involuntary Urine Loss, Leg Swelling, Lower Extremity Weakness, Psychiatric Problems, and Shortness of Breath. DENIES: Unexplained Weight Loss, Dry Eyes, Impaired Sex Drive, Easy Bleeding, Rash. EXAMINATION: Appearance: in NAD Neck: supple Respiratory Effort: breathing easily Lower Extremity: no edema Lymph: no adenopathy Skin Inspection: warm and dry Orientation: oriented to person; time and place Mood/Affect: normal       URINALYSIS from Voided specimen  Urine Dip: pH: 6.0, Bld: +, LE: +, Nit: Neg, Prot: ++, Ket: Neg, Gluc: Neg  Urine Micro: WBC: 6-10, RBC: 0, Bacteria: 3-5    IMPRESSION:    1. RECURRENT UTI (CVQ80-P90.0) - Improved: Asymptomatic today. Referring to urology with KUB assess for stones given history and Proteus on last culture. Continue topical estradiol cream.     2. POSTMENOPAUSAL ATROPHIC VAGINITIS (NPM80-B20.2) - Unchanged: Continue topical estradiol cream.     PLAN: All questions and concerns were addressed. The patient understands and agrees with the plan. She will follow up next available LENA and Ted, sooner with any questions or problems. cc: Romain Herrera, DO  [facility]  E&M Service, Urinalysis Microscopic    Upcoming Orders  Return Office Visit - with  [facility]ray Room at previously scheduled appointment  [address] [place] [address] 12/16/2021  10:00 AM   LENA Longoria Physician      Electronically signed by Janette Mccrary NP on 11/04/2021 at 1:32 PM    ________________________________________________________________________            Past Medical History: Allergies   Allergen Reactions    Celebrex [Celecoxib] Nausea Only    Ibuprofen Other (comments)     Does not want r/t COPD history.      Methocarbamol Shortness of Breath    Morphine Nausea Only    Sulfa (Sulfonamide Antibiotics) Nausea Only    Tramadol Other (comments)     Does not want r/t COPD    Adhesive Other (comments)     Red and bumpy      Codeine Other (comments)     Hyper activity      Prior to Admission medications    Medication Sig Start Date End Date Taking? Authorizing Provider   oxyCODONE-acetaminophen (PERCOCET) 5-325 mg per tablet Take 1 Tab by mouth two (2) times a day. Indications: pain    Other, MD Ashish   albuterol-ipratropium (DUO-NEB) 2.5 mg-0.5 mg/3 ml nebu 3 mL by Nebulization route every four (4) hours as needed (COPD). 8/22/19   Rah Rees NP   fluticasone-umeclidinium-vilanterol (TRELEGY ELLIPTA) 100-62.5-25 mcg inhaler Take 1 Puff by inhalation daily. Provider, Historical   metFORMIN (GLUCOPHAGE) 1,000 mg tablet Take 1,000 mg by mouth two (2) times a day. Provider, Historical   fluticasone propionate (FLONASE) 50 mcg/actuation nasal spray 2 Sprays by Both Nostrils route daily. Provider, Historical   liraglutide (VICTOZA) 0.6 mg/0.1 mL (18 mg/3 mL) pnij 1.8 mg by SubCUTAneous route daily. Provider, Historical   losartan (COZAAR) 25 mg tablet Take 25 mg by mouth daily. Provider, Historical   busPIRone (BUSPAR) 15 mg tablet Take 15 mg by mouth three (3) times daily. Provider, Historical   QUEtiapine (SEROQUEL) 400 mg tablet Take 600 mg by mouth nightly. Provider, Historical   nystatin, bulk, 15 billion unit powd 1 Units by Does Not Apply route three (3) times daily. To skin folds, ventral fold 12/26/18   Reyna Black MD   albuterol (PROVENTIL HFA, VENTOLIN HFA, PROAIR HFA) 90 mcg/actuation inhaler Take 2 Puffs by inhalation every four (4) hours as needed for Wheezing. 12/26/18   Reyna Black MD   buPROPion XL (WELLBUTRIN XL) 150 mg tablet Take 300 mg by mouth nightly. Provider, Historical   prazosin (MINIPRESS) 1 mg capsule Take 4 mg by mouth nightly. Provider, Historical   ergocalciferol (VITAMIN D2) 50,000 unit capsule Take 50,000 Units by mouth every seven (7) days. Provider, Historical   estradiol (ESTRACE) 0.5 mg tablet Take 0.5 mg by mouth daily. Provider, Historical   miconazole (MICOTIN) 2 % topical cream Apply 1 Each to affected area two (2) times a day. Apply ointment to fungal infection under abdominal pannus and groin twice a day for 14 days. Provider, Historical   aspirin 81 mg chewable tablet Take 81 mg by mouth daily. Felix Arroyo NP   gabapentin (NEURONTIN) 100 mg capsule Take 1 Cap by mouth three (3) times daily. 11/24/17   Kevin Blanco MD   levothyroxine (SYNTHROID) 112 mcg tablet Take 112 mcg by mouth Daily (before breakfast). Provider, Historical   metoprolol succinate (TOPROL XL) 25 mg XL tablet Take 25 mg by mouth daily. Provider, Historical   omeprazole (PRILOSEC) 20 mg capsule Take 40 mg by mouth daily. Provider, Historical   topiramate (TOPAMAX) 200 mg tablet Take 200 mg by mouth nightly. 12/8/10   Provider, Historical   simvastatin (ZOCOR) 20 mg tablet Take 20 mg by mouth nightly. Other, MD Ashish      PMHx:  has a past medical history of Adverse effect of anesthesia, Anxiety and depression, Arrhythmia, Arthritis, Chronic pain, Diabetes ([address] GERD (gastroesophageal reflux disease), Hypertension, Hypothyroidism, Kidney stone, Liver disease, Obesity, morbid, BMI 50 or higher ([address] and Seizures ([address] She also has no past medical history of Difficult intubation, Malignant hyperthermia due to anesthesia, Nausea & vomiting, or Pseudocholinesterase deficiency. PSurgHx:  has a past surgical history that includes hx cholecystectomy; hx appendectomy; hx hysterectomy; hx tubal ligation; cardiac catheterization (11/18/2010); and hx breast biopsy (Right). PSocHx:  reports that she has quit smoking. She has never used smokeless tobacco. She reports current drug use. Drugs: Prescription and OTC. She reports that she does not drink alcohol. ROS:  Admission ROS by Pavan Neil MD from 11/19/2021 were reviewed with the patient and changes (other than per HPI) include: none.     Physical Exam    General Appearance: NAD, awake  HENT: atraumatic, normal ears  Cardiovascular: not tachycardic, no LE edema  Respiratory: no distress, room air  Abdomen: soft, no suprapubic fullness or tenderness  : no CVA tenderness,concepcion draining clear yellow UA  Extremities: moves all  Musculoskeletal: normal alignment of neck and head  Neuro: Appropriate, no focal neurological deficits  Mood/Affect: appropriate, A&O x 3      Lab Results   Component Value Date/Time    WBC 7.2 11/20/2021 09:45 AM    HCT 28.6 (L) 11/20/2021 09:45 AM    PLATELET 917 72/37/1726 09:45 AM    Sodium 139 11/20/2021 09:45 AM    Potassium 3.6 11/20/2021 09:45 AM    Chloride 109 (H) 11/20/2021 09:45 AM    CO2 23 11/20/2021 09:45 AM    BUN 13 11/20/2021 09:45 AM    Creatinine 0.85 11/20/2021 09:45 AM    Glucose 163 (H) 11/20/2021 09:45 AM    Calcium 9.4 11/20/2021 09:45 AM    Magnesium 1.5 (L) 11/20/2021 09:45 AM    INR 1.0 07/25/2014 09:49 AM       UA:   Lab Results   Component Value Date/Time    Color YELLOW/STRAW 11/19/2021 06:34 PM    Appearance CLEAR 11/19/2021 06:34 PM    Specific gravity 1.018 11/19/2021 06:34 PM    Specific gravity 1.010 08/20/2019 12:46 PM    pH (UA) 6.5 11/19/2021 06:34 PM    Protein Negative 11/19/2021 06:34 PM    Glucose Negative 11/19/2021 06:34 PM    Ketone Negative 11/19/2021 06:34 PM    Bilirubin Negative 11/19/2021 06:34 PM    Urobilinogen 0.2 11/19/2021 06:34 PM    Nitrites Negative 11/19/2021 06:34 PM    Leukocyte Esterase SMALL (A) 11/19/2021 06:34 PM    Epithelial cells FEW 11/19/2021 06:34 PM    Bacteria Negative 11/19/2021 06:34 PM    WBC 20-50 11/19/2021 06:34 PM    RBC 20-50 11/19/2021 06:34 PM           Signed By: Ariel Fuentes NP  - November 23, 2021

## 2021-11-23 NOTE — PROGRESS NOTES
Problem: Falls - Risk of  Goal: *Absence of Falls  Description: Document Earma Ernesto Fall Risk and appropriate interventions in the flowsheet.   Outcome: Progressing Towards Goal  Note: Fall Risk Interventions:  Mobility Interventions: Bed/chair exit alarm, Communicate number of staff needed for ambulation/transfer, OT consult for ADLs, PT Consult for mobility concerns, Utilize walker, cane, or other assistive device         Medication Interventions: Bed/chair exit alarm, Evaluate medications/consider consulting pharmacy, Patient to call before getting OOB, Teach patient to arise slowly, Assess postural VS orthostatic hypotension    Elimination Interventions: Call light in reach, Patient to call for help with toileting needs, Toilet paper/wipes in reach, Toileting schedule/hourly rounds, Stay With Me (per policy), Bed/chair exit alarm

## 2021-11-23 NOTE — PROGRESS NOTES
GYN COSULT  Re: left vulvar mass seen on CT  HPI  62yo P2 admitted for back pain. Gyn consulted for left vulvar fullness.  Pt reports some mild vulvar discomfor, +low grade fevers      PMH: diabetes, up to date on mammo, never had colonoscopy  Hypothyroid  Fibromyalgia  HTN    Surgery: hyst, appy, GB  Meds: see list    ROS -    PE vss/afeb  gen NAD  Ab soft, nt  Pelvic +tender fullness left posterior vulva, +perianal  No masses  Upper vulva and vag neg, no lesions    A/p perirectal cellulitis  Start abx  Consult colorectal

## 2021-11-24 LAB
GLUCOSE BLD STRIP.AUTO-MCNC: 127 MG/DL (ref 65–117)
GLUCOSE BLD STRIP.AUTO-MCNC: 149 MG/DL (ref 65–117)
GLUCOSE BLD STRIP.AUTO-MCNC: 170 MG/DL (ref 65–117)
GLUCOSE BLD STRIP.AUTO-MCNC: 185 MG/DL (ref 65–117)
SERVICE CMNT-IMP: ABNORMAL

## 2021-11-24 PROCEDURE — 74011250636 HC RX REV CODE- 250/636: Performed by: GENERAL ACUTE CARE HOSPITAL

## 2021-11-24 PROCEDURE — 97535 SELF CARE MNGMENT TRAINING: CPT

## 2021-11-24 PROCEDURE — 74011000250 HC RX REV CODE- 250: Performed by: INTERNAL MEDICINE

## 2021-11-24 PROCEDURE — 74011250637 HC RX REV CODE- 250/637: Performed by: INTERNAL MEDICINE

## 2021-11-24 PROCEDURE — 97530 THERAPEUTIC ACTIVITIES: CPT

## 2021-11-24 PROCEDURE — 74011250637 HC RX REV CODE- 250/637: Performed by: NURSE PRACTITIONER

## 2021-11-24 PROCEDURE — 74011250636 HC RX REV CODE- 250/636: Performed by: SPECIALIST

## 2021-11-24 PROCEDURE — 74011000258 HC RX REV CODE- 258: Performed by: SPECIALIST

## 2021-11-24 PROCEDURE — 94640 AIRWAY INHALATION TREATMENT: CPT

## 2021-11-24 PROCEDURE — 74011250636 HC RX REV CODE- 250/636: Performed by: NURSE PRACTITIONER

## 2021-11-24 PROCEDURE — 51798 US URINE CAPACITY MEASURE: CPT

## 2021-11-24 PROCEDURE — 74011636637 HC RX REV CODE- 636/637: Performed by: GENERAL ACUTE CARE HOSPITAL

## 2021-11-24 PROCEDURE — 65270000015 HC RM PRIVATE ONCOLOGY

## 2021-11-24 PROCEDURE — 82962 GLUCOSE BLOOD TEST: CPT

## 2021-11-24 PROCEDURE — 74011250637 HC RX REV CODE- 250/637: Performed by: GENERAL ACUTE CARE HOSPITAL

## 2021-11-24 RX ORDER — PRAZOSIN HYDROCHLORIDE 2 MG/1
4 CAPSULE ORAL
COMMUNITY
End: 2021-11-29

## 2021-11-24 RX ORDER — PRAZOSIN HYDROCHLORIDE 2 MG/1
4 CAPSULE ORAL
Status: ON HOLD | COMMUNITY
End: 2021-11-24

## 2021-11-24 RX ORDER — VALSARTAN 80 MG/1
80 TABLET ORAL DAILY
COMMUNITY
End: 2021-11-29

## 2021-11-24 RX ORDER — MAG HYDROX/ALUMINUM HYD/SIMETH 200-200-20
30 SUSPENSION, ORAL (FINAL DOSE FORM) ORAL
Status: DISCONTINUED | OUTPATIENT
Start: 2021-11-24 | End: 2021-11-29 | Stop reason: HOSPADM

## 2021-11-24 RX ORDER — PHENAZOPYRIDINE HYDROCHLORIDE 100 MG/1
200 TABLET, FILM COATED ORAL
Status: DISCONTINUED | OUTPATIENT
Start: 2021-11-25 | End: 2021-11-29 | Stop reason: HOSPADM

## 2021-11-24 RX ORDER — QUETIAPINE FUMARATE 100 MG/1
400 TABLET, FILM COATED ORAL
Status: DISCONTINUED | OUTPATIENT
Start: 2021-11-24 | End: 2021-11-29 | Stop reason: HOSPADM

## 2021-11-24 RX ADMIN — Medication 10 ML: at 22:00

## 2021-11-24 RX ADMIN — ASPIRIN 81 MG CHEWABLE TABLET 81 MG: 81 TABLET CHEWABLE at 08:51

## 2021-11-24 RX ADMIN — HYDROMORPHONE HYDROCHLORIDE 1 MG: 1 INJECTION, SOLUTION INTRAMUSCULAR; INTRAVENOUS; SUBCUTANEOUS at 15:55

## 2021-11-24 RX ADMIN — HYDROMORPHONE HYDROCHLORIDE 1 MG: 1 INJECTION, SOLUTION INTRAMUSCULAR; INTRAVENOUS; SUBCUTANEOUS at 18:51

## 2021-11-24 RX ADMIN — Medication 10 ML: at 18:50

## 2021-11-24 RX ADMIN — HYDROMORPHONE HYDROCHLORIDE 1 MG: 1 INJECTION, SOLUTION INTRAMUSCULAR; INTRAVENOUS; SUBCUTANEOUS at 08:47

## 2021-11-24 RX ADMIN — ENOXAPARIN SODIUM 40 MG: 100 INJECTION SUBCUTANEOUS at 08:51

## 2021-11-24 RX ADMIN — BUSPIRONE HYDROCHLORIDE 15 MG: 5 TABLET ORAL at 21:23

## 2021-11-24 RX ADMIN — INSULIN LISPRO 2 UNITS: 100 INJECTION, SOLUTION INTRAVENOUS; SUBCUTANEOUS at 12:33

## 2021-11-24 RX ADMIN — INSULIN LISPRO 2 UNITS: 100 INJECTION, SOLUTION INTRAVENOUS; SUBCUTANEOUS at 16:30

## 2021-11-24 RX ADMIN — HYDROMORPHONE HYDROCHLORIDE 1 MG: 1 INJECTION, SOLUTION INTRAMUSCULAR; INTRAVENOUS; SUBCUTANEOUS at 22:24

## 2021-11-24 RX ADMIN — BUPROPION HYDROCHLORIDE 300 MG: 150 TABLET, EXTENDED RELEASE ORAL at 20:42

## 2021-11-24 RX ADMIN — OXYCODONE AND ACETAMINOPHEN 1 TABLET: 5; 325 TABLET ORAL at 10:47

## 2021-11-24 RX ADMIN — BUSPIRONE HYDROCHLORIDE 15 MG: 5 TABLET ORAL at 08:53

## 2021-11-24 RX ADMIN — BUDESONIDE 250 MCG: 0.25 INHALANT RESPIRATORY (INHALATION) at 20:59

## 2021-11-24 RX ADMIN — METOPROLOL SUCCINATE 25 MG: 25 TABLET, EXTENDED RELEASE ORAL at 08:52

## 2021-11-24 RX ADMIN — LEVOTHYROXINE SODIUM 112 MCG: 0.11 TABLET ORAL at 06:04

## 2021-11-24 RX ADMIN — GABAPENTIN 100 MG: 100 CAPSULE ORAL at 21:24

## 2021-11-24 RX ADMIN — ENOXAPARIN SODIUM 40 MG: 100 INJECTION SUBCUTANEOUS at 20:42

## 2021-11-24 RX ADMIN — GABAPENTIN 100 MG: 100 CAPSULE ORAL at 08:51

## 2021-11-24 RX ADMIN — ESTRADIOL 0.5 MG: 1 TABLET ORAL at 08:51

## 2021-11-24 RX ADMIN — ONDANSETRON 4 MG: 2 INJECTION INTRAMUSCULAR; INTRAVENOUS at 19:17

## 2021-11-24 RX ADMIN — Medication 400 MG: at 08:52

## 2021-11-24 RX ADMIN — BUDESONIDE 250 MCG: 0.25 INHALANT RESPIRATORY (INHALATION) at 08:10

## 2021-11-24 RX ADMIN — PIPERACILLIN AND TAZOBACTAM 3.38 G: 3; .375 INJECTION, POWDER, LYOPHILIZED, FOR SOLUTION INTRAVENOUS at 18:48

## 2021-11-24 RX ADMIN — ARFORMOTEROL TARTRATE 15 MCG: 15 SOLUTION RESPIRATORY (INHALATION) at 20:59

## 2021-11-24 RX ADMIN — IPRATROPIUM BROMIDE 0.5 MG: 0.5 SOLUTION RESPIRATORY (INHALATION) at 20:59

## 2021-11-24 RX ADMIN — HYDROMORPHONE HYDROCHLORIDE 1 MG: 1 INJECTION, SOLUTION INTRAMUSCULAR; INTRAVENOUS; SUBCUTANEOUS at 03:18

## 2021-11-24 RX ADMIN — ARFORMOTEROL TARTRATE 15 MCG: 15 SOLUTION RESPIRATORY (INHALATION) at 08:10

## 2021-11-24 RX ADMIN — HYDROMORPHONE HYDROCHLORIDE 1 MG: 1 INJECTION, SOLUTION INTRAMUSCULAR; INTRAVENOUS; SUBCUTANEOUS at 12:32

## 2021-11-24 RX ADMIN — Medication 10 ML: at 15:08

## 2021-11-24 RX ADMIN — ALUMINUM HYDROXIDE, MAGNESIUM HYDROXIDE, AND SIMETHICONE 30 ML: 200; 200; 20 SUSPENSION ORAL at 22:15

## 2021-11-24 RX ADMIN — Medication 10 ML: at 06:04

## 2021-11-24 RX ADMIN — HYDROMORPHONE HYDROCHLORIDE 1 MG: 1 INJECTION, SOLUTION INTRAMUSCULAR; INTRAVENOUS; SUBCUTANEOUS at 06:04

## 2021-11-24 RX ADMIN — QUETIAPINE FUMARATE 400 MG: 100 TABLET ORAL at 22:24

## 2021-11-24 RX ADMIN — BUSPIRONE HYDROCHLORIDE 15 MG: 5 TABLET ORAL at 15:07

## 2021-11-24 RX ADMIN — ATORVASTATIN CALCIUM 10 MG: 10 TABLET, FILM COATED ORAL at 21:24

## 2021-11-24 RX ADMIN — IPRATROPIUM BROMIDE 0.5 MG: 0.5 SOLUTION RESPIRATORY (INHALATION) at 08:10

## 2021-11-24 RX ADMIN — POLYETHYLENE GLYCOL 3350 17 G: 17 POWDER, FOR SOLUTION ORAL at 08:55

## 2021-11-24 RX ADMIN — TOPIRAMATE 200 MG: 100 TABLET, FILM COATED ORAL at 21:24

## 2021-11-24 RX ADMIN — PIPERACILLIN AND TAZOBACTAM 3.38 G: 3; .375 INJECTION, POWDER, LYOPHILIZED, FOR SOLUTION INTRAVENOUS at 03:18

## 2021-11-24 RX ADMIN — LOSARTAN POTASSIUM 25 MG: 25 TABLET, FILM COATED ORAL at 08:52

## 2021-11-24 RX ADMIN — GABAPENTIN 100 MG: 100 CAPSULE ORAL at 15:07

## 2021-11-24 RX ADMIN — PIPERACILLIN AND TAZOBACTAM 3.38 G: 3; .375 INJECTION, POWDER, LYOPHILIZED, FOR SOLUTION INTRAVENOUS at 10:47

## 2021-11-24 NOTE — PROGRESS NOTES
Gynecology Progress Note    Patient doing better on antibiotic     Vitals:  Blood pressure 122/69, pulse 85, temperature 98 °F (36.7 °C), resp. rate 18, height 5' (1.524 m), weight (!) 168.8 kg (372 lb 2.2 oz), SpO2 98 %, not currently breastfeeding. Temp (24hrs), Av.4 °F (36.9 °C), Min:98 °F (36.7 °C), Max:98.9 °F (37.2 °C)        Exam:  Patient . Lab/Data Review: All lab results for the last 24 hours reviewed.     Assessment and Plan:  Patient with perianal cellulitis feeling better will continue on antibiotics course, consult colo- rectal surgeon

## 2021-11-24 NOTE — PROGRESS NOTES
Problem: Self Care Deficits Care Plan (Adult)  Goal: *Acute Goals and Plan of Care (Insert Text)  Description: FUNCTIONAL STATUS PRIOR TO ADMISSION: Patient was modified independent using a rolling walker for functional mobility. Sits and sponge bathes in a chair next to her bed and then stands in the shower to rinse off, only wears slip on shoes and family assists with socks if pt wears them, performed all other ADLS on her own, family performs [address]: The patient lived with  and mother whom assist as needed. Occupational Therapy Goals:  Initiated 11/21/2021  1. Patient will perform grooming seated with modified independence within 7 days. 2. Patient will perform lower body dressing with excluding socks modified independence within 7 days. 3. Patient will perform toileting with modified independence within 7 days. 4. Patient will transfer from toilet with modified independence using the least restrictive device and appropriate durable medical equipment within 7 days. Outcome: Progressing Towards Goal   OCCUPATIONAL THERAPY TREATMENT  Patient: Jagdish Fraser (74 y.o. female)  Date: 11/24/2021  Diagnosis: Intractable back pain [M54.9]   <principal problem not specified>       Precautions: Fall, Back, Skin (BMI 72.68)  Chart, occupational therapy assessment, plan of care, and goals were reviewed. ASSESSMENT  Patient continues with skilled OT services and {IS/IS NOT:12529::\"is\"} progressing towards goals. ***     Current Level of Function Impacting Discharge (ADLs): ***    Other factors to consider for discharge: ***         PLAN :  Patient continues to benefit from skilled intervention to address the above impairments. Continue treatment per established plan of care to address goals.     Recommend with staff: ***    Recommend next OT session: ***    Recommendation for discharge: (in order for the patient to meet his/her long term goals)  {therapy discharge recs:74580}    This discharge recommendation:  {therapy discharge collaboration:87123}    IF patient discharges home will need the following DME: {therapy DME recommendations:18872}       SUBJECTIVE:   Patient stated ***.    OBJECTIVE DATA SUMMARY:   Cognitive/Behavioral Status:  Neurologic State: Alert; Appropriate for age  Orientation Level: Oriented X4  Cognition: Appropriate decision making; Appropriate for age attention/concentration; Appropriate safety awareness; Follows commands  Perception: Appears intact  Perseveration: No perseveration noted  Safety/Judgement: Awareness of environment; Fall prevention; Good awareness of safety precautions; Insight into deficits    Functional Mobility and Transfers for ADLs:  Bed Mobility:  Rolling: Minimum assistance; Contact guard assistance  Supine to Sit: Contact guard assistance; Minimum assistance (S to supine to sit, min/mod scoot to edge of bed  Simultaneous filing. User may not have seen previous data.)  Sit to Supine: Moderate assistance; Contact guard assistance; Additional time (A B LEs into bed  Simultaneous filing. User may not have seen previous data.)  Scooting: Contact guard assistance; Other (comment) (using BUE WB on bar in front of pt)    Transfers:  Sit to Stand: Minimum assistance; Additional time; Adaptive equipment; Moderate assistance (leaning onto stable BSC infront of bed  Simultaneous filing. User may not have seen previous data.)  Functional Transfers  Toilet Transfer :  (will need [address] before this is tried w/HDRW due to weight )  Bed to Chair:  (not yet safe for stepping away from bed; sidestep x 3)    Balance:  Sitting: Intact (Simultaneous filing. User may not have seen previous data.)  Standing: Impaired (Simultaneous filing. User may not have seen previous data.)  Standing - Static: Constant support; Fair (Simultaneous filing. User may not have seen previous data.)  Standing - Dynamic : Constant support; Fair (Simultaneous filing.  User may not have seen previous data.)    ADL Intervention:  Feeding  Feeding Assistance: Independent    Grooming  Grooming Assistance: Set-up  Position Performed: Seated edge of bed; Long sitting on bed                   Lower Body Dressing Assistance  Dressing Assistance: Total assistance(dependent) (would benefit from LE AE)    Toileting  Toileting Assistance: Total assistance(dependent) (inferred; toileting PTA difficult; Bidet? aide? w/concepcion)    Cognitive Retraining  Maintains Attention For (Time): Greater than 10 minutes  Following Commands: Follows one step commands/directions; Follows two step commands/directions  Safety/Judgement: Awareness of environment; Fall prevention; Good awareness of safety precautions; Insight into deficits    Therapeutic Exercises:   ***    Pain:  ***    Activity Tolerance:   {therapy activity tolerance:19260}    After treatment patient left in no apparent distress:   {AFTER therapy treatment:91077}    COMMUNICATION/COLLABORATION:   The patients plan of care was discussed with: {POC discussed PVIA:21228}.      Pina Sol  Time Calculation: 16 mins

## 2021-11-24 NOTE — PROGRESS NOTES
Pharmacy Medication Reconciliation     The patient was interviewed regarding current PTA medication list, use and drug allergies; No one present in room. The patient was questioned regarding use of any other inhalers, topical products, over the counter medications, herbal medications, vitamin products or ophthalmic/nasal/otic medication use. Allergy Update: Celebrex [celecoxib], Ibuprofen, Methocarbamol, Morphine, Sulfa (sulfonamide antibiotics), Tramadol, Adhesive, and Codeine    Recommendations/Findings: The following amendments were made to the patient's active medication list on file at [facility]:     1) Additions: Valsartan, Potassium, Estrace cream    2) Deletions: Losartan    3) Changes: Percocet 5/325 One BID prn pain to One Tablet every 4 hrs as needed for pain (unconfirmed with practioners)  Seroquel - NEW DOSE      Pertinent Findings: Seroquel was clarified with her Psychiatrist's office via Bessie Cassidy (Dr. Denisse Perez of [address] [place] [address], [phone]). Her dose was reduced from 600 mg to 400 mg po QHS in August 2021    -Clarified PTA med list with PCP Dr. Tre Cardenas office [phone] and Dr. Judy York office as above. I could not reach the [facility].  PTA medication list was corrected to the following:     Prior to Admission Medications   Prescriptions Last Dose Informant Taking? QUEtiapine (SEROQUEL) 400 mg tablet   Yes   Sig: Take 400 mg by mouth nightly. Dr. Deirdre Lazar   [phone]  For Sleep and Bipolar   albuterol (PROVENTIL HFA, VENTOLIN HFA, PROAIR HFA) 90 mcg/actuation inhaler   Yes   Sig: Take 2 Puffs by inhalation every four (4) hours as needed for Wheezing. albuterol-ipratropium (DUO-NEB) 2.5 mg-0.5 mg/3 ml nebu   Yes   Sig: 3 mL by Nebulization route every four (4) hours as needed (COPD). aspirin 81 mg chewable tablet   Yes   Sig: Take 81 mg by mouth daily.    buPROPion XL (WELLBUTRIN XL) 150 mg tablet   Yes   Sig: Take 300 mg by mouth nightly. busPIRone (BUSPAR) 15 mg tablet   Yes   Sig: Take 15 mg by mouth three (3) times daily. ergocalciferol (VITAMIN D2) 50,000 unit capsule   Yes   Sig: Take 50,000 Units by mouth every seven (7) days. Each    estradiol (ESTRACE) 0.5 mg tablet   Yes   Sig: Take 0.5 mg by mouth daily. fluticasone propionate (FLONASE) 50 mcg/actuation nasal spray   Yes   Si Sprays by Both Nostrils route daily. fluticasone-umeclidinium-vilanterol (TRELEGY ELLIPTA) 100-62.5-25 mcg inhaler   Yes   Sig: Take 1 Puff by inhalation daily. gabapentin (NEURONTIN) 100 mg capsule  Self Yes   Sig: Take 1 Cap by mouth three (3) times daily. levothyroxine (SYNTHROID) 112 mcg tablet   Yes   Sig: Take 112 mcg by mouth Daily (before breakfast). liraglutide (VICTOZA) 0.6 mg/0.1 mL (18 mg/3 mL) pnij   Yes   Si.8 mg by SubCUTAneous route daily. metFORMIN (GLUCOPHAGE) 1,000 mg tablet   Yes   Sig: Take 1,000 mg by mouth two (2) times a day. metoprolol succinate (TOPROL XL) 25 mg XL tablet   Yes   Sig: Take 25 mg by mouth daily. miconazole (MICOTIN) 2 % topical cream   Yes   Sig: Apply 1 Each to affected area two (2) times a day. Apply ointment to fungal infection under abdominal pannus and groin twice a day for 14 days. nystatin, bulk, 15 billion unit powd   Yes   Si Units by Does Not Apply route three (3) times daily. To skin folds, ventral fold   omeprazole (PRILOSEC) 20 mg capsule   Yes   Sig: Take 40 mg by mouth daily. oxyCODONE-acetaminophen (PERCOCET) 5-325 mg per tablet   Yes   Sig: Take 1 Tablet by mouth every four (4) hours as needed for Pain. Indications: pain   prazosin (MINIPRESS) 2 mg capsule   Yes   Sig: Take 4 mg by mouth nightly. prazosin (MINIPRESS) 2 mg capsule   Yes   Sig: Take 4 mg by mouth nightly. simvastatin (ZOCOR) 20 mg tablet   Yes   Sig: Take 20 mg by mouth nightly. topiramate (TOPAMAX) 200 mg tablet   Yes   Sig: Take 200 mg by mouth nightly.  Dr. Eloy Guerrero - Psychiatry valsartan (DIOVAN) 80 mg tablet   Yes   Sig: Take 80 mg by mouth daily.       Facility-Administered Medications: None        Thank you,  Karen Lundberg, [facility]

## 2021-11-24 NOTE — PROGRESS NOTES
I reviewed pertinent labs and imaging, and discussed /agreed on the plan of care with Dr. Debra Hager. Hospitalist Progress Note    NAME: Luiza Apple   :  1960   MRN:  [id number]       Assessment / Plan:   Acute on chronic lower back pain  Debilty r/t Super Morbid Obesity BMI 72  - appreciate Ortho input - no surgical intervention  At this time   - PT/OT input recc SNF   CT abd / Pelvis   IMPRESSION  1. Partial staghorn calculus in the lower pole of the right kidney. 2. Degenerative changes in the spine. Minimal anterior listhesis of L4 on L5  with disc osteophyte complex formation/degenerative disc disease at L5/S1. 3. There is fullness in the left side of the labia/left of the introitus. Recommend physical exam and possible referral to gynecology. 4. Incidental findings as above. MRI lumbar spine   IMPRESSION  1. Multilevel stable spondylitic changes as above, most severe at L3-S1.  2.  Severe canal stenosis at L3.  3.  Moderate canal stenosis with severe left and moderate right neuroforaminal  narrowing at L4-L5. 4.  Severe bilateral neuroforaminal narrowing at L5-S1.  5.  Additional findings as above.   - appreciate Gyn - no L labia mass or lesion  Left posterior vulva + perianal tenderness and fullness recc Color rectal consult   - Colorectal consult called and input pending   - cont Abx - feeling better       Urinary Retention   -Patient has had difficulty with urination since admission. Suspect immobility is playing a role.    -Bladder scan with 600 mL   -Has been straight cathed several times   -Placed concepcion   -appreciate  Urology input recc concepcion 7 days then follow up with VU OP     Hypertension Continue ASA, losartan, metoprolol and prazosin   Hyperlipidemia Continue atorvastatin   Seizure disorder Continue topamax   Hypothyroidism Continue levothyroxine   Type II Diabetes with peripheral neuropathy Continue SSI and gabapentin   Anxiety  Depression Continue seroquel, buspar, wellbutrin      Body mass index is 72.68 kg/m². Estimated discharge date: November 25  Barriers: placement , consult     Code status: Full  Prophylaxis: Lovenox  Recommended Disposition: SNF/LTC     Subjective:     Chief Complaint / Reason for Physician Visit  \"I really cant feel anything down there \". Discussed with RN events overnight. Pt seen and examined discussed eval by colorectal for possible intervention     Review of Systems:  Symptom Y/N Comments  Symptom Y/N Comments   Fever/Chills n   Chest Pain n    Poor Appetite n   Edema n    Cough n   Abdominal Pain n    Sputum n   Joint Pain n    SOB/DIOP n   Pruritis/Rash n    Nausea/vomit n   Tolerating PT/OT n    Diarrhea n   Tolerating Diet n    Constipation    Other       Could NOT obtain due to:      Objective:     VITALS:   Last 24hrs VS reviewed since prior progress note. Most recent are:  Patient Vitals for the past 24 hrs:   Temp Pulse Resp BP SpO2   11/24/21 0810     98 %   11/24/21 0757 98 °F (36.7 °C) 85 18 122/69 100 %   11/23/21 2333 98.9 °F (37.2 °C) 87 18 (!) 147/65 99 %   11/23/21 2144     95 %   11/23/21 1903 98.2 °F (36.8 °C) 87 18 (!) 142/65 95 %   11/23/21 1525 98.3 °F (36.8 °C) 81 18 (!) 134/59 97 %       Intake/Output Summary (Last 24 hours) at 11/24/2021 0954  Last data filed at 11/24/2021 4757  Gross per 24 hour   Intake    Output 2050 ml   Net -2050 ml        I had a face to face encounter and independently examined this patient on 11/24/2021, as outlined below:  PHYSICAL EXAM:  General: Obese  Alert, cooperative, no acute distress    EENT:  EOMI. Anicteric sclerae. MMM  Resp:  no wheezing or rales. No accessory muscle use  CV:  Regular  rhythm,  L labia  edema  GI:  Soft, Non distended, Non tender. +Bowel sounds  Neurologic:  Alert and oriented X 3, normal speech,   Psych:   . Not anxious nor agitated  Skin:  No rashes.   No jaundice    Reviewed most current lab test results and cultures  YES  Reviewed most current radiology test results   YES  Review and summation of old records today    NO  Reviewed patient's current orders and MAR    YES  PMH/SH reviewed - no change compared to H&P  ________________________________________________________________________  Care Plan discussed with:    Comments   Patient x    Family      RN x    Care Manager     Consultant                        Multidiciplinary team rounds were held today with , nursing, pharmacist and clinical coordinator. Patient's plan of care was discussed; medications were reviewed and discharge planning was addressed. ________________________________________________________________________  Total NON critical care TIME:  30 Minutes    Total CRITICAL CARE TIME Spent:   Minutes non procedure based      Comments   >50% of visit spent in counseling and coordination of care     ________________________________________________________________________  Javon Jackson. Kristin Brothers NP     Procedures: see electronic medical records for all procedures/Xrays and details which were not copied into this note but were reviewed prior to creation of Plan. LABS:  I reviewed today's most current labs and imaging studies. Pertinent labs include:  No results for input(s): WBC, HGB, HCT, PLT, HGBEXT, HCTEXT, PLTEXT in the last 72 hours. No results for input(s): NA, K, CL, CO2, GLU, BUN, CREA, CA, MG, PHOS, ALB, TBIL, TBILI, ALT, INR, INREXT in the last 72 hours. No lab exists for component: SGOT    Signed: Tawana Brothers NP

## 2021-11-24 NOTE — PROGRESS NOTES
Transition of Care Plan:     RUR: 11%  Disposition: SNF placement   >insurance authorization required for placement   Follow up appointments: PCP  DME needed: TBD - has shower chair, RW, cane at home  Transportation at [facility]. [phone])  [address] or means to access home: Yes     IM Medicare Letter: N/A  Is patient a BCPI-A Bundle: N/A                     If yes, was Bundle Letter given?: N/A   Caregiver Contact: Raul Pickett. [phone])  Alda Lott DTR ([phone])  Discharge Caregiver contacted prior to discharge? Pt to contact    Chart reviewed. CM continuing to follow for discharge planning. Pt has been tentatively accepted to [facility] for SNF placement at discharge. Spoke with Aysha Charles in admissions today. She is unable to initiate insurance authorization until Friday AM. Admissions is requesting clarification of pt's Seroquel dose and indication for use. Will need updated medication list and chart notes to reflect this change. Will continue to follow.     BARBARA Robert   Care Manager, [facility]  [phone]

## 2021-11-24 NOTE — PROGRESS NOTES
Problem: Self Care Deficits Care Plan (Adult)  Goal: *Acute Goals and Plan of Care (Insert Text)  Description: FUNCTIONAL STATUS PRIOR TO ADMISSION: Patient was modified independent using a rolling walker for functional mobility. Sits and sponge bathes in a chair next to her bed and then stands in the shower to rinse off, only wears slip on shoes and family assists with socks if pt wears them, performed all other ADLS on her own, family performs [address]: The patient lived with  and mother whom assist as needed. Occupational Therapy Goals:  Initiated 11/21/2021  1. Patient will perform grooming seated with modified independence within 7 days. 2. Patient will perform lower body dressing with excluding socks modified independence within 7 days. 3. Patient will perform toileting with modified independence within 7 days. 4. Patient will transfer from toilet with modified independence using the least restrictive device and appropriate durable medical equipment within 7 days. 11/24/2021 1558 by Srini Obrien  Outcome: Progressing Towards Goal  OCCUPATIONAL THERAPY TREATMENT  Patient: Lamont Montes (89 y.o. female)  Date: 11/24/2021  Diagnosis: Intractable back pain [M54.9]   <principal problem not specified>       Precautions: Fall, Back, Skin (BMI 72.68)  Chart, occupational therapy assessment, plan of care, and goals were reviewed. ASSESSMENT  Patient continues with skilled OT services and is progressing towards goals. Nice improvement with functional mobility today compared to yesterday per chart review; set up grooming skills well tolerated activity at edge of bed; actually requesting to groom, despite 9/10 pain in site of abcess. States concepcion cath is also uncomfortable.      Current Level of Function Impacting Discharge (ADLs): set up CGA UE ADLs, Should benefit from LE AE but currently D LE ADLs, able to bridge and scoot; reports difficulty toileting PTA due to BMI 72.68    Other factors to consider for discharge: reports she feels good about going to rehab         PLAN :  Patient continues to benefit from skilled intervention to address the above impairments. Continue treatment per established plan of care to address goals. Recommend with staff: bed in chair position for all meal or may sit edge of bed for meals    Recommend next OT session: LE AE including toilet aide    Recommendation for discharge: (in order for the patient to meet his/her long term goals)  Therapy up to 5 days/week in SNF setting    This discharge recommendation:  Has been made in collaboration with the attending provider and/or case management    IF patient discharges home will need the following DME: AE: long handled bathing, AE: long handled dressing, and HD BSC, RW, Shower bench, [facility]       SUBJECTIVE:   Patient stated Pacheco Norton had a hard time before with going to the bathroom.     OBJECTIVE DATA SUMMARY:   Cognitive/Behavioral Status:  Neurologic State: Alert; Appropriate for age  Orientation Level: Oriented X4  Cognition: Appropriate decision making; Appropriate for age attention/concentration; Appropriate safety awareness; Follows commands  Perception: Appears intact  Perseveration: No perseveration noted  Safety/Judgement: Awareness of environment; Fall prevention; Good awareness of safety precautions; Insight into deficits    Functional Mobility and Transfers for ADLs:  Bed Mobility:  Rolling: Minimum assistance; Contact guard assistance  Supine to Sit: Contact guard assistance; Minimum assistance (S to supine to sit, min/mod scoot to edge of bed  Simultaneous filing. User may not have seen previous data.)  Sit to Supine: Moderate assistance; Contact guard assistance; Additional time (A B LEs into bed  Simultaneous filing. User may not have seen previous data.)  Scooting: Contact guard assistance;  Other (comment) (using BUE WB on bar in front of pt)    Transfers:  Sit to Stand: Minimum assistance; Additional time; Adaptive equipment; Moderate assistance (leaning onto stable BSC infront of bed  Simultaneous filing. User may not have seen previous data.)  Functional Transfers  Toilet Transfer :  (will need [address] before this is tried w/HDRW due to weight )  Bed to Chair:  (not yet safe for stepping away from bed; sidestep x 3)    Balance:  Sitting: Intact (Simultaneous filing. User may not have seen previous data.)  Standing: Impaired (Simultaneous filing. User may not have seen previous data.)  Standing - Static: Constant support; Fair (Simultaneous filing. User may not have seen previous data.)  Standing - Dynamic : Constant support; Fair (Simultaneous filing. User may not have seen previous data.)    ADL Intervention:  Feeding  Feeding Assistance: Independent    Grooming  Grooming Assistance: Set-up  Position Performed: Seated edge of bed; Long sitting on bed                   Lower Body Dressing Assistance  Dressing Assistance: Total assistance(dependent) (would benefit from LE AE)    Toileting  Toileting Assistance: Total assistance(dependent) (inferred; toileting PTA difficult; Bidet? aide? w/jamey)    Cognitive Retraining  Maintains Attention For (Time): Greater than 10 minutes  Following Commands: Follows one step commands/directions; Follows two step commands/directions  Safety/Judgement: Awareness of environment; Fall prevention; Good awareness of safety precautions;  Insight into deficits        Pain:  9/10 pain at site of abcess; nsg aware; taking narcotics which are not helping much per patient    Activity Tolerance:   Fair, desaturates with exertion and requires oxygen, requires rest breaks, and patient requested O2 stating she feels much better with it on    After treatment patient left in no apparent distress:   Supine in bed, Call bell within reach, and Side rails x 3    COMMUNICATION/COLLABORATION:   The patients plan of care was discussed with: Physical therapist and Registered nurse.     Burnice Mealy OTR/L  Time Calculation: 16 mins

## 2021-11-24 NOTE — PROGRESS NOTES
End of Shift Note    Bedside shift change report given to ALYCIA Sow (oncoming nurse) by Ginny Grimaldo RN (offgoing nurse).   Report included the following information SBAR, Kardex, Intake/Output and MAR    Shift worked:  7p-7a     Shift summary and any significant changes:     Pt stable, scheduled meds given, PRN pain meds given for pain in left hip, no insulin coverage needed, concepcion draining and patent CHG wiped, Respiratory added 2 L NC overnight per pt request     Concerns for physician to address:  none     Zone phone for oncoming shift:   [phone]           Ginny Grimaldo RN

## 2021-11-24 NOTE — PROGRESS NOTES
Spiritual Care Assessment/Progress Note  [facility]      NAME: Afshin Liang      MRN: [id number]  AGE: 64 y.o.  SEX: female  Shinto Affiliation: Taoist   Language: English     11/24/2021     Total Time (in minutes): 31     Spiritual Assessment begun in [facility] through conversation with:         [x]Patient        [] Family    [] Friend(s)        Reason for Consult: Initial/Spiritual assessment, patient floor     Spiritual beliefs: (Please include comment if needed)     [x] Identifies with a joshua tradition:         [] Supported by a joshua community:            [] Claims no spiritual orientation:           [] Seeking spiritual identity:                [] Adheres to an individual form of spirituality:           [] Not able to assess:                           Identified resources for coping:      [x] Prayer                               [] Music                  [] Guided Imagery     [x] Family/friends                 [] Pet visits     [] Devotional reading                         [] Unknown     [] Other:                                              Interventions offered during this visit: (See comments for more details)    Patient Interventions: Affirmation of emotions/emotional suffering, Coping skills reviewed/reinforced, Initial/Spiritual assessment, patient floor, Normalization of emotional/spiritual concerns, Shinto beliefs/image of God discussed, Integration of medical assessment with existing values and beliefs, Guidance concerning next steps/process to be expected, Affirmation of joshua, Iconic (affirming the presence of God/Higher Power), Catharsis/review of pertinent events in supportive environment           Plan of Care:     [] Support spiritual and/or cultural needs    [] Support AMD and/or advance care planning process      [] Support grieving process   [] Coordinate Rites and/or Rituals    [] Coordination with community clergy   [] No spiritual needs identified at this time   [] Detailed Plan of Care below (See Comments)  [] Make referral to Music Therapy  [] Make referral to Pet Therapy     [] Make referral to Addiction services  [] Make referral to [facility]  [] Make referral to Spiritual Care Partner  [] No future visits requested        [x] Contact Spiritual Care for further referrals     Comments:   visited patient, Mrs. Janelle Harris on 1 [facility] Oncology unit. Patient welcomed  kindly and engaged in conversation. She shared that she was discharged from this facility a while ago and had to come back due to some complications. She indicated she was doing well. She has a very supportive family and she loves her grandchildren dearly. Her stressor was not being able to be with family on thanksgiving.  listened actively with affirmation, inquired if she would be interested in joining family via zoom ans she said that would be a great idea. Consulted with staff to investigate the possibility of setting up a zoom video call to enable patient join her family for thanksgiving. Nurse indicated that was possible and that she would make a note for that to happen.  returned to patient's room to inform her her nurse will help with zoom call. She requested for prayer, when  inquired if there was any other need for support. prayerwas offered, Patient appeared encouraged and thanked  for the support. Visited by: Haroldo Mendoza.    Paging Service: [phone])

## 2021-11-24 NOTE — PROGRESS NOTES
Problem: Mobility Impaired (Adult and Pediatric)  Goal: *Acute Goals and Plan of Care (Insert Text)  Description: FUNCTIONAL STATUS PRIOR TO ADMISSION: Patient was modified independent using a rolling walker for functional mobility. HOME SUPPORT PRIOR TO ADMISSION: The patient lived with her  and Mother and didn't require assistance with adls. Physical Therapy Goals  Initiated 11/21/2021  1. Patient will move from supine to sit and sit to supine , scoot up and down, and roll side to side in bed with modified independence within 7 day(s). 2.  Patient will transfer from bed to chair and chair to bed with modified independence using the least restrictive device within 7 day(s). 3.  Patient will perform sit to stand with modified independence within 7 day(s). 4.  Patient will ambulate with modified independence for 100 feet with the least restrictive device within 7 day(s). 5.  Patient will ascend/descend 3 stairs with  handrail(s) with modified independence within 7 day(s). Outcome: Progressing Towards Goal   PHYSICAL THERAPY TREATMENT  Patient: Naseem Rogers (59 y.o. female)  Date: 11/24/2021  Diagnosis: Intractable back pain [M54.9]   <principal problem not specified>       Precautions: Fall, Back, Skin (BMI 72.68)  Chart, physical therapy assessment, plan of care and goals were reviewed. ASSESSMENT  Patient continues with skilled PT services and is progressing towards goals. Pt showing excellent progress today. She is very motivated to improve. She improved to being able to come to edge of bed given extra time from slightly elevated HOB using rails and min A of 1. She shows good sitting balance. Using the back bar of the stabilized C with BUE WB on bar, she was able to scoot several times toward head of bed with CGA unweighting buttocks and using good trunk control to move laterally.  She was also able to come to stand with min to CGA of 2 with UEs WB on bar in front of her and maintain for adjust of bed linen and also able to work toward more postural extension. She was able to take 1-2 small side steps toward [facility] as well with the UE WB. Pt min A to return to bed and CGA to roll. Pt making good progress and would benefit greatly from rehab post discharge. Other factors to consider for discharge: previously mod I with walker; now with fall risk and A of 1-2 for mobility, no yet with functional amb         PLAN :  Patient continues to benefit from skilled intervention to address the above impairments. Continue treatment per established plan of care. to address goals. Recommendation for discharge: (in order for the patient to meet his/her long term goals)  Therapy up to 5 days/week in SNF setting    This discharge recommendation:  Has been made in collaboration with the attending provider and/or case management    IF patient discharges home will need the following DME: to be determined (TBD)       SUBJECTIVE:   Patient stated I am waiting to see where I go for rehab.     OBJECTIVE DATA SUMMARY:   Critical Behavior:  Neurologic State: Alert, Appropriate for age  Orientation Level: Oriented X4  Cognition: Appropriate decision making, Appropriate for age attention/concentration, Appropriate safety awareness, Follows commands  Safety/Judgement: Awareness of environment, Fall prevention, Good awareness of safety precautions, Insight into deficits  Functional Mobility Training:  Bed Mobility:     Supine to Sit: Minimum assistance; Bed Modified; Assist x1; Additional time; Other (comment) (uses bed rails)  Sit to Supine: Minimum assistance; Assist x2  Scooting: Contact guard assistance;  Other (comment) (using BUE WB on bar in front of pt)        Transfers:  Sit to Stand: Minimum assistance; Contact guard assistance; Assist x2 (with UE WB leaning forward toward bar in front)  Stand to Sit: Minimum assistance; Contact guard assistance; Assist x2                             Balance:  Sitting: Intact (Simultaneous filing. User may not have seen previous data.)  Standing: Impaired (Simultaneous filing. User may not have seen previous data.)  Standing - Static: Constant support; Fair (Simultaneous filing. User may not have seen previous data.)  Standing - Dynamic : Constant support; Fair (Simultaneous filing. User may not have seen previous data.)  Ambulation/Gait Training:              Gait Description (WDL):  (1-2 small side steps with UE WB on bar in front)           Pain Ratin/10 low back, positioned for comfort    Activity Tolerance:   Good    After treatment patient left in no apparent distress:   Supine in bed, Call bell within reach, Bed / chair alarm activated, and Side rails x 3    COMMUNICATION/COLLABORATION:   The patients plan of care was discussed with: Occupational therapist, Registered nurse, and Case management.      Sen Nieves, DENTON   Time Calculation: 16 mins

## 2021-11-24 NOTE — PROGRESS NOTES
Attempted to call consult: Surgery for los anal abcess    Bedside and Verbal shift change report given to Rama RN (oncoming nurse) by Paschal Eisenmenger, RN (offgoing nurse). Report included the following information SBAR, Kardex and Quality Measures.

## 2021-11-25 ENCOUNTER — APPOINTMENT (OUTPATIENT)
Dept: CT IMAGING | Age: 61
DRG: 551 | End: 2021-11-25
Attending: NURSE PRACTITIONER
Payer: COMMERCIAL

## 2021-11-25 LAB
GLUCOSE BLD STRIP.AUTO-MCNC: 122 MG/DL (ref 65–117)
GLUCOSE BLD STRIP.AUTO-MCNC: 127 MG/DL (ref 65–117)
GLUCOSE BLD STRIP.AUTO-MCNC: 132 MG/DL (ref 65–117)
GLUCOSE BLD STRIP.AUTO-MCNC: 160 MG/DL (ref 65–117)
SERVICE CMNT-IMP: ABNORMAL

## 2021-11-25 PROCEDURE — 77010033678 HC OXYGEN DAILY

## 2021-11-25 PROCEDURE — 74011000258 HC RX REV CODE- 258: Performed by: SPECIALIST

## 2021-11-25 PROCEDURE — 82962 GLUCOSE BLOOD TEST: CPT

## 2021-11-25 PROCEDURE — 74011636637 HC RX REV CODE- 636/637: Performed by: GENERAL ACUTE CARE HOSPITAL

## 2021-11-25 PROCEDURE — 65270000015 HC RM PRIVATE ONCOLOGY

## 2021-11-25 PROCEDURE — 74011250636 HC RX REV CODE- 250/636: Performed by: SPECIALIST

## 2021-11-25 PROCEDURE — 74011000636 HC RX REV CODE- 636: Performed by: STUDENT IN AN ORGANIZED HEALTH CARE EDUCATION/TRAINING PROGRAM

## 2021-11-25 PROCEDURE — 74011250637 HC RX REV CODE- 250/637: Performed by: INTERNAL MEDICINE

## 2021-11-25 PROCEDURE — 74011250637 HC RX REV CODE- 250/637: Performed by: GENERAL ACUTE CARE HOSPITAL

## 2021-11-25 PROCEDURE — 74011250636 HC RX REV CODE- 250/636: Performed by: NURSE PRACTITIONER

## 2021-11-25 PROCEDURE — 74011250637 HC RX REV CODE- 250/637: Performed by: NURSE PRACTITIONER

## 2021-11-25 PROCEDURE — 74011250636 HC RX REV CODE- 250/636: Performed by: GENERAL ACUTE CARE HOSPITAL

## 2021-11-25 PROCEDURE — 74177 CT ABD & PELVIS W/CONTRAST: CPT

## 2021-11-25 PROCEDURE — 74011000250 HC RX REV CODE- 250: Performed by: INTERNAL MEDICINE

## 2021-11-25 PROCEDURE — 94760 N-INVAS EAR/PLS OXIMETRY 1: CPT

## 2021-11-25 PROCEDURE — 94640 AIRWAY INHALATION TREATMENT: CPT

## 2021-11-25 RX ORDER — DOXYCYCLINE HYCLATE 100 MG
100 TABLET ORAL EVERY 12 HOURS
Status: DISCONTINUED | OUTPATIENT
Start: 2021-11-25 | End: 2021-11-28

## 2021-11-25 RX ADMIN — GABAPENTIN 100 MG: 100 CAPSULE ORAL at 09:00

## 2021-11-25 RX ADMIN — ONDANSETRON 4 MG: 2 INJECTION INTRAMUSCULAR; INTRAVENOUS at 21:05

## 2021-11-25 RX ADMIN — Medication 400 MG: at 09:00

## 2021-11-25 RX ADMIN — PHENAZOPYRIDINE HYDROCHLORIDE 200 MG: 100 TABLET ORAL at 12:28

## 2021-11-25 RX ADMIN — HYDROMORPHONE HYDROCHLORIDE 1 MG: 1 INJECTION, SOLUTION INTRAMUSCULAR; INTRAVENOUS; SUBCUTANEOUS at 18:36

## 2021-11-25 RX ADMIN — ALUMINUM HYDROXIDE, MAGNESIUM HYDROXIDE, AND SIMETHICONE 30 ML: 200; 200; 20 SUSPENSION ORAL at 21:02

## 2021-11-25 RX ADMIN — IPRATROPIUM BROMIDE 0.5 MG: 0.5 SOLUTION RESPIRATORY (INHALATION) at 20:35

## 2021-11-25 RX ADMIN — BUSPIRONE HYDROCHLORIDE 15 MG: 5 TABLET ORAL at 21:53

## 2021-11-25 RX ADMIN — BUSPIRONE HYDROCHLORIDE 15 MG: 5 TABLET ORAL at 17:12

## 2021-11-25 RX ADMIN — GABAPENTIN 100 MG: 100 CAPSULE ORAL at 17:12

## 2021-11-25 RX ADMIN — BUDESONIDE 250 MCG: 0.25 INHALANT RESPIRATORY (INHALATION) at 20:35

## 2021-11-25 RX ADMIN — PHENAZOPYRIDINE HYDROCHLORIDE 200 MG: 100 TABLET ORAL at 09:00

## 2021-11-25 RX ADMIN — HYDROMORPHONE HYDROCHLORIDE 1 MG: 1 INJECTION, SOLUTION INTRAMUSCULAR; INTRAVENOUS; SUBCUTANEOUS at 06:06

## 2021-11-25 RX ADMIN — BUDESONIDE 250 MCG: 0.25 INHALANT RESPIRATORY (INHALATION) at 09:18

## 2021-11-25 RX ADMIN — ATORVASTATIN CALCIUM 10 MG: 10 TABLET, FILM COATED ORAL at 21:53

## 2021-11-25 RX ADMIN — LEVOTHYROXINE SODIUM 112 MCG: 0.11 TABLET ORAL at 06:07

## 2021-11-25 RX ADMIN — Medication 10 ML: at 21:08

## 2021-11-25 RX ADMIN — ESTRADIOL 0.5 MG: 1 TABLET ORAL at 09:00

## 2021-11-25 RX ADMIN — TOPIRAMATE 200 MG: 100 TABLET, FILM COATED ORAL at 22:34

## 2021-11-25 RX ADMIN — Medication 10 ML: at 06:08

## 2021-11-25 RX ADMIN — INSULIN LISPRO 2 UNITS: 100 INJECTION, SOLUTION INTRAVENOUS; SUBCUTANEOUS at 09:01

## 2021-11-25 RX ADMIN — GABAPENTIN 100 MG: 100 CAPSULE ORAL at 21:53

## 2021-11-25 RX ADMIN — PIPERACILLIN AND TAZOBACTAM 3.38 G: 3; .375 INJECTION, POWDER, LYOPHILIZED, FOR SOLUTION INTRAVENOUS at 02:14

## 2021-11-25 RX ADMIN — DOXYCYCLINE HYCLATE 100 MG: 100 TABLET, COATED ORAL at 20:25

## 2021-11-25 RX ADMIN — ALUMINUM HYDROXIDE, MAGNESIUM HYDROXIDE, AND SIMETHICONE 30 ML: 200; 200; 20 SUSPENSION ORAL at 08:59

## 2021-11-25 RX ADMIN — IOPAMIDOL 100 ML: 755 INJECTION, SOLUTION INTRAVENOUS at 09:45

## 2021-11-25 RX ADMIN — BUPROPION HYDROCHLORIDE 300 MG: 150 TABLET, EXTENDED RELEASE ORAL at 20:25

## 2021-11-25 RX ADMIN — ENOXAPARIN SODIUM 40 MG: 100 INJECTION SUBCUTANEOUS at 20:25

## 2021-11-25 RX ADMIN — OXYCODONE AND ACETAMINOPHEN 1 TABLET: 5; 325 TABLET ORAL at 13:47

## 2021-11-25 RX ADMIN — HYDROMORPHONE HYDROCHLORIDE 1 MG: 1 INJECTION, SOLUTION INTRAMUSCULAR; INTRAVENOUS; SUBCUTANEOUS at 21:51

## 2021-11-25 RX ADMIN — HYDROMORPHONE HYDROCHLORIDE 1 MG: 1 INJECTION, SOLUTION INTRAMUSCULAR; INTRAVENOUS; SUBCUTANEOUS at 12:28

## 2021-11-25 RX ADMIN — HYDROMORPHONE HYDROCHLORIDE 1 MG: 1 INJECTION, SOLUTION INTRAMUSCULAR; INTRAVENOUS; SUBCUTANEOUS at 09:01

## 2021-11-25 RX ADMIN — ARFORMOTEROL TARTRATE 15 MCG: 15 SOLUTION RESPIRATORY (INHALATION) at 20:35

## 2021-11-25 RX ADMIN — QUETIAPINE FUMARATE 400 MG: 100 TABLET ORAL at 22:34

## 2021-11-25 RX ADMIN — POLYETHYLENE GLYCOL 3350 17 G: 17 POWDER, FOR SOLUTION ORAL at 09:00

## 2021-11-25 RX ADMIN — ENOXAPARIN SODIUM 40 MG: 100 INJECTION SUBCUTANEOUS at 08:59

## 2021-11-25 RX ADMIN — BUSPIRONE HYDROCHLORIDE 15 MG: 5 TABLET ORAL at 09:00

## 2021-11-25 RX ADMIN — METOPROLOL SUCCINATE 25 MG: 25 TABLET, EXTENDED RELEASE ORAL at 09:00

## 2021-11-25 RX ADMIN — LOSARTAN POTASSIUM 25 MG: 25 TABLET, FILM COATED ORAL at 09:00

## 2021-11-25 RX ADMIN — ARFORMOTEROL TARTRATE 15 MCG: 15 SOLUTION RESPIRATORY (INHALATION) at 09:18

## 2021-11-25 RX ADMIN — HYDROMORPHONE HYDROCHLORIDE 1 MG: 1 INJECTION, SOLUTION INTRAMUSCULAR; INTRAVENOUS; SUBCUTANEOUS at 15:18

## 2021-11-25 RX ADMIN — ASPIRIN 81 MG CHEWABLE TABLET 81 MG: 81 TABLET CHEWABLE at 09:00

## 2021-11-25 RX ADMIN — Medication 10 ML: at 13:47

## 2021-11-25 RX ADMIN — PHENAZOPYRIDINE HYDROCHLORIDE 200 MG: 100 TABLET ORAL at 17:11

## 2021-11-25 RX ADMIN — PIPERACILLIN AND TAZOBACTAM 3.38 G: 3; .375 INJECTION, POWDER, LYOPHILIZED, FOR SOLUTION INTRAVENOUS at 09:01

## 2021-11-25 NOTE — PROGRESS NOTES
Received notification from bedside RN about patient with regards to: reflux symptoms, requesting medication for relief  VS: BP 99/55, HR 89, RR 18, O2 sat 98% on NC 2 L    Intervention given: Mylanta PO prn ordered

## 2021-11-25 NOTE — PROGRESS NOTES
I reviewed pertinent labs and imaging, and discussed /agreed on the plan of care with Dr. Ashish Mckeon      Hospitalist Progress Note    NAME: Jessica Vera   :  1960   MRN:  [id number]       Assessment / Plan:  8092 spoke with Gen surgery - no role for surgery   - spoke with Gyn will not intervene suggest Hot compress ( sitz bath ) can be DC with oral antibiotics    Acute on chronic lower back pain  Debilty r/t Super Morbid Obesity BMI 72  CT abd / Pelvis   IMPRESSION  1. Partial staghorn calculus in the lower pole of the right kidney. 2. Degenerative changes in the spine. Minimal anterior listhesis of L4 on L5  with disc osteophyte complex formation/degenerative disc disease at L5/S1. 3. There is fullness in the left side of the labia/left of the introitus. Recommend physical exam and possible referral to gynecology. 4. Incidental findings as above. MRI lumbar spine   IMPRESSION  1. Multilevel stable spondylitic changes as above, most severe at L3-S1.  2.  Severe canal stenosis at L3.  3.  Moderate canal stenosis with severe left and moderate right neuroforaminal  narrowing at L4-L5. 4.  Severe bilateral neuroforaminal narrowing at L5-S1.  5.  Additional findings as above. appreciate Ortho input - no surgical intervention  At this time   - PT/OT input recc SNF    - appreciate Gyn - no L labia mass or lesion  Left posterior vulva + perianal tenderness     and fullness recc Color rectal consult   - Colorectal consult called recc repeat CT  abd pelvis   - cont Abx -   - CT results reviewed will consult gen surgery for I &  D , site tender and firm     CT ABD /PELVIS ()   IMPRESSION  1. Left Bartholin gland cyst. No perianal abscess. 2. Tracheobronchomalacia. 3. Nonobstructing, lower pole, right renal, staghorn calculus. Mild adjacent  renal sinus fat inflammation. 4. Splenomegaly, chronic. Urinary Retention   -Patient has had difficulty with urination since admission.  Suspect immobility is playing a role. -Bladder scan with 600 mL   -Has been straight cathed several times   -Placed concepcion   -appreciate  Urology input recc concepcion 7 days then follow up with [facility] OP     Hypertension Continue ASA, losartan, metoprolol and prazosin   Hyperlipidemia Continue atorvastatin   Seizure disorder Continue topamax   Hypothyroidism Continue levothyroxine   Type II Diabetes with peripheral neuropathy Continue SSI and gabapentin   Anxiety  Depression Continue seroquel, buspar, wellbutrin        Body mass index is 72.68 kg/m². Estimated discharge date: November 25  Barriers: placement , consult     Code status: Full  Prophylaxis: Lovenox  Recommended Disposition: SNF/LTC     Subjective:     Chief Complaint / Reason for Physician Visit  \"I want this taken care of \". Discussed with RN events overnight. Pt seen and examined findings on new CT , states site tender need narcotics for pain management   . Review of Systems:  Symptom Y/N Comments  Symptom Y/N Comments   Fever/Chills n   Chest Pain n    Poor Appetite n   Edema n    Cough n   Abdominal Pain n    Sputum n   Joint Pain n    SOB/DIOP n   Pruritis/Rash n    Nausea/vomit n   Tolerating PT/OT n    Diarrhea n   Tolerating Diet n    Constipation    Other       Could NOT obtain due to:      Objective:     VITALS:   Last 24hrs VS reviewed since prior progress note.  Most recent are:  Patient Vitals for the past 24 hrs:   Temp Pulse Resp BP SpO2   11/25/21 0918     97 %   11/25/21 0725 98.8 °F (37.1 °C) 77 18 (!) 113/53 94 %   11/25/21 0603  78  (!) 109/57    11/24/21 2344 98.5 °F (36.9 °C) 79 18 118/60 92 %   11/24/21 2221  80  111/65    11/24/21 2059     98 %   11/24/21 2039  89  (!) 99/55    11/24/21 2035  82  (!) 87/62    11/24/21 1935 98 °F (36.7 °C) 100 18 117/67 98 %   11/24/21 1517 98.1 °F (36.7 °C) 80 18 93/76 97 %       Intake/Output Summary (Last 24 hours) at 11/25/2021 1429  Last data filed at 11/25/2021 0630  Gross per 24 hour   Intake 750 ml   Output 2100 ml   Net -1350 ml        I had a face to face encounter and independently examined this patient on 11/25/2021, as outlined below:  PHYSICAL EXAM:  General: Obese  Alert, cooperative, no acute distress    EENT:  EOMI. Anicteric sclerae. MMM,missing teeth  Resp:  no wheezing or rales. No accessory muscle use  CV:  Regular  rhythm,  L labia  Edema tenderness   GI:  Soft, Non distended, Non tender. +Bowel sounds  Neurologic:  Alert and oriented X 3, normal speech,   Psych:   . Not anxious nor agitated  Skin:  No rashes. No jaundice    Reviewed most current lab test results and cultures  YES  Reviewed most current radiology test results   YES  Review and summation of old records today    NO  Reviewed patient's current orders and MAR    YES  PMH/SH reviewed - no change compared to H&P  ________________________________________________________________________  Care Plan discussed with:    Comments   Patient x    Family      RN x    Care Manager     Consultant                        Multidiciplinary team rounds were held today with , nursing, pharmacist and clinical coordinator. Patient's plan of care was discussed; medications were reviewed and discharge planning was addressed. ________________________________________________________________________  Total NON critical care TIME:  30 Minutes    Total CRITICAL CARE TIME Spent:   Minutes non procedure based      Comments   >50% of visit spent in counseling and coordination of care     ________________________________________________________________________  Mahendra Kirkpatrick NP     Procedures: see electronic medical records for all procedures/Xrays and details which were not copied into this note but were reviewed prior to creation of Plan. LABS:  I reviewed today's most current labs and imaging studies.   Pertinent labs include:  No results for input(s): WBC, HGB, HCT, PLT, HGBEXT, HCTEXT, PLTEXT, HGBEXT, HCTEXT, PLTEXT in the last 72 hours. No results for input(s): NA, K, CL, CO2, GLU, BUN, CREA, CA, MG, PHOS, ALB, TBIL, TBILI, ALT, INR, INREXT, INREXT in the last 72 hours. No lab exists for component: SGOT    Signed: Tawana Kolb, NP

## 2021-11-25 NOTE — PROGRESS NOTES
Problem: Falls - Risk of  Goal: *Absence of Falls  Description: Document Clydene Bone Fall Risk and appropriate interventions in the flowsheet.   Outcome: Progressing Towards Goal  Note: Fall Risk Interventions:  Mobility Interventions: Bed/chair exit alarm         Medication Interventions: Bed/chair exit alarm    Elimination Interventions: Bed/chair exit alarm

## 2021-11-25 NOTE — PROGRESS NOTES
Bedside shift change report given to randa (oncoming nurse) by tressa (offgoing nurse). Report included the following information SBAR, Kardex, ED Summary, Intake/Output, MAR and Recent Results. Ambulated to bathroom x3. Prn given for pain. Tolerating diet. Prn given for heartburn. Gastritis without bleeding, unspecified chronicity, unspecified gastritis type    HTN (hypertension)    Hyperlipidemia, unspecified hyperlipidemia type

## 2021-11-25 NOTE — PROGRESS NOTES
End of Shift Note    Bedside shift change report given to Basilia TONG (oncoming nurse) by Janet Campuzano RN (offgoing nurse). Report included the following information SBAR, Kardex, ED Summary, Intake/Output and MAR    Shift worked:  [id number]     Shift summary and any significant changes:     Patient c/o pf rectal abscess pain. Medicate with dilaudid per patient request with good relief. Patient tolerating PO. Patient concepcion draining QS. Vitals WNL. Antibx given per order. Patient slept most of night. Colorectal consult paged. Concerns for physician to address:  see above   Zone phone for oncoming shift:  [phone]     Activity:  Activity Level: Up with Assistance  Number times ambulated in hallways past shift: 0  Number of times OOB to chair past shift: 0    Cardiac:   Cardiac Monitoring: No      Cardiac Rhythm: Sinus Rhythm    Access:   Current line(s): PIV     Genitourinary:   Urinary status: concepcion    Respiratory:   O2 Device: None (Room air)  Chronic home O2 use?: NO  Incentive spirometer at bedside: YES     GI:  Last Bowel Movement Date: 11/17/21  Current diet:  ADULT DIET Regular; 3 carb choices (45 gm/meal); Low Fat/Low Chol/High Fiber/KAMRON  Passing flatus: YES  Tolerating current diet: YES       Pain Management:   Patient states pain is manageable on current regimen: YES    Skin:  Marin Score: 16  Interventions: increase time out of bed, PT/OT consult and internal/external urinary devices    Patient Safety:  Fall Score:  Total Score: 3  Interventions: bed/chair alarm, assistive device (walker, cane, etc), gripper socks and pt to call before getting OOB  High Fall Risk: Yes    Length of Stay:  Expected LOS: 2d 21h  Actual LOS: 6      Kaylie George RN

## 2021-11-25 NOTE — PROGRESS NOTES
Gynecology Progress Note    Patient is feeling better, afebrile    Vitals:  Blood pressure (!) 113/53, pulse 77, temperature 98.8 °F (37.1 °C), resp. rate 18, height 5' (1.524 m), weight (!) 168.8 kg (372 lb 2.2 oz), SpO2 97 %, not currently breastfeeding. Temp (24hrs), Av.4 °F (36.9 °C), Min:98 °F (36.7 °C), Max:98.8 °F (37.1 °C)        Exam:  Patient without distress. Abdomen soft,  nontender. Lab/Data Review: All lab results for the last 24 hours reviewed.     Assessment and Plan:   Perianal cellulitis Patient is feeling better on IV AB  Continue current care, colorectal consult

## 2021-11-25 NOTE — PROGRESS NOTES
Problem: Pressure Injury - Risk of  Goal: *Prevention of pressure injury  Description: Document Marin Scale and appropriate interventions in the flowsheet. Outcome: Progressing Towards Goal  Note: Pressure Injury Interventions:  Sensory Interventions: Chair cushion, Check visual cues for pain, Keep linens dry and wrinkle-free, Maintain/enhance activity level, Minimize linen layers, Monitor skin under medical devices, Pad between skin to skin, Pressure redistribution bed/mattress (bed type), Turn and reposition approx.  every two hours (pillows and wedges if needed)    Moisture Interventions: Absorbent underpads, Internal/External urinary devices, Minimize layers    Activity Interventions: Chair cushion, Increase time out of bed, Pressure redistribution bed/mattress(bed type), PT/OT evaluation    Mobility Interventions: Chair cushion, HOB 30 degrees or less, Pressure redistribution bed/mattress (bed type), PT/OT evaluation    Nutrition Interventions: Document food/fluid/supplement intake    Friction and Shear Interventions: HOB 30 degrees or less, Lift sheet, Minimize layers                Problem: Pain  Goal: *Control of Pain  Outcome: Progressing Towards Goal

## 2021-11-26 LAB
B PERT DNA SPEC QL NAA+PROBE: NOT DETECTED
BORDETELLA PARAPERTUSSIS PCR, BORPAR: NOT DETECTED
C PNEUM DNA SPEC QL NAA+PROBE: NOT DETECTED
COVID-19 RAPID TEST, COVR: DETECTED
FLUAV H1 2009 PAND RNA SPEC QL NAA+PROBE: NOT DETECTED
FLUAV H1 RNA SPEC QL NAA+PROBE: NOT DETECTED
FLUAV H3 RNA SPEC QL NAA+PROBE: NOT DETECTED
FLUAV SUBTYP SPEC NAA+PROBE: NOT DETECTED
FLUBV RNA SPEC QL NAA+PROBE: NOT DETECTED
GLUCOSE BLD STRIP.AUTO-MCNC: 127 MG/DL (ref 65–117)
GLUCOSE BLD STRIP.AUTO-MCNC: 135 MG/DL (ref 65–117)
GLUCOSE BLD STRIP.AUTO-MCNC: 157 MG/DL (ref 65–117)
GLUCOSE BLD STRIP.AUTO-MCNC: 177 MG/DL (ref 65–117)
HADV DNA SPEC QL NAA+PROBE: NOT DETECTED
HCOV 229E RNA SPEC QL NAA+PROBE: NOT DETECTED
HCOV HKU1 RNA SPEC QL NAA+PROBE: NOT DETECTED
HCOV NL63 RNA SPEC QL NAA+PROBE: NOT DETECTED
HCOV OC43 RNA SPEC QL NAA+PROBE: NOT DETECTED
HMPV RNA SPEC QL NAA+PROBE: NOT DETECTED
HPIV1 RNA SPEC QL NAA+PROBE: NOT DETECTED
HPIV2 RNA SPEC QL NAA+PROBE: NOT DETECTED
HPIV3 RNA SPEC QL NAA+PROBE: NOT DETECTED
HPIV4 RNA SPEC QL NAA+PROBE: NOT DETECTED
LDH SERPL L TO P-CCNC: 202 U/L (ref 81–246)
M PNEUMO DNA SPEC QL NAA+PROBE: NOT DETECTED
RSV RNA SPEC QL NAA+PROBE: NOT DETECTED
RV+EV RNA SPEC QL NAA+PROBE: NOT DETECTED
SARS-COV-2 PCR, COVPCR: NOT DETECTED
SERVICE CMNT-IMP: ABNORMAL
SOURCE, COVRS: ABNORMAL

## 2021-11-26 PROCEDURE — 2709999900 HC NON-CHARGEABLE SUPPLY

## 2021-11-26 PROCEDURE — 74011250637 HC RX REV CODE- 250/637: Performed by: INTERNAL MEDICINE

## 2021-11-26 PROCEDURE — 83615 LACTATE (LD) (LDH) ENZYME: CPT

## 2021-11-26 PROCEDURE — 0202U NFCT DS 22 TRGT SARS-COV-2: CPT

## 2021-11-26 PROCEDURE — 74011250637 HC RX REV CODE- 250/637: Performed by: NURSE PRACTITIONER

## 2021-11-26 PROCEDURE — 94760 N-INVAS EAR/PLS OXIMETRY 1: CPT

## 2021-11-26 PROCEDURE — 94640 AIRWAY INHALATION TREATMENT: CPT

## 2021-11-26 PROCEDURE — 36415 COLL VENOUS BLD VENIPUNCTURE: CPT

## 2021-11-26 PROCEDURE — 74011250637 HC RX REV CODE- 250/637: Performed by: GENERAL ACUTE CARE HOSPITAL

## 2021-11-26 PROCEDURE — 74011250636 HC RX REV CODE- 250/636: Performed by: NURSE PRACTITIONER

## 2021-11-26 PROCEDURE — 97530 THERAPEUTIC ACTIVITIES: CPT

## 2021-11-26 PROCEDURE — 65270000029 HC RM PRIVATE

## 2021-11-26 PROCEDURE — 82962 GLUCOSE BLOOD TEST: CPT

## 2021-11-26 PROCEDURE — 97535 SELF CARE MNGMENT TRAINING: CPT

## 2021-11-26 PROCEDURE — 77030029684 HC NEB SM VOL KT MONA -A

## 2021-11-26 PROCEDURE — 74011250636 HC RX REV CODE- 250/636: Performed by: GENERAL ACUTE CARE HOSPITAL

## 2021-11-26 PROCEDURE — 87635 SARS-COV-2 COVID-19 AMP PRB: CPT

## 2021-11-26 PROCEDURE — 74011000250 HC RX REV CODE- 250: Performed by: INTERNAL MEDICINE

## 2021-11-26 RX ADMIN — HYDROMORPHONE HYDROCHLORIDE 1 MG: 1 INJECTION, SOLUTION INTRAMUSCULAR; INTRAVENOUS; SUBCUTANEOUS at 17:59

## 2021-11-26 RX ADMIN — BUSPIRONE HYDROCHLORIDE 15 MG: 5 TABLET ORAL at 10:23

## 2021-11-26 RX ADMIN — BUDESONIDE 250 MCG: 0.25 INHALANT RESPIRATORY (INHALATION) at 09:38

## 2021-11-26 RX ADMIN — GABAPENTIN 100 MG: 100 CAPSULE ORAL at 21:39

## 2021-11-26 RX ADMIN — HYDROMORPHONE HYDROCHLORIDE 1 MG: 1 INJECTION, SOLUTION INTRAMUSCULAR; INTRAVENOUS; SUBCUTANEOUS at 06:54

## 2021-11-26 RX ADMIN — ONDANSETRON 4 MG: 2 INJECTION INTRAMUSCULAR; INTRAVENOUS at 15:21

## 2021-11-26 RX ADMIN — Medication 10 ML: at 05:25

## 2021-11-26 RX ADMIN — ARFORMOTEROL TARTRATE 15 MCG: 15 SOLUTION RESPIRATORY (INHALATION) at 21:47

## 2021-11-26 RX ADMIN — LEVOTHYROXINE SODIUM 112 MCG: 0.11 TABLET ORAL at 05:25

## 2021-11-26 RX ADMIN — HYDROMORPHONE HYDROCHLORIDE 1 MG: 1 INJECTION, SOLUTION INTRAMUSCULAR; INTRAVENOUS; SUBCUTANEOUS at 10:23

## 2021-11-26 RX ADMIN — BUSPIRONE HYDROCHLORIDE 15 MG: 5 TABLET ORAL at 15:15

## 2021-11-26 RX ADMIN — ENOXAPARIN SODIUM 40 MG: 100 INJECTION SUBCUTANEOUS at 21:38

## 2021-11-26 RX ADMIN — ONDANSETRON 4 MG: 2 INJECTION INTRAMUSCULAR; INTRAVENOUS at 10:24

## 2021-11-26 RX ADMIN — PRAZOSIN HYDROCHLORIDE 4 MG: 1 CAPSULE ORAL at 21:39

## 2021-11-26 RX ADMIN — QUETIAPINE FUMARATE 400 MG: 100 TABLET ORAL at 21:39

## 2021-11-26 RX ADMIN — OXYCODONE AND ACETAMINOPHEN 1 TABLET: 5; 325 TABLET ORAL at 05:29

## 2021-11-26 RX ADMIN — Medication 10 ML: at 16:26

## 2021-11-26 RX ADMIN — GABAPENTIN 100 MG: 100 CAPSULE ORAL at 10:24

## 2021-11-26 RX ADMIN — ARFORMOTEROL TARTRATE 15 MCG: 15 SOLUTION RESPIRATORY (INHALATION) at 09:38

## 2021-11-26 RX ADMIN — IPRATROPIUM BROMIDE 0.5 MG: 0.5 SOLUTION RESPIRATORY (INHALATION) at 21:47

## 2021-11-26 RX ADMIN — PHENAZOPYRIDINE HYDROCHLORIDE 200 MG: 100 TABLET ORAL at 15:15

## 2021-11-26 RX ADMIN — Medication 10 ML: at 21:00

## 2021-11-26 RX ADMIN — ENOXAPARIN SODIUM 40 MG: 100 INJECTION SUBCUTANEOUS at 10:24

## 2021-11-26 RX ADMIN — HYDROMORPHONE HYDROCHLORIDE 1 MG: 1 INJECTION, SOLUTION INTRAMUSCULAR; INTRAVENOUS; SUBCUTANEOUS at 20:59

## 2021-11-26 RX ADMIN — PHENAZOPYRIDINE HYDROCHLORIDE 200 MG: 100 TABLET ORAL at 17:59

## 2021-11-26 RX ADMIN — ONDANSETRON 4 MG: 2 INJECTION INTRAMUSCULAR; INTRAVENOUS at 20:59

## 2021-11-26 RX ADMIN — PHENAZOPYRIDINE HYDROCHLORIDE 200 MG: 100 TABLET ORAL at 10:23

## 2021-11-26 RX ADMIN — ATORVASTATIN CALCIUM 10 MG: 10 TABLET, FILM COATED ORAL at 22:00

## 2021-11-26 RX ADMIN — ALUMINUM HYDROXIDE, MAGNESIUM HYDROXIDE, AND SIMETHICONE 30 ML: 200; 200; 20 SUSPENSION ORAL at 18:09

## 2021-11-26 RX ADMIN — Medication 400 MG: at 10:23

## 2021-11-26 RX ADMIN — TOPIRAMATE 200 MG: 100 TABLET, FILM COATED ORAL at 21:39

## 2021-11-26 RX ADMIN — BUPROPION HYDROCHLORIDE 300 MG: 150 TABLET, EXTENDED RELEASE ORAL at 21:39

## 2021-11-26 RX ADMIN — HYDROMORPHONE HYDROCHLORIDE 1 MG: 1 INJECTION, SOLUTION INTRAMUSCULAR; INTRAVENOUS; SUBCUTANEOUS at 15:15

## 2021-11-26 RX ADMIN — ESTRADIOL 0.5 MG: 1 TABLET ORAL at 10:23

## 2021-11-26 RX ADMIN — BUDESONIDE 250 MCG: 0.25 INHALANT RESPIRATORY (INHALATION) at 21:47

## 2021-11-26 RX ADMIN — BUSPIRONE HYDROCHLORIDE 15 MG: 5 TABLET ORAL at 21:39

## 2021-11-26 RX ADMIN — ASPIRIN 81 MG CHEWABLE TABLET 81 MG: 81 TABLET CHEWABLE at 10:24

## 2021-11-26 RX ADMIN — GABAPENTIN 100 MG: 100 CAPSULE ORAL at 15:15

## 2021-11-26 RX ADMIN — IPRATROPIUM BROMIDE 0.5 MG: 0.5 SOLUTION RESPIRATORY (INHALATION) at 09:38

## 2021-11-26 RX ADMIN — LOSARTAN POTASSIUM 25 MG: 25 TABLET, FILM COATED ORAL at 10:23

## 2021-11-26 RX ADMIN — METOPROLOL SUCCINATE 25 MG: 25 TABLET, EXTENDED RELEASE ORAL at 10:23

## 2021-11-26 NOTE — PROGRESS NOTES
Problem: Self Care Deficits Care Plan (Adult)  Goal: *Acute Goals and Plan of Care (Insert Text)  Description: FUNCTIONAL STATUS PRIOR TO ADMISSION: Patient was modified independent using a rolling walker for functional mobility. Sits and sponge bathes in a chair next to her bed and then stands in the shower to rinse off, only wears slip on shoes and family assists with socks if pt wears them, performed all other ADLS on her own, family performs [address]: The patient lived with  and mother whom assist as needed. Occupational Therapy Goals:  Initiated 11/21/2021  1. Patient will perform grooming seated with modified independence within 7 days. 2. Patient will perform lower body dressing with excluding socks modified independence within 7 days. 3. Patient will perform toileting with modified independence within 7 days. 4. Patient will transfer from toilet with modified independence using the least restrictive device and appropriate durable medical equipment within 7 days. Outcome: Progressing Towards Goal   OCCUPATIONAL THERAPY TREATMENT  Patient: Loida Oden (86 y.o. female)  Date: 11/26/2021  Diagnosis: Intractable back pain [M54.9]   <principal problem not specified>       Precautions: Fall, Back, Skin (BMI 72.68)  Chart, occupational therapy assessment, plan of care, and goals were reviewed. ASSESSMENT  Patient continues with skilled OT services and is progressing towards goals. Improved functional strength and endurance despite nausea and pain. Presents as cog intact; able to walf to bathroom over weekend x 1; unable today due to nausea, worse in standing; also c/o hot/cold. Overall performance better today, min A-CGA functional mobility with heavy duty DME. Receptive to training regarding nutrition for healing, benefits of consistent exercise/activity level.     Current Level of Function Impacting Discharge (ADLs): set up UE ADLs, Min A-Max A LE ADLs, would benefit from LE AE; Min A functional transfers, very limited ambulation at [facility] with HDRW, limited by nausea    Other factors to consider for discharge: supportive family in the home         PLAN :  Patient continues to benefit from skilled intervention to address the above impairments. Continue treatment per established plan of care to address goals. Recommend with staff: up to chair for meals as tolerated; up to edge of bed at minimum for meals; RW and BSC    Recommend next OT session: LE AE    Recommendation for discharge: (in order for the patient to meet his/her long term goals)  Therapy up to 5 days/week in SNF setting    This discharge recommendation:  Has been made in collaboration with the attending provider and/or case management    IF patient discharges home will need the following DME: AE: long handled bathing, AE: long handled dressing, and HD BSC, HDRW, HD shower chair       SUBJECTIVE:   Patient stated I feel steady on this walker.  (HD)    OBJECTIVE DATA SUMMARY:   Cognitive/Behavioral Status:  Neurologic State: Alert; Appropriate for age  Orientation Level: Oriented X4  Cognition: Appropriate decision making; Appropriate for age attention/concentration; Appropriate safety awareness  Perception: Appears intact  Perseveration: No perseveration noted  Safety/Judgement: Awareness of environment; Fall prevention; Insight into deficits    Functional Mobility and Transfers for ADLs:  Bed Mobility:  Rolling: Contact guard assistance  Supine to Sit: Contact guard assistance; Minimum assistance  Sit to Supine: Minimum assistance  Scooting: Supervision; Contact guard assistance    Transfers:  Sit to Stand: Contact guard assistance; Adaptive equipment; Additional time (HDRW)  Functional Transfers  Toilet Transfer : Minimum assistance; Additional time; Adaptive equipment ([address]; HDRW inferred; skill limited today by nausea)  Bed to Chair: Minimum assistance; Additional time;  Adaptive equipment (did not complete task due to fatigue/nausea in standing)    Balance:  Sitting: Intact  Standing: Impaired; With support (HDRW w/gt belt)  Standing - Static: Constant support; Fair  Standing - Dynamic : Constant support; Fair    ADL Intervention:  Feeding  Feeding Assistance: Independent    Grooming  Grooming Assistance: Set-up (certain of inability to perform in standing)              Upper Body [address]: Contact guard assistance; Set-up    [address]: Total assistance(dependent); Moderate assistance (D distally; much improved in standing tolerance)    Toileting  Toileting Assistance: Minimum assistance; Moderate assistance (inferred based on standing and UE Use; D hygiene)    Cognitive Retraining  Maintains Attention For (Time): Greater than 10 minutes  Following Commands: Follows one step commands/directions; Follows two step commands/directions  Safety/Judgement: Awareness of environment; Fall prevention; Insight into deficits      Pain:  8/10 back pain, not worse in sitting or standing; medicated during session    Activity Tolerance:   Fair, SpO2 stable on RA, and requires frequent rest breaks; frequency increased to 5x/week due to improved activity toleranace and high risk factors for decline in function if not active enough due to baseline health    After treatment patient left in no apparent distress:   Patient positioned in R sidelying for pressure relief and pain management; call bell in reach, advised to not stand up alone to which she verbalized understanding    COMMUNICATION/COLLABORATION:   The patients plan of care was discussed with: Physical therapist, Registered nurse, and Case management.      Matt Tuttle OTR/L    Time Calculation: 38 mins

## 2021-11-26 NOTE — PROGRESS NOTES
I spoke with Sergey Judge NP Hospitalist yesterday about Gen surg consult for bartholin gland cyst inflammation. Pt had been seen by GYN and CR Surgery and needs GYN care. Hospitalist concurred, I am available as needed.  Will sign off

## 2021-11-26 NOTE — PROGRESS NOTES
Comprehensive Nutrition Assessment    Type and Reason for Visit: Initial, RD nutrition re-screen/LOS    Nutrition Recommendations/Plan:   · Continue CCD/45 g CHO/meal as seems to be maintaining BG control. Continue Cardiac diet as well. · Please document % meals and supplements consumed in flowsheet I/O's under intake. Nutrition Assessment:      11/26: Chart reviewed; med noted for intractable back pain; hx of DM, morbid obesity, HTN. . RD visited with pt at bedside. Able to confirm weight as documented weight stated 173 lbs but previous weights x 1 year ago documented as 360 lbs. Pt states she has lost a little weight but reports weight is 371 lbs. Updated in chart. Pt reports no questions/concerns regarding a Consistent Carb Diet or Cardiac Diet. Patient Vitals for the past 168 hrs:   % Diet Eaten   11/20/21 0848 76 - 100%     Last Weight Metric  Weight Loss Metrics 11/26/2021 9/10/2020 8/26/2019 8/20/2019 7/25/2019 6/12/2019 12/31/2018   Today's Wt 371 lb 11.1 oz 360 lb 365 lb 368 lb 370 lb 374 lb 405 lb 10.3 oz   BMI 72.59 kg/m2 65.84 kg/m2 64.66 kg/m2 67.31 kg/m2 67.67 kg/m2 68.41 kg/m2 74.19 kg/m2     Estimated Daily Nutrient Needs:  Energy (kcal): 2010 (BMR 2092 x 1. 2AF) - 500 kcals; Weight Used for Energy Requirements: Current  Protein (g): 135 (0.8 g/kg bw); Weight Used for Protein Requirements: Current  Fluid (ml/day): 2000 ml/day; Method Used for Fluid Requirements: 1 ml/kcal    Nutrition Related Findings:  BM: 11/25; Labs: reviewed; Meds: reviewed      Wounds:    None       Current Nutrition Therapies:  ADULT DIET Regular; 3 carb choices (45 gm/meal);  Low Fat/Low Chol/High Fiber/KAMRON    Anthropometric Measures:  · Height:  5' (152.4 cm)  · Current Body Wt:  168.6 kg (371 lb 11.1 oz) (pt stated)   · Ideal Body Wt:  100 lbs:  371.7 %   · BMI Category:  Obese class 3 (BMI 40.0 or greater)       Nutrition Diagnosis:   · Overweight/obesity related to excessive energy intake as evidenced by BMI (BMI)    Nutrition Interventions:   Food and/or Nutrient Delivery: Continue current diet  Nutrition Education and Counseling: No recommendations at this time  Coordination of Nutrition Care: Continue to monitor while inpatient    Goals:  Trend PO >50% of meals while maintaining BG <200 mg/dl next 5-7 days       Nutrition Monitoring and Evaluation:   Behavioral-Environmental Outcomes: None identified  Food/Nutrient Intake Outcomes: Food and nutrient intake  Physical Signs/Symptoms Outcomes: Biochemical data, Weight, Skin    Discharge Planning:    Continue current diet     Electronically signed by Sameer Morse RD on 11/26/2021 at 12:33 PM

## 2021-11-26 NOTE — PROGRESS NOTES
Transition of Care Plan:     RUR: 11%  Disposition: SNF placement ([place])  >insurance authorization initiated on 11/26/21  Follow up appointments: PCP  DME needed: TBD - has shower chair, RW, cane at home  Transportation at [address] [phone])  [place] or means to access home: Yes     IM Medicare Letter: N/A  Is patient a BCPI-A Bundle: N/A                     If yes, was Bundle Letter given?: N/A   Caregiver Contact: Raul Truong. ([phone])  Minna Lanza DTR ([phone])  Discharge Caregiver contacted prior to discharge? Pt to contact    Chart reviewed. CM continuing to follow for discharge planning. Pt is not yet medically stable for d/c at this time. CM has faxed updated clinicals to [facility] admissions today. Spoke with Danita Caceres in admissions who initiated insurance authorization this morning. Rapid covid test ordered for placement and is POSITIVE. Pt transferring off of unit to isolation unit at this time. Unit CM will continue to follow.     BARBARA Nova   Care Manager, [facility]  [phone]

## 2021-11-26 NOTE — PROGRESS NOTES
Problem: Falls - Risk of  Goal: *Absence of Falls  Description: Document Halle Emerson Fall Risk and appropriate interventions in the flowsheet. Outcome: Progressing Towards Goal  Note: Fall Risk Interventions:  Mobility Interventions: Bed/chair exit alarm, OT consult for ADLs, Patient to call before getting OOB, PT Consult for mobility concerns, PT Consult for assist device competence, Utilize walker, cane, or other assistive device         Medication Interventions: Bed/chair exit alarm, Patient to call before getting OOB, Teach patient to arise slowly    Elimination Interventions: Bed/chair exit alarm, Call light in reach, Patient to call for help with toileting needs, Toileting schedule/hourly rounds              Problem: Patient Education: Go to Patient Education Activity  Goal: Patient/Family Education  Outcome: Progressing Towards Goal     Problem: Pressure Injury - Risk of  Goal: *Prevention of pressure injury  Description: Document Marin Scale and appropriate interventions in the flowsheet.   Outcome: Progressing Towards Goal  Note: Pressure Injury Interventions:  Sensory Interventions: Assess changes in LOC, Check visual cues for pain, Keep linens dry and wrinkle-free, Minimize linen layers, Monitor skin under medical devices, Pad between skin to skin    Moisture Interventions: Absorbent underpads, Minimize layers    Activity Interventions: Chair cushion, Increase time out of bed, Pressure redistribution bed/mattress(bed type)    Mobility Interventions: Float heels, HOB 30 degrees or less, Pressure redistribution bed/mattress (bed type)    Nutrition Interventions: Document food/fluid/supplement intake    Friction and Shear Interventions: HOB 30 degrees or less, Lift sheet, Minimize layers                Problem: Patient Education: Go to Patient Education Activity  Goal: Patient/Family Education  Outcome: Progressing Towards Goal     Problem: Pain  Goal: *Control of Pain  Outcome: Progressing Towards Goal Problem: Patient Education: Go to Patient Education Activity  Goal: Patient/Family Education  Outcome: Progressing Towards Goal     Problem: Patient Education: Go to Patient Education Activity  Goal: Patient/Family Education  Outcome: Progressing Towards Goal     Problem: Patient Education: Go to Patient Education Activity  Goal: Patient/Family Education  Outcome: Progressing Towards Goal

## 2021-11-26 NOTE — PROGRESS NOTES
I reviewed pertinent labs and imaging, and discussed /agreed on the plan of care with Dr. Eric Bowden      Hospitalist Progress Note    NAME: Luiza Apple   :  1960   MRN:  [id number]       Assessment / Plan:    4128 notifed by Lab rapid covid positive . PCR ordered will transfer pt off unit  Pt made aware states not feeling well but denies cough , sob , no fever documented . Pt instructed to inform family members that have visited with her and encourage them to get tested   - will cancel dc for today      Acute on chronic lower back pain  Debilty r/t Super Morbid Obesity BMI 72  CT abd / Pelvis   IMPRESSION  1. Partial staghorn calculus in the lower pole of the right kidney. 2. Degenerative changes in the spine. Minimal anterior listhesis of L4 on L5  with disc osteophyte complex formation/degenerative disc disease at L5/S1. 3. There is fullness in the left side of the labia/left of the introitus. Recommend physical exam and possible referral to gynecology. 4. Incidental findings as above. MRI lumbar spine   IMPRESSION  1. Multilevel stable spondylitic changes as above, most severe at L3-S1.  2.  Severe canal stenosis at L3.  3.  Moderate canal stenosis with severe left and moderate right neuroforaminal  narrowing at L4-L5. 4.  Severe bilateral neuroforaminal narrowing at L5-S1.  5.  Additional findings as above. appreciate Ortho input - no surgical intervention  At this time   - PT/OT input recc SNF    - appreciate Gyn - no L labia mass or lesion  Left posterior vulva + perianal tenderness     and fullness recc Color rectal consult   - Colorectal consult called recc repeat CT  abd pelvis   - cont Abx -   - CT results showed Bartholin gland cyst - sitz bath and Abx x 7 days     CT ABD /PELVIS ()   IMPRESSION  1. Left Bartholin gland cyst. No perianal abscess. 2. Tracheobronchomalacia. 3. Nonobstructing, lower pole, right renal, staghorn calculus.  Mild adjacent  renal sinus fat inflammation. 4. Splenomegaly, chronic. Urinary Retention   -Patient has had difficulty with urination since admission. Suspect immobility is playing a role. -Bladder scan with 600 mL   -Has been straight cathed several times   -Placed concepcion   -appreciate  Urology input recc concepcion 7 days then follow up with [facility] OP     Hypertension Continue ASA, losartan, metoprolol and prazosin   Hyperlipidemia Continue atorvastatin   Seizure disorder Continue topamax   Hypothyroidism Continue levothyroxine   Type II Diabetes with peripheral neuropathy Continue SSI and gabapentin   Anxiety  Depression Continue seroquel, buspar, wellbutrin        Body mass index is 33.97 kg/m². Estimated discharge date: November 25  Barriers: placement , consult     Code status: Full  Prophylaxis: Lovenox  Recommended Disposition: SNF/LTC     Subjective:     Chief Complaint / Reason for Physician Visit  \"I feel ok today  \". Discussed with RN events overnight. Pt seen and examined DC pending insurance auth and covid result   Review of Systems:  Symptom Y/N Comments  Symptom Y/N Comments   Fever/Chills n   Chest Pain n    Poor Appetite n   Edema n    Cough n   Abdominal Pain n    Sputum n   Joint Pain n    SOB/DIOP n   Pruritis/Rash n    Nausea/vomit n   Tolerating PT/OT n    Diarrhea n   Tolerating Diet n    Constipation    Other       Could NOT obtain due to:      Objective:     VITALS:   Last 24hrs VS reviewed since prior progress note.  Most recent are:  Patient Vitals for the past 24 hrs:   Temp Pulse Resp BP SpO2   11/26/21 1100  89  (!) 145/82    11/26/21 1057  90  130/62    11/26/21 1044  85  115/73 97 %   11/26/21 0941     94 %   11/26/21 0800 98.4 °F (36.9 °C) 81 18 (!) 120/91 92 %   11/26/21 0620  80  (!) 110/59    11/26/21 0005  84  (!) 104/48    11/25/21 2326 98 °F (36.7 °C) 88 18 (!) 104/48 94 %   11/25/21 2200  84  (!) 104/48    11/25/21 2035     96 %   11/25/21 1928 98.7 °F (37.1 °C) 74 18 120/67 94 % 11/25/21 1520 98.5 °F (36.9 °C) 74 18 (!) 110/48 96 %       Intake/Output Summary (Last 24 hours) at 11/26/2021 1200  Last data filed at 11/26/2021 0533  Gross per 24 hour   Intake 780 ml   Output 1750 ml   Net -970 ml        I had a face to face encounter and independently examined this patient on 11/26/2021, as outlined below:  PHYSICAL EXAM:  General: Obese  Alert, cooperative, no acute distress    EENT:  EOMI. Anicteric sclerae. MMM,missing teeth  Resp:  no wheezing or rales. No accessory muscle use  CV:  Regular  rhythm,  L labia  Edema tenderness . firmm no induration   GI:  Soft, Non distended, Non tender. +Bowel sounds  Neurologic:  Alert and oriented X 3, normal speech,   Psych:   . Not anxious nor agitated  Skin:  No rashes. No jaundice    Reviewed most current lab test results and cultures  YES  Reviewed most current radiology test results   YES  Review and summation of old records today    NO  Reviewed patient's current orders and MAR    YES  PMH/SH reviewed - no change compared to H&P  ________________________________________________________________________  Care Plan discussed with:    Comments   Patient x    Family      RN x    Care Manager     Consultant                        Multidiciplinary team rounds were held today with , nursing, pharmacist and clinical coordinator. Patient's plan of care was discussed; medications were reviewed and discharge planning was addressed. ________________________________________________________________________  Total NON critical care TIME:  30 Minutes    Total CRITICAL CARE TIME Spent:   Minutes non procedure based      Comments   >50% of visit spent in counseling and coordination of care     ________________________________________________________________________  Gene Gillis Si, NP     Procedures: see electronic medical records for all procedures/Xrays and details which were not copied into this note but were reviewed prior to creation of Plan. LABS:  I reviewed today's most current labs and imaging studies. Pertinent labs include:  No results for input(s): WBC, HGB, HCT, PLT, HGBEXT, HCTEXT, PLTEXT, HGBEXT, HCTEXT, PLTEXT in the last 72 hours. No results for input(s): NA, K, CL, CO2, GLU, BUN, CREA, CA, MG, PHOS, ALB, TBIL, TBILI, ALT, INR, INREXT, INREXT in the last 72 hours. No lab exists for component: SGOT    Signed: Tawana Dockery NP

## 2021-11-26 NOTE — PROGRESS NOTES
Problem: Falls - Risk of  Goal: *Absence of Falls  Description: Document Sudarshan Miner Fall Risk and appropriate interventions in the flowsheet. Outcome: Progressing Towards Goal  Note: Fall Risk Interventions:  Mobility Interventions: Bed/chair exit alarm, OT consult for ADLs, Patient to call before getting OOB, PT Consult for mobility concerns, PT Consult for assist device competence, Utilize walker, cane, or other assistive device         Medication Interventions: Bed/chair exit alarm, Patient to call before getting OOB, Teach patient to arise slowly    Elimination Interventions: Bed/chair exit alarm, Call light in reach, Patient to call for help with toileting needs, Toileting schedule/hourly rounds              Problem: Patient Education: Go to Patient Education Activity  Goal: Patient/Family Education  Outcome: Progressing Towards Goal     Problem: Pressure Injury - Risk of  Goal: *Prevention of pressure injury  Description: Document Marin Scale and appropriate interventions in the flowsheet.   Outcome: Progressing Towards Goal  Note: Pressure Injury Interventions:  Sensory Interventions: Assess changes in LOC, Check visual cues for pain, Keep linens dry and wrinkle-free, Minimize linen layers, Monitor skin under medical devices, Pad between skin to skin    Moisture Interventions: Absorbent underpads, Minimize layers    Activity Interventions: Chair cushion, Increase time out of bed, Pressure redistribution bed/mattress(bed type)    Mobility Interventions: Float heels, HOB 30 degrees or less, Pressure redistribution bed/mattress (bed type)    Nutrition Interventions: Document food/fluid/supplement intake    Friction and Shear Interventions: HOB 30 degrees or less, Lift sheet, Minimize layers                Problem: Pain  Goal: *Control of Pain  Outcome: Progressing Towards Goal

## 2021-11-26 NOTE — PROGRESS NOTES
Problem: Mobility Impaired (Adult and Pediatric)  Goal: *Acute Goals and Plan of Care (Insert Text)  Description: FUNCTIONAL STATUS PRIOR TO ADMISSION: Patient was modified independent using a rolling walker for functional mobility. HOME SUPPORT PRIOR TO ADMISSION: The patient lived with her  and Mother and didn't require assistance with adls. Physical Therapy Goals  Initiated 11/21/2021  1. Patient will move from supine to sit and sit to supine , scoot up and down, and roll side to side in bed with modified independence within 7 day(s). 2.  Patient will transfer from bed to chair and chair to bed with modified independence using the least restrictive device within 7 day(s). 3.  Patient will perform sit to stand with modified independence within 7 day(s). 4.  Patient will ambulate with modified independence for 100 feet with the least restrictive device within 7 day(s). 5.  Patient will ascend/descend 3 stairs with  handrail(s) with modified independence within 7 day(s). Physical Therapy Goals  updated 11/26/2021  1. Patient will move from supine to sit and sit to supine , scoot up and down, and roll side to side in bed with modified independence within 7 day(s). 2.  Patient will transfer from bed to chair and chair to bed with modified independence using the least restrictive device within 7 day(s). 3.  Patient will perform sit to stand with modified independence within 7 day(s). 4.  Patient will ambulate with modified independence for 75 feet with the least restrictive device within 7 day(s). 11/26/2021 1321 by Percy Rivera, PT  Outcome: Progressing Towards Goal  PHYSICAL THERAPY TREATMENT: WEEKLY REASSESSMENT  Patient: Jessica Vera (45 y.o. female)  Date: 11/26/2021  Primary Diagnosis: Intractable back pain [M54.9]        Precautions:  Fall, Back, Skin (BMI 72.68)      ASSESSMENT  Patient continues with skilled PT services and is progressing towards goals.  Pt has been very cooperative and is progressing with all of her bed mobility, transfers and now progressing to amb. She is now however dx with COVID and today was not feeling well, nauseated and with limited activity tolerance. Plan has been for pt to transition to SNF rehab prior to returning home and pending how she does with the new COVID dx, would still be appropriate. Patient's progression toward goals since last assessment: Pt had been able to complete edge of bed stepping and transfers with SBA with walker but then had a decline in functional status to the point where she was not able to complete a full stand without max A of 2 and was only able to maintain for ~45 sec. She has now begun to improve again and is able to come to the edge of the bed with CGA to min A, has good sitting balance and is able to stand to walker (does pull on stabilized walker) with CGA. She was unable to tolerate amb this session but is noted to by nursing to have been able to get to bathroom with 2 assist yesterday. She does still need significant A with LEs getting back into bed. Pt did c/o dizziness today, not orthostatic to sitting, unable to tolerate standing for full measurement. Functional Outcome Measure: The patient scored 50/100 on the barthel outcome measure which is indicative of 50% functional impairment. Other factors to consider for discharge: now with new dx COVID; fall risk; limited activity tolerance         PLAN :  Goals have been updated based on progression since last assessment. Patient continues to benefit from skilled intervention to address the above impairments. Recommendations and Planned Interventions: bed mobility training, transfer training, gait training, therapeutic exercises, patient and family training/education, and therapeutic activities      Frequency/Duration: Patient will be followed by physical therapy:  3 times a week to address goals.     Recommendation for discharge: (in order for the patient to meet his/her long term goals)  Therapy up to 5 days/week in SNF setting    This discharge recommendation:  Has been made in collaboration with the attending provider and/or case management    IF patient discharges home will need the following DME: to be determined (TBD)         SUBJECTIVE:   Patient stated I feel horrible.     OBJECTIVE DATA SUMMARY:   HISTORY:    Past Medical History:   Diagnosis Date    Adverse effect of anesthesia     slow to wake up       Anxiety and depression     Arrhythmia     Arthritis     Chronic pain     djd    Diabetes (HCC)     GERD (gastroesophageal reflux disease)     Hypertension     Hypothyroidism     Kidney stone     Liver disease     Obesity, morbid, BMI 50 or higher ([facility]     Seizures ([facility]      Past Surgical History:   Procedure Laterality Date    CARDIAC CATHETERIZATION  11/18/2010         HX APPENDECTOMY      HX BREAST BIOPSY Right     about 15 years ago, neg    HX CHOLECYSTECTOMY      HX HYSTERECTOMY      HX TUBAL LIGATION         Personal factors and/or comorbidities impacting plan of care: weight of 371 lbs    Home Situation  Home Environment: Private residence  # Steps to Enter: 3  One/Two Story Residence: One story  Living Alone: No  Support Systems: Spouse/Significant Other, Parent(s)  Patient Expects to be Discharged to[de-identified] [employer]  Current DME Used/Available at Home: Shower chair, Walker, rolling  Tub or Shower Type: Shower    EXAMINATION/PRESENTATION/DECISION MAKING:   Critical Behavior:  Neurologic State: Alert, Appropriate for age  Orientation Level: Oriented X4  Cognition: Appropriate decision making, Appropriate for age attention/concentration, Appropriate safety awareness  Safety/Judgement: Awareness of environment, Fall prevention, Insight into deficits  Hearing:   Auditory  Auditory Impairment: None    Range Of Motion:  AROM: Generally decreased, functional                       Strength:    Strength: Generally decreased, functional Tone & Sensation:   Tone: Normal                              Coordination:  Coordination: Within functional limits       Functional Mobility:  Bed Mobility:  Rolling: Contact guard assistance  Supine to Sit: Contact guard assistance; Minimum assistance  Sit to Supine: Minimum assistance; Maximum assistance; Other (comment) (min A of trunk and max A at legs)  Scooting: Supervision; Contact guard assistance  Transfers:  Sit to Stand: Contact guard assistance; Adaptive equipment; Additional time (HDRW)  Stand to Sit: Supervision        Bed to Chair: Minimum assistance; Additional time; Adaptive equipment (did not complete task due to fatigue/nausea in standing)              Balance:   Sitting: Intact  Standing: Impaired; With support (HDRW w/gt belt)  Standing - Static: Constant support; Fair  Standing - Dynamic : Constant support; Fair      Functional Measure:  Barthel Index:    Bathin  Bladder: 10  Bowels: 10  Groomin  Dressin  Feeding: 10  Mobility: 0  Stairs: 0  Toilet Use: 5  Transfer (Bed to Chair and Back): 10  Total: 50/100       The Barthel ADL Index: Guidelines  1. The index should be used as a record of what a patient does, not as a record of what a patient could do. 2. The main aim is to establish degree of independence from any help, physical or verbal, however minor and for whatever reason. 3. The need for supervision renders the patient not independent. 4. A patient's performance should be established using the best available evidence. Asking the patient, friends/relatives and nurses are the usual sources, but direct observation and common sense are also important. However direct testing is not needed. 5. Usually the patient's performance over the preceding 24-48 hours is important, but occasionally longer periods will be relevant. 6. Middle categories imply that the patient supplies over 50 per cent of the effort. 7. Use of aids to be independent is allowed.     Score Interpretation (from [address] [facility])    Independent   60-79 Minimally independent   40-59 Partially dependent   20-39 Very dependent   <20 Totally dependent     -Rufina Aldana., Barthel, D.W. (1965). Functional evaluation: the Barthel Index. [address] ([address] (1997). The Barthel activities of daily living index: self-reporting versus actual performance in the old (> or = 75 years). Journal [address] 45(7), [facility], J.TERESE, Usman Dunn., Martha Patino. (1999). Measuring the change in disability after inpatient rehabilitation; comparison of the responsiveness of the Barthel Index and Functional [place] Measure. Journal of Neurology, Neurosurgery, and Psychiatry, 66(4), 931-150. BOBBI Trotter, FANI Villasenor, & Vannessa Barrett MFABRICE. (2004) Assessment of post-stroke quality of life in cost-effectiveness studies: The usefulness of the Barthel Index and the EuroQoL-5D. Quality of Life Research, 13, 427-43          Pain Ratin/10 back pain, RN in for meds     Activity Tolerance:   Poor    After treatment patient left in no apparent distress:   Supine in bed, Call bell within reach, and Side rails x 3    COMMUNICATION/EDUCATION:   The patients plan of care was discussed with: Occupational therapist, Registered nurse, and Case management. Fall prevention education was provided and the patient/caregiver indicated understanding., Patient/family have participated as able in goal setting and plan of care. , and Patient/family agree to work toward stated goals and plan of care.     Thank you for this referral.  Chrystal Mendoza, PT   Time Calculation: 41 mins

## 2021-11-26 NOTE — ADVANCED PRACTICE NURSE
End of Shift Note    Bedside shift change report given to Bandarabi Lewis (oncoming nurse) by Myriam Vázquez RN (offgoing nurse). Report included the following information SBAR, Kardex, ED Summary, Intake/Output, MAR and Recent Results    Shift worked:  1900-700     Shift summary and any significant changes:    Pateint c/o pain at lower back pain and abscess left buttocks. Medicate with dilaudid with good relief. Patient was nauseated after medicate doxycycline. Patient concepcion intack and in place draining voiding QS. Yfn slept most of night. Concerns for physician to address:  see above     Zone phone for oncoming shift:  [phone]     Activity:  Activity Level: Up with Assistance  Number times ambulated in hallways past shift: 0  Number of times OOB to chair past shift: 0    Cardiac:   Cardiac Monitoring: No      Cardiac Rhythm: Sinus Rhythm    Access:   Current line(s): PIV     Genitourinary:   Urinary status: concepcion    Respiratory:   O2 Device: None (Room air)  Chronic home O2 use?: YES  Incentive spirometer at bedside: NO     GI:  Last Bowel Movement Date: 11/25/21  Current diet:  ADULT DIET Regular; 3 carb choices (45 gm/meal); Low Fat/Low Chol/High Fiber/KAMRON  Passing flatus: YES  Tolerating current diet: YES       Pain Management:   Patient states pain is manageable on current regimen: YES    Skin:  Marin Score: 16  Interventions: turn team, increase time out of bed and PT/OT consult    Patient Safety:  Fall Score:  Total Score: 3  Interventions: bed/chair alarm, assistive device (walker, cane, etc), gripper socks and pt to call before getting OOB  High Fall Risk: Yes    Length of Stay:  Expected LOS: 2d 21h  Actual LOS: 7      Kaylie George RN

## 2021-11-27 LAB
25(OH)D3 SERPL-MCNC: 48.1 NG/ML (ref 30–100)
ALBUMIN SERPL-MCNC: 2.6 G/DL (ref 3.5–5)
ALBUMIN/GLOB SERPL: 0.7 {RATIO} (ref 1.1–2.2)
ALP SERPL-CCNC: 66 U/L (ref 45–117)
ALT SERPL-CCNC: 23 U/L (ref 12–78)
ANION GAP SERPL CALC-SCNC: 5 MMOL/L (ref 5–15)
AST SERPL-CCNC: 14 U/L (ref 15–37)
B PERT DNA SPEC QL NAA+PROBE: NOT DETECTED
BASOPHILS # BLD: 0.1 K/UL (ref 0–0.1)
BASOPHILS NFR BLD: 1 % (ref 0–1)
BILIRUB SERPL-MCNC: 0.4 MG/DL (ref 0.2–1)
BORDETELLA PARAPERTUSSIS PCR, BORPAR: NOT DETECTED
BUN SERPL-MCNC: 11 MG/DL (ref 6–20)
BUN/CREAT SERPL: 14 (ref 12–20)
C PNEUM DNA SPEC QL NAA+PROBE: NOT DETECTED
CALCIUM SERPL-MCNC: 9.2 MG/DL (ref 8.5–10.1)
CHLORIDE SERPL-SCNC: 112 MMOL/L (ref 97–108)
CO2 SERPL-SCNC: 23 MMOL/L (ref 21–32)
CREAT SERPL-MCNC: 0.79 MG/DL (ref 0.55–1.02)
CRP SERPL-MCNC: 0.92 MG/DL (ref 0–0.6)
D DIMER PPP FEU-MCNC: 1.16 MG/L FEU (ref 0–0.65)
DIFFERENTIAL METHOD BLD: ABNORMAL
EOSINOPHIL # BLD: 0.2 K/UL (ref 0–0.4)
EOSINOPHIL NFR BLD: 3 % (ref 0–7)
ERYTHROCYTE [DISTWIDTH] IN BLOOD BY AUTOMATED COUNT: 20.8 % (ref 11.5–14.5)
FIBRINOGEN PPP-MCNC: 499 MG/DL (ref 200–475)
FLUAV H1 2009 PAND RNA SPEC QL NAA+PROBE: NOT DETECTED
FLUAV H1 RNA SPEC QL NAA+PROBE: NOT DETECTED
FLUAV H3 RNA SPEC QL NAA+PROBE: NOT DETECTED
FLUAV SUBTYP SPEC NAA+PROBE: NOT DETECTED
FLUBV RNA SPEC QL NAA+PROBE: NOT DETECTED
GLOBULIN SER CALC-MCNC: 3.7 G/DL (ref 2–4)
GLUCOSE BLD STRIP.AUTO-MCNC: 132 MG/DL (ref 65–117)
GLUCOSE BLD STRIP.AUTO-MCNC: 139 MG/DL (ref 65–117)
GLUCOSE BLD STRIP.AUTO-MCNC: 144 MG/DL (ref 65–117)
GLUCOSE BLD STRIP.AUTO-MCNC: 174 MG/DL (ref 65–117)
GLUCOSE SERPL-MCNC: 121 MG/DL (ref 65–100)
HADV DNA SPEC QL NAA+PROBE: NOT DETECTED
HCOV 229E RNA SPEC QL NAA+PROBE: NOT DETECTED
HCOV HKU1 RNA SPEC QL NAA+PROBE: NOT DETECTED
HCOV NL63 RNA SPEC QL NAA+PROBE: NOT DETECTED
HCOV OC43 RNA SPEC QL NAA+PROBE: NOT DETECTED
HCT VFR BLD AUTO: 29 % (ref 35–47)
HEMOCCULT STL QL: NEGATIVE
HGB BLD-MCNC: 7.8 G/DL (ref 11.5–16)
HMPV RNA SPEC QL NAA+PROBE: NOT DETECTED
HPIV1 RNA SPEC QL NAA+PROBE: NOT DETECTED
HPIV2 RNA SPEC QL NAA+PROBE: NOT DETECTED
HPIV3 RNA SPEC QL NAA+PROBE: NOT DETECTED
HPIV4 RNA SPEC QL NAA+PROBE: NOT DETECTED
IMM GRANULOCYTES # BLD AUTO: 0.1 K/UL (ref 0–0.04)
IMM GRANULOCYTES NFR BLD AUTO: 1 % (ref 0–0.5)
INR PPP: 1 (ref 0.9–1.1)
LACTATE SERPL-SCNC: 1 MMOL/L (ref 0.4–2)
LYMPHOCYTES # BLD: 1.9 K/UL (ref 0.8–3.5)
LYMPHOCYTES NFR BLD: 29 % (ref 12–49)
M PNEUMO DNA SPEC QL NAA+PROBE: NOT DETECTED
MCH RBC QN AUTO: 19.1 PG (ref 26–34)
MCHC RBC AUTO-ENTMCNC: 26.9 G/DL (ref 30–36.5)
MCV RBC AUTO: 71.1 FL (ref 80–99)
MONOCYTES # BLD: 0.7 K/UL (ref 0–1)
MONOCYTES NFR BLD: 11 % (ref 5–13)
NEUTS SEG # BLD: 3.7 K/UL (ref 1.8–8)
NEUTS SEG NFR BLD: 55 % (ref 32–75)
NRBC # BLD: 0 K/UL (ref 0–0.01)
NRBC BLD-RTO: 0 PER 100 WBC
PLATELET # BLD AUTO: 232 K/UL (ref 150–400)
PMV BLD AUTO: 10.6 FL (ref 8.9–12.9)
POTASSIUM SERPL-SCNC: 3.7 MMOL/L (ref 3.5–5.1)
PROCALCITONIN SERPL-MCNC: <0.05 NG/ML
PROT SERPL-MCNC: 6.3 G/DL (ref 6.4–8.2)
PROTHROMBIN TIME: 10.9 SEC (ref 9–11.1)
RBC # BLD AUTO: 4.08 M/UL (ref 3.8–5.2)
RBC MORPH BLD: ABNORMAL
RSV RNA SPEC QL NAA+PROBE: NOT DETECTED
RV+EV RNA SPEC QL NAA+PROBE: NOT DETECTED
SARS-COV-2 PCR, COVPCR: NOT DETECTED
SERVICE CMNT-IMP: ABNORMAL
SODIUM SERPL-SCNC: 140 MMOL/L (ref 136–145)
WBC # BLD AUTO: 6.7 K/UL (ref 3.6–11)

## 2021-11-27 PROCEDURE — 74011250636 HC RX REV CODE- 250/636: Performed by: NURSE PRACTITIONER

## 2021-11-27 PROCEDURE — 84145 PROCALCITONIN (PCT): CPT

## 2021-11-27 PROCEDURE — 74011250637 HC RX REV CODE- 250/637: Performed by: NURSE PRACTITIONER

## 2021-11-27 PROCEDURE — 74011250637 HC RX REV CODE- 250/637: Performed by: INTERNAL MEDICINE

## 2021-11-27 PROCEDURE — 65270000029 HC RM PRIVATE

## 2021-11-27 PROCEDURE — 82962 GLUCOSE BLOOD TEST: CPT

## 2021-11-27 PROCEDURE — 74011636637 HC RX REV CODE- 636/637: Performed by: GENERAL ACUTE CARE HOSPITAL

## 2021-11-27 PROCEDURE — 36415 COLL VENOUS BLD VENIPUNCTURE: CPT

## 2021-11-27 PROCEDURE — 80053 COMPREHEN METABOLIC PANEL: CPT

## 2021-11-27 PROCEDURE — 83605 ASSAY OF LACTIC ACID: CPT

## 2021-11-27 PROCEDURE — 77010033678 HC OXYGEN DAILY

## 2021-11-27 PROCEDURE — 74011000250 HC RX REV CODE- 250: Performed by: NURSE PRACTITIONER

## 2021-11-27 PROCEDURE — 85384 FIBRINOGEN ACTIVITY: CPT

## 2021-11-27 PROCEDURE — 94640 AIRWAY INHALATION TREATMENT: CPT

## 2021-11-27 PROCEDURE — 85379 FIBRIN DEGRADATION QUANT: CPT

## 2021-11-27 PROCEDURE — 82306 VITAMIN D 25 HYDROXY: CPT

## 2021-11-27 PROCEDURE — 85610 PROTHROMBIN TIME: CPT

## 2021-11-27 PROCEDURE — 74011250636 HC RX REV CODE- 250/636: Performed by: GENERAL ACUTE CARE HOSPITAL

## 2021-11-27 PROCEDURE — 74011000250 HC RX REV CODE- 250: Performed by: INTERNAL MEDICINE

## 2021-11-27 PROCEDURE — 82272 OCCULT BLD FECES 1-3 TESTS: CPT

## 2021-11-27 PROCEDURE — 74011250637 HC RX REV CODE- 250/637: Performed by: GENERAL ACUTE CARE HOSPITAL

## 2021-11-27 PROCEDURE — 86140 C-REACTIVE PROTEIN: CPT

## 2021-11-27 PROCEDURE — 0202U NFCT DS 22 TRGT SARS-COV-2: CPT

## 2021-11-27 PROCEDURE — 85025 COMPLETE CBC W/AUTO DIFF WBC: CPT

## 2021-11-27 RX ORDER — BUDESONIDE AND FORMOTEROL FUMARATE DIHYDRATE 160; 4.5 UG/1; UG/1
2 AEROSOL RESPIRATORY (INHALATION)
Status: DISCONTINUED | OUTPATIENT
Start: 2021-11-27 | End: 2021-11-29 | Stop reason: HOSPADM

## 2021-11-27 RX ORDER — LORAZEPAM 2 MG/ML
1 INJECTION INTRAMUSCULAR ONCE
Status: COMPLETED | OUTPATIENT
Start: 2021-11-27 | End: 2021-11-27

## 2021-11-27 RX ORDER — HYDROCODONE BITARTRATE AND ACETAMINOPHEN 5; 325 MG/1; MG/1
1 TABLET ORAL
Status: DISCONTINUED | OUTPATIENT
Start: 2021-11-27 | End: 2021-11-28

## 2021-11-27 RX ADMIN — ATORVASTATIN CALCIUM 10 MG: 10 TABLET, FILM COATED ORAL at 22:00

## 2021-11-27 RX ADMIN — ENOXAPARIN SODIUM 40 MG: 100 INJECTION SUBCUTANEOUS at 21:21

## 2021-11-27 RX ADMIN — Medication 10 ML: at 20:57

## 2021-11-27 RX ADMIN — PHENAZOPYRIDINE HYDROCHLORIDE 200 MG: 100 TABLET ORAL at 17:16

## 2021-11-27 RX ADMIN — Medication 400 MG: at 09:53

## 2021-11-27 RX ADMIN — BUSPIRONE HYDROCHLORIDE 15 MG: 5 TABLET ORAL at 15:41

## 2021-11-27 RX ADMIN — BUDESONIDE 250 MCG: 0.25 INHALANT RESPIRATORY (INHALATION) at 09:07

## 2021-11-27 RX ADMIN — PROCHLORPERAZINE EDISYLATE 10 MG: 5 INJECTION INTRAMUSCULAR; INTRAVENOUS at 20:51

## 2021-11-27 RX ADMIN — ONDANSETRON 4 MG: 4 TABLET, ORALLY DISINTEGRATING ORAL at 17:16

## 2021-11-27 RX ADMIN — HYDROCODONE BITARTRATE AND ACETAMINOPHEN 1 TABLET: 5; 325 TABLET ORAL at 18:45

## 2021-11-27 RX ADMIN — HYDROMORPHONE HYDROCHLORIDE 1 MG: 1 INJECTION, SOLUTION INTRAMUSCULAR; INTRAVENOUS; SUBCUTANEOUS at 03:34

## 2021-11-27 RX ADMIN — QUETIAPINE FUMARATE 400 MG: 100 TABLET ORAL at 21:23

## 2021-11-27 RX ADMIN — GABAPENTIN 100 MG: 100 CAPSULE ORAL at 21:24

## 2021-11-27 RX ADMIN — ARFORMOTEROL TARTRATE 15 MCG: 15 SOLUTION RESPIRATORY (INHALATION) at 09:07

## 2021-11-27 RX ADMIN — GABAPENTIN 100 MG: 100 CAPSULE ORAL at 15:41

## 2021-11-27 RX ADMIN — HYDROMORPHONE HYDROCHLORIDE 1 MG: 1 INJECTION, SOLUTION INTRAMUSCULAR; INTRAVENOUS; SUBCUTANEOUS at 07:12

## 2021-11-27 RX ADMIN — TOPIRAMATE 200 MG: 100 TABLET, FILM COATED ORAL at 21:22

## 2021-11-27 RX ADMIN — INSULIN LISPRO 2 UNITS: 100 INJECTION, SOLUTION INTRAVENOUS; SUBCUTANEOUS at 09:49

## 2021-11-27 RX ADMIN — ENOXAPARIN SODIUM 40 MG: 100 INJECTION SUBCUTANEOUS at 09:49

## 2021-11-27 RX ADMIN — PRAZOSIN HYDROCHLORIDE 4 MG: 1 CAPSULE ORAL at 21:24

## 2021-11-27 RX ADMIN — IPRATROPIUM BROMIDE 0.5 MG: 0.5 SOLUTION RESPIRATORY (INHALATION) at 09:07

## 2021-11-27 RX ADMIN — LEVOTHYROXINE SODIUM 112 MCG: 0.11 TABLET ORAL at 05:42

## 2021-11-27 RX ADMIN — POLYETHYLENE GLYCOL 3350 17 G: 17 POWDER, FOR SOLUTION ORAL at 09:49

## 2021-11-27 RX ADMIN — GABAPENTIN 100 MG: 100 CAPSULE ORAL at 09:53

## 2021-11-27 RX ADMIN — Medication 10 ML: at 05:42

## 2021-11-27 RX ADMIN — INSULIN LISPRO 2 UNITS: 100 INJECTION, SOLUTION INTRAVENOUS; SUBCUTANEOUS at 12:28

## 2021-11-27 RX ADMIN — Medication 10 ML: at 14:00

## 2021-11-27 RX ADMIN — BUSPIRONE HYDROCHLORIDE 15 MG: 5 TABLET ORAL at 21:21

## 2021-11-27 RX ADMIN — PHENAZOPYRIDINE HYDROCHLORIDE 200 MG: 100 TABLET ORAL at 09:53

## 2021-11-27 RX ADMIN — BUSPIRONE HYDROCHLORIDE 15 MG: 5 TABLET ORAL at 09:52

## 2021-11-27 RX ADMIN — ESTRADIOL 0.5 MG: 1 TABLET ORAL at 09:53

## 2021-11-27 RX ADMIN — ASPIRIN 81 MG CHEWABLE TABLET 81 MG: 81 TABLET CHEWABLE at 09:53

## 2021-11-27 RX ADMIN — HYDROCODONE BITARTRATE AND ACETAMINOPHEN 1 TABLET: 5; 325 TABLET ORAL at 12:27

## 2021-11-27 RX ADMIN — LORAZEPAM 1 MG: 2 INJECTION INTRAMUSCULAR; INTRAVENOUS at 20:51

## 2021-11-27 RX ADMIN — BUPROPION HYDROCHLORIDE 300 MG: 150 TABLET, EXTENDED RELEASE ORAL at 21:22

## 2021-11-27 RX ADMIN — PHENAZOPYRIDINE HYDROCHLORIDE 200 MG: 100 TABLET ORAL at 12:27

## 2021-11-27 NOTE — PROGRESS NOTES
Problem: Falls - Risk of  Goal: *Absence of Falls  Description: Document Kendy Blevins Fall Risk and appropriate interventions in the flowsheet.   Outcome: Progressing Towards Goal  Note: Fall Risk Interventions:  Mobility Interventions: Bed/chair exit alarm, Patient to call before getting OOB         Medication Interventions: Patient to call before getting OOB    Elimination Interventions: Bed/chair exit alarm, Call light in reach, Patient to call for help with toileting needs, Toileting schedule/hourly rounds              Problem: Pain  Goal: *Control of Pain  Outcome: Progressing Towards Goal

## 2021-11-27 NOTE — PROGRESS NOTES
I reviewed pertinent labs and imaging, and discussed /agreed on the plan of care with Dr. Mary Kay Barry      Hospitalist Progress Note    NAME: Ángel Benjamin   :  1960   MRN:  [id number]       Assessment / Plan:        Acute on chronic lower back pain  Debilty r/t Super Morbid Obesity BMI 72  CT abd / Pelvis   IMPRESSION  1. Partial staghorn calculus in the lower pole of the right kidney. 2. Degenerative changes in the spine. Minimal anterior listhesis of L4 on L5  with disc osteophyte complex formation/degenerative disc disease at L5/S1. 3. There is fullness in the left side of the labia/left of the introitus. Recommend physical exam and possible referral to gynecology. 4. Incidental findings as above. MRI lumbar spine   IMPRESSION  1. Multilevel stable spondylitic changes as above, most severe at L3-S1.  2.  Severe canal stenosis at L3.  3.  Moderate canal stenosis with severe left and moderate right neuroforaminal  narrowing at L4-L5. 4.  Severe bilateral neuroforaminal narrowing at L5-S1.  5.  Additional findings as above. appreciate Ortho input - no surgical intervention  At this time   - PT/OT input recc SNF    - appreciate Gyn - no L labia mass or lesion  Left posterior vulva + perianal tenderness     and fullness recc Color rectal consult   - Colorectal consult called recc repeat CT  abd pelvis   - CT results showed Bartholin gland cyst - sitz bath and Abx x 7 days ( day 2)    CT ABD /PELVIS ()   IMPRESSION  1. Left Bartholin gland cyst. No perianal abscess. 2. Tracheobronchomalacia. 3. Nonobstructing, lower pole, right renal, staghorn calculus. Mild adjacent  renal sinus fat inflammation. 4. Splenomegaly, chronic. Urinary Retention   -Patient has had difficulty with urination since admission. Suspect immobility is playing a role.    -Bladder scan with 600 mL   -Has been straight cathed several times   -Placed concepcion   -appreciate  Urology input recc concepcion 7 days then follow up with [facility] OP     Hypertension Continue ASA, losartan, metoprolol and prazosin   Hyperlipidemia Continue atorvastatin   Seizure disorder Continue topamax   Hypothyroidism Continue levothyroxine   Type II Diabetes with peripheral neuropathy Continue SSI and gabapentin   Anxiety  Depression Continue seroquel, buspar, wellbutrin      Covid   - Rapid covid positive   - Pro korey Normal , lactic 1.0, CRP 0.92  Ld 202 D- dimer 1.16 Fibrinogen 499   - PCR negative  Will repeat   - pt remains asymptomatic   - cont droplet precautions until 2nd PCR results      Anemia HEATHER vs Chronic   - hgb 8.4 on admission   - today at 7.8 no over sign of bleeding   - will check iron panel   - stool for occult       Body mass index is 72.59 kg/m². Estimated discharge date: November 29  Barriers: ins authorization and Covid PCR     Code status: Full  Prophylaxis: Lovenox  Recommended Disposition: SNF/LTC     Subjective:     Chief Complaint / Reason for Physician Visit  \"what about my pain medication  \". Discussed with RN events overnight. Pt seen and examined DC pending insurance auth and covid results initially positive now negative . Discussed with pt that she will be DC on pain medication but the facility MD will have to continue them      Review of Systems:  Symptom Y/N Comments  Symptom Y/N Comments   Fever/Chills n   Chest Pain n    Poor Appetite n   Edema n    Cough n   Abdominal Pain n    Sputum n   Joint Pain n    SOB/DIOP n   Pruritis/Rash n    Nausea/vomit n   Tolerating PT/OT n    Diarrhea n   Tolerating Diet n    Constipation    Other       Could NOT obtain due to:      Objective:     VITALS:   Last 24hrs VS reviewed since prior progress note.  Most recent are:  Patient Vitals for the past 24 hrs:   Temp Pulse Resp BP SpO2   11/27/21 0820 98.8 °F (37.1 °C) 78 18 (!) 94/55 97 %   11/27/21 0707 98.4 °F (36.9 °C) 74 18 106/74 93 %   11/27/21 0327 98.4 °F (36.9 °C) 75 19 108/63    11/27/21 0100    105/67    11/27/21 0038    92/61  11/27/21 0011    (!) 89/69    11/26/21 2340  80  (!) 82/62 90 %   11/26/21 2333 98 °F (36.7 °C) 77 18 (!) 94/50 90 %   11/26/21 2148     90 %   11/26/21 1617 99.2 °F (37.3 °C) 73 18 115/72 90 %   11/26/21 1100  89  (!) 145/82    11/26/21 1057  90  130/62    11/26/21 1044  85  115/73 97 %   11/26/21 0941     94 %       Intake/Output Summary (Last 24 hours) at 11/27/2021 0910  Last data filed at 11/27/2021 1620  Gross per 24 hour   Intake 480 ml   Output 900 ml   Net -420 ml        I had a face to face encounter and independently examined this patient on 11/27/2021, as outlined below:  PHYSICAL EXAM:  General: Obese  Alert, cooperative, no acute distress    EENT:  EOMI. Anicteric sclerae. MMM,missing teeth  Resp:  no wheezing or rales. No accessory muscle use  CV:  Regular  rhythm,  L labia  Edema tenderness . firm no induration   GI:  Soft, Non distended, Non tender. +Bowel sounds BM   Neurologic:  Alert and oriented X 3, normal speech,   Psych:   . Not anxious nor agitated  Skin:  No rashes. No jaundice firmness to L labia redness noted     Reviewed most current lab test results and cultures  YES  Reviewed most current radiology test results   YES  Review and summation of old records today    NO  Reviewed patient's current orders and MAR    YES  PMH/SH reviewed - no change compared to H&P  ________________________________________________________________________  Care Plan discussed with:    Comments   Patient x    Family      RN x    Care Manager     Consultant                        Multidiciplinary team rounds were held today with , nursing, pharmacist and clinical coordinator. Patient's plan of care was discussed; medications were reviewed and discharge planning was addressed.      ________________________________________________________________________  Total NON critical care TIME:  30 Minutes    Total CRITICAL CARE TIME Spent:   Minutes non procedure based      Comments >50% of visit spent in counseling and coordination of care     ________________________________________________________________________  Rajan Swift. Pravin Saravia NP     Procedures: see electronic medical records for all procedures/Xrays and details which were not copied into this note but were reviewed prior to creation of Plan. LABS:  I reviewed today's most current labs and imaging studies. Pertinent labs include:  Recent Labs     11/27/21  0324   WBC 6.7   HGB 7.8*   HCT 29.0*        Recent Labs     11/27/21  0324      K 3.7   *   CO2 23   *   BUN 11   CREA 0.79   CA 9.2   ALB 2.6*   TBILI 0.4   ALT 23   INR 1.0       Signed: Tawana Saravia NP

## 2021-11-27 NOTE — PROGRESS NOTES
End of Shift Note    Bedside shift change report given to RN (oncoming nurse) by Vivian Hackett (offgoing nurse).   Report included the following information SBAR, Kardex, Procedure Summary, Intake/Output, MAR and Recent Results    Shift worked:  7-7pm     Shift summary and any significant changes:     No significant changes     Concerns for physician to address:       Zone phone for oncoming shift:       Vivian Hackett

## 2021-11-28 LAB
ANION GAP SERPL CALC-SCNC: 4 MMOL/L (ref 5–15)
BUN SERPL-MCNC: 12 MG/DL (ref 6–20)
BUN/CREAT SERPL: 18 (ref 12–20)
CALCIUM SERPL-MCNC: 9.4 MG/DL (ref 8.5–10.1)
CHLORIDE SERPL-SCNC: 113 MMOL/L (ref 97–108)
CO2 SERPL-SCNC: 24 MMOL/L (ref 21–32)
CREAT SERPL-MCNC: 0.68 MG/DL (ref 0.55–1.02)
CRP SERPL-MCNC: 0.94 MG/DL (ref 0–0.6)
D DIMER PPP FEU-MCNC: 1.16 MG/L FEU (ref 0–0.65)
ERYTHROCYTE [DISTWIDTH] IN BLOOD BY AUTOMATED COUNT: 20.6 % (ref 11.5–14.5)
GLUCOSE BLD STRIP.AUTO-MCNC: 132 MG/DL (ref 65–117)
GLUCOSE BLD STRIP.AUTO-MCNC: 146 MG/DL (ref 65–117)
GLUCOSE BLD STRIP.AUTO-MCNC: 152 MG/DL (ref 65–117)
GLUCOSE BLD STRIP.AUTO-MCNC: 168 MG/DL (ref 65–117)
GLUCOSE SERPL-MCNC: 111 MG/DL (ref 65–100)
HCT VFR BLD AUTO: 28.8 % (ref 35–47)
HGB BLD-MCNC: 7.9 G/DL (ref 11.5–16)
IRON SATN MFR SERPL: 6 % (ref 20–50)
IRON SERPL-MCNC: 25 UG/DL (ref 35–150)
MCH RBC QN AUTO: 19.4 PG (ref 26–34)
MCHC RBC AUTO-ENTMCNC: 27.4 G/DL (ref 30–36.5)
MCV RBC AUTO: 70.8 FL (ref 80–99)
NRBC # BLD: 0 K/UL (ref 0–0.01)
NRBC BLD-RTO: 0 PER 100 WBC
PLATELET # BLD AUTO: 201 K/UL (ref 150–400)
POTASSIUM SERPL-SCNC: 3.8 MMOL/L (ref 3.5–5.1)
RBC # BLD AUTO: 4.07 M/UL (ref 3.8–5.2)
SERVICE CMNT-IMP: ABNORMAL
SODIUM SERPL-SCNC: 141 MMOL/L (ref 136–145)
TIBC SERPL-MCNC: 412 UG/DL (ref 250–450)
WBC # BLD AUTO: 6.6 K/UL (ref 3.6–11)

## 2021-11-28 PROCEDURE — 74011636637 HC RX REV CODE- 636/637: Performed by: GENERAL ACUTE CARE HOSPITAL

## 2021-11-28 PROCEDURE — 74011250637 HC RX REV CODE- 250/637: Performed by: INTERNAL MEDICINE

## 2021-11-28 PROCEDURE — 86140 C-REACTIVE PROTEIN: CPT

## 2021-11-28 PROCEDURE — 85379 FIBRIN DEGRADATION QUANT: CPT

## 2021-11-28 PROCEDURE — 74011250637 HC RX REV CODE- 250/637: Performed by: NURSE PRACTITIONER

## 2021-11-28 PROCEDURE — 36415 COLL VENOUS BLD VENIPUNCTURE: CPT

## 2021-11-28 PROCEDURE — 82962 GLUCOSE BLOOD TEST: CPT

## 2021-11-28 PROCEDURE — 83540 ASSAY OF IRON: CPT

## 2021-11-28 PROCEDURE — 77010033678 HC OXYGEN DAILY

## 2021-11-28 PROCEDURE — 94760 N-INVAS EAR/PLS OXIMETRY 1: CPT

## 2021-11-28 PROCEDURE — 65270000029 HC RM PRIVATE

## 2021-11-28 PROCEDURE — 74011250636 HC RX REV CODE- 250/636: Performed by: GENERAL ACUTE CARE HOSPITAL

## 2021-11-28 PROCEDURE — 74011250637 HC RX REV CODE- 250/637: Performed by: GENERAL ACUTE CARE HOSPITAL

## 2021-11-28 PROCEDURE — 94640 AIRWAY INHALATION TREATMENT: CPT

## 2021-11-28 PROCEDURE — 80048 BASIC METABOLIC PNL TOTAL CA: CPT

## 2021-11-28 PROCEDURE — 85027 COMPLETE CBC AUTOMATED: CPT

## 2021-11-28 RX ORDER — METOPROLOL SUCCINATE 25 MG/1
12.5 TABLET, EXTENDED RELEASE ORAL DAILY
Status: DISCONTINUED | OUTPATIENT
Start: 2021-11-28 | End: 2021-11-29 | Stop reason: HOSPADM

## 2021-11-28 RX ORDER — CEPHALEXIN 250 MG/1
500 CAPSULE ORAL EVERY 6 HOURS
Status: DISCONTINUED | OUTPATIENT
Start: 2021-11-28 | End: 2021-11-29 | Stop reason: HOSPADM

## 2021-11-28 RX ORDER — HYDROCODONE BITARTRATE AND ACETAMINOPHEN 5; 325 MG/1; MG/1
1 TABLET ORAL
Status: DISCONTINUED | OUTPATIENT
Start: 2021-11-28 | End: 2021-11-29 | Stop reason: HOSPADM

## 2021-11-28 RX ORDER — HYDROXYZINE 25 MG/1
50 TABLET, FILM COATED ORAL
Status: DISCONTINUED | OUTPATIENT
Start: 2021-11-28 | End: 2021-11-29 | Stop reason: HOSPADM

## 2021-11-28 RX ADMIN — CEPHALEXIN 500 MG: 250 CAPSULE ORAL at 17:41

## 2021-11-28 RX ADMIN — LEVOTHYROXINE SODIUM 112 MCG: 0.11 TABLET ORAL at 05:56

## 2021-11-28 RX ADMIN — HYDROXYZINE HYDROCHLORIDE 50 MG: 25 TABLET, FILM COATED ORAL at 10:49

## 2021-11-28 RX ADMIN — BUSPIRONE HYDROCHLORIDE 15 MG: 5 TABLET ORAL at 21:09

## 2021-11-28 RX ADMIN — HYDROXYZINE HYDROCHLORIDE 50 MG: 25 TABLET, FILM COATED ORAL at 17:40

## 2021-11-28 RX ADMIN — Medication 400 MG: at 09:32

## 2021-11-28 RX ADMIN — BUDESONIDE AND FORMOTEROL FUMARATE DIHYDRATE 2 PUFF: 160; 4.5 AEROSOL RESPIRATORY (INHALATION) at 08:33

## 2021-11-28 RX ADMIN — ENOXAPARIN SODIUM 40 MG: 100 INJECTION SUBCUTANEOUS at 21:10

## 2021-11-28 RX ADMIN — PHENAZOPYRIDINE HYDROCHLORIDE 200 MG: 100 TABLET ORAL at 17:41

## 2021-11-28 RX ADMIN — PHENAZOPYRIDINE HYDROCHLORIDE 200 MG: 100 TABLET ORAL at 09:31

## 2021-11-28 RX ADMIN — ESTRADIOL 0.5 MG: 1 TABLET ORAL at 09:32

## 2021-11-28 RX ADMIN — INSULIN LISPRO 2 UNITS: 100 INJECTION, SOLUTION INTRAVENOUS; SUBCUTANEOUS at 17:40

## 2021-11-28 RX ADMIN — Medication 10 ML: at 14:10

## 2021-11-28 RX ADMIN — POLYETHYLENE GLYCOL 3350 17 G: 17 POWDER, FOR SOLUTION ORAL at 09:32

## 2021-11-28 RX ADMIN — HYDROCODONE BITARTRATE AND ACETAMINOPHEN 1 TABLET: 5; 325 TABLET ORAL at 14:10

## 2021-11-28 RX ADMIN — GABAPENTIN 100 MG: 100 CAPSULE ORAL at 21:09

## 2021-11-28 RX ADMIN — HYDROCODONE BITARTRATE AND ACETAMINOPHEN 1 TABLET: 5; 325 TABLET ORAL at 17:41

## 2021-11-28 RX ADMIN — PRAZOSIN HYDROCHLORIDE 4 MG: 1 CAPSULE ORAL at 21:09

## 2021-11-28 RX ADMIN — Medication 10 ML: at 21:14

## 2021-11-28 RX ADMIN — GABAPENTIN 100 MG: 100 CAPSULE ORAL at 09:32

## 2021-11-28 RX ADMIN — BUSPIRONE HYDROCHLORIDE 15 MG: 5 TABLET ORAL at 17:42

## 2021-11-28 RX ADMIN — ENOXAPARIN SODIUM 40 MG: 100 INJECTION SUBCUTANEOUS at 09:32

## 2021-11-28 RX ADMIN — HYDROCODONE BITARTRATE AND ACETAMINOPHEN 1 TABLET: 5; 325 TABLET ORAL at 06:15

## 2021-11-28 RX ADMIN — BUDESONIDE AND FORMOTEROL FUMARATE DIHYDRATE 2 PUFF: 160; 4.5 AEROSOL RESPIRATORY (INHALATION) at 21:34

## 2021-11-28 RX ADMIN — TOPIRAMATE 200 MG: 100 TABLET, FILM COATED ORAL at 21:09

## 2021-11-28 RX ADMIN — HYDROCODONE BITARTRATE AND ACETAMINOPHEN 1 TABLET: 5; 325 TABLET ORAL at 22:10

## 2021-11-28 RX ADMIN — PHENAZOPYRIDINE HYDROCHLORIDE 200 MG: 100 TABLET ORAL at 12:41

## 2021-11-28 RX ADMIN — Medication 10 ML: at 07:17

## 2021-11-28 RX ADMIN — INSULIN LISPRO 2 UNITS: 100 INJECTION, SOLUTION INTRAVENOUS; SUBCUTANEOUS at 09:31

## 2021-11-28 RX ADMIN — HYDROCODONE BITARTRATE AND ACETAMINOPHEN 1 TABLET: 5; 325 TABLET ORAL at 09:32

## 2021-11-28 RX ADMIN — BUSPIRONE HYDROCHLORIDE 15 MG: 5 TABLET ORAL at 09:32

## 2021-11-28 RX ADMIN — QUETIAPINE FUMARATE 400 MG: 100 TABLET ORAL at 21:09

## 2021-11-28 RX ADMIN — ATORVASTATIN CALCIUM 10 MG: 10 TABLET, FILM COATED ORAL at 21:09

## 2021-11-28 RX ADMIN — BUPROPION HYDROCHLORIDE 300 MG: 150 TABLET, EXTENDED RELEASE ORAL at 21:09

## 2021-11-28 RX ADMIN — CEPHALEXIN 500 MG: 250 CAPSULE ORAL at 23:43

## 2021-11-28 RX ADMIN — ASPIRIN 81 MG CHEWABLE TABLET 81 MG: 81 TABLET CHEWABLE at 09:32

## 2021-11-28 RX ADMIN — GABAPENTIN 100 MG: 100 CAPSULE ORAL at 17:41

## 2021-11-28 RX ADMIN — CEPHALEXIN 500 MG: 250 CAPSULE ORAL at 12:41

## 2021-11-28 RX ADMIN — TIOTROPIUM BROMIDE INHALATION SPRAY 2 PUFF: 3.12 SPRAY, METERED RESPIRATORY (INHALATION) at 08:31

## 2021-11-28 NOTE — PROGRESS NOTES
Bedside shift change report given to Aneudy Durbin (oncoming nurse) by Chidi Ortiz RN (offgoing nurse). Report included the following information SBAR, Kardex, Intake/Output, MAR and Recent Results    Shift worked: 7am-7pm     Shift summary and any significant changes:    No significant changes noted today. New labs completed this afternoon. Patient has chronic back pain;prn meds available. Patient remains on 2L of oxygen.       Concerns for physician to address: N/A     Zone phone for oncoming shift:  N/A

## 2021-11-28 NOTE — PROGRESS NOTES
I reviewed pertinent labs and imaging, and discussed /agreed on the plan of care with Dr. Tejal Shaver      Hospitalist Progress Note    NAME: Jagdish Fraser   :  1960   MRN:  [id number]       Assessment / Plan:        Acute on chronic lower back pain  Debilty r/t Super Morbid Obesity BMI 72  CT abd / Pelvis   IMPRESSION  1. Partial staghorn calculus in the lower pole of the right kidney. 2. Degenerative changes in the spine. Minimal anterior listhesis of L4 on L5  with disc osteophyte complex formation/degenerative disc disease at L5/S1. 3. There is fullness in the left side of the labia/left of the introitus. Recommend physical exam and possible referral to gynecology. 4. Incidental findings as above. MRI lumbar spine   IMPRESSION  1. Multilevel stable spondylitic changes as above, most severe at L3-S1.  2.  Severe canal stenosis at L3.  3.  Moderate canal stenosis with severe left and moderate right neuroforaminal  narrowing at L4-L5. 4.  Severe bilateral neuroforaminal narrowing at L5-S1.  5.  Additional findings as above. appreciate Ortho input - no surgical intervention  At this time   - PT/OT input recc SNF    - appreciate Gyn - no L labia mass or lesion  Left posterior vulva + perianal tenderness     and fullness recc Color rectal consult   - Colorectal consult called recc repeat CT  abd pelvis   - CT results showed Bartholin gland cyst - sitz bath and Abx x 7 days ( day 3)    CT ABD /PELVIS ()   IMPRESSION  1. Left Bartholin gland cyst. No perianal abscess. 2. Tracheobronchomalacia. 3. Nonobstructing, lower pole, right renal, staghorn calculus. Mild adjacent  renal sinus fat inflammation. 4. Splenomegaly, chronic. Urinary Retention   -Patient has had difficulty with urination since admission. Suspect immobility is playing a role.    -Placed concepcion   Will remove concepcion today attempt void trail   -appreciate  Urology input recc concepcion 7 days then follow up with [facility] OP Hypertension hold antihypertensive for hypotension , monitor  Hyperlipidemia Continue atorvastatin   Seizure disorder Continue topamax   Hypothyroidism Continue levothyroxine   Type II Diabetes with peripheral neuropathy Continue SSI and gabapentin   Depression Continue seroquel, buspar, wellbutrin   Anxiety - requesting ativan for anxiety , given atarax   COPD - cont prn nebs , home inhalers, Oxygen to keep sats > 90%    Covid - resolved   - Rapid covid positive   - Pro korey Normal , lactic 1.0, CRP 0.92  Ld 202 D- dimer 1.16 Fibrinogen 499   - PCR negative  x2   - pt remains asymptomatic   - DC droplet precautions      Anemia HEATHER vs Chronic   - hgb 8.4 on admission   - today at 7.8 no over sign of bleeding   - will check iron panel pending  - stool for occult       Body mass index is 72.59 kg/m². Estimated discharge date: November 29  Barriers: ins authorization and Covid PCR     Code status: Full  Prophylaxis: Lovenox  Recommended Disposition: SNF/LTC     Subjective:     Chief Complaint / Reason for Physician Visit  \"Can you make my pain med q4   \". Discussed with RN events overnight. Pt seen and examined Discussed 2nd PCR negative  Pt reassured she does not have covid . Will contact case management for DC planning   Review of Systems:  Symptom Y/N Comments  Symptom Y/N Comments   Fever/Chills n   Chest Pain n    Poor Appetite n   Edema n    Cough n   Abdominal Pain n    Sputum n   Joint Pain n    SOB/DIOP n   Pruritis/Rash n    Nausea/vomit n   Tolerating PT/OT n    Diarrhea n   Tolerating Diet n    Constipation    Other       Could NOT obtain due to:      Objective:     VITALS:   Last 24hrs VS reviewed since prior progress note.  Most recent are:  Patient Vitals for the past 24 hrs:   Temp Pulse Resp BP SpO2   11/28/21 0834     92 %   11/27/21 2208 98.5 °F (36.9 °C) 80 19 99/73 94 %   11/27/21 1455 98.7 °F (37.1 °C) 80 18 121/84 95 %   11/27/21 0907     97 %       Intake/Output Summary (Last 24 hours) at 11/28/2021 0849  Last data filed at 11/27/2021 1500  Gross per 24 hour   Intake    Output 400 ml   Net -400 ml        I had a face to face encounter and independently examined this patient on 11/28/2021, as outlined below:  PHYSICAL EXAM:  General: Obese  Alert, cooperative, no acute distress    EENT:  EOMI. Anicteric sclerae. MMM,missing teeth  Resp:  no wheezing or rales. No accessory muscle use  CV:  Regular  rhythm,  L labia  Edema tenderness . firm no induration   GI:  Soft, Non distended, Non tender. +Bowel sounds BM   Neurologic:  Alert and oriented X 3, normal speech,   Psych:   . Not anxious nor agitated  Skin:  No rashes. No jaundice firmness to L labia redness noted     Reviewed most current lab test results and cultures  YES  Reviewed most current radiology test results   YES  Review and summation of old records today    NO  Reviewed patient's current orders and MAR    YES  PMH/SH reviewed - no change compared to H&P  ________________________________________________________________________  Care Plan discussed with:    Comments   Patient x    Family      RN x    Care Manager     Consultant                        Multidiciplinary team rounds were held today with , nursing, pharmacist and clinical coordinator. Patient's plan of care was discussed; medications were reviewed and discharge planning was addressed. ________________________________________________________________________  Total NON critical care TIME:  30 Minutes    Total CRITICAL CARE TIME Spent:   Minutes non procedure based      Comments   >50% of visit spent in counseling and coordination of care     ________________________________________________________________________  Abelardo Avilez. Karin Jules NP     Procedures: see electronic medical records for all procedures/Xrays and details which were not copied into this note but were reviewed prior to creation of Plan.       LABS:  I reviewed today's most current labs and imaging studies. Pertinent labs include:  Recent Labs     11/28/21 0126 11/27/21 0324   WBC 6.6 6.7   HGB 7.9* 7.8*   HCT 28.8* 29.0*    232     Recent Labs     11/28/21 0126 11/27/21 0324    140   K 3.8 3.7   * 112*   CO2 24 23   * 121*   BUN 12 11   CREA 0.68 0.79   CA 9.4 9.2   ALB  --  2.6*   TBILI  --  0.4   ALT  --  23   INR  --  1.0       Signed: Tawana Briseno, NP

## 2021-11-28 NOTE — PROGRESS NOTES
End of Shift Note    Bedside shift change report given to Agnieszka (oncoming nurse) by Mookie Davidson (offgoing nurse). Report included the following information SBAR, Kardex, Intake/Output, MAR and Recent Results    Shift worked:  [id number]     Shift summary and any significant changes:     No significant changes, pt c/o pain x1 overnight, PRN Norco given. Pt still experiencing episodes of asymptomatic hypotension. 1 time dose of lorazepam given overnight.      Concerns for physician to address:  anxiety/hypotension     Zone phone for oncoming shift:   [phone]           Mookie Davidson

## 2021-11-28 NOTE — PROGRESS NOTES
Received notification from bedside RN about patient with regards to: feeling so sick and anxious, requesting medication for relief  VS: /84, HR 80, RR 18, O2 sat 95% on NC 2.5 L    Intervention given: Compazine IV prn, Ativan 1 mg IV x 1 dose ordered

## 2021-11-29 VITALS
HEART RATE: 86 BPM | WEIGHT: 293 LBS | OXYGEN SATURATION: 94 % | HEIGHT: 60 IN | RESPIRATION RATE: 18 BRPM | SYSTOLIC BLOOD PRESSURE: 101 MMHG | TEMPERATURE: 98.8 F | DIASTOLIC BLOOD PRESSURE: 64 MMHG | BODY MASS INDEX: 57.52 KG/M2

## 2021-11-29 LAB
GLUCOSE BLD STRIP.AUTO-MCNC: 109 MG/DL (ref 65–117)
GLUCOSE BLD STRIP.AUTO-MCNC: 146 MG/DL (ref 65–117)
GLUCOSE BLD STRIP.AUTO-MCNC: 151 MG/DL (ref 65–117)
SERVICE CMNT-IMP: ABNORMAL
SERVICE CMNT-IMP: ABNORMAL
SERVICE CMNT-IMP: NORMAL

## 2021-11-29 PROCEDURE — 97535 SELF CARE MNGMENT TRAINING: CPT

## 2021-11-29 PROCEDURE — 74011636637 HC RX REV CODE- 636/637: Performed by: GENERAL ACUTE CARE HOSPITAL

## 2021-11-29 PROCEDURE — 74011250637 HC RX REV CODE- 250/637: Performed by: INTERNAL MEDICINE

## 2021-11-29 PROCEDURE — 94760 N-INVAS EAR/PLS OXIMETRY 1: CPT

## 2021-11-29 PROCEDURE — 82962 GLUCOSE BLOOD TEST: CPT

## 2021-11-29 PROCEDURE — 77010033678 HC OXYGEN DAILY

## 2021-11-29 PROCEDURE — 74011250637 HC RX REV CODE- 250/637: Performed by: NURSE PRACTITIONER

## 2021-11-29 PROCEDURE — 94640 AIRWAY INHALATION TREATMENT: CPT

## 2021-11-29 PROCEDURE — 74011250636 HC RX REV CODE- 250/636: Performed by: GENERAL ACUTE CARE HOSPITAL

## 2021-11-29 PROCEDURE — 97116 GAIT TRAINING THERAPY: CPT | Performed by: PHYSICAL THERAPIST

## 2021-11-29 PROCEDURE — 74011250637 HC RX REV CODE- 250/637: Performed by: GENERAL ACUTE CARE HOSPITAL

## 2021-11-29 RX ORDER — HYDROCODONE BITARTRATE AND ACETAMINOPHEN 5; 325 MG/1; MG/1
1 TABLET ORAL
Qty: 15 TABLET | Refills: 0 | Status: SHIPPED | OUTPATIENT
Start: 2021-11-29 | End: 2021-11-29

## 2021-11-29 RX ORDER — HYDROXYZINE 50 MG/1
50 TABLET, FILM COATED ORAL
Qty: 30 TABLET | Refills: 0 | Status: SHIPPED | OUTPATIENT
Start: 2021-11-29 | End: 2021-11-29

## 2021-11-29 RX ORDER — LEVOTHYROXINE SODIUM 112 UG/1
112 TABLET ORAL
Qty: 30 TABLET | Refills: 0 | Status: SHIPPED | OUTPATIENT
Start: 2021-11-30 | End: 2021-12-30

## 2021-11-29 RX ORDER — LANOLIN ALCOHOL/MO/W.PET/CERES
400 CREAM (GRAM) TOPICAL DAILY
Qty: 30 TABLET | Refills: 0 | Status: SHIPPED | OUTPATIENT
Start: 2021-11-30 | End: 2021-12-30

## 2021-11-29 RX ORDER — GABAPENTIN 100 MG/1
100 CAPSULE ORAL 3 TIMES DAILY
Qty: 90 CAPSULE | Refills: 0 | Status: SHIPPED | OUTPATIENT
Start: 2021-11-29 | End: 2021-11-29

## 2021-11-29 RX ORDER — LEVOTHYROXINE SODIUM 112 UG/1
112 TABLET ORAL
Qty: 30 TABLET | Refills: 0 | Status: SHIPPED | OUTPATIENT
Start: 2021-11-30 | End: 2021-11-29

## 2021-11-29 RX ORDER — QUETIAPINE FUMARATE 400 MG/1
400 TABLET, FILM COATED ORAL
Qty: 30 TABLET | Refills: 0 | Status: SHIPPED | OUTPATIENT
Start: 2021-11-29 | End: 2021-12-29

## 2021-11-29 RX ORDER — PRAZOSIN HYDROCHLORIDE 2 MG/1
4 CAPSULE ORAL
Qty: 60 CAPSULE | Refills: 0 | Status: SHIPPED | OUTPATIENT
Start: 2021-11-29 | End: 2021-11-29

## 2021-11-29 RX ORDER — POLYETHYLENE GLYCOL 3350 17 G/17G
17 POWDER, FOR SOLUTION ORAL DAILY
Qty: 30 PACKET | Refills: 0 | Status: SHIPPED | OUTPATIENT
Start: 2021-11-30 | End: 2021-12-30

## 2021-11-29 RX ORDER — LANOLIN ALCOHOL/MO/W.PET/CERES
400 CREAM (GRAM) TOPICAL DAILY
Qty: 30 TABLET | Refills: 0 | Status: SHIPPED | OUTPATIENT
Start: 2021-11-30 | End: 2021-11-29

## 2021-11-29 RX ORDER — HYDROCODONE BITARTRATE AND ACETAMINOPHEN 5; 325 MG/1; MG/1
1 TABLET ORAL
Qty: 15 TABLET | Refills: 0 | Status: SHIPPED | OUTPATIENT
Start: 2021-11-29 | End: 2021-12-02

## 2021-11-29 RX ORDER — GABAPENTIN 100 MG/1
100 CAPSULE ORAL 3 TIMES DAILY
Qty: 90 CAPSULE | Refills: 0 | Status: SHIPPED | OUTPATIENT
Start: 2021-11-29 | End: 2021-12-29

## 2021-11-29 RX ORDER — QUETIAPINE FUMARATE 400 MG/1
400 TABLET, FILM COATED ORAL
Qty: 30 TABLET | Refills: 0 | Status: SHIPPED | OUTPATIENT
Start: 2021-11-29 | End: 2021-11-29

## 2021-11-29 RX ORDER — HYDROXYZINE 50 MG/1
50 TABLET, FILM COATED ORAL
Qty: 30 TABLET | Refills: 0 | Status: SHIPPED | OUTPATIENT
Start: 2021-11-29 | End: 2021-12-29

## 2021-11-29 RX ORDER — CEPHALEXIN 500 MG/1
500 CAPSULE ORAL EVERY 6 HOURS
Qty: 15 CAPSULE | Refills: 0 | Status: SHIPPED | OUTPATIENT
Start: 2021-11-29 | End: 2021-11-29

## 2021-11-29 RX ORDER — POLYETHYLENE GLYCOL 3350 17 G/17G
17 POWDER, FOR SOLUTION ORAL DAILY
Qty: 30 PACKET | Refills: 0 | Status: SHIPPED | OUTPATIENT
Start: 2021-11-30 | End: 2021-11-29

## 2021-11-29 RX ORDER — PRAZOSIN HYDROCHLORIDE 2 MG/1
4 CAPSULE ORAL
Qty: 60 CAPSULE | Refills: 0 | Status: SHIPPED | OUTPATIENT
Start: 2021-11-29 | End: 2021-12-29

## 2021-11-29 RX ORDER — CEPHALEXIN 500 MG/1
500 CAPSULE ORAL EVERY 6 HOURS
Qty: 15 CAPSULE | Refills: 0 | Status: SHIPPED | OUTPATIENT
Start: 2021-11-29 | End: 2021-12-03

## 2021-11-29 RX ADMIN — ENOXAPARIN SODIUM 40 MG: 100 INJECTION SUBCUTANEOUS at 09:24

## 2021-11-29 RX ADMIN — CEPHALEXIN 500 MG: 250 CAPSULE ORAL at 17:25

## 2021-11-29 RX ADMIN — INSULIN LISPRO 2 UNITS: 100 INJECTION, SOLUTION INTRAVENOUS; SUBCUTANEOUS at 11:59

## 2021-11-29 RX ADMIN — HYDROCODONE BITARTRATE AND ACETAMINOPHEN 1 TABLET: 5; 325 TABLET ORAL at 06:29

## 2021-11-29 RX ADMIN — GABAPENTIN 100 MG: 100 CAPSULE ORAL at 16:29

## 2021-11-29 RX ADMIN — Medication 10 ML: at 05:58

## 2021-11-29 RX ADMIN — HYDROXYZINE HYDROCHLORIDE 50 MG: 25 TABLET, FILM COATED ORAL at 13:01

## 2021-11-29 RX ADMIN — ASPIRIN 81 MG CHEWABLE TABLET 81 MG: 81 TABLET CHEWABLE at 09:25

## 2021-11-29 RX ADMIN — HYDROCODONE BITARTRATE AND ACETAMINOPHEN 1 TABLET: 5; 325 TABLET ORAL at 12:14

## 2021-11-29 RX ADMIN — LEVOTHYROXINE SODIUM 112 MCG: 0.11 TABLET ORAL at 05:58

## 2021-11-29 RX ADMIN — ESTRADIOL 0.5 MG: 1 TABLET ORAL at 09:24

## 2021-11-29 RX ADMIN — PHENAZOPYRIDINE HYDROCHLORIDE 200 MG: 100 TABLET ORAL at 17:25

## 2021-11-29 RX ADMIN — BUSPIRONE HYDROCHLORIDE 15 MG: 5 TABLET ORAL at 16:29

## 2021-11-29 RX ADMIN — PHENAZOPYRIDINE HYDROCHLORIDE 200 MG: 100 TABLET ORAL at 13:01

## 2021-11-29 RX ADMIN — ALUMINUM HYDROXIDE, MAGNESIUM HYDROXIDE, AND SIMETHICONE 30 ML: 200; 200; 20 SUSPENSION ORAL at 17:06

## 2021-11-29 RX ADMIN — BUDESONIDE AND FORMOTEROL FUMARATE DIHYDRATE 2 PUFF: 160; 4.5 AEROSOL RESPIRATORY (INHALATION) at 08:57

## 2021-11-29 RX ADMIN — TIOTROPIUM BROMIDE INHALATION SPRAY 2 PUFF: 3.12 SPRAY, METERED RESPIRATORY (INHALATION) at 08:58

## 2021-11-29 RX ADMIN — BUSPIRONE HYDROCHLORIDE 15 MG: 5 TABLET ORAL at 09:24

## 2021-11-29 RX ADMIN — CEPHALEXIN 500 MG: 250 CAPSULE ORAL at 12:00

## 2021-11-29 RX ADMIN — GABAPENTIN 100 MG: 100 CAPSULE ORAL at 09:25

## 2021-11-29 RX ADMIN — CEPHALEXIN 500 MG: 250 CAPSULE ORAL at 05:57

## 2021-11-29 RX ADMIN — PHENAZOPYRIDINE HYDROCHLORIDE 200 MG: 100 TABLET ORAL at 09:24

## 2021-11-29 RX ADMIN — Medication 400 MG: at 09:25

## 2021-11-29 NOTE — PROGRESS NOTES
Patient is ready for d/c from CM standpoint    Transition of Care Plan:     RUR: 11%  Disposition: Home w/[facility] & [facility]  Follow up appointments: on AVS  DME needed: Home O2 & bariatric walker-[place]  Transportation at [facility] or means to access home: Yes     IM Medicare Letter: N/A  Is patient a BCPI-A Bundle: N/A                     If yes, was Bundle Letter given?: N/A   Caregiver Contact: Raul Betts. [phone])  Kaylin Campbell DTR [phone])  Discharge Caregiver contacted prior to discharge? Pt contacted    Patient is d/c home today without any other needs or concerns. CM delivered O2 tank & bariatric walker to pt's room. Follow-up appointment is on the AVS.    Care Management Interventions  PCP Verified by CM: Yes  Palliative Care Criteria Met (RRAT>21 & CHF Dx)?: No  Mode of Transport at Discharge:  Other (see comment) (daughter)  Transition of Care Consult (CM Consult): [address]: No  Reason Outside [place]: Out of service area  Discharge Durable Medical Equipment: Yes (home oxygen & bariatric r/w-[place])  Health Maintenance Reviewed: Yes  Physical Therapy Consult: Yes  Occupational Therapy Consult: Yes  Speech Therapy Consult: No  Support Systems: Spouse/Significant Other, Parent(s)  Confirm Follow Up Transport: Self  The Plan for Transition of Care is Related to the Following Treatment Goals : Pending PT/OT recommendations  The Patient and/or Patient Representative was Provided with a Choice of Provider and Agrees with the Discharge Plan?: Yes  Name of the Patient Representative Who was Provided with a Choice of Provider and Agrees with the Discharge Plan: pt signed  Freedom of Choice List was Provided with Basic Dialogue that Supports the Patient's Individualized Plan of Care/Goals, Treatment Preferences and Shares the Quality Data Associated with the Providers?: Yes  Discharge Location  Discharge Placement: Home with home health [facility] GUEVARA [facility])      Og Arteaga  Ext [phone]

## 2021-11-29 NOTE — DISCHARGE SUMMARY
Hospitalist Discharge Summary     Patient ID:  Pawan Cristobal  [id number]  89 y.o.  1960 11/19/2021    PCP on record: Nick Jarrell DO    Admit date: 11/19/2021  Discharge date and time: 11/29/2021    DISCHARGE DIAGNOSIS:    Acute on chronic lower back pain  Debilty r/t Super Morbid Obesity   Urinary Retention   Hypertension   Hyperlipidemia    Seizure disorder   Hypothyroidism   Type II Diabetes with peripheral neuropathy   Depression   Anxiety  COPD     CONSULTATIONS:  IP CONSULT TO ORTHOPEDIC SURGERY  IP CONSULT TO OB GYN  IP CONSULT TO UROLOGY  IP CONSULT TO COLORECTAL SURGERY    Excerpted HPI from H&P of Viviana Reyna MD:    CHIEF COMPLAINT: back pain, left leg pain     HISTORY OF PRESENT ILLNESS:     Cris Welch is a 64 y.o.  female who presents with CC listed above. Pt states that she awoke this morning at approximately 5am with lower back pain that radiated down her left leg. She also endorsed having difficulty urinating. She denies any lower extremity numbness. She states that she can only stand for a few seconds and then has to sit back down due to \"feeling weak. \" She denies any loss of sensation at her buttocks, perineum or inner thighs.   ______________________________________________________________________  DISCHARGE SUMMARY/HOSPITAL COURSE:  for full details see H&P, daily progress notes, labs, consult notes. Juliana beltran. o.F was admitted to [facility] on 11/19/2021 and treated for the following medical complaints:     Acute on chronic lower back pain  Debilty r/t Super Morbid Obesity BMI 72  CT abd / Pelvis   IMPRESSION  1. Partial staghorn calculus in the lower pole of the right kidney. 2. Degenerative changes in the spine. Minimal anterior listhesis of L4 on L5  with disc osteophyte complex formation/degenerative disc disease at L5/S1. 3. There is fullness in the left side of the labia/left of the introitus.   Recommend physical exam and possible referral to gynecology. 4. Incidental findings as above. MRI lumbar spine   IMPRESSION  1.  Multilevel stable spondylitic changes as above, most severe at L3-S1. 2.  Severe canal stenosis at L3.  3.  Moderate canal stenosis with severe left and moderate right neuroforaminal  narrowing at L4-L5. 4.  Severe bilateral neuroforaminal narrowing at L5-S1. 5.  Additional findings as above.      appreciate Ortho input - no surgical intervention  At this time   - PT/OT input recc SNF    - appreciate Gyn - no L labia mass or lesion  Left posterior vulva + perianal tenderness     and fullness recc Color rectal consult   - Colorectal consult called recc repeat CT  abd pelvis   - CT results showed Bartholin gland cyst - sitz bath and Abx x 7 days ( day 3)     CT ABD /PELVIS (11/25)   IMPRESSION  1. Left Bartholin gland cyst. No perianal abscess. 2. Tracheobronchomalacia. 3. Nonobstructing, lower pole, right renal, staghorn calculus. Mild adjacent  renal sinus fat inflammation. 4. Splenomegaly, chronic.     Urinary Retention - resolved   -Patient has had difficulty with urination since admission. Suspect immobility is playing a role. -Placed concepcion 11/23  concepcion removed voiding well  today attempt void trail   -appreciate  Urology input recc concepcion 7 days then follow up with [facility] OP      Hypertension hold antihypertensive for hypotension , monitor  Hyperlipidemia Continue atorvastatin   Seizure disorder Continue topamax   Hypothyroidism Continue levothyroxine   Type II Diabetes with peripheral neuropathy Continue SSI and gabapentin   Depression Continue seroquel, buspar, wellbutrin   Anxiety - requesting ativan for anxiety , given atarax   COPD - cont prn nebs , home inhalers, Oxygen to keep sats > 90% pt up with PT sats down to 88% with activity and SOB .  Will likely need home oxygen   - CM to arrange Home oxygen for pt   - OP follow up with Pulmonary      Covid - resolved   - Rapid covid positive   - Pro korey Normal , lactic 1.0, CRP 0.92  Ld 202 D- dimer 1.16 Fibrinogen 499   - PCR negative  x2   - pt remains asymptomatic   - DC droplet precautions       Anemia HEATHER vs Chronic   - hgb 8.4 on admission   - today at 7.8 no over sign of bleeding   -  iron panel done  Iron 25 TIBC 412 Iron sat 6%   - will add Iron supplements   -     Patient's plan of care has been reviewed with them. Patient and/or family have verbally conveyed their understanding and agreement of the patient's signs, symptoms, diagnosis, treatment and prognosis and additionally agree to follow up as recommended or return to [facility] should their condition change prior to follow-up. Discharge instructions have also been provided to the patient with some educational information regarding their diagnosis as well a list of reasons why they would want to return to the office prior to their follow-up appointment should their condition change.    _______________________________________________________________________  Patient seen and examined by me on discharge day. Pertinent Findings:  Gen:    Not in distress  Chest: Clear lungs  CVS:   Regular rhythm. No edema  Abd:  Soft, not distended, not tender  Neuro:  Alert, Oriented x 4  _______________________________________________________________________  DISCHARGE MEDICATIONS:   Current Discharge Medication List      START taking these medications    Details   cephALEXin (KEFLEX) 500 mg capsule Take 1 Capsule by mouth every six (6) hours for 15 doses. Indications: skin infection due to Streptococcus pyogenes bacteria  Qty: 15 Capsule, Refills: 0  Start date: 11/29/2021, End date: 12/3/2021      HYDROcodone-acetaminophen (NORCO) 5-325 mg per tablet Take 1 Tablet by mouth every four (4) hours as needed for Pain for up to 3 days. Max Daily Amount: 6 Tablets.  Indications: pain  Qty: 15 Tablet, Refills: 0  Start date: 11/29/2021, End date: 12/2/2021    Associated Diagnoses: Intractable back pain hydrOXYzine HCL (ATARAX) 50 mg tablet Take 1 Tablet by mouth three (3) times daily as needed for Anxiety for up to 30 days. Indications: anxious  Qty: 30 Tablet, Refills: 0  Start date: 11/29/2021, End date: 12/29/2021      magnesium oxide (MAG-OX) 400 mg tablet Take 1 Tablet by mouth daily for 30 days. Indications: low amount of magnesium in the blood  Qty: 30 Tablet, Refills: 0  Start date: 11/30/2021, End date: 12/30/2021      ferrous fumarate-vitamin C (NARA-SEQUELS) 200 mg (65 mg iron)-25 mg TbER EXTENDED RELEASE Take 1 Tablet by mouth daily for 30 days. Indications: anemia from inadequate iron  Qty: 30 Tablet, Refills: 0  Start date: 11/29/2021, End date: 12/29/2021      polyethylene glycol (MIRALAX) 17 gram packet Take 1 Packet by mouth daily for 30 days. Indications: constipation  Qty: 30 Packet, Refills: 0  Start date: 11/30/2021, End date: 12/30/2021         CONTINUE these medications which have CHANGED    Details   gabapentin (NEURONTIN) 100 mg capsule Take 1 Capsule by mouth three (3) times daily for 30 days. Max Daily Amount: 300 mg. Indications: neuropathic pain  Qty: 90 Capsule, Refills: 0  Start date: 11/29/2021, End date: 12/29/2021    Associated Diagnoses: Intractable back pain      prazosin (MINIPRESS) 2 mg capsule Take 2 Capsules by mouth nightly for 30 days. Indications: high blood pressure  Qty: 60 Capsule, Refills: 0  Start date: 11/29/2021, End date: 12/29/2021      QUEtiapine (SEROquel) 400 mg tablet Take 1 Tablet by mouth nightly for 30 days. Indications: bipolar depression  Qty: 30 Tablet, Refills: 0  Start date: 11/29/2021, End date: 12/29/2021      levothyroxine (SYNTHROID) 112 mcg tablet Take 1 Tablet by mouth Daily (before breakfast) for 30 days.  Indications: a condition with low thyroid hormone levels  Qty: 30 Tablet, Refills: 0  Start date: 11/30/2021, End date: 12/30/2021         CONTINUE these medications which have NOT CHANGED    Details   albuterol-ipratropium (DUO-NEB) 2.5 mg-0.5 mg/3 ml nebu 3 mL by Nebulization route every four (4) hours as needed (COPD). Qty: 30 Nebule, Refills: 0      fluticasone-umeclidinium-vilanterol (TRELEGY ELLIPTA) 100-62.5-25 mcg inhaler Take 1 Puff by inhalation daily. metFORMIN (GLUCOPHAGE) 1,000 mg tablet Take 1,000 mg by mouth two (2) times a day. fluticasone propionate (FLONASE) 50 mcg/actuation nasal spray 2 Sprays by Both Nostrils route daily. liraglutide (VICTOZA) 0.6 mg/0.1 mL (18 mg/3 mL) pnij 1.8 mg by SubCUTAneous route daily. busPIRone (BUSPAR) 15 mg tablet Take 15 mg by mouth three (3) times daily. albuterol (PROVENTIL HFA, VENTOLIN HFA, PROAIR HFA) 90 mcg/actuation inhaler Take 2 Puffs by inhalation every four (4) hours as needed for Wheezing. Qty: 1 Inhaler, Refills: 0      buPROPion XL (WELLBUTRIN XL) 150 mg tablet Take 300 mg by mouth nightly. Dr. Shalonda Blake      ergocalciferol (VITAMIN D2) 50,000 unit capsule Take 50,000 Units by mouth every seven (7) days. Each Sunday      estradiol (ESTRACE) 0.5 mg tablet Take 0.5 mg by mouth daily. miconazole (MICOTIN) 2 % topical cream Apply 1 Each to affected area two (2) times a day. Apply ointment to fungal infection under abdominal pannus and groin twice a day for 14 days. aspirin 81 mg chewable tablet Take 81 mg by mouth daily. omeprazole (PRILOSEC) 20 mg capsule Take 40 mg by mouth daily. topiramate (TOPAMAX) 200 mg tablet Take 200 mg by mouth nightly. Dr. Shalonda Blake - Psychiatry      simvastatin (ZOCOR) 20 mg tablet Take 20 mg by mouth nightly.          STOP taking these medications       valsartan (DIOVAN) 80 mg tablet Comments:   Reason for Stopping:         oxyCODONE-acetaminophen (PERCOCET) 5-325 mg per tablet Comments:   Reason for Stopping:         nystatin, bulk, 15 billion unit powd Comments:   Reason for Stopping:         metoprolol succinate (TOPROL XL) 25 mg XL tablet Comments:   Reason for Stopping:         losartan (COZAAR) 25 mg tablet Comments:   Reason for Stopping:                 Patient Follow Up Instructions: Activity: PT/OT per Home Health  Diet: Diabetic Diet  Wound Care: As directed sitz bath twice a day x 5 days     Follow-up with PCP in 1 week. Follow-up tests/labs CBC    Follow-up Information     Follow up With Specialties Details Why [address] [facility]  On 11/30/2021 follow up at [facility] office at 8:20 am with Dr. Lynn Parson on this date [address], [address] [facility]  office will call patient to schedule a hospital follow-up appointment [address] [place] [address]  [phone]      Shannan Kirkpatrick MD Internal Medicine, Pulmonary Disease Schedule an appointment as soon as possible for a visit in 1 month  [phone] [facility]  [address]  This is your home health care provider. If you dont hear from them within 48hrs of discharge, please give them a call [address] [facility], [address]    [facility]   provider of home oxygen and rolling walker  [address] [facility]  [phone]        ________________________________________________________________    Risk of deterioration: Low    Condition at Discharge:  Stable  __________________________________________________________________    Disposition  Home with family and home health services    ____________________________________________________________________    Code Status: Full Code  ___________________________________________________________________      Total time in minutes spent coordinating this discharge (includes going over instructions, follow-up, prescriptions, and preparing report for sign off to her PCP) :  35  minutes    Signed:  Tawana Gamboa NP

## 2021-11-29 NOTE — PROGRESS NOTES
Problem: Mobility Impaired (Adult and Pediatric)  Goal: *Acute Goals and Plan of Care (Insert Text)  Description: FUNCTIONAL STATUS PRIOR TO ADMISSION: Patient was modified independent using a rolling walker for functional mobility. HOME SUPPORT PRIOR TO ADMISSION: The patient lived with her  and Mother and didn't require assistance with adls. Physical Therapy Goals  Initiated 11/21/2021  1. Patient will move from supine to sit and sit to supine , scoot up and down, and roll side to side in bed with modified independence within 7 day(s). 2.  Patient will transfer from bed to chair and chair to bed with modified independence using the least restrictive device within 7 day(s). 3.  Patient will perform sit to stand with modified independence within 7 day(s). 4.  Patient will ambulate with modified independence for 100 feet with the least restrictive device within 7 day(s). 5.  Patient will ascend/descend 3 stairs with  handrail(s) with modified independence within 7 day(s). Physical Therapy Goals  updated 11/26/2021  1. Patient will move from supine to sit and sit to supine , scoot up and down, and roll side to side in bed with modified independence within 7 day(s). 2.  Patient will transfer from bed to chair and chair to bed with modified independence using the least restrictive device within 7 day(s). 3.  Patient will perform sit to stand with modified independence within 7 day(s). 4.  Patient will ambulate with modified independence for 75 feet with the least restrictive device within 7 day(s). Outcome: Progressing Towards Goal   PHYSICAL THERAPY TREATMENT  Patient: Lamont Montes (59 y.o. female)  Date: 11/29/2021  Diagnosis: Intractable back pain [M54.9]   <principal problem not specified>       Precautions: Fall, Back, Skin (BMI 72.68)  Chart, physical therapy assessment, plan of care and goals were reviewed.     ASSESSMENT  Patient continues with skilled PT services and is progressing towards goals. Patient currently limited by body habitus, decreased activity tolerance, gait instability and decreased activity tolerance. Currently limited by elevated HR and SOB. Patient up in chair and needing SBA with cues for technique. Amb approx 40 feet with RW and CGA with increased SOB and decreased activity tolerance. SpO2 drops to 88% and HR to 130 bpm with activity. Anticipate patient will be able to DC home with [facility] PT and family support. May need O2 at home. Documentation for home O2:     ROOM AIR    AT REST   O2 SATS  93 HR  94   ROOM AIR WITH ACTIVITY 02 SATS  88 HR  130   (2    ) LITERS OF O2 WITH ACTIVITY O2 SATS  95 HR  125   (0    )LITERS OF 02 PATIENT LEFT COMFORTABLY  SITTING/SUPINE 02 SATS  93 HR  95            Other factors to consider for discharge: at risk for falls, poor activity tolerance         PLAN :  Patient continues to benefit from skilled intervention to address the above impairments. Continue treatment per established plan of care. to address goals. Recommendation for discharge: (in order for the patient to meet his/her long term goals)  Physical therapy at least 2 days/week in the home AND ensure assist and/or supervision for safety with mobilty        IF patient discharges home will need the following DME: rolling walker if she does not already own       SUBJECTIVE:   Patient stated I don't want to go to rehab if I can just do it at home.     OBJECTIVE DATA SUMMARY:   Critical Behavior:  Neurologic State: Alert  Orientation Level: Oriented X4  Cognition: Follows commands  Safety/Judgement: Awareness of environment, Fall prevention, Insight into deficits  Functional Mobility Training:  Bed Mobility:   Up in chair upon arrival                 Transfers:  Sit to Stand: Stand-by assistance  Stand to Sit: Stand-by assistance                             Balance:  Sitting: Intact  Standing: Intact;  With support  Standing - Static: Constant support; Good  Standing - Dynamic : Constant support; Good  Ambulation/Gait Training:  Distance (ft): 40 Feet (ft) (and additional 15 feet with RW)  Assistive Device: Gait belt; Walker, rolling  Ambulation - Level of Assistance: Contact guard assistance        Gait Abnormalities: Decreased step clearance; Shuffling gait        Base of Support: Widened     Speed/Nan: Pace decreased (<100 feet/min); Shuffled; Slow  Step Length: Left shortened; Right shortened       Pain Rating:  No c/o pain    Activity Tolerance:   Fair and requires rest breaks    After treatment patient left in no apparent distress:   Sitting in chair and Call bell within reach    COMMUNICATION/COLLABORATION:   The patients plan of care was discussed with: Physical therapist, Occupational therapist, and Registered nurse.      Tyler Warner PT, DPT   Time Calculation: 13 mins

## 2021-11-29 NOTE — PROGRESS NOTES
Problem: Self Care Deficits Care Plan (Adult)  Goal: *Acute Goals and Plan of Care (Insert Text)  Description: FUNCTIONAL STATUS PRIOR TO ADMISSION: Patient was modified independent using a rolling walker for functional mobility. Sits and sponge bathes in a chair next to her bed and then stands in the shower to rinse off, only wears slip on shoes and family assists with socks if pt wears them, performed all other ADLS on her own, family performs [address]: The patient lived with  and mother whom assist as needed. Occupational Therapy Goals:  Goals modified/updated/remain appropriate as stated below on 11/29/21  1. Patient will perform grooming standing at sink with RW PRN with modified independence within 7 days. 2. Patient will perform lower body dressing with excluding socks modified independence within 7 days. 3. Patient will perform toileting with modified independence within 7 days. 4. Patient will transfer from toilet with RW with modified independence within 7 days. Initiated 11/21/2021  1. Patient will perform grooming seated with modified independence within 7 days. Met seated with S/U; Upgrade to standing at sink  2. Patient will perform lower body dressing with excluding socks modified independence within 7 days. 3. Patient will perform toileting with modified independence within 7 days. 4. Patient will transfer from toilet with modified independence using the least restrictive device and appropriate durable medical equipment within 7 days. Outcome: Progressing Towards Goal    OCCUPATIONAL THERAPY TREATMENT/WEEKLY RE-ASSESSMENT  Patient: Luiza Apple (53 y.o. female)  Date: 11/29/2021  Diagnosis: Intractable back pain [M54.9]   <principal problem not specified>       Precautions: Fall, Back, Skin (BMI 72.68)  Chart, occupational therapy assessment, plan of care, and goals were reviewed.     ASSESSMENT  Patient continues with skilled OT services and is progressing towards goals. This patient is mobilizing in room with RW with SBA-S into bathroom/standing for grooming at sink. Patient weaned off O2 and SpO2 88-94 during session on room air with drops in SpO2 with activity below 90%, recovers quickly (less than 20 secs or so) with seated break with tachycardia into 130's with activity during session. She requires frequent rest breaks to recover between activities in room. She will benefit from [facility] OT at this time as she has A from her  and mom at home. Goals were assessed and one was updated to standing for grooming. Patient Vitals for the past 4 hrs:   Temp Pulse Resp BP SpO2   11/29/21 0940 -- 88 -- -- 93 %   11/29/21 0936 -- (!) 122 -- -- (!) 89 %   11/29/21 0934 -- (!) 132 -- -- 90 %   11/29/21 0858 -- -- -- -- 95 %   11/29/21 0819 99.5 °F (37.5 °C) 93 19 123/76 97 %             Current Level of Function Impacting Discharge (ADLs): SpO2 decreases with activity with tachycardia    Other factors to consider for discharge: HHOT, morbid obesity         PLAN :  Goals have been updated based on progression since last assessment. Patient continues to benefit from skilled intervention to address the above impairments. Continue to follow patient 5 times a week to address goals. Recommend with staff: up to chair for meals    Recommend next OT session: per POC    Recommendation for discharge: (in order for the patient to meet his/her long term goals)  Occupational therapy at least 2 days/week in the home     This discharge recommendation:  Has not yet been discussed the attending provider and/or case management    IF patient discharges home will need the following DME: none       SUBJECTIVE:   Patient stated I need the oxygen.     OBJECTIVE DATA SUMMARY:   Cognitive/Behavioral Status:  Neurologic State: Alert  Orientation Level: Oriented X4  Cognition: Follows commands             Functional Mobility and Transfers for ADLs:  Bed Mobility: Transfers:  Sit to Stand: Stand-by assistance  Functional Transfers  Toilet Transfer : Stand-by assistance; Adaptive equipment  Adaptive Equipment: Walker (comment); Grab bars       Balance:  Sitting: Intact  Standing: Intact; With support  Standing - Static: Constant support; Good  Standing - Dynamic : Constant support; Good    ADL Intervention:       Grooming  Position Performed: Standing  Washing Face: Supervision  Washing Hands: Supervision  Brushing Teeth: Supervision                   Lower Body Dressing Assistance  Underpants: Set-up; Stand-by assistance  Position Performed: Seated in chair    Toileting  Bladder Hygiene: Modified independent  Clothing Management: Stand-by assistance  Adaptive Equipment: Walker; Grab bars         Therapeutic Exercises:       Pain:  No c/o    Activity Tolerance:   Fair, desaturates with exertion and requires oxygen, requires frequent rest breaks, and desats with activity, nurse notified and patient to stay off O2 seated in chair as SpO2 stable seated in chair and she will continue to monitor. After treatment patient left in no apparent distress:   Sitting in chair, Call bell within reach, and Caregiver / family present    COMMUNICATION/COLLABORATION:   The patients plan of care was discussed with: Physical therapist and Registered nurse.      ANTONY Esquivel/L  Time Calculation: 19 mins

## 2021-11-29 NOTE — PROGRESS NOTES
Pulse Oximetry Assessment:    95% at rest on room air (If this value is <90%, you do not need to proceed and the other fields may be deleted)  88% while ambulating on room air  93% at rest on 2 LPM  90% while ambulating on 2 LPM    If patient will require home oxygen, please obtain an order from the attending physician for a case management consult to set up home oxygen.

## 2021-11-29 NOTE — PROGRESS NOTES
Patient discharged home with family. Copy of discharge instructions given to patient prior to discharge. IV removed. Patient send to discharge lobby via wheelchair. All cabinets checked for patient belongings.

## 2021-11-29 NOTE — PROGRESS NOTES
End of Shift Note    Bedside shift change report given to RN (oncoming nurse) by Kely Rodriguez (offgoing nurse).   Report included the following information SBAR, Kardex, Procedure Summary, Intake/Output, MAR and Recent Results    Shift worked:  7-7pm     Shift summary and any significant changes:     No significant changes  Sitz bath completed during the shift     Concerns for physician to address:       Zone phone for oncoming shift:       Kely Rodriguez

## 2022-02-07 ENCOUNTER — TRANSCRIBE ORDER (OUTPATIENT)
Dept: SCHEDULING | Age: 62
End: 2022-02-07

## 2022-02-07 DIAGNOSIS — R06.02 SHORTNESS OF BREATH: Primary | ICD-10-CM

## 2022-02-07 DIAGNOSIS — R06.00 DYSPNEA: ICD-10-CM

## 2022-02-07 DIAGNOSIS — J44.9 CHRONIC OBSTRUCTIVE BRONCHITIS (HCC): ICD-10-CM

## 2022-02-07 DIAGNOSIS — R29.898: ICD-10-CM

## 2022-02-15 ENCOUNTER — HOSPITAL ENCOUNTER (EMERGENCY)
Age: 62
Discharge: HOME | End: 2022-02-15
Attending: EMERGENCY MEDICINE
Payer: COMMERCIAL

## 2022-02-15 ENCOUNTER — APPOINTMENT (OUTPATIENT)
Dept: CT IMAGING | Age: 62
End: 2022-02-15
Attending: EMERGENCY MEDICINE
Payer: COMMERCIAL

## 2022-02-15 VITALS
OXYGEN SATURATION: 100 % | SYSTOLIC BLOOD PRESSURE: 144 MMHG | RESPIRATION RATE: 24 BRPM | TEMPERATURE: 99 F | WEIGHT: 293 LBS | BODY MASS INDEX: 53.92 KG/M2 | DIASTOLIC BLOOD PRESSURE: 97 MMHG | HEIGHT: 62 IN

## 2022-02-15 DIAGNOSIS — R10.9 RIGHT FLANK PAIN: ICD-10-CM

## 2022-02-15 DIAGNOSIS — N20.0 STAGHORN CALCULUS: Primary | ICD-10-CM

## 2022-02-15 LAB
ALBUMIN SERPL-MCNC: 3.1 G/DL (ref 3.5–5)
ALBUMIN/GLOB SERPL: 0.8 {RATIO} (ref 1.1–2.2)
ALP SERPL-CCNC: 59 U/L (ref 45–117)
ALT SERPL-CCNC: 20 U/L (ref 12–78)
ANION GAP SERPL CALC-SCNC: 4 MMOL/L (ref 5–15)
APPEARANCE UR: CLEAR
AST SERPL-CCNC: 16 U/L (ref 15–37)
BACTERIA URNS QL MICRO: NEGATIVE /HPF
BASOPHILS # BLD: 0.1 K/UL (ref 0–0.1)
BASOPHILS NFR BLD: 1 % (ref 0–1)
BILIRUB SERPL-MCNC: 0.1 MG/DL (ref 0.2–1)
BILIRUB UR QL: NEGATIVE
BUN SERPL-MCNC: 17 MG/DL (ref 6–20)
BUN/CREAT SERPL: 19 (ref 12–20)
CALCIUM SERPL-MCNC: 9.3 MG/DL (ref 8.5–10.1)
CHLORIDE SERPL-SCNC: 111 MMOL/L (ref 97–108)
CO2 SERPL-SCNC: 26 MMOL/L (ref 21–32)
COLOR UR: ABNORMAL
CREAT SERPL-MCNC: 0.91 MG/DL (ref 0.55–1.02)
DIFFERENTIAL METHOD BLD: ABNORMAL
EOSINOPHIL # BLD: 0.3 K/UL (ref 0–0.4)
EOSINOPHIL NFR BLD: 3 % (ref 0–7)
EPITH CASTS URNS QL MICRO: ABNORMAL /LPF
ERYTHROCYTE [DISTWIDTH] IN BLOOD BY AUTOMATED COUNT: 19.9 % (ref 11.5–14.5)
GLOBULIN SER CALC-MCNC: 3.7 G/DL (ref 2–4)
GLUCOSE BLD STRIP.AUTO-MCNC: 110 MG/DL (ref 65–117)
GLUCOSE SERPL-MCNC: 121 MG/DL (ref 65–100)
GLUCOSE UR STRIP.AUTO-MCNC: NEGATIVE MG/DL
HCT VFR BLD AUTO: 31.7 % (ref 35–47)
HGB BLD-MCNC: 8.9 G/DL (ref 11.5–16)
HGB UR QL STRIP: ABNORMAL
IMM GRANULOCYTES # BLD AUTO: 0 K/UL (ref 0–0.04)
IMM GRANULOCYTES NFR BLD AUTO: 0 % (ref 0–0.5)
KETONES UR QL STRIP.AUTO: NEGATIVE MG/DL
LEUKOCYTE ESTERASE UR QL STRIP.AUTO: ABNORMAL
LYMPHOCYTES # BLD: 1.8 K/UL (ref 0.8–3.5)
LYMPHOCYTES NFR BLD: 20 % (ref 12–49)
MCH RBC QN AUTO: 19.9 PG (ref 26–34)
MCHC RBC AUTO-ENTMCNC: 28.1 G/DL (ref 30–36.5)
MCV RBC AUTO: 70.9 FL (ref 80–99)
MONOCYTES # BLD: 0.8 K/UL (ref 0–1)
MONOCYTES NFR BLD: 8 % (ref 5–13)
MUCOUS THREADS URNS QL MICRO: ABNORMAL /LPF
NEUTS SEG # BLD: 6.2 K/UL (ref 1.8–8)
NEUTS SEG NFR BLD: 68 % (ref 32–75)
NITRITE UR QL STRIP.AUTO: NEGATIVE
NRBC # BLD: 0 K/UL (ref 0–0.01)
NRBC BLD-RTO: 0 PER 100 WBC
PH UR STRIP: 5 [PH] (ref 5–8)
PLATELET # BLD AUTO: 209 K/UL (ref 150–400)
POTASSIUM SERPL-SCNC: 4.2 MMOL/L (ref 3.5–5.1)
PROT SERPL-MCNC: 6.8 G/DL (ref 6.4–8.2)
PROT UR STRIP-MCNC: 30 MG/DL
RBC # BLD AUTO: 4.47 M/UL (ref 3.8–5.2)
RBC #/AREA URNS HPF: ABNORMAL /HPF (ref 0–5)
SERVICE CMNT-IMP: NORMAL
SODIUM SERPL-SCNC: 141 MMOL/L (ref 136–145)
SP GR UR REFRACTOMETRY: 1.03 (ref 1–1.03)
UA: UC IF INDICATED,UAUC: ABNORMAL
UROBILINOGEN UR QL STRIP.AUTO: 0.2 EU/DL (ref 0.2–1)
WBC # BLD AUTO: 9.1 K/UL (ref 3.6–11)
WBC URNS QL MICRO: ABNORMAL /HPF (ref 0–4)

## 2022-02-15 PROCEDURE — 96374 THER/PROPH/DIAG INJ IV PUSH: CPT

## 2022-02-15 PROCEDURE — 81001 URINALYSIS AUTO W/SCOPE: CPT

## 2022-02-15 PROCEDURE — 36415 COLL VENOUS BLD VENIPUNCTURE: CPT

## 2022-02-15 PROCEDURE — 87086 URINE CULTURE/COLONY COUNT: CPT

## 2022-02-15 PROCEDURE — 82962 GLUCOSE BLOOD TEST: CPT

## 2022-02-15 PROCEDURE — 85025 COMPLETE CBC W/AUTO DIFF WBC: CPT

## 2022-02-15 PROCEDURE — 74011250636 HC RX REV CODE- 250/636: Performed by: EMERGENCY MEDICINE

## 2022-02-15 PROCEDURE — 99285 EMERGENCY DEPT VISIT HI MDM: CPT

## 2022-02-15 PROCEDURE — 74176 CT ABD & PELVIS W/O CONTRAST: CPT

## 2022-02-15 PROCEDURE — 80053 COMPREHEN METABOLIC PANEL: CPT

## 2022-02-15 PROCEDURE — 96375 TX/PRO/DX INJ NEW DRUG ADDON: CPT

## 2022-02-15 RX ORDER — MORPHINE SULFATE 2 MG/ML
4 INJECTION, SOLUTION INTRAMUSCULAR; INTRAVENOUS
Status: DISCONTINUED | OUTPATIENT
Start: 2022-02-15 | End: 2022-02-15

## 2022-02-15 RX ORDER — ONDANSETRON 2 MG/ML
4 INJECTION INTRAMUSCULAR; INTRAVENOUS
Status: COMPLETED | OUTPATIENT
Start: 2022-02-15 | End: 2022-02-15

## 2022-02-15 RX ORDER — HYDROMORPHONE HYDROCHLORIDE 1 MG/ML
1 INJECTION, SOLUTION INTRAMUSCULAR; INTRAVENOUS; SUBCUTANEOUS
Status: DISCONTINUED | OUTPATIENT
Start: 2022-02-15 | End: 2022-02-15 | Stop reason: HOSPADM

## 2022-02-15 RX ORDER — HYDROCODONE BITARTRATE AND ACETAMINOPHEN 10; 325 MG/1; MG/1
1 TABLET ORAL
Qty: 9 TABLET | Refills: 0 | Status: SHIPPED | OUTPATIENT
Start: 2022-02-15 | End: 2022-03-17

## 2022-02-15 RX ORDER — FENTANYL CITRATE 50 UG/ML
50 INJECTION, SOLUTION INTRAMUSCULAR; INTRAVENOUS
Status: COMPLETED | OUTPATIENT
Start: 2022-02-15 | End: 2022-02-15

## 2022-02-15 RX ORDER — KETOROLAC TROMETHAMINE 30 MG/ML
15 INJECTION, SOLUTION INTRAMUSCULAR; INTRAVENOUS
Status: COMPLETED | OUTPATIENT
Start: 2022-02-15 | End: 2022-02-15

## 2022-02-15 RX ADMIN — HYDROMORPHONE HYDROCHLORIDE 1 MG: 1 INJECTION, SOLUTION INTRAMUSCULAR; INTRAVENOUS; SUBCUTANEOUS at 19:48

## 2022-02-15 RX ADMIN — KETOROLAC TROMETHAMINE 15 MG: 30 INJECTION, SOLUTION INTRAMUSCULAR at 17:10

## 2022-02-15 RX ADMIN — SODIUM CHLORIDE 1000 ML: 9 INJECTION, SOLUTION INTRAVENOUS at 17:09

## 2022-02-15 RX ADMIN — FENTANYL CITRATE 50 MCG: 50 INJECTION INTRAMUSCULAR; INTRAVENOUS at 17:09

## 2022-02-15 RX ADMIN — ONDANSETRON 4 MG: 2 INJECTION INTRAMUSCULAR; INTRAVENOUS at 18:28

## 2022-02-16 LAB
BACTERIA SPEC CULT: NORMAL
SERVICE CMNT-IMP: NORMAL

## 2022-02-16 NOTE — DISCHARGE INSTRUCTIONS
You were evaluated in the emergency department for right flank pain due to a kidney stone. Your examination was reassuring as was your work-up including CT scan and blood work and urinalysis. It will be important for you to follow-up with your Urologist 2-3 days. If you develop worsening symptoms such as fevers or worsening pain, please return to the emergency department immediately.

## 2022-02-16 NOTE — ED PROVIDER NOTES
EMERGENCY DEPARTMENT HISTORY AND PHYSICAL EXAM      Date: 2/15/2022  Patient Name: Park Gallagher    History of Presenting Illness     Chief Complaint   Patient presents with    Flank Pain     Diagnosed with a kidney stone and it is causing her a lot of pain so [facility] sent her here. History Provided By: Patient    HPI: Park Gallagher, 64 y.o. female with prior history of nephrolithiasis, diabetes, hypertension, chronic back pain followed by pain clinic, severe morbid obesity presents to the ED with cc of right flank pain and right lower quadrant abdominal pain. Symptoms have been present over the past 1 month. States that she was previously diagnosed with kidney stone and followed by [facility], sees both Dr. Parish Elizabeth and Dr. Fidencio Flores. She was seen in clinic earlier today where she had a clean urine sample and referred to the ED for persistent right flank pain. She states that she has history of right staghorn calculus that is currently followed by [facility] and is currently undergoing pulmonary and cardiology clearance prior to any urologic procedure. She is taking hydrocodone 5 mg at home without relief of symptoms. Denies fevers, chills, nausea, vomiting. There are no other complaints, changes, or physical findings at this time. PCP: Marvis Canavan, DO    No current facility-administered medications on file prior to encounter. Current Outpatient Medications on File Prior to Encounter   Medication Sig Dispense Refill    albuterol-ipratropium (DUO-NEB) 2.5 mg-0.5 mg/3 ml nebu 3 mL by Nebulization route every four (4) hours as needed (COPD). 30 Nebule 0    fluticasone-umeclidinium-vilanterol (TRELEGY ELLIPTA) 100-62.5-25 mcg inhaler Take 1 Puff by inhalation daily.  metFORMIN (GLUCOPHAGE) 1,000 mg tablet Take 1,000 mg by mouth two (2) times a day.  fluticasone propionate (FLONASE) 50 mcg/actuation nasal spray 2 Sprays by Both Nostrils route daily.       liraglutide (VICTOZA) 0.6 mg/0.1 mL (18 mg/3 mL) pnij 1.8 mg by SubCUTAneous route daily.  busPIRone (BUSPAR) 15 mg tablet Take 15 mg by mouth three (3) times daily.  albuterol (PROVENTIL HFA, VENTOLIN HFA, PROAIR HFA) 90 mcg/actuation inhaler Take 2 Puffs by inhalation every four (4) hours as needed for Wheezing. 1 Inhaler 0    buPROPion XL (WELLBUTRIN XL) 150 mg tablet Take 300 mg by mouth nightly. Dr. Pro Dunn ergocalciferol (VITAMIN D2) 50,000 unit capsule Take 50,000 Units by mouth every seven (7) days. Each Sunday      estradiol (ESTRACE) 0.5 mg tablet Take 0.5 mg by mouth daily.  miconazole (MICOTIN) 2 % topical cream Apply 1 Each to affected area two (2) times a day. Apply ointment to fungal infection under abdominal pannus and groin twice a day for 14 days.  aspirin 81 mg chewable tablet Take 81 mg by mouth daily.  omeprazole (PRILOSEC) 20 mg capsule Take 40 mg by mouth daily.  topiramate (TOPAMAX) 200 mg tablet Take 200 mg by mouth nightly. Dr. Gareth Vale - Psychiatry      simvastatin (ZOCOR) 20 mg tablet Take 20 mg by mouth nightly.          Past History     Past Medical History:  Past Medical History:   Diagnosis Date    Adverse effect of anesthesia     slow to wake up       Anxiety and depression     Arrhythmia     Arthritis     Chronic pain     djd    Diabetes [facility] [address]     GERD (gastroesophageal reflux disease)     Hypertension     Hypothyroidism     Kidney stone     Liver disease     Obesity, morbid, BMI 50 or higher ([address]     Seizures (HCC)        Past Surgical History:  Past Surgical History:   Procedure Laterality Date    CARDIAC CATHETERIZATION  11/18/2010         HX APPENDECTOMY      HX BREAST BIOPSY Right     about 15 years ago, neg    HX CHOLECYSTECTOMY      HX HYSTERECTOMY      HX TUBAL LIGATION         Family History:  Family History   Problem Relation Age of Onset    Heart Disease Mother     Hypertension Mother     Cancer Mother     Breast Cancer Mother     Heart Disease Father     Hypertension Father     Cancer Brother        Social History:  Social History     Tobacco Use    Smoking status: Former Smoker    Smokeless tobacco: Never Used    Tobacco comment: quit smoking  about 34  years ago      Substance Use Topics    Alcohol use: No    Drug use: Yes     Types: Prescription, OTC       Allergies: Allergies   Allergen Reactions    Celebrex [Celecoxib] Nausea Only    Ibuprofen Other (comments)     Does not want r/t COPD history.  Methocarbamol Shortness of Breath    Morphine Nausea Only    Sulfa (Sulfonamide Antibiotics) Nausea Only    Tramadol Other (comments)     Does not want r/t COPD    Adhesive Other (comments)     Red and bumpy      Codeine Other (comments)     Hyper activity         Review of Systems   Review of Systems   Constitutional: Negative for chills and fever. Respiratory: Negative for cough and shortness of breath. Cardiovascular: Negative for chest pain and leg swelling. Gastrointestinal: Positive for abdominal pain. Negative for diarrhea, nausea and vomiting. Genitourinary: Positive for flank pain. Musculoskeletal: Negative for back pain and gait problem. Skin: Negative for color change and rash. Neurological: Negative for dizziness, weakness, light-headedness and headaches. Hematological: Does not bruise/bleed easily. All other systems reviewed and are negative. Physical Exam   Physical Exam  Vitals and nursing note reviewed. Constitutional:       General: She is not in acute distress. Appearance: Normal appearance. She is obese. She is not ill-appearing or toxic-appearing. Comments: Morbidly obese female lying on the right side in no acute distress. HENT:      Head: Normocephalic and atraumatic. Nose: Nose normal.      Mouth/Throat:      Mouth: Mucous membranes are moist.   Eyes:      Extraocular Movements: Extraocular movements intact.       Pupils: Pupils are equal, round, and reactive to light. Cardiovascular:      Rate and Rhythm: Normal rate and regular rhythm. Heart sounds: No murmur heard. Pulmonary:      Effort: Pulmonary effort is normal. No respiratory distress. Breath sounds: Normal breath sounds. No wheezing. Abdominal:      General: There is no distension. Palpations: Abdomen is soft. Tenderness: There is abdominal tenderness ( Right lower quadrant right upper quadrant TTP without guarding or rebound). There is right CVA tenderness. There is no left CVA tenderness, guarding or rebound. Musculoskeletal:         General: No swelling or tenderness. Normal range of motion. Cervical back: Normal range of motion and neck supple. Right lower leg: No edema. Left lower leg: No edema. Skin:     General: Skin is warm and dry. Coloration: Skin is not pale. Findings: No erythema. Neurological:      General: No focal deficit present. Mental Status: She is alert and oriented to person, place, and time. Diagnostic Study Results     Labs -     Labs Reviewed   CBC WITH AUTOMATED DIFF - Abnormal; Notable for the following components:       Result Value    HGB 8.9 (*)     HCT 31.7 (*)     MCV 70.9 (*)     MCH 19.9 (*)     MCHC 28.1 (*)     RDW 19.9 (*)     All other components within normal limits   METABOLIC PANEL, COMPREHENSIVE - Abnormal; Notable for the following components:    Chloride 111 (*)     Anion gap 4 (*)     Glucose 121 (*)     Bilirubin, total 0.1 (*)     Albumin 3.1 (*)     A-G Ratio 0.8 (*)     All other components within normal limits   URINALYSIS W/ REFLEX CULTURE - Abnormal; Notable for the following components:    Protein 30 (*)     Blood LARGE (*)     Leukocyte Esterase SMALL (*)     UA:UC IF INDICATED URINE CULTURE ORDERED (*)     Mucus 1+ (*)     All other components within normal limits   CULTURE, URINE   GLUCOSE, POC       Radiologic Studies -   CT ABD PELV WO CONT   Final Result      1. Ill-defined bilateral pulmonary groundglass opacifications for which clinical   correlation is recommended for infectious and inflammatory causes including   COVID-19 pneumonia. 2. Staghorn calculus of right kidney again shown with mild adjacent inflammatory   changes. No ureteral calculus or hydronephrosis demonstrated. CT Results  (Last 48 hours)               02/15/22 1542  CT ABD PELV WO CONT Final result    Impression:      1. Ill-defined bilateral pulmonary groundglass opacifications for which clinical   correlation is recommended for infectious and inflammatory causes including   COVID-19 pneumonia. 2. Staghorn calculus of right kidney again shown with mild adjacent inflammatory   changes. No ureteral calculus or hydronephrosis demonstrated. Narrative:  EXAM: CT ABD PELV WO CONT       INDICATION: stone       COMPARISON: CT 11/25/2021       IV CONTRAST: None. ORAL CONTRAST: None. The lack of oral contrast material diminishes the capacity   of CT to evaluate the bowel and adjacent structures. TECHNIQUE:    Thin axial images were obtained through the abdomen and pelvis. Coronal and   sagittal reformats were generated. CT dose reduction was achieved through use of   a standardized protocol tailored for this examination and automatic exposure   control for dose modulation. The absence of intravenous contrast material reduces the sensitivity for   evaluation of the vasculature and solid organs. FINDINGS:    LOWER THORAX: There are multifocal ill-defined areas of pulmonary groundglass   opacification which will require clinical correlation. LIVER: Mild diffuse hepatic steatosis. No intrahepatic bile duct dilation or   hepatic mass lesion demonstrated. BILIARY TREE: Status post cholecystectomy. CBD is not dilated. SPLEEN: Mild splenomegaly, unchanged. PANCREAS: No focal abnormality. ADRENALS: Unremarkable.    KIDNEYS/URETERS: Inferior staghorn calculus of right kidney again shown with   mild edema in fat adjacent to the renal pelvis and within hilar fat. No ureteral   dilation or calculus is demonstrated. STOMACH: Unremarkable. SMALL BOWEL: No dilatation or wall thickening. COLON: No dilatation or wall thickening. APPENDIX: Not shown. PERITONEUM: No ascites or pneumoperitoneum. RETROPERITONEUM: No lymphadenopathy or aortic aneurysm. REPRODUCTIVE ORGANS: Status post hysterectomy. URINARY BLADDER: Nondistended and therefore not well assessed on this study. BONES: Thoracic, lumbar, SI and hip degenerative changes again demonstrated   including grade 1 anterolisthesis at L4-5. ABDOMINAL WALL: No mass or hernia. ADDITIONAL COMMENTS: N/A               CXR Results  (Last 48 hours)    None          Medical Decision Making   I am the first provider for this patient. I reviewed the vital signs, available nursing notes, past medical history, past surgical history, family history and social history. Vital Signs-Reviewed the patient's vital signs. Visit Vitals  BP (!) 144/97 (BP 1 Location: Left lower arm, BP Patient Position: Sitting)   Temp 99 °F (37.2 °C)   Resp 24   Ht 5' 2\" (1.575 m)   Wt (!) 168.3 kg (371 lb)   SpO2 100%   BMI 67.86 kg/m²       Records Reviewed: Nursing Notes and Old Medical Records   Personally reviewed the [address] [facility] urology notes sent from clinic earlier today. Provider Notes (Medical Decision Making):   79-year-old female presenting with persistent right flank pain secondary to right sided staghorn calculus. Afebrile and vital signs are stable. Exam as above. She is in no acute distress. Repeat CT abdomen pelvis today demonstrates no change in cycling calculus, some inflammatory changes but no fever, leukocytosis, or sign of infection on UA today. No sign of infected stone or pyelonephritis. Evaluation otherwise unremarkable.     She was sent to ED for evaluation by urology and I consulted [facility], spoke with Urology PA for Dr. Kraig Hays. Urology recommends outpatient follow-up in their office, no need for admission for urologic procedure. Patient treated with pain medication in the ED, encouraged to follow-up with urology and pain management. Agreeable with plan as above. ED Course:   Initial assessment performed. The patients presenting problems have been discussed, and they are in agreement with the care plan formulated and outlined with them. I have encouraged them to ask questions as they arise throughout their visit. Discharge Note:  The patient has been re-evaluated and is ready for discharge. Reviewed available results with patient. Counseled patient on diagnosis and care plan. Patient has expressed understanding, and all questions have been answered. Patient agrees with plan and agrees to follow up as recommended, or to return to the ED if their symptoms worsen. Discharge instructions have been provided and explained to the patient, along with reasons to return to the ED. Disposition:  Discharge    DISCHARGE PLAN:  1. Discharge Medication List as of 2/15/2022  8:18 PM        2. Follow-up Information     Follow up With Specialties Details Why Contact Info    Gloria Wolff MD Urology Schedule an appointment as soon as possible for a visit   [address] [place] [address]  [phone]      [facility] EMERGENCY DEPT Emergency Medicine Go to  As needed, If symptoms worsen [address] [place] [address] Drive  [address] [facility]  [phone]        3. Return to ED if worse     Diagnosis     Clinical Impression:   1. Staghorn calculus    2. Right flank pain        Attestations:  I am the first and primary provider of record for this patient's ED encounter. I personally performed the services described above in this documentation. Poli Spangler MD    Please note that this dictation was completed with [employer], the [employer] voice recognition software.   Quite often unanticipated grammatical, syntax, homophones, and other interpretive errors are inadvertently transcribed by the computer software. Please disregard these errors. Please excuse any errors that have escaped final proofreading. Thank you.

## 2022-02-16 NOTE — ED NOTES
Bedside and Verbal shift change report given to Sisi Fabian  (oncoming nurse) by María Elena Ross RN  (offgoing nurse). Report included the following information SBAR, Encounter Summary. Riya Curtis

## 2022-03-14 ENCOUNTER — HOSPITAL ENCOUNTER (OUTPATIENT)
Dept: NUCLEAR MEDICINE | Age: 62
Discharge: HOME | End: 2022-03-14
Attending: STUDENT IN AN ORGANIZED HEALTH CARE EDUCATION/TRAINING PROGRAM

## 2022-03-14 NOTE — PROGRESS NOTES
Nuclear Medicine- Discussed MPI with . The patient is 5'2\" and 371lbs and SOB. Pt can't lie flat and hold arms over her head  for study.   Study cancelled

## 2022-03-18 PROBLEM — J40 BRONCHITIS: Status: ACTIVE | Noted: 2019-06-13

## 2022-03-18 PROBLEM — M54.9 INTRACTABLE BACK PAIN: Status: ACTIVE | Noted: 2021-11-19

## 2022-03-18 PROBLEM — J96.11 CHRONIC RESPIRATORY FAILURE WITH HYPOXIA (HCC): Status: ACTIVE | Noted: 2019-06-11

## 2022-03-18 PROBLEM — M25.561 RIGHT ANTERIOR KNEE PAIN: Status: ACTIVE | Noted: 2017-11-22

## 2022-03-18 PROBLEM — E66.2 OBESITY HYPOVENTILATION SYNDROME (HCC): Status: ACTIVE | Noted: 2019-06-11

## 2022-03-19 PROBLEM — M71.21: Status: ACTIVE | Noted: 2017-11-22

## 2022-03-19 PROBLEM — N13.2 URETERAL STONE WITH HYDRONEPHROSIS: Status: ACTIVE | Noted: 2018-12-31

## 2022-03-19 PROBLEM — M79.7 FIBROMYALGIA: Status: ACTIVE | Noted: 2019-06-13

## 2022-03-19 PROBLEM — M79.3 PANNICULITIS: Status: ACTIVE | Noted: 2017-11-21

## 2022-03-19 PROBLEM — J44.9 COPD (CHRONIC OBSTRUCTIVE PULMONARY DISEASE) (HCC): Status: ACTIVE | Noted: 2019-08-22

## 2022-03-19 PROBLEM — E66.01 OBESITY, MORBID (HCC): Status: ACTIVE | Noted: 2018-11-08

## 2022-03-20 PROBLEM — J44.1 COPD EXACERBATION (HCC): Status: ACTIVE | Noted: 2019-06-11

## 2022-04-07 ENCOUNTER — HOSPITAL ENCOUNTER (OUTPATIENT)
Dept: PREADMISSION TESTING | Age: 62
Discharge: HOME | End: 2022-04-07
Attending: STUDENT IN AN ORGANIZED HEALTH CARE EDUCATION/TRAINING PROGRAM
Payer: COMMERCIAL

## 2022-04-07 ENCOUNTER — HOSPITAL ENCOUNTER (OUTPATIENT)
Dept: GENERAL RADIOLOGY | Age: 62
Discharge: HOME | End: 2022-04-07
Attending: STUDENT IN AN ORGANIZED HEALTH CARE EDUCATION/TRAINING PROGRAM
Payer: COMMERCIAL

## 2022-04-07 VITALS
HEART RATE: 86 BPM | TEMPERATURE: 98.2 F | WEIGHT: 293 LBS | OXYGEN SATURATION: 98 % | RESPIRATION RATE: 22 BRPM | HEIGHT: 62 IN | BODY MASS INDEX: 53.92 KG/M2

## 2022-04-07 LAB
ALBUMIN SERPL-MCNC: 3.4 G/DL (ref 3.5–5)
ALBUMIN/GLOB SERPL: 0.8 {RATIO} (ref 1.1–2.2)
ALP SERPL-CCNC: 56 U/L (ref 45–117)
ALT SERPL-CCNC: 19 U/L (ref 12–78)
ANION GAP SERPL CALC-SCNC: 6 MMOL/L (ref 5–15)
APPEARANCE UR: CLEAR
AST SERPL-CCNC: 10 U/L (ref 15–37)
BACTERIA URNS QL MICRO: ABNORMAL /HPF
BILIRUB SERPL-MCNC: 0.2 MG/DL (ref 0.2–1)
BILIRUB UR QL: NEGATIVE
BUN SERPL-MCNC: 23 MG/DL (ref 6–20)
BUN/CREAT SERPL: 26 (ref 12–20)
CALCIUM SERPL-MCNC: 10.3 MG/DL (ref 8.5–10.1)
CHLORIDE SERPL-SCNC: 104 MMOL/L (ref 97–108)
CO2 SERPL-SCNC: 27 MMOL/L (ref 21–32)
COLOR UR: ABNORMAL
CREAT SERPL-MCNC: 0.87 MG/DL (ref 0.55–1.02)
EPITH CASTS URNS QL MICRO: ABNORMAL /LPF
ERYTHROCYTE [DISTWIDTH] IN BLOOD BY AUTOMATED COUNT: 22.4 % (ref 11.5–14.5)
GLOBULIN SER CALC-MCNC: 4.1 G/DL (ref 2–4)
GLUCOSE SERPL-MCNC: 92 MG/DL (ref 65–100)
GLUCOSE UR STRIP.AUTO-MCNC: NEGATIVE MG/DL
HCT VFR BLD AUTO: 39.1 % (ref 35–47)
HGB BLD-MCNC: 11.1 G/DL (ref 11.5–16)
HGB UR QL STRIP: ABNORMAL
KETONES UR QL STRIP.AUTO: NEGATIVE MG/DL
LEUKOCYTE ESTERASE UR QL STRIP.AUTO: ABNORMAL
MCH RBC QN AUTO: 23.1 PG (ref 26–34)
MCHC RBC AUTO-ENTMCNC: 28.4 G/DL (ref 30–36.5)
MCV RBC AUTO: 81.3 FL (ref 80–99)
MUCOUS THREADS URNS QL MICRO: ABNORMAL /LPF
NITRITE UR QL STRIP.AUTO: NEGATIVE
NRBC # BLD: 0 K/UL (ref 0–0.01)
NRBC BLD-RTO: 0 PER 100 WBC
PH UR STRIP: 6.5 [PH] (ref 5–8)
PLATELET # BLD AUTO: 201 K/UL (ref 150–400)
PMV BLD AUTO: 10.7 FL (ref 8.9–12.9)
POTASSIUM SERPL-SCNC: 4.1 MMOL/L (ref 3.5–5.1)
PROT SERPL-MCNC: 7.5 G/DL (ref 6.4–8.2)
PROT UR STRIP-MCNC: ABNORMAL MG/DL
RBC # BLD AUTO: 4.81 M/UL (ref 3.8–5.2)
RBC #/AREA URNS HPF: ABNORMAL /HPF (ref 0–5)
SODIUM SERPL-SCNC: 137 MMOL/L (ref 136–145)
SP GR UR REFRACTOMETRY: 1.03 (ref 1–1.03)
UA: UC IF INDICATED,UAUC: ABNORMAL
UROBILINOGEN UR QL STRIP.AUTO: 1 EU/DL (ref 0.2–1)
WBC # BLD AUTO: 8.7 K/UL (ref 3.6–11)
WBC URNS QL MICRO: ABNORMAL /HPF (ref 0–4)
YEAST URNS QL MICRO: PRESENT

## 2022-04-07 PROCEDURE — 81001 URINALYSIS AUTO W/SCOPE: CPT

## 2022-04-07 PROCEDURE — 36415 COLL VENOUS BLD VENIPUNCTURE: CPT

## 2022-04-07 PROCEDURE — 80053 COMPREHEN METABOLIC PANEL: CPT

## 2022-04-07 PROCEDURE — 71046 X-RAY EXAM CHEST 2 VIEWS: CPT

## 2022-04-07 PROCEDURE — 87086 URINE CULTURE/COLONY COUNT: CPT

## 2022-04-07 PROCEDURE — 85027 COMPLETE CBC AUTOMATED: CPT

## 2022-04-07 RX ORDER — SODIUM CHLORIDE, SODIUM LACTATE, POTASSIUM CHLORIDE, CALCIUM CHLORIDE 600; 310; 30; 20 MG/100ML; MG/100ML; MG/100ML; MG/100ML
25 INJECTION, SOLUTION INTRAVENOUS CONTINUOUS
Status: CANCELLED | OUTPATIENT
Start: 2022-04-13

## 2022-04-07 RX ORDER — QUETIAPINE FUMARATE 400 MG/1
600 TABLET, FILM COATED ORAL
COMMUNITY

## 2022-04-07 RX ORDER — MONTELUKAST SODIUM 10 MG/1
10 TABLET ORAL DAILY
COMMUNITY

## 2022-04-07 RX ORDER — POTASSIUM CHLORIDE 750 MG/1
10 TABLET, FILM COATED, EXTENDED RELEASE ORAL
COMMUNITY

## 2022-04-07 RX ORDER — LEVOTHYROXINE SODIUM 112 UG/1
112 TABLET ORAL
COMMUNITY

## 2022-04-07 RX ORDER — VALSARTAN 80 MG/1
80 TABLET ORAL DAILY
COMMUNITY

## 2022-04-07 RX ORDER — PRAZOSIN HYDROCHLORIDE 2 MG/1
4 CAPSULE ORAL
COMMUNITY

## 2022-04-07 RX ORDER — HYDROCODONE BITARTRATE AND ACETAMINOPHEN 5; 325 MG/1; MG/1
1 TABLET ORAL
COMMUNITY

## 2022-04-07 RX ORDER — METOPROLOL SUCCINATE 25 MG/1
25 TABLET, EXTENDED RELEASE ORAL DAILY
COMMUNITY

## 2022-04-07 RX ORDER — GABAPENTIN 100 MG/1
100 CAPSULE ORAL 3 TIMES DAILY
COMMUNITY

## 2022-04-07 NOTE — PERIOP NOTES
Faxed stop bang sleep apnea assessment eval. To Dr Don Reid office. Paper confirmation in patients chart.

## 2022-04-07 NOTE — PERIOP NOTES
[facility]  Preoperative Instructions        Surgery Date 04/13/22          Time of Arrival TBD    1. On the day of your surgery, please report to the Surgical Services Registration Desk and sign in at your designated time. The Surgery Center is located to the right of the Emergency Room. 2. You must have someone with you to drive you home. You should not drive a car for 24 hours following surgery. Please make arrangements for a friend or family member to stay with you for the first 24 hours after your surgery. 3. Do not have anything to eat or drink (including water, gum, mints, coffee, juice) after midnight ?04/12/22? Popeye Oscar ? This may not apply to medications prescribed by your physician. ?(Please note below the special instructions with medications to take the morning of your procedure.)    4. We recommend you do not drink any alcoholic beverages for 24 hours before and after your surgery. 5. Contact your surgeons office for instructions on the following medications: non-steroidal anti-inflammatory drugs (i.e. Advil, Aleve), vitamins, and supplements. (Some surgeons will want you to stop these medications prior to surgery and others may allow you to take them)  **If you are currently taking Plavix, Coumadin, Aspirin and/or other blood-thinning agents, contact your surgeon for instructions. ** Your surgeon will partner with the physician prescribing these medications to determine if it is safe to stop or if you need to continue taking. Please do not stop taking these medications without instructions from your surgeon    6. Wear comfortable clothes. Wear glasses instead of contacts. Do not bring any money or jewelry. Please bring picture ID, insurance card, and any prearranged co-payment or hospital payment. Do not wear make-up, particularly mascara the morning of your surgery. Do not wear nail polish, particularly if you are having foot /hand surgery.   Wear your hair loose or down, no ponytails, buns, micah pins or clips. All body piercings must be removed. Please shower with antibacterial soap for three consecutive days before and on the morning of surgery, but do not apply any lotions, powders or deodorants after the shower on the day of surgery. Please use a fresh towels after each shower. Please sleep in clean clothes and change bed linens the night before surgery. Please do not shave for 48 hours prior to surgery. Shaving of the face is acceptable. 7. You should understand that if you do not follow these instructions your surgery may be cancelled. If your physical condition changes (I.e. fever, cold or flu) please contact your surgeon as soon as possible. 8. It is important that you be on time. If a situation occurs where you may be late, please call [phone] (OR Holding Area). 9. If you have any questions and or problems, please call [phone] (Pre-admission Testing). 10. Your surgery time may be subject to change. You will receive a phone call the evening prior if your time changes. 11.  If having outpatient surgery, you must have someone to drive you here, stay with you during the duration of your stay, and to drive you home at time of discharge. Special Instructions: last day of aspirin is today. Bring inhaler with you to the hospital    TAKE ALL MEDICATIONS DAY OF SURGERY EXCEPT: Trenna Fontanez and metformin, no hydrocodone      I understand a pre-operative phone call will be made to verify my surgery time. In the event that I am not available, I give permission for a message to be left on my answering service and/or with another person?   Yes [phone]         ___________________      __________   _________    (Signature of Patient)             (Witness)                (Date and Time)

## 2022-04-07 NOTE — PERIOP NOTES
Had Clive Diehl NP, come to see patient about pts. C/o an area that she had breakdown history under her right side of pannus. The patient had applied 4 x 4 pads with paper tape over them and applied vasoline to the area. After removing dressing. No area of open breakdown but does have some yeast noted. Patient states that she also applies some cream also, but will have to call me and let me know the name of it. Also informed Alisha Carter of patients CRYSTAL score of 5 but pt stated that she was evaluated for sleep apnea 3 years ago and determined not to have it. Will send information on to her PCP to make him aware. LM for Dr Bessie Franz nurse about pt being 163.1 kg and was wondering if he wanted to change to ancef 3gm pre operatively. Phone and fax number provided.

## 2022-04-08 LAB
BACTERIA SPEC CULT: NORMAL
CC UR VC: NORMAL
SERVICE CMNT-IMP: NORMAL

## 2022-04-08 RX ORDER — CEFAZOLIN SODIUM 1 G/3ML
3 INJECTION, POWDER, FOR SOLUTION INTRAMUSCULAR; INTRAVENOUS ONCE
Status: CANCELLED | OUTPATIENT
Start: 2022-04-13 | End: 2022-04-13

## 2022-04-08 RX ORDER — TRIAMCINOLONE ACETONIDE 1 MG/G
OINTMENT TOPICAL DAILY
COMMUNITY

## 2022-04-12 NOTE — PERIOP NOTES
Called and left message with Sharl Nyhan in Dr. Ethel Henry office regarding increasing Ancef to 3gms. Patient weighs 163kg. She will call us back.

## 2022-04-13 ENCOUNTER — APPOINTMENT (OUTPATIENT)
Dept: GENERAL RADIOLOGY | Age: 62
End: 2022-04-13
Attending: STUDENT IN AN ORGANIZED HEALTH CARE EDUCATION/TRAINING PROGRAM
Payer: COMMERCIAL

## 2022-04-13 ENCOUNTER — ANESTHESIA (OUTPATIENT)
Dept: SURGERY | Age: 62
End: 2022-04-13
Payer: COMMERCIAL

## 2022-04-13 ENCOUNTER — HOSPITAL ENCOUNTER (OUTPATIENT)
Age: 62
Setting detail: OUTPATIENT SURGERY
Discharge: HOME | End: 2022-04-13
Attending: STUDENT IN AN ORGANIZED HEALTH CARE EDUCATION/TRAINING PROGRAM | Admitting: STUDENT IN AN ORGANIZED HEALTH CARE EDUCATION/TRAINING PROGRAM
Payer: COMMERCIAL

## 2022-04-13 ENCOUNTER — ANESTHESIA EVENT (OUTPATIENT)
Dept: SURGERY | Age: 62
End: 2022-04-13
Payer: COMMERCIAL

## 2022-04-13 VITALS
DIASTOLIC BLOOD PRESSURE: 68 MMHG | HEIGHT: 62 IN | TEMPERATURE: 98 F | OXYGEN SATURATION: 98 % | RESPIRATION RATE: 17 BRPM | WEIGHT: 293 LBS | SYSTOLIC BLOOD PRESSURE: 137 MMHG | BODY MASS INDEX: 53.92 KG/M2 | HEART RATE: 89 BPM

## 2022-04-13 LAB
GLUCOSE BLD STRIP.AUTO-MCNC: 107 MG/DL (ref 65–117)
GLUCOSE BLD STRIP.AUTO-MCNC: 98 MG/DL (ref 65–117)
SERVICE CMNT-IMP: NORMAL
SERVICE CMNT-IMP: NORMAL

## 2022-04-13 PROCEDURE — 77030008684 HC TU ET CUF COVD -B: Performed by: ANESTHESIOLOGY

## 2022-04-13 PROCEDURE — 2709999900 HC NON-CHARGEABLE SUPPLY: Performed by: STUDENT IN AN ORGANIZED HEALTH CARE EDUCATION/TRAINING PROGRAM

## 2022-04-13 PROCEDURE — 82962 GLUCOSE BLOOD TEST: CPT

## 2022-04-13 PROCEDURE — C1758 CATHETER, URETERAL: HCPCS | Performed by: STUDENT IN AN ORGANIZED HEALTH CARE EDUCATION/TRAINING PROGRAM

## 2022-04-13 PROCEDURE — 74011250636 HC RX REV CODE- 250/636: Performed by: ANESTHESIOLOGY

## 2022-04-13 PROCEDURE — 74011000250 HC RX REV CODE- 250: Performed by: NURSE ANESTHETIST, CERTIFIED REGISTERED

## 2022-04-13 PROCEDURE — 74011250636 HC RX REV CODE- 250/636: Performed by: STUDENT IN AN ORGANIZED HEALTH CARE EDUCATION/TRAINING PROGRAM

## 2022-04-13 PROCEDURE — 76210000004 HC OR PH I REC 4.5 TO 5 HR: Performed by: STUDENT IN AN ORGANIZED HEALTH CARE EDUCATION/TRAINING PROGRAM

## 2022-04-13 PROCEDURE — 76060000033 HC ANESTHESIA 1 TO 1.5 HR: Performed by: STUDENT IN AN ORGANIZED HEALTH CARE EDUCATION/TRAINING PROGRAM

## 2022-04-13 PROCEDURE — C2617 STENT, NON-COR, TEM W/O DEL: HCPCS | Performed by: STUDENT IN AN ORGANIZED HEALTH CARE EDUCATION/TRAINING PROGRAM

## 2022-04-13 PROCEDURE — 74011250636 HC RX REV CODE- 250/636: Performed by: NURSE ANESTHETIST, CERTIFIED REGISTERED

## 2022-04-13 PROCEDURE — 77030026438 HC STYL ET INTUB CARD -A: Performed by: ANESTHESIOLOGY

## 2022-04-13 PROCEDURE — 77030019948 HC FBR LSR HOLM DISP OCOA -E: Performed by: STUDENT IN AN ORGANIZED HEALTH CARE EDUCATION/TRAINING PROGRAM

## 2022-04-13 PROCEDURE — 76010000161 HC OR TIME 1 TO 1.5 HR INTENSV-TIER 1: Performed by: STUDENT IN AN ORGANIZED HEALTH CARE EDUCATION/TRAINING PROGRAM

## 2022-04-13 PROCEDURE — 74011000636 HC RX REV CODE- 636: Performed by: STUDENT IN AN ORGANIZED HEALTH CARE EDUCATION/TRAINING PROGRAM

## 2022-04-13 PROCEDURE — C1769 GUIDE WIRE: HCPCS | Performed by: STUDENT IN AN ORGANIZED HEALTH CARE EDUCATION/TRAINING PROGRAM

## 2022-04-13 PROCEDURE — 74011250637 HC RX REV CODE- 250/637: Performed by: STUDENT IN AN ORGANIZED HEALTH CARE EDUCATION/TRAINING PROGRAM

## 2022-04-13 PROCEDURE — 74420 UROGRAPHY RTRGR +-KUB: CPT

## 2022-04-13 PROCEDURE — C1894 INTRO/SHEATH, NON-LASER: HCPCS | Performed by: STUDENT IN AN ORGANIZED HEALTH CARE EDUCATION/TRAINING PROGRAM

## 2022-04-13 DEVICE — URETERAL STENT
Type: IMPLANTABLE DEVICE | Site: URETER | Status: FUNCTIONAL
Brand: POLARIS™ ULTRA

## 2022-04-13 RX ORDER — ONDANSETRON 2 MG/ML
4 INJECTION INTRAMUSCULAR; INTRAVENOUS AS NEEDED
Status: DISCONTINUED | OUTPATIENT
Start: 2022-04-13 | End: 2022-04-13 | Stop reason: HOSPADM

## 2022-04-13 RX ORDER — FENTANYL CITRATE 50 UG/ML
25 INJECTION, SOLUTION INTRAMUSCULAR; INTRAVENOUS
Status: COMPLETED | OUTPATIENT
Start: 2022-04-13 | End: 2022-04-13

## 2022-04-13 RX ORDER — SODIUM CHLORIDE, SODIUM LACTATE, POTASSIUM CHLORIDE, CALCIUM CHLORIDE 600; 310; 30; 20 MG/100ML; MG/100ML; MG/100ML; MG/100ML
25 INJECTION, SOLUTION INTRAVENOUS CONTINUOUS
Status: DISCONTINUED | OUTPATIENT
Start: 2022-04-13 | End: 2022-04-13 | Stop reason: HOSPADM

## 2022-04-13 RX ORDER — LIDOCAINE HYDROCHLORIDE 10 MG/ML
0.1 INJECTION, SOLUTION EPIDURAL; INFILTRATION; INTRACAUDAL; PERINEURAL AS NEEDED
Status: DISCONTINUED | OUTPATIENT
Start: 2022-04-13 | End: 2022-04-13 | Stop reason: HOSPADM

## 2022-04-13 RX ORDER — MIDAZOLAM HYDROCHLORIDE 1 MG/ML
1 INJECTION, SOLUTION INTRAMUSCULAR; INTRAVENOUS AS NEEDED
Status: DISCONTINUED | OUTPATIENT
Start: 2022-04-13 | End: 2022-04-13 | Stop reason: HOSPADM

## 2022-04-13 RX ORDER — DEXAMETHASONE SODIUM PHOSPHATE 4 MG/ML
INJECTION, SOLUTION INTRA-ARTICULAR; INTRALESIONAL; INTRAMUSCULAR; INTRAVENOUS; SOFT TISSUE AS NEEDED
Status: DISCONTINUED | OUTPATIENT
Start: 2022-04-13 | End: 2022-04-13 | Stop reason: HOSPADM

## 2022-04-13 RX ORDER — LIDOCAINE HYDROCHLORIDE 20 MG/ML
INJECTION, SOLUTION EPIDURAL; INFILTRATION; INTRACAUDAL; PERINEURAL AS NEEDED
Status: DISCONTINUED | OUTPATIENT
Start: 2022-04-13 | End: 2022-04-13 | Stop reason: HOSPADM

## 2022-04-13 RX ORDER — NEOSTIGMINE METHYLSULFATE 1 MG/ML
INJECTION, SOLUTION INTRAVENOUS AS NEEDED
Status: DISCONTINUED | OUTPATIENT
Start: 2022-04-13 | End: 2022-04-13 | Stop reason: HOSPADM

## 2022-04-13 RX ORDER — PROPOFOL 10 MG/ML
INJECTION, EMULSION INTRAVENOUS AS NEEDED
Status: DISCONTINUED | OUTPATIENT
Start: 2022-04-13 | End: 2022-04-13 | Stop reason: HOSPADM

## 2022-04-13 RX ORDER — ONDANSETRON 2 MG/ML
INJECTION INTRAMUSCULAR; INTRAVENOUS AS NEEDED
Status: DISCONTINUED | OUTPATIENT
Start: 2022-04-13 | End: 2022-04-13 | Stop reason: HOSPADM

## 2022-04-13 RX ORDER — ROCURONIUM BROMIDE 10 MG/ML
INJECTION, SOLUTION INTRAVENOUS AS NEEDED
Status: DISCONTINUED | OUTPATIENT
Start: 2022-04-13 | End: 2022-04-13 | Stop reason: HOSPADM

## 2022-04-13 RX ORDER — GLYCOPYRROLATE 0.2 MG/ML
INJECTION INTRAMUSCULAR; INTRAVENOUS AS NEEDED
Status: DISCONTINUED | OUTPATIENT
Start: 2022-04-13 | End: 2022-04-13 | Stop reason: HOSPADM

## 2022-04-13 RX ORDER — TAMSULOSIN HYDROCHLORIDE 0.4 MG/1
0.4 CAPSULE ORAL DAILY
Qty: 14 CAPSULE | Refills: 0 | Status: SHIPPED | OUTPATIENT
Start: 2022-04-13

## 2022-04-13 RX ORDER — DEXMEDETOMIDINE HYDROCHLORIDE 100 UG/ML
INJECTION, SOLUTION INTRAVENOUS AS NEEDED
Status: DISCONTINUED | OUTPATIENT
Start: 2022-04-13 | End: 2022-04-13 | Stop reason: HOSPADM

## 2022-04-13 RX ORDER — AMOXICILLIN AND CLAVULANATE POTASSIUM 875; 125 MG/1; MG/1
1 TABLET, FILM COATED ORAL EVERY 12 HOURS
Qty: 10 TABLET | Refills: 0 | Status: SHIPPED | OUTPATIENT
Start: 2022-04-13 | End: 2022-04-18

## 2022-04-13 RX ORDER — KETOROLAC TROMETHAMINE 10 MG/1
10 TABLET, FILM COATED ORAL
Qty: 10 TABLET | Refills: 0 | Status: SHIPPED | OUTPATIENT
Start: 2022-04-13

## 2022-04-13 RX ORDER — FENTANYL CITRATE 50 UG/ML
50 INJECTION, SOLUTION INTRAMUSCULAR; INTRAVENOUS AS NEEDED
Status: DISCONTINUED | OUTPATIENT
Start: 2022-04-13 | End: 2022-04-13 | Stop reason: HOSPADM

## 2022-04-13 RX ORDER — FENTANYL CITRATE 50 UG/ML
INJECTION, SOLUTION INTRAMUSCULAR; INTRAVENOUS AS NEEDED
Status: DISCONTINUED | OUTPATIENT
Start: 2022-04-13 | End: 2022-04-13 | Stop reason: HOSPADM

## 2022-04-13 RX ORDER — SUCCINYLCHOLINE CHLORIDE 20 MG/ML
INJECTION INTRAMUSCULAR; INTRAVENOUS AS NEEDED
Status: DISCONTINUED | OUTPATIENT
Start: 2022-04-13 | End: 2022-04-13 | Stop reason: HOSPADM

## 2022-04-13 RX ORDER — HYDROMORPHONE HYDROCHLORIDE 1 MG/ML
0.5 INJECTION, SOLUTION INTRAMUSCULAR; INTRAVENOUS; SUBCUTANEOUS
Status: COMPLETED | OUTPATIENT
Start: 2022-04-13 | End: 2022-04-13

## 2022-04-13 RX ADMIN — FENTANYL CITRATE 25 MCG: 50 INJECTION, SOLUTION INTRAMUSCULAR; INTRAVENOUS at 12:32

## 2022-04-13 RX ADMIN — Medication 3 AMPULE: at 09:17

## 2022-04-13 RX ADMIN — FENTANYL CITRATE 25 MCG: 50 INJECTION, SOLUTION INTRAMUSCULAR; INTRAVENOUS at 12:27

## 2022-04-13 RX ADMIN — LIDOCAINE HYDROCHLORIDE 40 MG: 20 INJECTION, SOLUTION EPIDURAL; INFILTRATION; INTRACAUDAL; PERINEURAL at 10:29

## 2022-04-13 RX ADMIN — FENTANYL CITRATE 50 MCG: 50 INJECTION, SOLUTION INTRAMUSCULAR; INTRAVENOUS at 10:44

## 2022-04-13 RX ADMIN — ROCURONIUM BROMIDE 20 MG: 10 INJECTION INTRAVENOUS at 10:35

## 2022-04-13 RX ADMIN — FENTANYL CITRATE 25 MCG: 50 INJECTION, SOLUTION INTRAMUSCULAR; INTRAVENOUS at 12:22

## 2022-04-13 RX ADMIN — PROPOFOL 50 MG: 10 INJECTION, EMULSION INTRAVENOUS at 10:55

## 2022-04-13 RX ADMIN — FENTANYL CITRATE 25 MCG: 50 INJECTION, SOLUTION INTRAMUSCULAR; INTRAVENOUS at 12:37

## 2022-04-13 RX ADMIN — Medication 3 G: at 10:34

## 2022-04-13 RX ADMIN — FENTANYL CITRATE 50 MCG: 50 INJECTION, SOLUTION INTRAMUSCULAR; INTRAVENOUS at 10:22

## 2022-04-13 RX ADMIN — ONDANSETRON HYDROCHLORIDE 4 MG: 2 INJECTION, SOLUTION INTRAMUSCULAR; INTRAVENOUS at 10:22

## 2022-04-13 RX ADMIN — HYDROMORPHONE HYDROCHLORIDE 0.5 MG: 1 INJECTION, SOLUTION INTRAMUSCULAR; INTRAVENOUS; SUBCUTANEOUS at 13:35

## 2022-04-13 RX ADMIN — HYDROMORPHONE HYDROCHLORIDE 0.5 MG: 1 INJECTION, SOLUTION INTRAMUSCULAR; INTRAVENOUS; SUBCUTANEOUS at 13:10

## 2022-04-13 RX ADMIN — DEXMEDETOMIDINE HYDROCHLORIDE 10 MCG: 100 INJECTION, SOLUTION, CONCENTRATE INTRAVENOUS at 10:22

## 2022-04-13 RX ADMIN — Medication 3 MG: at 11:30

## 2022-04-13 RX ADMIN — PROPOFOL 150 MG: 10 INJECTION, EMULSION INTRAVENOUS at 10:29

## 2022-04-13 RX ADMIN — SUCCINYLCHOLINE CHLORIDE 100 MG: 20 INJECTION, SOLUTION INTRAMUSCULAR; INTRAVENOUS at 10:29

## 2022-04-13 RX ADMIN — PROPOFOL 50 MG: 10 INJECTION, EMULSION INTRAVENOUS at 10:44

## 2022-04-13 RX ADMIN — HYDROMORPHONE HYDROCHLORIDE 0.5 MG: 1 INJECTION, SOLUTION INTRAMUSCULAR; INTRAVENOUS; SUBCUTANEOUS at 14:22

## 2022-04-13 RX ADMIN — FENTANYL CITRATE 25 MCG: 50 INJECTION, SOLUTION INTRAMUSCULAR; INTRAVENOUS at 11:50

## 2022-04-13 RX ADMIN — GLYCOPYRROLATE 0.1 MG: 0.2 INJECTION, SOLUTION INTRAMUSCULAR; INTRAVENOUS at 10:22

## 2022-04-13 RX ADMIN — HYDROMORPHONE HYDROCHLORIDE 0.5 MG: 1 INJECTION, SOLUTION INTRAMUSCULAR; INTRAVENOUS; SUBCUTANEOUS at 14:03

## 2022-04-13 RX ADMIN — SODIUM CHLORIDE, POTASSIUM CHLORIDE, SODIUM LACTATE AND CALCIUM CHLORIDE 25 ML/HR: 600; 310; 30; 20 INJECTION, SOLUTION INTRAVENOUS at 09:17

## 2022-04-13 RX ADMIN — FENTANYL CITRATE 25 MCG: 50 INJECTION, SOLUTION INTRAMUSCULAR; INTRAVENOUS at 12:05

## 2022-04-13 RX ADMIN — DEXAMETHASONE SODIUM PHOSPHATE 4 MG: 4 INJECTION, SOLUTION INTRAMUSCULAR; INTRAVENOUS at 10:40

## 2022-04-13 RX ADMIN — GLYCOPYRROLATE 0.4 MG: 0.2 INJECTION, SOLUTION INTRAMUSCULAR; INTRAVENOUS at 11:30

## 2022-04-13 RX ADMIN — FENTANYL CITRATE 25 MCG: 50 INJECTION, SOLUTION INTRAMUSCULAR; INTRAVENOUS at 12:52

## 2022-04-13 RX ADMIN — FENTANYL CITRATE 25 MCG: 50 INJECTION, SOLUTION INTRAMUSCULAR; INTRAVENOUS at 12:11

## 2022-04-13 RX ADMIN — DEXMEDETOMIDINE HYDROCHLORIDE 10 MCG: 100 INJECTION, SOLUTION, CONCENTRATE INTRAVENOUS at 10:35

## 2022-04-13 NOTE — PERIOP NOTES
[address] [facility],4Th Floor from Operating Room to PACU    Report received from [address] and DANY Carpenter CRNA regarding Becky Ready. Surgeon(s):  Alee Rothman MD  And Procedure(s) (LRB):  RIGHT CYSTOSCOPY WITH RETROGRADE URETEROSCOPY WITH QUANTA LASER AND RIGHT STENT PLACEMENT (Right)  confirmed   with allergies discussed. Anesthesia type, drugs, patient history, complications, estimated blood loss, vital signs, intake and output, and last pain medication, lines, reversal medications and temperature were reviewed. 1433 LVM for Dr. Tariq Lambert re: pain control, possible admission? 1440 Updated pt's , Jamee Davison, regarding pt's status, pain control. Adar.Rob Jones MD via answering service. [address] with Dr. Tariq Lambert regarding pain control & pt wanting to be admitted. Per MD he will send an additional script for pain medication to pt's pharmacy & given pt's stability including vital signs he does not see the need to admit pt. Explained to pt & pt's , both in agreement. 1630 Discharge instructions provided to pt's , Jamee Davison. Opportunity for questions/clarifications provided. Pt's d/c'd via WC in NAD, PIV removed, all belongings accounted for.

## 2022-04-13 NOTE — ANESTHESIA PREPROCEDURE EVALUATION
Relevant Problems   RESPIRATORY SYSTEM   (+) COPD (chronic obstructive pulmonary disease) (HCC)   (+) COPD exacerbation (HCC)   (+) DIOP (dyspnea on exertion)   (+) SOB (shortness of breath)      CARDIOVASCULAR   (+) Hypertension      RENAL FAILURE   (+) Ureteral stone with hydronephrosis      ENDOCRINE   (+) Diabetes (HCC)   (+) Obesity, morbid (HCC)       Anesthetic History   No history of anesthetic complications            Review of Systems / Medical History  Patient summary reviewed, nursing notes reviewed and pertinent labs reviewed    Pulmonary    COPD: moderate    Sleep apnea  Shortness of breath         Neuro/Psych     seizures    Psychiatric history     Cardiovascular    Hypertension        Dysrhythmias       Exercise tolerance: >4 METS     GI/Hepatic/Renal     GERD      Liver disease     Endo/Other    Diabetes: type 2  Hypothyroidism  Morbid obesity and arthritis     Other Findings   Comments: Kidney stone  Diabetes ([address]  Hypothyroidism  Hypertension  GERD (gastroesophageal reflux disease)  Liver disease  Chronic pain  Arrhythmia  Arthritis  Obesity, morbid, BMI 50 or higher (HCC)  Anxiety and depression  Adverse effect of anesthesia  Seizures (HCC)  Chronic obstructive pulmonary disease            Physical Exam    Airway  Mallampati: III  TM Distance: 4 - 6 cm  Neck ROM: normal range of motion   Mouth opening: Normal     Cardiovascular  Regular rate and rhythm,  S1 and S2 normal,  no murmur, click, rub, or gallop  Rhythm: regular  Rate: normal         Dental  No notable dental hx       Pulmonary  Breath sounds clear to auscultation               Abdominal  GI exam deferred       Other Findings            Anesthetic Plan    ASA: 3  Anesthesia type: general    Monitoring Plan: BIS      Induction: Intravenous  Anesthetic plan and risks discussed with: Patient

## 2022-04-13 NOTE — H&P
Surgery History and Physcial    Subjective:      Vanna Correa is a 58 y.o. female with a history of large R renal stone and recurrent UTI who presents for R ureteroscopic stone manipulation. OV 12/2021:  Annie Martinez is a 64year old female who presents today for \"kidney stone, incomlete bladder emptying\". She returns for follow-up. She regularly follows with urogynecology for a history of recurrent UTI. Also with a history of kidney stones, right CRULS 1/2019. She was seen in consultation on 11/25/2021 at [facility] when she was admitted for back pain, acute on chronic. MRI showed severe spinal stenosis. A Magaña was placed for incomplete bladder emptying, 600 cc. CT showed a partial right lower pole staghorn calculus. I personally reviewed images, Hounsfield units around 700. Stone branching into lower and interpolar calyces. She feels she is voiding okay today but has some intermittency and some mild dysuria, wonders if she still has UTI. Urinalysis does have persistent bacteriuria. Denies lateralizing flank pain but does have chronic back pain. Her bigger issue is respiratory-she has COPD and was recently started on supplemental oxygen and is on 2 L nasal cannula. she is also on a course of oral antibiotics for Bartholin's gland cyst that was diagnosed during admission. EXAMINATION: Appearance: NAD, in wheelchair Respiratory Effort: SOB at rest Abdomen/Flank: Severe truncal obesity       IMPRESSION:    1. RECURRENT UTI (XJH78-G12.0) - Deteriorated: 500 mg IM Rocephin given today. Urine be sent for culture. No empiric antibiotics, but treat as warranted. She will also need prophylactic coverage prior to ureteroscopy. 2. RENAL CALCULUS (EPA31-D35.0) - Deteriorated: Large branching staghorn into the right lower pole approximately 3 cm. Density about 700 Hounsfield units. Discussed that stone would not pass spontaneously and is likely contributing to recurrent UTI among other factors. Her health state and obesity, pulmonary status makes her a poor candidate for PCNL. We discussed staged ureteroscopy as recommended therapy. Understands that multiple sessions will likely be required with ureteral stent. The expected course, risks, benefits, alternatives, and complications of Ureteroscopy with laser lithotripsy possible ureteral stent were discussed. Possible complications of bleeding, infection, ureteral stricture, ureteral damage, incomplete removal of stones, inability to reach the stone, stent related colic, hematuria, dysuria, irritative voiding symptoms, pain, and need for secondary procedures were outlined and accepted. We will schedule for right CRULS with me at hospital, 90 minutes, Quanta laser, pulmonary referral and clearance prior to surgery. 3. ASTHMA WITH CHRONIC OBSTRUCTIVE PULMONARY DISEASE (COPD) (CDP68-Z65.9) - New: On supplemental oxygen. Increased surgical risk.      Patient Active Problem List    Diagnosis Date Noted    Intractable back pain 11/19/2021    COPD (chronic obstructive pulmonary disease) ([address] 08/22/2019    Fibromyalgia 06/13/2019    Bronchitis 06/13/2019    COPD exacerbation ([address] 06/11/2019    Chronic respiratory failure with hypoxia ([address] 06/11/2019    Obesity hypoventilation syndrome ([address] 06/11/2019    Ureteral stone with hydronephrosis 12/31/2018    Obesity, morbid ([address] 11/08/2018    Right anterior knee pain 11/22/2017    Cyst, baker's knee, right 11/22/2017    Panniculitis 11/21/2017    Normal coronary arteries 07/30/2014    Diabetes ([address]     Hypertension     SOB (shortness of breath) 07/16/2014    DIOP (dyspnea on exertion) 07/16/2014     Past Medical History:   Diagnosis Date    Adverse effect of anesthesia     slow to wake up       Anxiety and depression     Arrhythmia     Arthritis     Chronic obstructive pulmonary disease (HCC)     Chronic pain     djd    Diabetes ([address]     GERD (gastroesophageal reflux disease)     Hypertension     Hypothyroidism     Kidney stone     Liver disease     Obesity, morbid, BMI 50 or higher [facility]     Seizures ([facility]     last one 20 years ago approx. unknown etiology      Past Surgical History:   Procedure Laterality Date    CARDIAC CATHETERIZATION  11/18/2010    WNL per pt.  HX APPENDECTOMY      HX BREAST BIOPSY Right     about 15 years ago, neg    HX CHOLECYSTECTOMY      HX HYSTERECTOMY      HX TUBAL LIGATION        Social History     Tobacco Use    Smoking status: Former Smoker    Smokeless tobacco: Never Used    Tobacco comment: quit smoking  about 34  years ago      Substance Use Topics    Alcohol use: No      Family History   Problem Relation Age of Onset    Heart Disease Mother     Hypertension Mother     Cancer Mother     Breast Cancer Mother     Heart Disease Father     Hypertension Father     Cancer Brother       Prior to Admission medications    Medication Sig Start Date End Date Taking? Authorizing Provider   triamcinolone acetonide (KENALOG) 0.1 % ointment Apply  to affected area daily. use thin layer and apply to affected area (pannus)    Provider, Historical   gabapentin (NEURONTIN) 100 mg capsule Take 100 mg by mouth three (3) times daily. Provider, Historical   valsartan (DIOVAN) 80 mg tablet Take 80 mg by mouth daily. Provider, Historical   metoprolol succinate (TOPROL-XL) 25 mg XL tablet Take 25 mg by mouth daily. Provider, Historical   prazosin (Minipress) 2 mg capsule Take 4 mg by mouth nightly. Provider, Historical   HYDROcodone-acetaminophen (NORCO) 5-325 mg per tablet Take 1 Tablet by mouth every six (6) hours as needed for Pain. Provider, Historical   QUEtiapine (SEROqueL) 400 mg tablet Take 600 mg by mouth nightly. Provider, Historical   potassium chloride SR (KLOR-CON 10) 10 mEq tablet Take 10 mEq by mouth nightly. Provider, Historical   montelukast (Singulair) 10 mg tablet Take 10 mg by mouth daily.     Provider, Historical levothyroxine (SYNTHROID) 112 mcg tablet Take 112 mcg by mouth Daily (before breakfast). Provider, Historical   albuterol-ipratropium (DUO-NEB) 2.5 mg-0.5 mg/3 ml nebu 3 mL by Nebulization route every four (4) hours as needed (COPD). 8/22/19   Kedar Ho NP   fluticasone-umeclidinium-vilanterol (TRELEGY ELLIPTA) 100-62.5-25 mcg inhaler Take 1 Puff by inhalation daily. Provider, Historical   metFORMIN (GLUCOPHAGE) 1,000 mg tablet Take 1,000 mg by mouth two (2) times a day. Provider, Historical   fluticasone propionate (FLONASE) 50 mcg/actuation nasal spray 2 Sprays by Both Nostrils route daily. Provider, Historical   liraglutide (VICTOZA) 0.6 mg/0.1 mL (18 mg/3 mL) pnij 1.8 mg by SubCUTAneous route nightly. Provider, Historical   busPIRone (BUSPAR) 15 mg tablet Take 15 mg by mouth three (3) times daily. Provider, Historical   albuterol (PROVENTIL HFA, VENTOLIN HFA, PROAIR HFA) 90 mcg/actuation inhaler Take 2 Puffs by inhalation every four (4) hours as needed for Wheezing. 12/26/18   Reyna Black MD   buPROPion XL (WELLBUTRIN XL) 150 mg tablet Take 300 mg by mouth nightly. Dr. Tracy Metcalf    Provider, Historical   ergocalciferol (VITAMIN D2) 50,000 unit capsule Take 50,000 Units by mouth every seven (7) days. Each Sunday    Provider, Historical   estradiol (ESTRACE) 0.5 mg tablet Take 0.5 mg by mouth daily. Provider, Historical   aspirin 81 mg chewable tablet Take 81 mg by mouth daily. Bindu Chen NP   omeprazole (PRILOSEC) 20 mg capsule Take 40 mg by mouth daily. Provider, Historical   topiramate (TOPAMAX) 200 mg tablet Take 200 mg by mouth nightly. Dr. Tracy Metcalf - Psychiatry 12/8/10   Provider, Historical   simvastatin (ZOCOR) 20 mg tablet Take 20 mg by mouth nightly. Other, MD Ashish     Allergies   Allergen Reactions    Celebrex [Celecoxib] Nausea Only    Ibuprofen Other (comments)     Does not want r/t COPD history.      Methocarbamol Shortness of Breath    Morphine Nausea Only    Sulfa (Sulfonamide Antibiotics) Nausea Only    Tramadol Other (comments)     Does not want r/t COPD    Adhesive Other (comments)     Red and bumpy      Codeine Other (comments)     Hyper activity         Review of Systems     Objective: There were no vitals taken for this visit. Physical Exam    Imaging:  images and reports reviewed    Lab Review:  No results found for this or any previous visit (from the past 24 hour(s)). Assessment:     R renal stone    Plan:     1. I recommend proceeding with R ureteroscopic stone manipulation    2. Discussed aspects of surgical intervention, methods, risks including by not limited to infection, bleeding,damage to adjacent structures, need for further procedures, and the risks of general anesthetic. The patient understands the risks; any and all questions were answered to the patient's satisfaction.

## 2022-04-13 NOTE — OP NOTES
DATE OF PROCEDURE: 4/13/2022    SURGEON: Juancarlos Leyva MD    ASSISTANT: none    PREOPERATIVE DIAGNOSES:   1. Right renal staghorn calculus      POSTOPERATIVE DIAGNOSES:   Same    PROCEDURES PERFORMED:   1. Cystourethroscopy  2. Right ureteroscopic stone manipulation with laser lithotripsy  3. Right retrograde pyelogram  4. Right ureteral stent placement-6 x 24 double-J with string  5. Intraoperative fluoroscopy interpretation less than 1 hour    PERIOPERATIVE ANTIBIOTICS: Ancef    ANAESTHESIA: General    INDICATIONS:This patient has a history of a branching 3 cm lower pole staghorn stone and back pain. . After discussion of management options the patient elected to proceed with the procedures listed above. PROCEDURE NARRATIVE: The patient signed consent for the operation prior to being brought back to the operating room. Upon arrival on the operating room, a time out was performed for the patient's safety and the correct patient, procedure, site and side were identified. The patient was placed in a supine position and anesthesia was administered. The patient was placed in a dorsal lithotomy position. All pressure points were appropriately padded in order to prevent compartment syndrome and neuropathy. The patient was prepped and draped in the usual sterile fashion. Cystourethroscopy was performed with a 22 Romanian sheath and 30 degree lens. The urethra bladder appeared normal.  Ureteral orifices were orthotopic and effluxing clear urine. Ultra track wire was advanced into the right ureteral orifice and confirmed the right renal pelvis. Stone was faintly visible on fluoroscopy overlying the lower pole of the left renal shadow. The wire was secured to the drape. Semirigid ureteroscope was advanced alongside the wire. The ureter appeared normal with no stones or lesions. Scope was advanced to level the renal pelvis where the stone was partially visualized.   I used a 200 µm high-power holmium laser fiber to fragment the visible portions of the stone which was fairly soft. After treating all stone that I could see with the rigid scope, I exchanged for a flexible scope. 11/13, 36 cm ureteral access sheath was placed over the working wire and advanced easily to the level of the renal pelvis. Flexible ureteroscope was assembled. The remaining lower pole branch of the stone was identified. Again using the high-power laser, the remainder of the stone was fragmented to dust and small pieces. Extensive popcorn effect was used to fragment the soft stone into passable dust.  Pressure irrigation of the lower pole calyces was used to evacuate the dust and confirm treatment of all stone fragments. No fragments remained larger than 1 mm. I then performed a retrograde pyelogram through the scope with normal filling of the pelvicalyceal system and no hydronephrosis noted. Systematic survey of all calyces was then performed. There was abundant dust throughout the renal pelvis and multiple calyces but no sizable fragments remained. The scope and ureteral access sheath were backed out. Again there was dust in the ureter but no evidence of large stone fragment or ureteral injury. Under direct vision fluoroscopic guidance, I placed a 6 x 24 double-J stent with good proximal and distal curl seen. The string was trimmed 15 cm from the meatus and tucked in the vagina. This terminated the procedure. INTRAOPERATIVE FINDINGS/ SYNOPSIS:   1.  Large 3 cm branching staghorn calculus in the lower pole was fragmented entirely to dust.  Stone was quite soft. BLOOD LOSS: None    DRAINS: 6 x 24 double-J stent on right    SPECIMENS:   None  COMPLICATIONS: None    DISPOSITION: The patient will be taken to the PACU for recovery. She will be discharged home. She will follow-up in 1 week with [facility].   We will discuss the recommendation of discontinuing Topamax as it relates to her recurrent renal stones.

## 2022-04-13 NOTE — ANESTHESIA POSTPROCEDURE EVALUATION
Procedure(s):  RIGHT CYSTOSCOPY WITH RETROGRADE URETEROSCOPY WITH QUANTA LASER AND RIGHT STENT PLACEMENT. general    Anesthesia Post Evaluation      Multimodal analgesia: multimodal analgesia used between 6 hours prior to anesthesia start to PACU discharge  Patient location during evaluation: PACU  Patient participation: complete - patient participated  Level of consciousness: sleepy but conscious and responsive to verbal stimuli  Pain score: 4  Pain management: adequate  Airway patency: patent  Anesthetic complications: no  Cardiovascular status: acceptable  Respiratory status: acceptable  Hydration status: acceptable  Comments: +Post-Anesthesia Evaluation and Assessment    Patient: Michelle Bar MRN: [id number]  SSN: [id number]   YOB: 1960  Age: 58 y.o. Sex: female      Cardiovascular Function/Vital Signs    BP (!) 142/78   Pulse 71   Temp 36.7 °C (98 °F)   Resp 14   Ht 5' 2\" (1.575 m)   Wt 155.4 kg (342 lb 8.5 oz)   SpO2 95%   BMI 62.65 kg/m²     Patient is status post Procedure(s):  RIGHT CYSTOSCOPY WITH RETROGRADE URETEROSCOPY WITH QUANTA LASER AND RIGHT STENT PLACEMENT. Nausea/Vomiting: Controlled. Postoperative hydration reviewed and adequate. Pain:  Pain Scale 1: Numeric (0 - 10) (04/13/22 1237)  Pain Intensity 1: 9 (04/13/22 1237)   Managed. Neurological Status:   Neuro (WDL): Exceptions to WDL (04/13/22 0845)   At baseline. Mental Status and Level of Consciousness: Arousable. Pulmonary Status:   O2 Device: Nasal cannula (04/13/22 1147)   Adequate oxygenation and airway patent. Complications related to anesthesia: None    Post-anesthesia assessment completed. No concerns.     Signed By: Sonu Mckeon MD    4/13/2022  Post anesthesia nausea and vomiting:  controlled  Final Post Anesthesia Temperature Assessment:  Normothermia (36.0-37.5 degrees C)      INITIAL Post-op Vital signs:   Vitals Value Taken Time   /73 04/13/22 1315   Temp 36.7 °C (98 °F) 04/13/22 1147   Pulse 70 04/13/22 1316   Resp 14 04/13/22 1316   SpO2 98 % 04/13/22 1316   Vitals shown include unvalidated device data.

## 2022-04-13 NOTE — DISCHARGE INSTRUCTIONS
Patient Education        Ureteral Stent Placement: What to Expect at Home  Your Recovery     A ureteral (say \"you-REE-ter-ul\") stent is a thin, hollow tube that is placed in the ureter to help urine pass from the kidney into the bladder. Ureters are the tubes that connect the kidneys to the bladder. You may have a small amount of blood in your urine for 1 to 3 days after the procedure. While the stent is in place, you may have to urinate more often, feel a sudden need to urinate, or feel like you can't completely empty your bladder. You may feel some pain when you urinate or do strenuous activity. You also may notice a small amount of blood in your urine after strenuous activities. These side effects usually don't prevent people from doing their normal daily activities. You may have a thin string coming out of your urethra. Your urethra is the tube that carries urine from your bladder to outside your body. This string is attached to the stent. Try not to pull on the string. It will be used to pull out the stent when you no longer need it. After the procedure, urine may flow better from your kidneys to your bladder. A ureteral stent may be left in place for several days or for as long as several months. Your doctor will take it out when you no longer need it. Or, in some cases, it may be taken out at home. This care sheet gives you a general idea about how long it will take for you to recover. But each person recovers at a different pace. Follow the steps below to get better as quickly as possible. How can you care for yourself at home? Activity    · Rest when you feel tired. Getting enough sleep will help you recover.     · Avoid strenuous activities, such as bicycle riding, jogging, weight lifting, or aerobic exercise, until your doctor says it is okay.     · Ask your doctor when you can drive again.     · Most people are able to return to work the day after the procedure.  If your work requires intense activity, you may feel pain in your kidney area or get tired easily. If this happens, you may need to do less strenuous activities while the stent is in.     · Ask your doctor when it is okay for you to have sex. Diet    · You can eat your normal diet. If your stomach is upset, try bland, low-fat foods like plain rice, broiled chicken, toast, and yogurt.     · Drink plenty of fluids (unless your doctor tells you not to). Medicines    · Your doctor will tell you if and when you can restart your medicines. You will also get instructions about taking any new medicines.     · If you take aspirin or some other blood thinner, ask your doctor if and when to start taking it again. Make sure that you understand exactly what your doctor wants you to do.     · Be safe with medicines. Take pain medicines exactly as directed. ? If the doctor gave you a prescription medicine for pain, take it as prescribed. ? If you are not taking a prescription pain medicine, ask your doctor if you can take an over-the-counter medicine.     · If you think your pain medicine is making you sick to your stomach:  ? Take your medicine after meals (unless your doctor has told you not to). ? Ask your doctor for a different pain medicine.     · If your doctor prescribed antibiotics, take them as directed. Do not stop taking them just because you feel better. You need to take the full course of antibiotics. Follow-up care is a key part of your treatment and safety. Be sure to make and go to all appointments, and call your doctor if you are having problems. It's also a good idea to know your test results and keep a list of the medicines you take. When should you call for help? Call 911 anytime you think you may need emergency care.  For example, call if:    · You passed out (lost consciousness).     · You have severe trouble breathing.     · You have sudden chest pain and shortness of breath, or you cough up blood.     · You have severe belly pain.   Call your doctor now or seek immediate medical care if:    · Part or all of the stent comes out of your urethra.     · You have pain that does not get better after you take pain medicine.     · You have symptoms of a urinary infection. For example:  ? You have blood or pus in your urine. ? You have pain in your back just below your rib cage. This is called flank pain. ? You have a fever, chills, or body aches. ? It hurts to urinate. ? You have groin or belly pain.     · You cannot control when you urinate, or you leak urine. Watch closely for changes in your health, and be sure to contact your doctor if you have any problems. Where can you learn more? Go to http://www.gray.com/  Enter B869 in the search box to learn more about \"Ureteral Stent Placement: What to Expect at Home. \"  Current as of: October 18, 2021               Content Version: 13.2  © 2006-2022 [employer]. Care instructions adapted under license by [employer] (which disclaims liability or warranty for this information). If you have questions about a medical condition or this instruction, always ask your healthcare professional. [place] [address] any warranty or liability for your use of this information. DISCHARGE SUMMARY from Nurse    PATIENT INSTRUCTIONS:    After general anesthesia or intravenous sedation, for 24 hours or while taking prescription narcotics:  · Limit your activities  · Do not drive and operate hazardous machinery  · Do not make important personal or business decisions  · Do not drink alcoholic beverages  · If you have not urinated within 8 hours after discharge, please contact your surgeon on call.     Report the following to your surgeon:  · Excessive pain, swelling, redness or odor of or around the surgical area  · Temperature over 100.5  · Nausea and vomiting lasting longer than 4 hours or if unable to take medications  · Any signs of decreased circulation or nerve impairment to extremity: change in color, persistent numbness, tingling, coldness or increase pain  · Any questions    These are general instructions for a healthy lifestyle (if applicable): No smoking/ No tobacco products/ Avoid exposure to secondhand smoke  Surgeon General's Warning:  Quitting smoking now greatly reduces serious risk to your health. Obesity, smoking, and sedentary lifestyle greatly increases your risk for illness    A healthy diet, regular physical exercise & weight monitoring are important for maintaining a healthy lifestyle    You may be retaining fluid if you have a history of heart failure or if you experience any of the following symptoms:  Weight gain of 3 pounds or more overnight or 5 pounds in a week, increased swelling in our hands or feet or shortness of breath while lying flat in bed. Please call your doctor as soon as you notice any of these symptoms; do not wait until your next office visit. A common side effect of anesthesia following surgery is nausea and/or vomiting. In order to decrease symptoms, it is wise to avoid foods that are high in fat, greasy foods, milk products, and spicy foods for the first 24 hours. Acceptable foods for the first 24 hours following surgery include but are not limited to:    · soup  · broth  ·  toast   · crackers   · applesauce  ·  bananas   · mashed potatoes,  · soft or scrambled eggs  · oatmeal  ·  jello    It is important to eat when taking your pain medication. This will help to prevent nausea. If possible, please try to time your meals with your medications. It is very important to stay hydrated following surgery. Sip fluids frequently while awake. Avoid acidic drinks such as citrus juices and soda for 24 hours. Carbonated beverages may cause bloating and gas.  Acceptable fluids include:    · water (flavor packets may add variety)  · coffee or tea (in moderation)  · Gatorade  · Syed-Aid  · apple juice  · cranberry juice    You are encouraged to cough and deep breathe every hour when awake. This will help to prevent respiratory complications following anesthesia. You may want to hug a pillow when coughing and sneezing to add additional support to the surgical area and to decrease discomfort if you had abdominal or chest surgery. If you are discharged home with support stockings, you may remove them after 24 hours. Support stockings are used to help prevent blood clots in the legs following surgery. TO PREVENT AN INFECTION      1. [address] YOUR HANDS     To prevent infection, good handwashing is the most important thing you or your caregiver can do.  Wash your hands with soap and water or use the hand  we gave you before you touch any wounds. 2. SHOWER     Use the antibacterial soap we gave you when you take a shower.  Shower with this soap until your wounds are healed.  To reach all areas of your body, you may need someone to help you.  Dont forget to clean your belly button with every shower. 3.  USE CLEAN SHEETS     Use freshly cleaned sheets on your bed after surgery.  To keep the surgery site clean, do not allow pets to sleep with you while your wound is still healing. 4. STOP SMOKING     Stop smoking, or at least cut back on smoking     Smoking slows your healing. 5.  CONTROL YOUR BLOOD SUGAR     High blood sugars slow wound healing.  If you are diabetic, control your blood sugar levels before and after your surgery. Please take time to review all of your Home Care Instructions and Medication Information sheets provided in your discharge packet. If you have any questions, please contact your surgeon's office. Thank you. The discharge information has been reviewed with the patient and instruction recipient. The patient and instruction recipient verbalized understanding.   Discharge medications reviewed with the patient and instruction recipient and appropriate educational materials and side effects teaching were provided.

## 2022-09-06 ENCOUNTER — APPOINTMENT (OUTPATIENT)
Dept: CT IMAGING | Age: 62
End: 2022-09-06
Attending: STUDENT IN AN ORGANIZED HEALTH CARE EDUCATION/TRAINING PROGRAM
Payer: COMMERCIAL

## 2022-09-06 ENCOUNTER — HOSPITAL ENCOUNTER (EMERGENCY)
Age: 62
Discharge: HOME | End: 2022-09-06
Attending: STUDENT IN AN ORGANIZED HEALTH CARE EDUCATION/TRAINING PROGRAM
Payer: COMMERCIAL

## 2022-09-06 VITALS
OXYGEN SATURATION: 98 % | HEIGHT: 62 IN | WEIGHT: 293 LBS | DIASTOLIC BLOOD PRESSURE: 66 MMHG | BODY MASS INDEX: 53.92 KG/M2 | HEART RATE: 88 BPM | RESPIRATION RATE: 18 BRPM | SYSTOLIC BLOOD PRESSURE: 117 MMHG | TEMPERATURE: 98.4 F

## 2022-09-06 DIAGNOSIS — D35.02 ADENOMA OF LEFT ADRENAL GLAND: ICD-10-CM

## 2022-09-06 DIAGNOSIS — K57.90 DIVERTICULOSIS: ICD-10-CM

## 2022-09-06 DIAGNOSIS — N13.30 HYDRONEPHROSIS, UNSPECIFIED HYDRONEPHROSIS TYPE: ICD-10-CM

## 2022-09-06 DIAGNOSIS — N20.0 NEPHROLITHIASIS: Primary | ICD-10-CM

## 2022-09-06 LAB
ALBUMIN SERPL-MCNC: 3.1 G/DL (ref 3.5–5)
ALBUMIN/GLOB SERPL: 0.9 {RATIO} (ref 1.1–2.2)
ALP SERPL-CCNC: 62 U/L (ref 45–117)
ALT SERPL-CCNC: 20 U/L (ref 12–78)
ANION GAP SERPL CALC-SCNC: 4 MMOL/L (ref 5–15)
APPEARANCE UR: CLEAR
AST SERPL-CCNC: 16 U/L (ref 15–37)
BACTERIA URNS QL MICRO: NEGATIVE /HPF
BASOPHILS # BLD: 0.1 K/UL (ref 0–0.1)
BASOPHILS NFR BLD: 1 % (ref 0–1)
BILIRUB SERPL-MCNC: 0.3 MG/DL (ref 0.2–1)
BILIRUB UR QL: NEGATIVE
BUN SERPL-MCNC: 17 MG/DL (ref 6–20)
BUN/CREAT SERPL: 19 (ref 12–20)
CALCIUM SERPL-MCNC: 9.9 MG/DL (ref 8.5–10.1)
CHLORIDE SERPL-SCNC: 111 MMOL/L (ref 97–108)
CO2 SERPL-SCNC: 26 MMOL/L (ref 21–32)
COLOR UR: NORMAL
CREAT SERPL-MCNC: 0.89 MG/DL (ref 0.55–1.02)
DIFFERENTIAL METHOD BLD: ABNORMAL
EOSINOPHIL # BLD: 0.2 K/UL (ref 0–0.4)
EOSINOPHIL NFR BLD: 3 % (ref 0–7)
EPITH CASTS URNS QL MICRO: NORMAL /LPF
ERYTHROCYTE [DISTWIDTH] IN BLOOD BY AUTOMATED COUNT: 15.6 % (ref 11.5–14.5)
GLOBULIN SER CALC-MCNC: 3.4 G/DL (ref 2–4)
GLUCOSE SERPL-MCNC: 128 MG/DL (ref 65–100)
GLUCOSE UR STRIP.AUTO-MCNC: NEGATIVE MG/DL
HCT VFR BLD AUTO: 37.3 % (ref 35–47)
HGB BLD-MCNC: 10.9 G/DL (ref 11.5–16)
HGB UR QL STRIP: NEGATIVE
HYALINE CASTS URNS QL MICRO: NORMAL /LPF (ref 0–2)
IMM GRANULOCYTES # BLD AUTO: 0.1 K/UL (ref 0–0.04)
IMM GRANULOCYTES NFR BLD AUTO: 1 % (ref 0–0.5)
KETONES UR QL STRIP.AUTO: NEGATIVE MG/DL
LEUKOCYTE ESTERASE UR QL STRIP.AUTO: NEGATIVE
LYMPHOCYTES # BLD: 1.6 K/UL (ref 0.8–3.5)
LYMPHOCYTES NFR BLD: 22 % (ref 12–49)
MCH RBC QN AUTO: 25.3 PG (ref 26–34)
MCHC RBC AUTO-ENTMCNC: 29.2 G/DL (ref 30–36.5)
MCV RBC AUTO: 86.7 FL (ref 80–99)
MONOCYTES # BLD: 0.4 K/UL (ref 0–1)
MONOCYTES NFR BLD: 6 % (ref 5–13)
NEUTS SEG # BLD: 4.7 K/UL (ref 1.8–8)
NEUTS SEG NFR BLD: 67 % (ref 32–75)
NITRITE UR QL STRIP.AUTO: NEGATIVE
NRBC # BLD: 0 K/UL (ref 0–0.01)
NRBC BLD-RTO: 0 PER 100 WBC
PH UR STRIP: 5.5 [PH] (ref 5–8)
PLATELET # BLD AUTO: 198 K/UL (ref 150–400)
PMV BLD AUTO: 10.4 FL (ref 8.9–12.9)
POTASSIUM SERPL-SCNC: 4.6 MMOL/L (ref 3.5–5.1)
PROT SERPL-MCNC: 6.5 G/DL (ref 6.4–8.2)
PROT UR STRIP-MCNC: NEGATIVE MG/DL
RBC # BLD AUTO: 4.3 M/UL (ref 3.8–5.2)
RBC #/AREA URNS HPF: NORMAL /HPF (ref 0–5)
SODIUM SERPL-SCNC: 141 MMOL/L (ref 136–145)
SP GR UR REFRACTOMETRY: 1.02
UA: UC IF INDICATED,UAUC: NORMAL
UROBILINOGEN UR QL STRIP.AUTO: 0.2 EU/DL (ref 0.2–1)
WBC # BLD AUTO: 7 K/UL (ref 3.6–11)
WBC URNS QL MICRO: NORMAL /HPF (ref 0–4)

## 2022-09-06 PROCEDURE — 74176 CT ABD & PELVIS W/O CONTRAST: CPT

## 2022-09-06 PROCEDURE — 99284 EMERGENCY DEPT VISIT MOD MDM: CPT

## 2022-09-06 PROCEDURE — 74011250637 HC RX REV CODE- 250/637: Performed by: STUDENT IN AN ORGANIZED HEALTH CARE EDUCATION/TRAINING PROGRAM

## 2022-09-06 PROCEDURE — 96374 THER/PROPH/DIAG INJ IV PUSH: CPT

## 2022-09-06 PROCEDURE — 74011250636 HC RX REV CODE- 250/636: Performed by: STUDENT IN AN ORGANIZED HEALTH CARE EDUCATION/TRAINING PROGRAM

## 2022-09-06 PROCEDURE — 81001 URINALYSIS AUTO W/SCOPE: CPT

## 2022-09-06 PROCEDURE — 80053 COMPREHEN METABOLIC PANEL: CPT

## 2022-09-06 PROCEDURE — 85025 COMPLETE CBC W/AUTO DIFF WBC: CPT

## 2022-09-06 PROCEDURE — 36415 COLL VENOUS BLD VENIPUNCTURE: CPT

## 2022-09-06 RX ORDER — KETOROLAC TROMETHAMINE 30 MG/ML
15 INJECTION, SOLUTION INTRAMUSCULAR; INTRAVENOUS
Status: DISCONTINUED | OUTPATIENT
Start: 2022-09-06 | End: 2022-09-06 | Stop reason: HOSPADM

## 2022-09-06 RX ORDER — OXYCODONE HYDROCHLORIDE 5 MG/1
5 TABLET ORAL
Status: COMPLETED | OUTPATIENT
Start: 2022-09-06 | End: 2022-09-06

## 2022-09-06 RX ORDER — ONDANSETRON 2 MG/ML
4 INJECTION INTRAMUSCULAR; INTRAVENOUS
Status: COMPLETED | OUTPATIENT
Start: 2022-09-06 | End: 2022-09-06

## 2022-09-06 RX ADMIN — OXYCODONE 5 MG: 5 TABLET ORAL at 19:44

## 2022-09-06 RX ADMIN — ONDANSETRON 4 MG: 2 INJECTION INTRAMUSCULAR; INTRAVENOUS at 19:45

## 2022-09-06 NOTE — ED PROVIDER NOTES
EMERGENCY DEPARTMENT HISTORY AND PHYSICAL EXAM      Date: 9/6/2022  Patient Name: Naseem Rogers    History of Presenting Illness     Chief Complaint   Patient presents with    Flank Pain     Pt arrives in wheelchair to triage, reports L flank pain x 3-4 weeks, worsening over past 3 days. Denies dysuria but reports intermittent stream when urinating. Admission in Feb to outside hospital for kidney stone on R side. Wears 2L O2 at baseline for COPD. History Provided By: Patient    HPI: Naseem Rogers, 58 y.o. female with a past medical history significant for kidney stone, hypertension, chronic pain, anxiety depression, COPD on 2 L, GERD, diabetes presents to the ED with cc of she notes her pain started approximately 3 weeks ago and has been getting worse. It has not been responsive to her home hydrocodone. She notes left flank pain rating down into her left groin. She notes no dysuria but does have some difficulty urinating with small voids each time. She denies any fevers over 100.4 but does know she was 99 yesterday she has nausea but no vomiting. She denies any diarrhea. She has a history of nephrolithiasis that needed to be surgically removed. She notes this feels somewhat similar. She denies any chest pain, shortness of breath, headache, dizziness    There are no other complaints, changes, or physical findings at this time. PCP: Jenn Becerra, DO    No current facility-administered medications on file prior to encounter. Current Outpatient Medications on File Prior to Encounter   Medication Sig Dispense Refill    ketorolac (TORADOL) 10 mg tablet Take 1 Tablet by mouth every six (6) hours as needed for Pain. 10 Tablet 0    tamsulosin (Flomax) 0.4 mg capsule Take 1 Capsule by mouth daily. 14 Capsule 0    triamcinolone acetonide (KENALOG) 0.1 % ointment Apply  to affected area daily.  use thin layer and apply to affected area (pannus)      gabapentin (NEURONTIN) 100 mg capsule Take 100 mg by mouth three (3) times daily. valsartan (DIOVAN) 80 mg tablet Take 80 mg by mouth daily. metoprolol succinate (TOPROL-XL) 25 mg XL tablet Take 25 mg by mouth daily. prazosin (Minipress) 2 mg capsule Take 4 mg by mouth nightly. HYDROcodone-acetaminophen (NORCO) 5-325 mg per tablet Take 1 Tablet by mouth every six (6) hours as needed for Pain. QUEtiapine (SEROqueL) 400 mg tablet Take 600 mg by mouth nightly. potassium chloride SR (KLOR-CON 10) 10 mEq tablet Take 10 mEq by mouth nightly. montelukast (Singulair) 10 mg tablet Take 10 mg by mouth daily. levothyroxine (SYNTHROID) 112 mcg tablet Take 112 mcg by mouth Daily (before breakfast). albuterol-ipratropium (DUO-NEB) 2.5 mg-0.5 mg/3 ml nebu 3 mL by Nebulization route every four (4) hours as needed (COPD). 30 Nebule 0    fluticasone-umeclidinium-vilanterol (TRELEGY ELLIPTA) 100-62.5-25 mcg inhaler Take 1 Puff by inhalation daily. metFORMIN (GLUCOPHAGE) 1,000 mg tablet Take 1,000 mg by mouth two (2) times a day. fluticasone propionate (FLONASE) 50 mcg/actuation nasal spray 2 Sprays by Both Nostrils route daily. liraglutide (VICTOZA) 0.6 mg/0.1 mL (18 mg/3 mL) pnij 1.8 mg by SubCUTAneous route nightly. busPIRone (BUSPAR) 15 mg tablet Take 15 mg by mouth three (3) times daily. albuterol (PROVENTIL HFA, VENTOLIN HFA, PROAIR HFA) 90 mcg/actuation inhaler Take 2 Puffs by inhalation every four (4) hours as needed for Wheezing. 1 Inhaler 0    buPROPion XL (WELLBUTRIN XL) 150 mg tablet Take 300 mg by mouth nightly. Dr. Emerald Verduzco      ergocalciferol (VITAMIN D2) 50,000 unit capsule Take 50,000 Units by mouth every seven (7) days. Each Sunday      estradiol (ESTRACE) 0.5 mg tablet Take 0.5 mg by mouth daily. aspirin 81 mg chewable tablet Take 81 mg by mouth daily. omeprazole (PRILOSEC) 20 mg capsule Take 40 mg by mouth daily. topiramate (TOPAMAX) 200 mg tablet Take 200 mg by mouth nightly.   [facility] - Psychiatry      simvastatin (ZOCOR) 20 mg tablet Take 20 mg by mouth nightly. Past History     Past Medical History:  Past Medical History:   Diagnosis Date    Adverse effect of anesthesia     slow to wake up       Anxiety and depression     Arrhythmia     Arthritis     Chronic obstructive pulmonary disease (HCC)     Chronic pain     djd    Diabetes (HCC)     GERD (gastroesophageal reflux disease)     Hypertension     Hypothyroidism     Kidney stone     Liver disease     Obesity, morbid, BMI 50 or higher ([address]     Seizures ([facility] [address]     last one 20 years ago approx. unknown etiology       Past Surgical History:  Past Surgical History:   Procedure Laterality Date    CARDIAC CATHETERIZATION  11/18/2010    WNL per pt. HX APPENDECTOMY      HX BREAST BIOPSY Right     about 15 years ago, neg    HX CHOLECYSTECTOMY      HX HYSTERECTOMY      HX TUBAL LIGATION         Family History:  Family History   Problem Relation Age of Onset    Heart Disease Mother     Hypertension Mother     Cancer Mother     Breast Cancer Mother     Heart Disease Father     Hypertension Father     Cancer Brother        Social History:  Social History     Tobacco Use    Smoking status: Former    Smokeless tobacco: Never    Tobacco comments:     quit smoking  about 34  years ago      Substance Use Topics    Alcohol use: No    Drug use: Yes     Types: Prescription, OTC       Allergies: Allergies   Allergen Reactions    Celebrex [Celecoxib] Nausea Only    Ibuprofen Other (comments)     Does not want r/t COPD history. Methocarbamol Shortness of Breath    Morphine Nausea Only    Sulfa (Sulfonamide Antibiotics) Nausea Only    Tramadol Other (comments)     Does not want r/t COPD    Adhesive Other (comments)     Red and bumpy      Codeine Other (comments)     Hyper activity         Review of Systems   Review of Systems   Constitutional:  Positive for fever. Negative for activity change and chills. HENT:  Negative for congestion.     Eyes: Negative for visual disturbance. Respiratory:  Negative for chest tightness and shortness of breath. Cardiovascular:  Negative for chest pain. Gastrointestinal:  Positive for abdominal pain and nausea. Negative for constipation, diarrhea and vomiting. Genitourinary:  Positive for frequency, pelvic pain and urgency. Negative for decreased urine volume and dysuria. Musculoskeletal:  Positive for back pain. Skin:  Negative for rash. Neurological:  Negative for dizziness and headaches. Hematological:  Does not bruise/bleed easily. Physical Exam   Physical Exam  Constitutional:       Appearance: Normal appearance. HENT:      Head: Normocephalic and atraumatic. Nose: Nose normal.      Mouth/Throat:      Mouth: Mucous membranes are moist.   Eyes:      Extraocular Movements: Extraocular movements intact. Conjunctiva/sclera: Conjunctivae normal.      Pupils: Pupils are equal, round, and reactive to light. Cardiovascular:      Rate and Rhythm: Normal rate and regular rhythm. Heart sounds: Normal heart sounds. Pulmonary:      Effort: Pulmonary effort is normal.      Breath sounds: Normal breath sounds. Abdominal:      General: Abdomen is flat. Palpations: Abdomen is soft. Tenderness: There is right CVA tenderness and left CVA tenderness. Comments: Suprapubic abdominal pain, non peritoneal   Musculoskeletal:         General: No swelling or deformity. Normal range of motion. Cervical back: Normal range of motion. Skin:     General: Skin is warm. Neurological:      General: No focal deficit present. Mental Status: She is alert and oriented to person, place, and time. Mental status is at baseline. Psychiatric:         Mood and Affect: Mood normal.         Thought Content:  Thought content normal.       Diagnostic Study Results     Labs -     Recent Results (from the past 24 hour(s))   CBC WITH AUTOMATED DIFF    Collection Time: 09/06/22  5:03 PM   Result Value Ref Range    WBC 7.0 3.6 - 11.0 K/uL    RBC 4.30 3.80 - 5.20 M/uL    HGB 10.9 (L) 11.5 - 16.0 g/dL    HCT 37.3 35.0 - 47.0 %    MCV 86.7 80.0 - 99.0 FL    MCH 25.3 (L) 26.0 - 34.0 PG    MCHC 29.2 (L) 30.0 - 36.5 g/dL    RDW 15.6 (H) 11.5 - 14.5 %    PLATELET 422 935 - 640 K/uL    MPV 10.4 8.9 - 12.9 FL    NRBC 0.0 0  WBC    ABSOLUTE NRBC 0.00 0.00 - 0.01 K/uL    NEUTROPHILS 67 32 - 75 %    LYMPHOCYTES 22 12 - 49 %    MONOCYTES 6 5 - 13 %    EOSINOPHILS 3 0 - 7 %    BASOPHILS 1 0 - 1 %    IMMATURE GRANULOCYTES 1 (H) 0.0 - 0.5 %    ABS. NEUTROPHILS 4.7 1.8 - 8.0 K/UL    ABS. LYMPHOCYTES 1.6 0.8 - 3.5 K/UL    ABS. MONOCYTES 0.4 0.0 - 1.0 K/UL    ABS. EOSINOPHILS 0.2 0.0 - 0.4 K/UL    ABS. BASOPHILS 0.1 0.0 - 0.1 K/UL    ABS. IMM. GRANS. 0.1 (H) 0.00 - 0.04 K/UL    DF AUTOMATED     METABOLIC PANEL, COMPREHENSIVE    Collection Time: 09/06/22  5:03 PM   Result Value Ref Range    Sodium 141 136 - 145 mmol/L    Potassium 4.6 3.5 - 5.1 mmol/L    Chloride 111 (H) 97 - 108 mmol/L    CO2 26 21 - 32 mmol/L    Anion gap 4 (L) 5 - 15 mmol/L    Glucose 128 (H) 65 - 100 mg/dL    BUN 17 6 - 20 MG/DL    Creatinine 0.89 0.55 - 1.02 MG/DL    BUN/Creatinine ratio 19 12 - 20      GFR est AA >60 >60 ml/min/1.73m2    GFR est non-AA >60 >60 ml/min/1.73m2    Calcium 9.9 8.5 - 10.1 MG/DL    Bilirubin, total 0.3 0.2 - 1.0 MG/DL    ALT (SGPT) 20 12 - 78 U/L    AST (SGOT) 16 15 - 37 U/L    Alk.  phosphatase 62 45 - 117 U/L    Protein, total 6.5 6.4 - 8.2 g/dL    Albumin 3.1 (L) 3.5 - 5.0 g/dL    Globulin 3.4 2.0 - 4.0 g/dL    A-G Ratio 0.9 (L) 1.1 - 2.2     URINALYSIS W/ REFLEX CULTURE    Collection Time: 09/06/22  7:36 PM    Specimen: Urine   Result Value Ref Range    Color YELLOW/STRAW      Appearance CLEAR CLEAR      Specific gravity 1.019      pH (UA) 5.5 5.0 - 8.0      Protein Negative NEG mg/dL    Glucose Negative NEG mg/dL    Ketone Negative NEG mg/dL    Bilirubin Negative NEG      Blood Negative NEG      Urobilinogen 0.2 0.2 - 1.0 EU/dL    Nitrites Negative NEG      Leukocyte Esterase Negative NEG      UA:UC IF INDICATED CULTURE NOT INDICATED BY UA RESULT CNI      WBC 0-4 0 - 4 /hpf    RBC 0-5 0 - 5 /hpf    Epithelial cells FEW FEW /lpf    Bacteria Negative NEG /hpf    Hyaline cast 0-2 0 - 2 /lpf       Radiologic Studies -   CT ABD PELV WO CONT   Final Result   No renal, ureteral, or bladder calculus   Mild hydronephrosis of the left lower renal pole moiety   Incidental sigmoid diverticulosis and left adrenal adenoma        CT Results  (Last 48 hours)                 09/06/22 1849  CT ABD PELV WO CONT Final result    Impression:  No renal, ureteral, or bladder calculus   Mild hydronephrosis of the left lower renal pole moiety   Incidental sigmoid diverticulosis and left adrenal adenoma       Narrative:  EXAM: CT ABD PELV WO CONT       INDICATION: L flank pain       COMPARISON: 2/15/2022       IV CONTRAST: None. ORAL CONTRAST: None       TECHNIQUE:    Thin axial images were obtained through the abdomen and pelvis. Coronal and   sagittal reformats were generated. CT dose reduction was achieved through use of   a standardized protocol tailored for this examination and automatic exposure   control for dose modulation. The absence of intravenous contrast material reduces the sensitivity for   evaluation of the vasculature and solid organs. FINDINGS:    LOWER THORAX: No significant abnormality in the incidentally imaged lower chest.   LIVER: No mass. BILIARY TREE: Prior cholecystectomy CBD is not dilated. SPLEEN: within normal limits. PANCREAS: No focal abnormality. ADRENALS: Left adrenal 8 mm nodule with density of 282, compatible with an   adenoma. KIDNEYS/URETERS: No calculus. Mild hydronephrosis of the left lower renal pole   moiety. STOMACH: Unremarkable. SMALL BOWEL: No dilatation or wall thickening. COLON: No dilatation or wall thickening. Sigmoid diverticulosis.    APPENDIX: not seen   PERITONEUM: No ascites or pneumoperitoneum. RETROPERITONEUM: No lymphadenopathy or aortic aneurysm. REPRODUCTIVE ORGANS: Prior hysterectomy   URINARY BLADDER: No mass or calculus. BONES: No destructive bone lesion. Disc desiccation at the lumbosacral junction. ABDOMINAL WALL: No mass or hernia. ADDITIONAL COMMENTS: N/A                 CXR Results  (Last 48 hours)      None              Medical Decision Making   I am the first provider for this patient. I reviewed the vital signs, available nursing notes, past medical history, past surgical history, family history and social history. Vital Signs-Reviewed the patient's vital signs. Patient Vitals for the past 12 hrs:   Temp Pulse Resp BP SpO2   09/06/22 1657 98.4 °F (36.9 °C) 88 18 (!) 121/97 98 %       Records Reviewed: Nursing records and medical records reviewed    MDM:  DDx includes nephrolithiasis, Pyelonephritis, UTI, GERD, diverticulitis    Provider Notes (Medical Decision Making):   49-year-old female with multiple comorbidities including COPD on 2 L, diabetes, nephrolithiasis, GERD, chronic pain, hypertension presents with left flank pain. Her vital signs are stable upon arrival and she is stable on her 2 L of home oxygen. She has mild left and right CVA tenderness and suprapubic tenderness to palpation. Her lab work is unremarkable for any acute pathology. A dry CT scan showed incidental diverticulosis with adrenal adenoma as well as mild hydronephrosis in the left but no kidney stones. I suspect she had a kidney stone that has been passing over the past 3 weeks and is now gone. Will work-up with urinalysis to evaluate for any residual infection and continue follow-up with urology. ED Course:   Initial assessment performed. The patients presenting problems have been discussed, and they are in agreement with the care plan formulated and outlined with them. I have encouraged them to ask questions as they arise throughout their visit.     ED Course as of 09/06/22 2008   Tue Sep 06, 2022   2006 Patient notes pain similar. Discussed findings including her adrenal adenoma which she is aware of and her diverticulosis. Advise follow-up with urologist. [JS]      ED Course User Index  [JS] Gurpreet Gonzales MD           Disposition:  Discharge Note:  7:21 PM  The patient has been re-evaluated and is ready for discharge. Reviewed available results with patient. Counseled patient on diagnosis and care plan. Patient has expressed understanding, and all questions have been answered. Patient agrees with plan and agrees to follow up as recommended, or to return to the ED if their symptoms worsen. Discharge instructions have been provided and explained to the patient, along with reasons to return to the ED. DISCHARGE PLAN:  1. Current Discharge Medication List        2. Follow-up Information       Follow up With Specialties Details Why Contact Info    Carlos Barrios DO Family Medicine In 1 week  [address] [place] [address]  [phone]      [facility] EMERGENCY DEPT Emergency Medicine  If symptoms worsen [address] [place] [address]  [address] [facility]  [phone]          3. Return to ED if worse     Diagnosis     Clinical Impression:   1. Nephrolithiasis    2. Adenoma of left adrenal gland    3. Hydronephrosis, unspecified hydronephrosis type    4. Diverticulosis        Attestations:    Vickie Perdomo MD    Please note that this dictation was completed with [employer], the computer voice recognition software. Quite often unanticipated grammatical, syntax, homophones, and other interpretive errors are inadvertently transcribed by the computer software. Please disregard these errors. Please excuse any errors that have escaped final proofreading. Thank you.

## 2022-09-07 NOTE — ED NOTES
I have reviewed verbal and written discharge instructions with the patient. The patient verbalized understanding. IV removed. Pt alert and ambulatory to wheelchair at discharge.

## 2022-10-05 ENCOUNTER — TRANSCRIBE ORDER (OUTPATIENT)
Dept: SCHEDULING | Age: 62
End: 2022-10-05

## 2022-10-05 DIAGNOSIS — Z12.31 VISIT FOR SCREENING MAMMOGRAM: Primary | ICD-10-CM

## 2023-02-01 ENCOUNTER — APPOINTMENT (OUTPATIENT)
Dept: CT IMAGING | Age: 63
End: 2023-02-01
Attending: EMERGENCY MEDICINE
Payer: COMMERCIAL

## 2023-02-01 ENCOUNTER — HOSPITAL ENCOUNTER (EMERGENCY)
Age: 63
Discharge: HOME | End: 2023-02-02
Attending: EMERGENCY MEDICINE
Payer: COMMERCIAL

## 2023-02-01 VITALS
BODY MASS INDEX: 53.92 KG/M2 | HEART RATE: 95 BPM | OXYGEN SATURATION: 100 % | SYSTOLIC BLOOD PRESSURE: 120 MMHG | DIASTOLIC BLOOD PRESSURE: 88 MMHG | RESPIRATION RATE: 16 BRPM | WEIGHT: 293 LBS | HEIGHT: 62 IN | TEMPERATURE: 98.4 F

## 2023-02-01 DIAGNOSIS — N39.0 URINARY TRACT INFECTION WITHOUT HEMATURIA, SITE UNSPECIFIED: Primary | ICD-10-CM

## 2023-02-01 LAB
ALBUMIN SERPL-MCNC: 3.2 G/DL (ref 3.5–5)
ALBUMIN/GLOB SERPL: 0.8 (ref 1.1–2.2)
ALP SERPL-CCNC: 65 U/L (ref 45–117)
ALT SERPL-CCNC: 28 U/L (ref 12–78)
ANION GAP SERPL CALC-SCNC: 8 MMOL/L (ref 5–15)
APPEARANCE UR: ABNORMAL
AST SERPL-CCNC: 29 U/L (ref 15–37)
BACTERIA URNS QL MICRO: ABNORMAL /HPF
BILIRUB SERPL-MCNC: 0.2 MG/DL (ref 0.2–1)
BILIRUB UR QL: NEGATIVE
BUN SERPL-MCNC: 19 MG/DL (ref 6–20)
BUN/CREAT SERPL: 17 (ref 12–20)
CALCIUM SERPL-MCNC: 10 MG/DL (ref 8.5–10.1)
CHLORIDE SERPL-SCNC: 107 MMOL/L (ref 97–108)
CO2 SERPL-SCNC: 25 MMOL/L (ref 21–32)
COLOR UR: ABNORMAL
CREAT SERPL-MCNC: 1.09 MG/DL (ref 0.55–1.02)
EPITH CASTS URNS QL MICRO: ABNORMAL /LPF
ERYTHROCYTE [DISTWIDTH] IN BLOOD BY AUTOMATED COUNT: 15.7 % (ref 11.5–14.5)
GLOBULIN SER CALC-MCNC: 4.1 G/DL (ref 2–4)
GLUCOSE SERPL-MCNC: 171 MG/DL (ref 65–100)
GLUCOSE UR STRIP.AUTO-MCNC: NEGATIVE MG/DL
HCT VFR BLD AUTO: 40.4 % (ref 35–47)
HGB BLD-MCNC: 11.7 G/DL (ref 11.5–16)
HGB UR QL STRIP: ABNORMAL
KETONES UR QL STRIP.AUTO: ABNORMAL MG/DL
LEUKOCYTE ESTERASE UR QL STRIP.AUTO: ABNORMAL
LIPASE SERPL-CCNC: 222 U/L (ref 73–393)
MCH RBC QN AUTO: 23.9 PG (ref 26–34)
MCHC RBC AUTO-ENTMCNC: 29 G/DL (ref 30–36.5)
MCV RBC AUTO: 82.6 FL (ref 80–99)
NITRITE UR QL STRIP.AUTO: NEGATIVE
NRBC # BLD: 0 K/UL (ref 0–0.01)
NRBC BLD-RTO: 0 PER 100 WBC
PH UR STRIP: 5 (ref 5–8)
PLATELET # BLD AUTO: 245 K/UL (ref 150–400)
PMV BLD AUTO: 10.5 FL (ref 8.9–12.9)
POTASSIUM SERPL-SCNC: 4.1 MMOL/L (ref 3.5–5.1)
PROT SERPL-MCNC: 7.3 G/DL (ref 6.4–8.2)
PROT UR STRIP-MCNC: 30 MG/DL
RBC # BLD AUTO: 4.89 M/UL (ref 3.8–5.2)
RBC #/AREA URNS HPF: ABNORMAL /HPF (ref 0–5)
SODIUM SERPL-SCNC: 140 MMOL/L (ref 136–145)
SP GR UR REFRACTOMETRY: >1.03 (ref 1–1.03)
UROBILINOGEN UR QL STRIP.AUTO: 1 EU/DL (ref 0.2–1)
WBC # BLD AUTO: 8.8 K/UL (ref 3.6–11)
WBC URNS QL MICRO: ABNORMAL /HPF (ref 0–4)

## 2023-02-01 PROCEDURE — 74176 CT ABD & PELVIS W/O CONTRAST: CPT

## 2023-02-01 PROCEDURE — 36415 COLL VENOUS BLD VENIPUNCTURE: CPT

## 2023-02-01 PROCEDURE — 83690 ASSAY OF LIPASE: CPT

## 2023-02-01 PROCEDURE — 99284 EMERGENCY DEPT VISIT MOD MDM: CPT

## 2023-02-01 PROCEDURE — 96374 THER/PROPH/DIAG INJ IV PUSH: CPT

## 2023-02-01 PROCEDURE — 85027 COMPLETE CBC AUTOMATED: CPT

## 2023-02-01 PROCEDURE — 80053 COMPREHEN METABOLIC PANEL: CPT

## 2023-02-01 PROCEDURE — 74011250636 HC RX REV CODE- 250/636: Performed by: EMERGENCY MEDICINE

## 2023-02-01 PROCEDURE — 96375 TX/PRO/DX INJ NEW DRUG ADDON: CPT

## 2023-02-01 PROCEDURE — 81001 URINALYSIS AUTO W/SCOPE: CPT

## 2023-02-01 RX ORDER — ONDANSETRON 2 MG/ML
4 INJECTION INTRAMUSCULAR; INTRAVENOUS
Status: COMPLETED | OUTPATIENT
Start: 2023-02-01 | End: 2023-02-01

## 2023-02-01 RX ORDER — FENTANYL CITRATE 50 UG/ML
50 INJECTION, SOLUTION INTRAMUSCULAR; INTRAVENOUS
Status: COMPLETED | OUTPATIENT
Start: 2023-02-01 | End: 2023-02-01

## 2023-02-01 RX ORDER — CEFDINIR 300 MG/1
300 CAPSULE ORAL 2 TIMES DAILY
Qty: 14 CAPSULE | Refills: 0 | Status: SHIPPED | OUTPATIENT
Start: 2023-02-01

## 2023-02-01 RX ORDER — HYDROCODONE BITARTRATE AND ACETAMINOPHEN 5; 325 MG/1; MG/1
1 TABLET ORAL
Qty: 10 TABLET | Refills: 0 | Status: SHIPPED | OUTPATIENT
Start: 2023-02-01 | End: 2023-02-04

## 2023-02-01 RX ADMIN — ONDANSETRON 4 MG: 2 INJECTION INTRAMUSCULAR; INTRAVENOUS at 21:54

## 2023-02-01 RX ADMIN — FENTANYL CITRATE 50 MCG: 50 INJECTION, SOLUTION INTRAMUSCULAR; INTRAVENOUS at 21:54

## 2023-02-01 RX ADMIN — SODIUM CHLORIDE 1000 ML: 9 INJECTION, SOLUTION INTRAVENOUS at 21:54

## 2023-02-01 NOTE — ED TRIAGE NOTES
Pt c/o intermittent right flank pain, right lower abd pain, and difficulty urinating x1 month that has worsened. Pt reports hx of UTI's and a kidney stone in July. Pt states this is different. Pt reports urinating at 1400 today but states it was only a small amount. Sitting makes pain worse. Pt states she feels pressure in lower abd. Pt wears 2L NC at baseline.

## 2023-02-02 NOTE — ED PROVIDER NOTES
[facility] EMERGENCY DEPT  EMERGENCY DEPARTMENT ENCOUNTER       Pt Name: Shell Aguilar  MRN: [id number]  [place] 1960  Date of evaluation: 2/1/2023  Provider: Juan Curiel DO   PCP: Suellen Escobar DO  Note Started: 8:47 PM 2/1/23     CHIEF COMPLAINT       Chief Complaint   Patient presents with    Urinary Pain    Abdominal Pain        HISTORY OF PRESENT ILLNESS: 1 or more elements      History From: Patient, History limited by: none     Shell Aguilar is a 58 y.o. female presenting the emergency department complaining of lower abdominal discomfort, urinary discomfort       Please See MDM for Additional Details of the HPI/PMH  Nursing Notes were all reviewed and agreed with or any disagreements were addressed in the HPI. REVIEW OF SYSTEMS        Positives and Pertinent negatives as per HPI. PAST HISTORY     Past Medical History:  Past Medical History:   Diagnosis Date    Adverse effect of anesthesia     slow to wake up       Anxiety and depression     Arrhythmia     Arthritis     Chronic obstructive pulmonary disease (HCC)     Chronic pain     djd    Diabetes (HCC)     GERD (gastroesophageal reflux disease)     Hypertension     Hypothyroidism     Kidney stone     Liver disease     Obesity, morbid, BMI 50 or higher ([facility] [address]     Seizures ([facility] [address]     last one 20 years ago approx. unknown etiology       Past Surgical History:  Past Surgical History:   Procedure Laterality Date    CARDIAC CATHETERIZATION  11/18/2010    WNL per pt.     HX APPENDECTOMY      HX BREAST BIOPSY Right     about 15 years ago, neg    HX CHOLECYSTECTOMY      HX HYSTERECTOMY      HX TUBAL LIGATION         Family History:  Family History   Problem Relation Age of Onset    Heart Disease Mother     Hypertension Mother     Cancer Mother     Breast Cancer Mother     Heart Disease Father     Hypertension Father     Cancer Brother        Social History:  Social History     Tobacco Use    Smoking status: Former    Smokeless tobacco: Never Tobacco comments:     quit smoking  about 34  years ago      Substance Use Topics    Alcohol use: No    Drug use: Yes     Types: Prescription, OTC       Allergies: Allergies   Allergen Reactions    Celebrex [Celecoxib] Nausea Only    Ibuprofen Other (comments)     Does not want r/t COPD history. Methocarbamol Shortness of Breath    Morphine Nausea Only    Sulfa (Sulfonamide Antibiotics) Nausea Only    Tramadol Other (comments)     Does not want r/t COPD    Adhesive Other (comments)     Red and bumpy      Codeine Other (comments)     Hyper activity       CURRENT MEDICATIONS      Previous Medications    ALBUTEROL (PROVENTIL HFA, VENTOLIN HFA, PROAIR HFA) 90 MCG/ACTUATION INHALER    Take 2 Puffs by inhalation every four (4) hours as needed for Wheezing. ALBUTEROL-IPRATROPIUM (DUO-NEB) 2.5 MG-0.5 MG/3 ML NEBU    3 mL by Nebulization route every four (4) hours as needed (COPD). ASPIRIN 81 MG CHEWABLE TABLET    Take 81 mg by mouth daily. BUPROPION XL (WELLBUTRIN XL) 150 MG TABLET    Take 300 mg by mouth nightly. Dr. Roxann Recio    BUSPIRONE (BUSPAR) 15 MG TABLET    Take 15 mg by mouth three (3) times daily. ERGOCALCIFEROL (VITAMIN D2) 50,000 UNIT CAPSULE    Take 50,000 Units by mouth every seven (7) days. Each Sunday    ESTRADIOL (ESTRACE) 0.5 MG TABLET    Take 0.5 mg by mouth daily. FLUTICASONE PROPIONATE (FLONASE) 50 MCG/ACTUATION NASAL SPRAY    2 Sprays by Both Nostrils route daily. FLUTICASONE-UMECLIDINIUM-VILANTEROL (TRELEGY ELLIPTA) 100-62.5-25 MCG INHALER    Take 1 Puff by inhalation daily. GABAPENTIN (NEURONTIN) 100 MG CAPSULE    Take 100 mg by mouth three (3) times daily. HYDROCODONE-ACETAMINOPHEN (NORCO) 5-325 MG PER TABLET    Take 1 Tablet by mouth every six (6) hours as needed for Pain. KETOROLAC (TORADOL) 10 MG TABLET    Take 1 Tablet by mouth every six (6) hours as needed for Pain. LEVOTHYROXINE (SYNTHROID) 112 MCG TABLET    Take 112 mcg by mouth Daily (before breakfast). LIRAGLUTIDE (VICTOZA) 0.6 MG/0.1 ML (18 MG/3 ML) PNIJ    1.8 mg by SubCUTAneous route nightly. METFORMIN (GLUCOPHAGE) 1,000 MG TABLET    Take 1,000 mg by mouth two (2) times a day. METOPROLOL SUCCINATE (TOPROL-XL) 25 MG XL TABLET    Take 25 mg by mouth daily. MONTELUKAST (SINGULAIR) 10 MG TABLET    Take 10 mg by mouth daily. OMEPRAZOLE (PRILOSEC) 20 MG CAPSULE    Take 40 mg by mouth daily. POTASSIUM CHLORIDE SR (KLOR-CON 10) 10 MEQ TABLET    Take 10 mEq by mouth nightly. PRAZOSIN (MINIPRESS) 2 MG CAPSULE    Take 4 mg by mouth nightly. QUETIAPINE (SEROQUEL) 400 MG TABLET    Take 600 mg by mouth nightly. SIMVASTATIN (ZOCOR) 20 MG TABLET    Take 20 mg by mouth nightly. TAMSULOSIN (FLOMAX) 0.4 MG CAPSULE    Take 1 Capsule by mouth daily. TOPIRAMATE (TOPAMAX) 200 MG TABLET    Take 200 mg by mouth nightly. Dr. Adamaris Mena - Psychiatry    TRIAMCINOLONE ACETONIDE (KENALOG) 0.1 % OINTMENT    Apply  to affected area daily. use thin layer and apply to affected area (pannus)    VALSARTAN (DIOVAN) 80 MG TABLET    Take 80 mg by mouth daily. SCREENINGS               No data recorded         PHYSICAL EXAM      ED Triage Vitals [02/01/23 1628]   ED Encounter Vitals Group      /88      Pulse (Heart Rate) 95      Resp Rate 16      Temp 98.4 °F (36.9 °C)      Temp src       O2 Sat (%) 100 %      Weight (!) 359 lb      Height 5' 2\"        Physical Exam  Vitals and nursing note reviewed. Constitutional:       Appearance: She is well-developed. She is obese. HENT:      Head: Normocephalic and atraumatic. Eyes:      General:         Right eye: No discharge. Left eye: No discharge. Conjunctiva/sclera: Conjunctivae normal.      Pupils: Pupils are equal, round, and reactive to light. Neck:      Trachea: No tracheal deviation. Cardiovascular:      Rate and Rhythm: Normal rate and regular rhythm. Heart sounds: Normal heart sounds. No murmur heard.   Pulmonary:      Effort: Pulmonary effort is normal. No respiratory distress. Breath sounds: Normal breath sounds. No wheezing or rales. Abdominal:      General: Bowel sounds are normal.      Palpations: Abdomen is soft. Tenderness: There is abdominal tenderness in the suprapubic area. There is no right CVA tenderness, left CVA tenderness, guarding or rebound. Musculoskeletal:         General: No tenderness or deformity. Normal range of motion. Cervical back: Normal range of motion and neck supple. Skin:     General: Skin is warm and dry. Findings: No erythema or rash. Neurological:      Mental Status: She is alert and oriented to person, place, and time. Psychiatric:         Behavior: Behavior normal.        DIAGNOSTIC RESULTS   LABS:     Recent Results (from the past 12 hour(s))   CBC W/O DIFF    Collection Time: 02/01/23  4:38 PM   Result Value Ref Range    WBC 8.8 3.6 - 11.0 K/uL    RBC 4.89 3.80 - 5.20 M/uL    HGB 11.7 11.5 - 16.0 g/dL    HCT 40.4 35.0 - 47.0 %    MCV 82.6 80.0 - 99.0 FL    MCH 23.9 (L) 26.0 - 34.0 PG    MCHC 29.0 (L) 30.0 - 36.5 g/dL    RDW 15.7 (H) 11.5 - 14.5 %    PLATELET 366 135 - 673 K/uL    MPV 10.5 8.9 - 12.9 FL    NRBC 0.0 0  WBC    ABSOLUTE NRBC 0.00 0.00 - 0.39 K/uL   METABOLIC PANEL, COMPREHENSIVE    Collection Time: 02/01/23  4:38 PM   Result Value Ref Range    Sodium 140 136 - 145 mmol/L    Potassium 4.1 3.5 - 5.1 mmol/L    Chloride 107 97 - 108 mmol/L    CO2 25 21 - 32 mmol/L    Anion gap 8 5 - 15 mmol/L    Glucose 171 (H) 65 - 100 mg/dL    BUN 19 6 - 20 MG/DL    Creatinine 1.09 (H) 0.55 - 1.02 MG/DL    BUN/Creatinine ratio 17 12 - 20      eGFR 57 (L) >60 ml/min/1.73m2    Calcium 10.0 8.5 - 10.1 MG/DL    Bilirubin, total 0.2 0.2 - 1.0 MG/DL    ALT (SGPT) 28 12 - 78 U/L    AST (SGOT) 29 15 - 37 U/L    Alk.  phosphatase 65 45 - 117 U/L    Protein, total 7.3 6.4 - 8.2 g/dL    Albumin 3.2 (L) 3.5 - 5.0 g/dL    Globulin 4.1 (H) 2.0 - 4.0 g/dL    A-G Ratio 0.8 (L) 1.1 - 2.2 LIPASE    Collection Time: 02/01/23  4:38 PM   Result Value Ref Range    Lipase 222 73 - 393 U/L   URINALYSIS W/ RFLX MICROSCOPIC    Collection Time: 02/01/23  9:49 PM   Result Value Ref Range    Color DARK YELLOW      Appearance CLOUDY (A) CLEAR      Specific gravity >1.030 (H) 1.003 - 1.030    pH (UA) 5.0 5.0 - 8.0      Protein 30 (A) NEG mg/dL    Glucose Negative NEG mg/dL    Ketone TRACE (A) NEG mg/dL    Bilirubin Negative NEG      Blood SMALL (A) NEG      Urobilinogen 1.0 0.2 - 1.0 EU/dL    Nitrites Negative NEG      Leukocyte Esterase MODERATE (A) NEG      WBC  0 - 4 /hpf    RBC 0-5 0 - 5 /hpf    Epithelial cells MANY (A) FEW /lpf    Bacteria 1+ (A) NEG /hpf        EKG: If performed, independent interpretation documented below in the MDM section     RADIOLOGY:  Non-plain film images such as CT, Ultrasound and MRI are read by the radiologist. Plain radiographic images are visualized and preliminarily interpreted by the ED Provider with the findings documented in the MDM section. Interpretation per the Radiologist below, if available at the time of this note:     CT ABD PELV WO CONT    Result Date: 2/1/2023  INDICATION: Right flank pain. Exam: Noncontrast CT of the abdomen and pelvis is performed with 5 mm collimation. Sagittal and coronal reformatted images were also performed. CT dose reduction was achieved through the use of a standardized protocol tailored for this examination and automatic exposure control for dose modulation. Direct comparison is made to prior CT dated February 2022. FINDINGS: The visualized lung bases are clear. Abdomen: Liver: The liver is normal on noncontrast images. Spleen: The spleen is normal on noncontrast images. Adrenals: The adrenals are normal on noncontrast images. Pancreas: The pancreas is normal on noncontrast images. Gallbladder: The gallbladder is surgically absent. Kidneys: There is no hydronephrosis or hydroureter.  No renal, ureteral bladder calculus is visualized. Bowel: No thickened or dilated loop of large or small bowel seen. Appendix: The appendix is nonvisualized, however there are no secondary signs of acute appendicitis. Pelvis: Urinary bladder is partially filled and grossly normal. Comments: There is a grade 1 anterolisthesis of L4-L5 and a grade 1 anterolisthesis of S1 with respect to L5. Facet hypertrophy is noted at multiple lower lumbar levels. Miscellaneous: There is no free intraperitoneal gas or fluid. There is no focal fluid collection to suggest abscess. The uterus is absent. No renal calculi or evidence of obstructive uropathy. PROCEDURES   Unless otherwise noted below, none  Procedures     CRITICAL CARE TIME       EMERGENCY DEPARTMENT COURSE and DIFFERENTIAL DIAGNOSIS/MDM   Vitals:    Vitals:    02/01/23 1628   BP: 120/88   Pulse: 95   Resp: 16   Temp: 98.4 °F (36.9 °C)   SpO2: 100%   Weight: (!) 162.8 kg (359 lb)   Height: 5' 2\" (1.575 m)        Patient was given the following medications:  Medications   fentaNYL citrate (PF) injection 50 mcg (50 mcg IntraVENous Given 2/1/23 2154)   sodium chloride 0.9 % bolus infusion 1,000 mL (1,000 mL IntraVENous New Bag 2/1/23 2154)   ondansetron (ZOFRAN) injection 4 mg (4 mg IntraVENous Given 2/1/23 2154)       Medical Decision Making  78-year-old female presenting the emergency department with urinary discomfort, lower abdominal pain. Has a history of kidney stones. Some mild suprapubic tenderness, no erythema of the vulva or perineum. She has normal vitals, reassuring exam.  We will get a CT to rule out kidney stone. We will treat her pain. Disposition pending labs and imaging. Amount and/or Complexity of Data Reviewed  External Data Reviewed: notes. Details: Seen by Dr. Velia Lisa in April 2022 had a right ureteral stent placed and lithotripsy done  Labs: ordered. Radiology: ordered. Risk  Prescription drug management.   Risk Details: Patient has evidence of urinary tract infection, concern for possible Marvin. She is afebrile, nontoxic. At this time I do not think she warrants hospitalization is reasonable for outpatient therapy. Consider hospitalization given age, but she is afebrile, nontoxic                 FINAL IMPRESSION     1. Urinary tract infection without hematuria, site unspecified          DISPOSITION/PLAN   Chantell Hayes's  results have been reviewed with her. She has been counseled regarding her diagnosis, treatment, and plan. She verbally conveys understanding and agreement of the signs, symptoms, diagnosis, treatment and prognosis and additionally agrees to follow up as discussed. She also agrees with the care-plan and conveys that all of her questions have been answered. I have also provided discharge instructions for her that include: educational information regarding their diagnosis and treatment, and list of reasons why they would want to return to the ED prior to their follow-up appointment, should her condition change. CLINICAL IMPRESSION    Discharge Note: The patient is stable for discharge home. The signs, symptoms, diagnosis, and discharge instructions have been discussed, understanding conveyed, and agreed upon. The patient is to follow up as recommended or return to ER should their symptoms worsen. PATIENT REFERRED TO:  Follow-up Information    None           DISCHARGE MEDICATIONS:  Current Discharge Medication List        START taking these medications    Details   !! HYDROcodone-acetaminophen (Norco) 5-325 mg per tablet Take 1 Tablet by mouth every six (6) hours as needed for Pain for up to 3 days. Max Daily Amount: 4 Tablets. Qty: 10 Tablet, Refills: 0  Start date: 2/1/2023, End date: 2/4/2023    Associated Diagnoses: Urinary tract infection without hematuria, site unspecified      cefdinir (OMNICEF) 300 mg capsule Take 1 Capsule by mouth two (2) times a day.   Qty: 14 Capsule, Refills: 0  Start date: 2/1/2023       !! - Potential duplicate medications found. Please discuss with provider. CONTINUE these medications which have NOT CHANGED    Details   !! HYDROcodone-acetaminophen (NORCO) 5-325 mg per tablet Take 1 Tablet by mouth every six (6) hours as needed for Pain. !! - Potential duplicate medications found. Please discuss with provider. DISCONTINUED MEDICATIONS:  Current Discharge Medication List          I am the Primary Clinician of Record. Green Lane Abell, DO (electronically signed)    (Please note that parts of this dictation were completed with voice recognition software. Quite often unanticipated grammatical, syntax, homophones, and other interpretive errors are inadvertently transcribed by the computer software. Please disregards these errors.  Please excuse any errors that have escaped final proofreading.)

## 2023-02-02 NOTE — ED NOTES
Bedside and Verbal shift change report given to Tierra Martinez (oncoming nurse) by Nakita Flores RN (offgoing nurse). Report included the following information SBAR, Kardex, ED Summary, Procedure Summary, Intake/Output, MAR and Recent Results.

## 2023-02-02 NOTE — ED NOTES
Patient discharged by provider, discharge instructions provided to patient and reviewed, all questions answered. Patient A/Ox4, breathing unlabored, VSS.  Patient transported to car via wheelchair on home O2 upon discharge

## 2023-04-21 DIAGNOSIS — Z12.31 VISIT FOR SCREENING MAMMOGRAM: Primary | ICD-10-CM

## 2023-10-05 ENCOUNTER — HOSPITAL ENCOUNTER (OUTPATIENT)
Facility: HOSPITAL | Age: 63
Discharge: HOME | End: 2023-10-05
Attending: UROLOGY
Payer: COMMERCIAL

## 2023-10-05 ENCOUNTER — TRANSCRIBE ORDERS (OUTPATIENT)
Facility: HOSPITAL | Age: 63
End: 2023-10-05

## 2023-10-05 DIAGNOSIS — N20.1 URETERAL STONE: Primary | ICD-10-CM

## 2023-10-05 DIAGNOSIS — N20.1 URETERAL STONE: ICD-10-CM

## 2023-10-05 PROCEDURE — 74176 CT ABD & PELVIS W/O CONTRAST: CPT

## 2023-10-06 NOTE — DISCHARGE SUMMARY
Hospitalist Discharge Summary    NAME: Joe Kruger   :  1960   MRN:  [id number]     DISCHARGE DIAGNOSIS:  Right proximal ureter calculus POA  causing minimal upstream hydroureter and hydronephrosis  - S/P Right ureteroscopy yesterday with holmium laser lithotripsy of her stone and placement of JJ stent  UTI POA     Diabetes: POA   Hypothyroidism: POA   Anxiety/Depression: POA  Seizure Disorder:  Hypertension:    CONSULTATIONS:  urology    Follow Up: Follow-up Information     Follow up With Specialties Details Why Contact Info    [address] [place] [address]  [address] [address]  [phone]      John Rosales DO Family Practice Schedule an appointment as soon as possible for a visit in 1 week  [address] [place] [address]  [phone]            Procedures: see electronic medical records for all procedures/Xrays and details which were not copied into this note but were reviewed prior to creation of Plan. Please follow-up tests/labs that are still pendin. None     PMH/SH reviewed - no change compared to H&P    DISCHARGE SUMMARY/HOSPITAL COURSE: for full details see H&P, daily progress notes, labs, consult notes. Briefly As Per HPI:  Johanna Del Valle is a 62 y.o. Morbidly obese female with h/o DJD, chronic pain, DM, anxiety, depression, seizures presented to ER with c/o above symptoms for last 5 days. Pain is on right side of abdomen, with chills, intractable N/V and loose BM. C/o dysuria and says she was diagnosed UTI on  and has been on antibiotics with no improvement. In ER she was afebrile, normal WBC. CT abd/pelvis showed There is a 9 mm calculus in the right proximal ureter causing minimal upstream hydroureter and hydronephrosis. Midline hypodense pelvic mass, unchanged from 2018 is of unclear etiology.  A top differential consideration is an ovarian cyst. Pt seen by urology, plan to take to OR in am for stent. The patient's hospital course was complicated by:  Right proximal ureter calculus POA  causing minimal upstream hydroureter and hydronephrosis  - S/P Right ureteroscopy yesterday with holmium laser lithotripsy of her stone and placement of JJ stent  -urology on board   - pt still c/o pain which is normal after post stent placement , will c./w prn percocet  And at discharge , to be renewed if needed in the nursing home   - No medical reason for persistent pain per urology   - Stent to be removed by urologist next week   - pyridium added to help with pain   - patient has low pain threshold   -      UTI POA  LORENZO   -will continue on Levaquin empirically against which klebsiella was sensitive.  -follow urine cx sent on 01/01/2019 showed pseudomonas se to Levaquin  - stop date levaquin on 01/06/2019       Diabetes: POA  - Blood sugars controlled   -check A1c 8.9,   - c/ w metformin      Hypothyroidism: POA  -continue home Levothyroxine     Anxiety/Depression: POA  Seizure Disorder:  -continue Wellbutrin, Seroquel and Topamax with prn Klonipin     Hypertension:  - bp wnl  , still  hold losartan because LORENZO which is resolved   -continue home Metoprolol Succ 25mg       Morbid Obesity    Patient with complex inpatient course. Please see all notes, labs, vitals, testing and procedures for details_______________________________________________________________________   Patient seen and examined by me on day of discharge. Pertinent findings are:  Gen: NAD   HEENT:NC/AT   Chest:CTAB   Cv:RRR s1s2 wnl , no RMG   Abd:NT ND   Neuro:AAOx3    See Discharge Instructions for further details. _______________________________________________________________________    Medications Reviewed:  Current Discharge Medication List      START taking these medications    Details   phenazopyridine (PYRIDIUM) 200 mg tablet Take 1 Tab by mouth three (3) times daily (after meals) for 10 days.   Qty: 30 Tab, Refills: 0      levoFLOXacin (LEVAQUIN) 750 mg tablet Take 1 Tab by mouth daily for 2 days. Qty: 2 Tab, Refills: 0      oxyCODONE-acetaminophen (PERCOCET) 5-325 mg per tablet Take 1 Tab by mouth every six (6) hours as needed for Pain. Max Daily Amount: 4 Tabs. Qty: 20 Tab, Refills: 0    Associated Diagnoses: Right ureteral stone         CONTINUE these medications which have CHANGED    Details   cyanocobalamin 2,500 mcg tab tablet Take 2 Tabs by mouth daily. Qty: 30 Tab, Refills: 0         CONTINUE these medications which have NOT CHANGED    Details   busPIRone (BUSPAR) 15 mg tablet Take 15 mg by mouth three (3) times daily. QUEtiapine (SEROQUEL) 400 mg tablet Take 600 mg by mouth nightly. nystatin, bulk, 15 billion unit powd 1 Units by Does Not Apply route three (3) times daily. To skin folds, ventral fold  Qty: 2 Each, Refills: 1      albuterol (PROVENTIL HFA, VENTOLIN HFA, PROAIR HFA) 90 mcg/actuation inhaler Take 2 Puffs by inhalation every four (4) hours as needed for Wheezing. Qty: 1 Inhaler, Refills: 0      LORazepam (ATIVAN) 1 mg tablet Take 1 mg by mouth every eight (8) hours as needed for Anxiety. Refills: 0      buPROPion XL (WELLBUTRIN XL) 150 mg tablet Take 300 mg by mouth nightly. prazosin (MINIPRESS) 1 mg capsule Take 4 mg by mouth nightly.      ergocalciferol (VITAMIN D2) 50,000 unit capsule Take 50,000 Units by mouth every seven (7) days. Twice a week Sunday and Wednesday      metFORMIN (GLUCOPHAGE) 500 mg tablet Take 1,000 mg by mouth daily (with dinner). estradiol (ESTRACE) 0.5 mg tablet Take 0.5 mg by mouth daily. terconazole (TERAZOL 7) 0.4 % vaginal cream Insert 1 Applicator into vagina nightly. has refill for 7 day supply      diclofenac potassium (CATAFLAM) 50 mg tablet Take 50 mg by mouth two (2) times a day. Patient takes 1 tablet twice daily for a week on, then a week off, then repeats. miconazole (MICOTIN) 2 % topical cream Apply 1 Each to affected area two (2) times a day.  Apply ointment to fungal infection under abdominal pannus and groin twice a day for 14 days. aspirin 81 mg chewable tablet Take 81 mg by mouth daily. gabapentin (NEURONTIN) 100 mg capsule Take 1 Cap by mouth three (3) times daily. Qty: 90 Cap, Refills: 1      levothyroxine (SYNTHROID) 112 mcg tablet Take 112 mcg by mouth Daily (before breakfast). glimepiride (AMARYL) 2 mg tablet Take 2 mg by mouth every morning. metoprolol succinate (TOPROL XL) 25 mg XL tablet Take 25 mg by mouth daily. omeprazole (PRILOSEC) 20 mg capsule Take 40 mg by mouth daily. topiramate (TOPAMAX) 200 mg tablet Take 200 mg by mouth nightly. simvastatin (ZOCOR) 20 mg tablet Take 20 mg by mouth nightly. STOP taking these medications       losartan (COZAAR) 25 mg tablet Comments:   Reason for Stopping:         potassium chloride (KLOR-CON M10) 10 mEq tablet Comments:   Reason for Stopping:             _______________________________________________________________________    Risk of deterioration: Moderate  ________________________________________________________________________    Disposition  SNF/LTC  ________________________________________________________________________    Care Plan discussed with:   Patient, Family, RN, Care Manager, Consultant  ________________________________________________________________________    Code Status: Full Code  ________________________________________________________________________    Total time spent in discharge (min): 35   ________________________________________________________________________    CDMP Checked: Yes    Signed: Arlene Hernandez MD    This note will not be viewable in [address] Olanzapine Counseling- I discussed with the patient the common side effects of olanzapine including but are not limited to: lack of energy, dry mouth, increased appetite, sleepiness, tremor, constipation, dizziness, changes in behavior, or restlessness.  Explained that teenagers are more likely to experience headaches, abdominal pain, pain in the arms or legs, tiredness, and sleepiness.  Serious side effects include but are not limited: increased risk of death in elderly patients who are confused, have memory loss, or dementia-related psychosis; hyperglycemia; increased cholesterol and triglycerides; and weight gain.

## 2023-12-28 ENCOUNTER — HOSPITAL ENCOUNTER (OUTPATIENT)
Facility: HOSPITAL | Age: 63
Discharge: HOME | End: 2023-12-28
Attending: INTERNAL MEDICINE
Payer: COMMERCIAL

## 2023-12-28 DIAGNOSIS — R93.89 ABNORMAL COMPUTERIZED AXIAL TOMOGRAPHY OF CHEST: ICD-10-CM

## 2023-12-28 PROCEDURE — 71250 CT THORAX DX C-: CPT

## 2024-04-30 ENCOUNTER — TRANSCRIBE ORDERS (OUTPATIENT)
Facility: HOSPITAL | Age: 64
End: 2024-04-30

## 2024-04-30 DIAGNOSIS — Z12.31 VISIT FOR SCREENING MAMMOGRAM: Primary | ICD-10-CM

## 2024-05-16 ENCOUNTER — HOSPITAL ENCOUNTER (OUTPATIENT)
Facility: HOSPITAL | Age: 64
Discharge: HOME | End: 2024-05-16
Payer: COMMERCIAL

## 2024-05-16 VITALS — HEIGHT: 63 IN | WEIGHT: 293 LBS | BODY MASS INDEX: 51.91 KG/M2

## 2024-05-16 DIAGNOSIS — Z12.31 VISIT FOR SCREENING MAMMOGRAM: ICD-10-CM

## 2024-05-16 PROCEDURE — 77067 SCR MAMMO BI INCL CAD: CPT

## 2024-07-05 ENCOUNTER — APPOINTMENT (OUTPATIENT)
Facility: HOSPITAL | Age: 64
End: 2024-07-05
Payer: COMMERCIAL

## 2024-07-05 ENCOUNTER — HOSPITAL ENCOUNTER (EMERGENCY)
Facility: HOSPITAL | Age: 64
Discharge: HOME | End: 2024-07-05
Payer: COMMERCIAL

## 2024-07-05 VITALS
RESPIRATION RATE: 19 BRPM | HEART RATE: 88 BPM | DIASTOLIC BLOOD PRESSURE: 76 MMHG | TEMPERATURE: 98.2 F | OXYGEN SATURATION: 96 % | SYSTOLIC BLOOD PRESSURE: 114 MMHG

## 2024-07-05 DIAGNOSIS — J18.9 PNEUMONIA DUE TO INFECTIOUS ORGANISM, UNSPECIFIED LATERALITY, UNSPECIFIED PART OF LUNG: ICD-10-CM

## 2024-07-05 DIAGNOSIS — G89.29 ACUTE EXACERBATION OF CHRONIC LOW BACK PAIN: ICD-10-CM

## 2024-07-05 DIAGNOSIS — J44.1 COPD EXACERBATION (HCC): Primary | ICD-10-CM

## 2024-07-05 DIAGNOSIS — M54.50 ACUTE EXACERBATION OF CHRONIC LOW BACK PAIN: ICD-10-CM

## 2024-07-05 LAB
ALBUMIN SERPL-MCNC: 3.5 G/DL (ref 3.5–5)
ALBUMIN/GLOB SERPL: 1.1 (ref 1.1–2.2)
ALP SERPL-CCNC: 62 U/L (ref 45–117)
ALT SERPL-CCNC: 20 U/L (ref 12–78)
ANION GAP SERPL CALC-SCNC: 5 MMOL/L (ref 5–15)
APPEARANCE UR: CLEAR
AST SERPL-CCNC: 15 U/L (ref 15–37)
BACTERIA URNS QL MICRO: NEGATIVE /HPF
BASOPHILS # BLD: 0.1 K/UL (ref 0–0.1)
BASOPHILS NFR BLD: 1 % (ref 0–1)
BILIRUB SERPL-MCNC: 0.2 MG/DL (ref 0.2–1)
BILIRUB UR QL: NEGATIVE
BUN SERPL-MCNC: 20 MG/DL (ref 6–20)
BUN/CREAT SERPL: 18 (ref 12–20)
CALCIUM SERPL-MCNC: 10.3 MG/DL (ref 8.5–10.1)
CHLORIDE SERPL-SCNC: 107 MMOL/L (ref 97–108)
CO2 SERPL-SCNC: 27 MMOL/L (ref 21–32)
COLOR UR: ABNORMAL
CREAT SERPL-MCNC: 1.1 MG/DL (ref 0.55–1.02)
DIFFERENTIAL METHOD BLD: ABNORMAL
EKG ATRIAL RATE: 105 BPM
EKG DIAGNOSIS: NORMAL
EKG P AXIS: 49 DEGREES
EKG P-R INTERVAL: 142 MS
EKG Q-T INTERVAL: 340 MS
EKG QRS DURATION: 78 MS
EKG QTC CALCULATION (BAZETT): 449 MS
EKG R AXIS: 1 DEGREES
EKG T AXIS: 22 DEGREES
EKG VENTRICULAR RATE: 105 BPM
EOSINOPHIL # BLD: 0.2 K/UL (ref 0–0.4)
EOSINOPHIL NFR BLD: 2 % (ref 0–7)
EPITH CASTS URNS QL MICRO: ABNORMAL /LPF
ERYTHROCYTE [DISTWIDTH] IN BLOOD BY AUTOMATED COUNT: 17.5 % (ref 11.5–14.5)
GLOBULIN SER CALC-MCNC: 3.3 G/DL (ref 2–4)
GLUCOSE SERPL-MCNC: 150 MG/DL (ref 65–100)
GLUCOSE UR STRIP.AUTO-MCNC: NEGATIVE MG/DL
HCT VFR BLD AUTO: 33.6 % (ref 35–47)
HGB BLD-MCNC: 9.4 G/DL (ref 11.5–16)
HGB UR QL STRIP: NEGATIVE
HYALINE CASTS URNS QL MICRO: ABNORMAL /LPF (ref 0–2)
IMM GRANULOCYTES # BLD AUTO: 0.1 K/UL (ref 0–0.04)
IMM GRANULOCYTES NFR BLD AUTO: 1 % (ref 0–0.5)
KETONES UR QL STRIP.AUTO: ABNORMAL MG/DL
LEUKOCYTE ESTERASE UR QL STRIP.AUTO: NEGATIVE
LYMPHOCYTES # BLD: 1.1 K/UL (ref 0.8–3.5)
LYMPHOCYTES NFR BLD: 14 % (ref 12–49)
MAGNESIUM SERPL-MCNC: 1.5 MG/DL (ref 1.6–2.4)
MCH RBC QN AUTO: 21.5 PG (ref 26–34)
MCHC RBC AUTO-ENTMCNC: 28 G/DL (ref 30–36.5)
MCV RBC AUTO: 76.9 FL (ref 80–99)
MONOCYTES # BLD: 0.7 K/UL (ref 0–1)
MONOCYTES NFR BLD: 9 % (ref 5–13)
NEUTS SEG # BLD: 5.4 K/UL (ref 1.8–8)
NEUTS SEG NFR BLD: 73 % (ref 32–75)
NITRITE UR QL STRIP.AUTO: NEGATIVE
NRBC # BLD: 0 K/UL (ref 0–0.01)
NRBC BLD-RTO: 0 PER 100 WBC
PH UR STRIP: 5.5 (ref 5–8)
PLATELET # BLD AUTO: 245 K/UL (ref 150–400)
PMV BLD AUTO: 10.3 FL (ref 8.9–12.9)
POTASSIUM SERPL-SCNC: 4.3 MMOL/L (ref 3.5–5.1)
PROT SERPL-MCNC: 6.8 G/DL (ref 6.4–8.2)
PROT UR STRIP-MCNC: NEGATIVE MG/DL
RBC # BLD AUTO: 4.37 M/UL (ref 3.8–5.2)
RBC #/AREA URNS HPF: ABNORMAL /HPF (ref 0–5)
RBC MORPH BLD: ABNORMAL
SODIUM SERPL-SCNC: 139 MMOL/L (ref 136–145)
SP GR UR REFRACTOMETRY: 1.02
URINE CULTURE IF INDICATED: ABNORMAL
UROBILINOGEN UR QL STRIP.AUTO: 1 EU/DL (ref 0.2–1)
WBC # BLD AUTO: 7.6 K/UL (ref 3.6–11)
WBC URNS QL MICRO: ABNORMAL /HPF (ref 0–4)

## 2024-07-05 PROCEDURE — 80053 COMPREHEN METABOLIC PANEL: CPT

## 2024-07-05 PROCEDURE — 6370000000 HC RX 637 (ALT 250 FOR IP)

## 2024-07-05 PROCEDURE — 96374 THER/PROPH/DIAG INJ IV PUSH: CPT

## 2024-07-05 PROCEDURE — 85025 COMPLETE CBC W/AUTO DIFF WBC: CPT

## 2024-07-05 PROCEDURE — 6360000002 HC RX W HCPCS

## 2024-07-05 PROCEDURE — 74176 CT ABD & PELVIS W/O CONTRAST: CPT

## 2024-07-05 PROCEDURE — 36415 COLL VENOUS BLD VENIPUNCTURE: CPT

## 2024-07-05 PROCEDURE — 96376 TX/PRO/DX INJ SAME DRUG ADON: CPT

## 2024-07-05 PROCEDURE — 2580000003 HC RX 258

## 2024-07-05 PROCEDURE — 99285 EMERGENCY DEPT VISIT HI MDM: CPT

## 2024-07-05 PROCEDURE — 51701 INSERT BLADDER CATHETER: CPT

## 2024-07-05 PROCEDURE — 81001 URINALYSIS AUTO W/SCOPE: CPT

## 2024-07-05 PROCEDURE — 2500000003 HC RX 250 WO HCPCS

## 2024-07-05 PROCEDURE — 83735 ASSAY OF MAGNESIUM: CPT

## 2024-07-05 PROCEDURE — 96375 TX/PRO/DX INJ NEW DRUG ADDON: CPT

## 2024-07-05 PROCEDURE — 71045 X-RAY EXAM CHEST 1 VIEW: CPT

## 2024-07-05 RX ORDER — HYDROMORPHONE HYDROCHLORIDE 1 MG/ML
1 INJECTION, SOLUTION INTRAMUSCULAR; INTRAVENOUS; SUBCUTANEOUS
Status: COMPLETED | OUTPATIENT
Start: 2024-07-05 | End: 2024-07-05

## 2024-07-05 RX ORDER — IPRATROPIUM BROMIDE AND ALBUTEROL SULFATE 2.5; .5 MG/3ML; MG/3ML
1 SOLUTION RESPIRATORY (INHALATION)
Status: COMPLETED | OUTPATIENT
Start: 2024-07-05 | End: 2024-07-05

## 2024-07-05 RX ORDER — HYDROMORPHONE HYDROCHLORIDE 1 MG/ML
0.5 INJECTION, SOLUTION INTRAMUSCULAR; INTRAVENOUS; SUBCUTANEOUS
Status: COMPLETED | OUTPATIENT
Start: 2024-07-05 | End: 2024-07-05

## 2024-07-05 RX ORDER — AZITHROMYCIN 250 MG/1
250 TABLET, FILM COATED ORAL DAILY
Qty: 4 TABLET | Refills: 0 | Status: SHIPPED | OUTPATIENT
Start: 2024-07-05 | End: 2024-07-09

## 2024-07-05 RX ORDER — AMOXICILLIN AND CLAVULANATE POTASSIUM 875; 125 MG/1; MG/1
1 TABLET, FILM COATED ORAL 2 TIMES DAILY
Qty: 10 TABLET | Refills: 0 | Status: SHIPPED | OUTPATIENT
Start: 2024-07-05 | End: 2024-07-10

## 2024-07-05 RX ORDER — PREDNISONE 20 MG/1
40 TABLET ORAL DAILY
Qty: 8 TABLET | Refills: 0 | Status: SHIPPED | OUTPATIENT
Start: 2024-07-05 | End: 2024-07-09

## 2024-07-05 RX ORDER — AZITHROMYCIN 250 MG/1
500 TABLET, FILM COATED ORAL ONCE
Status: COMPLETED | OUTPATIENT
Start: 2024-07-05 | End: 2024-07-05

## 2024-07-05 RX ADMIN — AZITHROMYCIN 500 MG: 250 TABLET, FILM COATED ORAL at 17:03

## 2024-07-05 RX ADMIN — HYDROMORPHONE HYDROCHLORIDE 0.5 MG: 1 INJECTION, SOLUTION INTRAMUSCULAR; INTRAVENOUS; SUBCUTANEOUS at 17:47

## 2024-07-05 RX ADMIN — HYDROMORPHONE HYDROCHLORIDE 1 MG: 1 INJECTION, SOLUTION INTRAMUSCULAR; INTRAVENOUS; SUBCUTANEOUS at 14:55

## 2024-07-05 RX ADMIN — IPRATROPIUM BROMIDE AND ALBUTEROL SULFATE 1 DOSE: .5; 3 SOLUTION RESPIRATORY (INHALATION) at 15:22

## 2024-07-05 RX ADMIN — WATER 125 MG: 1 INJECTION INTRAMUSCULAR; INTRAVENOUS; SUBCUTANEOUS at 15:20

## 2024-07-05 ASSESSMENT — LIFESTYLE VARIABLES
HOW MANY STANDARD DRINKS CONTAINING ALCOHOL DO YOU HAVE ON A TYPICAL DAY: PATIENT DOES NOT DRINK
HOW OFTEN DO YOU HAVE A DRINK CONTAINING ALCOHOL: NEVER

## 2024-07-05 ASSESSMENT — PAIN DESCRIPTION - ORIENTATION
ORIENTATION: MID;LOWER
ORIENTATION: LOWER;MID

## 2024-07-05 ASSESSMENT — PAIN DESCRIPTION - DESCRIPTORS
DESCRIPTORS: STABBING;SHARP
DESCRIPTORS: STABBING;SHARP

## 2024-07-05 ASSESSMENT — PAIN DESCRIPTION - LOCATION
LOCATION: BACK
LOCATION: BACK

## 2024-07-05 ASSESSMENT — PAIN SCALES - GENERAL
PAINLEVEL_OUTOF10: 10
PAINLEVEL_OUTOF10: 10

## 2024-07-07 NOTE — ED PROVIDER NOTES
[facility] EMERGENCY DEPT  EMERGENCY DEPARTMENT ENCOUNTER       Pt Name: Isabela Beckham  MRN: [id number]  Birthdate 1960  Date of Evaluation: 7/5/2024  Provider: NICOLAS Maldonado - REGINALDO   PCP: Alfonso Guadalupe DO  Note Started: 7:45 PM 7/7/24     CHIEF COMPLAINT       Chief Complaint   Patient presents with    Back Pain     Lower back pain, denies injuiry or fall    Flank Pain     Left side flank pain, increased urination    Shortness of Breath     Onset worse over the last few day, using inhaler more without relief        HISTORY OF PRESENT ILLNESS: 1 or more elements      History From: Patient  HPI Limitations None     Isabela Beckham is a 64 y.o. female with PMHx of anxiety, depression, COPD, chronic pain, diabetes, GERD, hypertension, hypothyroidism, kidney stone, liver disease, and seizures who presents to the ED today for complaints of chills, SOB, and increased yellow sputum production.  Patient states it also sometimes hurts to breathe and feels like when she has had pneumonia in the past.  Patient has been using her inhaler more frequently.  Denies recent travel, surgeries, immobilization.  Patient also states she has developed some left flank pain with increased urination and is concerned she may be having a UTI.  Denies bending, lifting, twisting.     See MDM Documentation for further HPI and PMHx     Nursing Notes were all reviewed and agreed with or any disagreements were addressed in the HPI.     REVIEW OF SYSTEMS      Review of Systems     Positives and Pertinent negatives as per HPI.    PAST HISTORY     Past Medical History:  Past Medical History:   Diagnosis Date    Adverse effect of anesthesia     slow to wake up       Anxiety and depression     Arrhythmia     Arthritis     Chronic obstructive pulmonary disease (HCC)     Chronic pain     djd    Diabetes (HCC)     GERD (gastroesophageal reflux disease)     Hypertension     Hypothyroidism     Kidney stone     Liver disease     Obesity,

## 2024-07-11 LAB
EKG ATRIAL RATE: 105 BPM
EKG DIAGNOSIS: NORMAL
EKG P AXIS: 49 DEGREES
EKG P-R INTERVAL: 142 MS
EKG Q-T INTERVAL: 340 MS
EKG QRS DURATION: 78 MS
EKG QTC CALCULATION (BAZETT): 449 MS
EKG R AXIS: 1 DEGREES
EKG T AXIS: 22 DEGREES
EKG VENTRICULAR RATE: 105 BPM

## 2024-08-22 ENCOUNTER — HOSPITAL ENCOUNTER (EMERGENCY)
Facility: HOSPITAL | Age: 64
Discharge: HOME | End: 2024-08-23
Attending: EMERGENCY MEDICINE
Payer: COMMERCIAL

## 2024-08-22 ENCOUNTER — APPOINTMENT (OUTPATIENT)
Facility: HOSPITAL | Age: 64
End: 2024-08-22
Payer: COMMERCIAL

## 2024-08-22 DIAGNOSIS — M54.6 ACUTE BILATERAL THORACIC BACK PAIN: Primary | ICD-10-CM

## 2024-08-22 LAB
ALBUMIN SERPL-MCNC: 3 G/DL (ref 3.5–5)
ALBUMIN/GLOB SERPL: 0.8 (ref 1.1–2.2)
ALP SERPL-CCNC: 68 U/L (ref 45–117)
ALT SERPL-CCNC: 20 U/L (ref 12–78)
ANION GAP SERPL CALC-SCNC: 6 MMOL/L (ref 5–15)
AST SERPL-CCNC: 17 U/L (ref 15–37)
BASOPHILS # BLD: 0.1 K/UL (ref 0–0.1)
BASOPHILS NFR BLD: 1 % (ref 0–1)
BILIRUB SERPL-MCNC: 0.3 MG/DL (ref 0.2–1)
BUN SERPL-MCNC: 26 MG/DL (ref 6–20)
BUN/CREAT SERPL: 27 (ref 12–20)
CALCIUM SERPL-MCNC: 9.9 MG/DL (ref 8.5–10.1)
CHLORIDE SERPL-SCNC: 106 MMOL/L (ref 97–108)
CO2 SERPL-SCNC: 24 MMOL/L (ref 21–32)
CREAT SERPL-MCNC: 0.97 MG/DL (ref 0.55–1.02)
DIFFERENTIAL METHOD BLD: ABNORMAL
EOSINOPHIL # BLD: 0.1 K/UL (ref 0–0.4)
EOSINOPHIL NFR BLD: 1 % (ref 0–7)
ERYTHROCYTE [DISTWIDTH] IN BLOOD BY AUTOMATED COUNT: 18.6 % (ref 11.5–14.5)
GLOBULIN SER CALC-MCNC: 3.8 G/DL (ref 2–4)
GLUCOSE SERPL-MCNC: 200 MG/DL (ref 65–100)
HCT VFR BLD AUTO: 34.5 % (ref 35–47)
HGB BLD-MCNC: 9.9 G/DL (ref 11.5–16)
IMM GRANULOCYTES # BLD AUTO: 0.1 K/UL (ref 0–0.04)
IMM GRANULOCYTES NFR BLD AUTO: 1 % (ref 0–0.5)
LYMPHOCYTES # BLD: 1.7 K/UL (ref 0.8–3.5)
LYMPHOCYTES NFR BLD: 15 % (ref 12–49)
MAGNESIUM SERPL-MCNC: 1.4 MG/DL (ref 1.6–2.4)
MCH RBC QN AUTO: 21.5 PG (ref 26–34)
MCHC RBC AUTO-ENTMCNC: 28.7 G/DL (ref 30–36.5)
MCV RBC AUTO: 75 FL (ref 80–99)
MONOCYTES # BLD: 0.9 K/UL (ref 0–1)
MONOCYTES NFR BLD: 8 % (ref 5–13)
NEUTS SEG # BLD: 8.7 K/UL (ref 1.8–8)
NEUTS SEG NFR BLD: 74 % (ref 32–75)
NRBC # BLD: 0.02 K/UL (ref 0–0.01)
NRBC BLD-RTO: 0.2 PER 100 WBC
PLATELET # BLD AUTO: 296 K/UL (ref 150–400)
PMV BLD AUTO: 10.6 FL (ref 8.9–12.9)
POTASSIUM SERPL-SCNC: 4.4 MMOL/L (ref 3.5–5.1)
PROT SERPL-MCNC: 6.8 G/DL (ref 6.4–8.2)
RBC # BLD AUTO: 4.6 M/UL (ref 3.8–5.2)
RBC MORPH BLD: ABNORMAL
SODIUM SERPL-SCNC: 136 MMOL/L (ref 136–145)
TROPONIN I SERPL HS-MCNC: 4 NG/L (ref 0–51)
TROPONIN I SERPL HS-MCNC: 6 NG/L (ref 0–51)
WBC # BLD AUTO: 11.6 K/UL (ref 3.6–11)

## 2024-08-22 PROCEDURE — 71046 X-RAY EXAM CHEST 2 VIEWS: CPT

## 2024-08-22 PROCEDURE — 84484 ASSAY OF TROPONIN QUANT: CPT

## 2024-08-22 PROCEDURE — 6370000000 HC RX 637 (ALT 250 FOR IP)

## 2024-08-22 PROCEDURE — 6360000004 HC RX CONTRAST MEDICATION

## 2024-08-22 PROCEDURE — 2700000000 HC OXYGEN THERAPY PER DAY

## 2024-08-22 PROCEDURE — 94664 DEMO&/EVAL PT USE INHALER: CPT

## 2024-08-22 PROCEDURE — 6360000002 HC RX W HCPCS

## 2024-08-22 PROCEDURE — 94640 AIRWAY INHALATION TREATMENT: CPT

## 2024-08-22 PROCEDURE — 80053 COMPREHEN METABOLIC PANEL: CPT

## 2024-08-22 PROCEDURE — 93005 ELECTROCARDIOGRAM TRACING: CPT | Performed by: EMERGENCY MEDICINE

## 2024-08-22 PROCEDURE — 99285 EMERGENCY DEPT VISIT HI MDM: CPT

## 2024-08-22 PROCEDURE — 6370000000 HC RX 637 (ALT 250 FOR IP): Performed by: EMERGENCY MEDICINE

## 2024-08-22 PROCEDURE — 85025 COMPLETE CBC W/AUTO DIFF WBC: CPT

## 2024-08-22 PROCEDURE — 36415 COLL VENOUS BLD VENIPUNCTURE: CPT

## 2024-08-22 PROCEDURE — 71275 CT ANGIOGRAPHY CHEST: CPT

## 2024-08-22 PROCEDURE — 83735 ASSAY OF MAGNESIUM: CPT

## 2024-08-22 RX ORDER — OXYCODONE AND ACETAMINOPHEN 5; 325 MG/1; MG/1
1 TABLET ORAL EVERY 6 HOURS PRN
Qty: 6 TABLET | Refills: 0 | Status: SHIPPED | OUTPATIENT
Start: 2024-08-22 | End: 2024-08-25

## 2024-08-22 RX ORDER — OXYCODONE AND ACETAMINOPHEN 5; 325 MG/1; MG/1
1 TABLET ORAL ONCE
Status: COMPLETED | OUTPATIENT
Start: 2024-08-22 | End: 2024-08-22

## 2024-08-22 RX ORDER — ALBUTEROL SULFATE 0.83 MG/ML
2.5 SOLUTION RESPIRATORY (INHALATION)
Status: COMPLETED | OUTPATIENT
Start: 2024-08-22 | End: 2024-08-22

## 2024-08-22 RX ORDER — HYDROCODONE BITARTRATE AND ACETAMINOPHEN 5; 325 MG/1; MG/1
1 TABLET ORAL
Status: COMPLETED | OUTPATIENT
Start: 2024-08-22 | End: 2024-08-22

## 2024-08-22 RX ORDER — KETOROLAC TROMETHAMINE 30 MG/ML
15 INJECTION, SOLUTION INTRAMUSCULAR; INTRAVENOUS
Status: DISCONTINUED | OUTPATIENT
Start: 2024-08-22 | End: 2024-08-23 | Stop reason: HOSPADM

## 2024-08-22 RX ORDER — IOPAMIDOL 755 MG/ML
100 INJECTION, SOLUTION INTRAVASCULAR
Status: COMPLETED | OUTPATIENT
Start: 2024-08-22 | End: 2024-08-22

## 2024-08-22 RX ADMIN — ALBUTEROL SULFATE 2.5 MG: 2.5 SOLUTION RESPIRATORY (INHALATION) at 20:09

## 2024-08-22 RX ADMIN — HYDROCODONE BITARTRATE AND ACETAMINOPHEN 1 TABLET: 5; 325 TABLET ORAL at 20:09

## 2024-08-22 RX ADMIN — OXYCODONE HYDROCHLORIDE AND ACETAMINOPHEN 1 TABLET: 5; 325 TABLET ORAL at 22:15

## 2024-08-22 RX ADMIN — IOPAMIDOL 100 ML: 755 INJECTION, SOLUTION INTRAVENOUS at 21:26

## 2024-08-22 ASSESSMENT — PAIN SCALES - GENERAL
PAINLEVEL_OUTOF10: 10

## 2024-08-22 ASSESSMENT — PAIN DESCRIPTION - ORIENTATION: ORIENTATION: RIGHT;LEFT

## 2024-08-22 ASSESSMENT — PAIN DESCRIPTION - ONSET: ONSET: ON-GOING

## 2024-08-22 ASSESSMENT — PAIN DESCRIPTION - PAIN TYPE: TYPE: ACUTE PAIN

## 2024-08-22 ASSESSMENT — PAIN DESCRIPTION - FREQUENCY: FREQUENCY: CONTINUOUS

## 2024-08-22 ASSESSMENT — PAIN DESCRIPTION - DESCRIPTORS: DESCRIPTORS: SHARP

## 2024-08-22 ASSESSMENT — PAIN DESCRIPTION - LOCATION
LOCATION: CHEST
LOCATION: OTHER (COMMENT)

## 2024-08-22 ASSESSMENT — PAIN - FUNCTIONAL ASSESSMENT: PAIN_FUNCTIONAL_ASSESSMENT: 0-10

## 2024-08-22 NOTE — ED PROVIDER NOTES
[facility] EMERGENCY DEPT  EMERGENCY DEPARTMENT ENCOUNTER       Pt Name: Isabela Beckham  MRN: [id number]  Birthdate 1960  Date of evaluation: 8/22/2024  Provider: Genaro Lawrence DO   PCP: Alfonso Guadalupe DO  Note Started: 7:24 PM EDT 8/22/24  Attending Physician: Carson Casas DO    CHIEF COMPLAINT       Chief Complaint   Patient presents with    Shortness of Breath     Wheeled to triage with c/o shortness of breath, on 3L NC at baseline d/t COPD. States shortness of breath getting worse, seen by pulmonology Friday and prescribed prednisone, antibiotics, taking all regular meds and breathing treatments, nothing helping.        HISTORY OF PRESENT ILLNESS: 1 or more elements      History From: Patient, History limited by: none     Isabela Beckham is a 64 y.o. female with past medical history of COPD on 3 L NC at baseline, hypertension, diabetes, obesity, and fibromyalgia presents emergency department due to shortness of breath.  Patient states for the past few days she has been having pleuritic pains in her back when she takes a deep breath.  She also states she is coughing up thick yellow sputum which is abnormal for her.  She endorses increased use of her nebulizers and inhalers but states she has not had to increase her home oxygen.  She denies fever but endorses chills, denies chest pain, abdominal pain, vomiting/diarrhea.  She states she was seen by her pulmonologist today who started her on couple antibiotics and steroids.  She states these have not helped so therefore she went to present to the emergency department for further evaluation and out of concern for possible pneumonia.       Please See MDM for Additional Details of the HPI/PMH  Nursing Notes were all reviewed and agreed with or any disagreements were addressed in the HPI.     REVIEW OF SYSTEMS        Positives and Pertinent negatives as per HPI.    PAST HISTORY     Past Medical History:  Past Medical History:   Diagnosis Date    Adverse effect    simvastatin 20 MG tablet  Commonly known as: ZOCOR     tamsulosin 0.4 MG capsule  Commonly known as: FLOMAX     topiramate 200 MG tablet  Commonly known as: TOPAMAX     triamcinolone 0.1 % ointment  Commonly known as: KENALOG     valsartan 80 MG tablet  Commonly known as: DIOVAN                DISCONTINUED MEDICATIONS:  Current Discharge Medication List          Case discussed with attending physician of record above.  Genaro Lawrence DO (electronically signed)    (Please note that parts of this dictation were completed with voice recognition software. Quite often unanticipated grammatical, syntax, homophones, and other interpretive errors are inadvertently transcribed by the computer software. Please disregards these errors. Please excuse any errors that have escaped final proofreading.)

## 2024-08-23 VITALS
WEIGHT: 293 LBS | RESPIRATION RATE: 17 BRPM | DIASTOLIC BLOOD PRESSURE: 96 MMHG | SYSTOLIC BLOOD PRESSURE: 107 MMHG | TEMPERATURE: 98.2 F | HEIGHT: 62 IN | HEART RATE: 75 BPM | BODY MASS INDEX: 53.92 KG/M2 | OXYGEN SATURATION: 100 %

## 2024-08-24 LAB
EKG ATRIAL RATE: 86 BPM
EKG DIAGNOSIS: NORMAL
EKG P AXIS: 36 DEGREES
EKG P-R INTERVAL: 144 MS
EKG Q-T INTERVAL: 354 MS
EKG QRS DURATION: 80 MS
EKG QTC CALCULATION (BAZETT): 423 MS
EKG R AXIS: 32 DEGREES
EKG T AXIS: 12 DEGREES
EKG VENTRICULAR RATE: 86 BPM

## 2024-12-28 ENCOUNTER — HOSPITAL ENCOUNTER (INPATIENT)
Facility: HOSPITAL | Age: 64
LOS: 3 days | Discharge: NURSING, REHAB OR LONG-TERM CARE | DRG: 189 | End: 2024-12-31
Attending: FAMILY MEDICINE | Admitting: HOSPITALIST
Payer: COMMERCIAL

## 2024-12-28 DIAGNOSIS — R06.02 SHORTNESS OF BREATH: ICD-10-CM

## 2024-12-28 DIAGNOSIS — M54.9 INTRACTABLE BACK PAIN: Primary | ICD-10-CM

## 2024-12-28 LAB
ARTERIAL PATENCY WRIST A: POSITIVE
BASE DEFICIT BLD-SCNC: 2.7 MMOL/L
BDY SITE: ABNORMAL
GAS FLOW.O2 O2 DELIVERY SYS: ABNORMAL
GLUCOSE BLD STRIP.AUTO-MCNC: 262 MG/DL (ref 65–117)
GLUCOSE BLD STRIP.AUTO-MCNC: 321 MG/DL (ref 65–117)
HCO3 BLD-SCNC: 22.5 MMOL/L (ref 21–28)
O2/TOTAL GAS SETTING VFR VENT: 4 %
PCO2 BLD: 39.5 MMHG (ref 35–48)
PH BLD: 7.36 (ref 7.35–7.45)
PO2 BLD: 129 MMHG (ref 83–108)
SAO2 % BLD: 98.8 % (ref 92–97)
SERVICE CMNT-IMP: ABNORMAL
SERVICE CMNT-IMP: ABNORMAL
SPECIMEN TYPE: ABNORMAL

## 2024-12-28 PROCEDURE — 36600 WITHDRAWAL OF ARTERIAL BLOOD: CPT

## 2024-12-28 PROCEDURE — 6370000000 HC RX 637 (ALT 250 FOR IP): Performed by: HOSPITALIST

## 2024-12-28 PROCEDURE — 2700000000 HC OXYGEN THERAPY PER DAY

## 2024-12-28 PROCEDURE — 82803 BLOOD GASES ANY COMBINATION: CPT

## 2024-12-28 PROCEDURE — 94640 AIRWAY INHALATION TREATMENT: CPT

## 2024-12-28 PROCEDURE — 2500000003 HC RX 250 WO HCPCS: Performed by: HOSPITALIST

## 2024-12-28 PROCEDURE — 6360000002 HC RX W HCPCS: Performed by: HOSPITALIST

## 2024-12-28 PROCEDURE — 82962 GLUCOSE BLOOD TEST: CPT

## 2024-12-28 PROCEDURE — 1200000000 HC SEMI PRIVATE

## 2024-12-28 RX ORDER — ONDANSETRON 4 MG/1
4 TABLET, ORALLY DISINTEGRATING ORAL EVERY 8 HOURS PRN
Status: DISCONTINUED | OUTPATIENT
Start: 2024-12-28 | End: 2024-12-31 | Stop reason: HOSPADM

## 2024-12-28 RX ORDER — LEVOTHYROXINE SODIUM 112 UG/1
112 TABLET ORAL
Status: DISCONTINUED | OUTPATIENT
Start: 2024-12-29 | End: 2024-12-31 | Stop reason: HOSPADM

## 2024-12-28 RX ORDER — ACETAMINOPHEN 650 MG/1
650 SUPPOSITORY RECTAL EVERY 6 HOURS PRN
Status: DISCONTINUED | OUTPATIENT
Start: 2024-12-28 | End: 2024-12-31 | Stop reason: HOSPADM

## 2024-12-28 RX ORDER — ENOXAPARIN SODIUM 100 MG/ML
40 INJECTION SUBCUTANEOUS DAILY
Status: DISCONTINUED | OUTPATIENT
Start: 2024-12-28 | End: 2024-12-29

## 2024-12-28 RX ORDER — ARFORMOTEROL TARTRATE 15 UG/2ML
15 SOLUTION RESPIRATORY (INHALATION)
Status: DISCONTINUED | OUTPATIENT
Start: 2024-12-28 | End: 2024-12-31 | Stop reason: HOSPADM

## 2024-12-28 RX ORDER — BUPROPION HYDROCHLORIDE 150 MG/1
300 TABLET ORAL DAILY
Status: DISCONTINUED | OUTPATIENT
Start: 2024-12-28 | End: 2024-12-31 | Stop reason: HOSPADM

## 2024-12-28 RX ORDER — SODIUM CHLORIDE 9 MG/ML
INJECTION, SOLUTION INTRAVENOUS PRN
Status: DISCONTINUED | OUTPATIENT
Start: 2024-12-28 | End: 2024-12-31 | Stop reason: HOSPADM

## 2024-12-28 RX ORDER — NALOXONE HYDROCHLORIDE 0.4 MG/ML
0.4 INJECTION, SOLUTION INTRAMUSCULAR; INTRAVENOUS; SUBCUTANEOUS PRN
Status: DISCONTINUED | OUTPATIENT
Start: 2024-12-28 | End: 2024-12-31 | Stop reason: HOSPADM

## 2024-12-28 RX ORDER — METOPROLOL SUCCINATE 25 MG/1
25 TABLET, EXTENDED RELEASE ORAL DAILY
Status: DISCONTINUED | OUTPATIENT
Start: 2024-12-28 | End: 2024-12-31 | Stop reason: HOSPADM

## 2024-12-28 RX ORDER — HYDROMORPHONE HYDROCHLORIDE 1 MG/ML
1 INJECTION, SOLUTION INTRAMUSCULAR; INTRAVENOUS; SUBCUTANEOUS EVERY 6 HOURS PRN
Status: DISCONTINUED | OUTPATIENT
Start: 2024-12-28 | End: 2024-12-31 | Stop reason: HOSPADM

## 2024-12-28 RX ORDER — VALSARTAN 160 MG/1
80 TABLET ORAL DAILY
Status: DISCONTINUED | OUTPATIENT
Start: 2024-12-28 | End: 2024-12-31 | Stop reason: HOSPADM

## 2024-12-28 RX ORDER — ATORVASTATIN CALCIUM 10 MG/1
10 TABLET, FILM COATED ORAL DAILY
Status: DISCONTINUED | OUTPATIENT
Start: 2024-12-28 | End: 2024-12-31 | Stop reason: HOSPADM

## 2024-12-28 RX ORDER — SODIUM CHLORIDE 0.9 % (FLUSH) 0.9 %
5-40 SYRINGE (ML) INJECTION PRN
Status: DISCONTINUED | OUTPATIENT
Start: 2024-12-28 | End: 2024-12-31 | Stop reason: HOSPADM

## 2024-12-28 RX ORDER — IPRATROPIUM BROMIDE AND ALBUTEROL SULFATE 2.5; .5 MG/3ML; MG/3ML
1 SOLUTION RESPIRATORY (INHALATION)
Status: COMPLETED | OUTPATIENT
Start: 2024-12-28 | End: 2024-12-29

## 2024-12-28 RX ORDER — PRAZOSIN HYDROCHLORIDE 1 MG/1
3 CAPSULE ORAL NIGHTLY
Status: DISCONTINUED | OUTPATIENT
Start: 2024-12-28 | End: 2024-12-31 | Stop reason: HOSPADM

## 2024-12-28 RX ORDER — HYDROCODONE BITARTRATE AND ACETAMINOPHEN 5; 325 MG/1; MG/1
1 TABLET ORAL EVERY 6 HOURS PRN
Status: DISCONTINUED | OUTPATIENT
Start: 2024-12-28 | End: 2024-12-28 | Stop reason: SDUPTHER

## 2024-12-28 RX ORDER — GUAIFENESIN 600 MG/1
600 TABLET, EXTENDED RELEASE ORAL 2 TIMES DAILY
Status: DISCONTINUED | OUTPATIENT
Start: 2024-12-28 | End: 2024-12-31 | Stop reason: HOSPADM

## 2024-12-28 RX ORDER — HYDROCODONE BITARTRATE AND ACETAMINOPHEN 5; 325 MG/1; MG/1
1 TABLET ORAL EVERY 6 HOURS PRN
Status: DISCONTINUED | OUTPATIENT
Start: 2024-12-28 | End: 2024-12-31 | Stop reason: HOSPADM

## 2024-12-28 RX ORDER — BUDESONIDE 0.5 MG/2ML
0.5 INHALANT ORAL
Status: DISCONTINUED | OUTPATIENT
Start: 2024-12-28 | End: 2024-12-31 | Stop reason: HOSPADM

## 2024-12-28 RX ORDER — ERGOCALCIFEROL 1.25 MG/1
50000 CAPSULE ORAL
Status: DISCONTINUED | OUTPATIENT
Start: 2024-12-29 | End: 2024-12-31 | Stop reason: HOSPADM

## 2024-12-28 RX ORDER — SODIUM CHLORIDE 0.9 % (FLUSH) 0.9 %
5-40 SYRINGE (ML) INJECTION EVERY 12 HOURS SCHEDULED
Status: DISCONTINUED | OUTPATIENT
Start: 2024-12-28 | End: 2024-12-31 | Stop reason: HOSPADM

## 2024-12-28 RX ORDER — GABAPENTIN 100 MG/1
100 CAPSULE ORAL 3 TIMES DAILY
Status: DISCONTINUED | OUTPATIENT
Start: 2024-12-28 | End: 2024-12-31 | Stop reason: HOSPADM

## 2024-12-28 RX ORDER — INSULIN GLARGINE 100 [IU]/ML
30 INJECTION, SOLUTION SUBCUTANEOUS NIGHTLY
Status: DISCONTINUED | OUTPATIENT
Start: 2024-12-28 | End: 2024-12-29

## 2024-12-28 RX ORDER — ASPIRIN 81 MG/1
81 TABLET, CHEWABLE ORAL DAILY
Status: DISCONTINUED | OUTPATIENT
Start: 2024-12-28 | End: 2024-12-31 | Stop reason: HOSPADM

## 2024-12-28 RX ORDER — ACETAMINOPHEN 325 MG/1
650 TABLET ORAL EVERY 6 HOURS PRN
Status: DISCONTINUED | OUTPATIENT
Start: 2024-12-28 | End: 2024-12-31 | Stop reason: HOSPADM

## 2024-12-28 RX ORDER — POLYETHYLENE GLYCOL 3350 17 G/17G
17 POWDER, FOR SOLUTION ORAL DAILY PRN
Status: DISCONTINUED | OUTPATIENT
Start: 2024-12-28 | End: 2024-12-31 | Stop reason: HOSPADM

## 2024-12-28 RX ORDER — INSULIN LISPRO 100 [IU]/ML
0-8 INJECTION, SOLUTION INTRAVENOUS; SUBCUTANEOUS
Status: DISCONTINUED | OUTPATIENT
Start: 2024-12-28 | End: 2024-12-31 | Stop reason: HOSPADM

## 2024-12-28 RX ORDER — INSULIN GLARGINE 100 [IU]/ML
20 INJECTION, SOLUTION SUBCUTANEOUS NIGHTLY
Status: DISCONTINUED | OUTPATIENT
Start: 2024-12-28 | End: 2024-12-28

## 2024-12-28 RX ORDER — MONTELUKAST SODIUM 10 MG/1
10 TABLET ORAL DAILY
Status: DISCONTINUED | OUTPATIENT
Start: 2024-12-28 | End: 2024-12-31 | Stop reason: HOSPADM

## 2024-12-28 RX ORDER — TOPIRAMATE 100 MG/1
200 TABLET, FILM COATED ORAL NIGHTLY
Status: DISCONTINUED | OUTPATIENT
Start: 2024-12-28 | End: 2024-12-31 | Stop reason: HOSPADM

## 2024-12-28 RX ORDER — ONDANSETRON 2 MG/ML
4 INJECTION INTRAMUSCULAR; INTRAVENOUS EVERY 6 HOURS PRN
Status: DISCONTINUED | OUTPATIENT
Start: 2024-12-28 | End: 2024-12-31 | Stop reason: HOSPADM

## 2024-12-28 RX ADMIN — INSULIN LISPRO 6 UNITS: 100 INJECTION, SOLUTION INTRAVENOUS; SUBCUTANEOUS at 21:03

## 2024-12-28 RX ADMIN — WATER 40 MG: 1 INJECTION INTRAMUSCULAR; INTRAVENOUS; SUBCUTANEOUS at 21:04

## 2024-12-28 RX ADMIN — METOPROLOL SUCCINATE 25 MG: 25 TABLET, EXTENDED RELEASE ORAL at 16:30

## 2024-12-28 RX ADMIN — ONDANSETRON 4 MG: 2 INJECTION INTRAMUSCULAR; INTRAVENOUS at 16:48

## 2024-12-28 RX ADMIN — GUAIFENESIN 600 MG: 600 TABLET, EXTENDED RELEASE ORAL at 21:04

## 2024-12-28 RX ADMIN — HYDROMORPHONE HYDROCHLORIDE 1 MG: 1 INJECTION, SOLUTION INTRAMUSCULAR; INTRAVENOUS; SUBCUTANEOUS at 16:42

## 2024-12-28 RX ADMIN — BUSPIRONE HYDROCHLORIDE 15 MG: 10 TABLET ORAL at 16:29

## 2024-12-28 RX ADMIN — ATORVASTATIN CALCIUM 10 MG: 10 TABLET, FILM COATED ORAL at 16:30

## 2024-12-28 RX ADMIN — INSULIN LISPRO 4 UNITS: 100 INJECTION, SOLUTION INTRAVENOUS; SUBCUTANEOUS at 16:43

## 2024-12-28 RX ADMIN — ARFORMOTEROL TARTRATE 15 MCG: 15 SOLUTION RESPIRATORY (INHALATION) at 21:01

## 2024-12-28 RX ADMIN — BUSPIRONE HYDROCHLORIDE 15 MG: 10 TABLET ORAL at 21:03

## 2024-12-28 RX ADMIN — INSULIN GLARGINE 30 UNITS: 100 INJECTION, SOLUTION SUBCUTANEOUS at 21:03

## 2024-12-28 RX ADMIN — BUDESONIDE 500 MCG: 0.5 INHALANT RESPIRATORY (INHALATION) at 21:01

## 2024-12-28 RX ADMIN — HYDROCODONE BITARTRATE AND ACETAMINOPHEN 1 TABLET: 5; 325 TABLET ORAL at 21:04

## 2024-12-28 RX ADMIN — TOPIRAMATE 200 MG: 100 TABLET, FILM COATED ORAL at 21:04

## 2024-12-28 RX ADMIN — ASPIRIN 81 MG CHEWABLE TABLET 81 MG: 81 TABLET CHEWABLE at 16:30

## 2024-12-28 RX ADMIN — GABAPENTIN 100 MG: 100 CAPSULE ORAL at 16:30

## 2024-12-28 RX ADMIN — MONTELUKAST 10 MG: 10 TABLET, FILM COATED ORAL at 16:29

## 2024-12-28 RX ADMIN — IPRATROPIUM BROMIDE AND ALBUTEROL SULFATE 1 DOSE: 2.5; .5 SOLUTION RESPIRATORY (INHALATION) at 21:01

## 2024-12-28 RX ADMIN — HYDROMORPHONE HYDROCHLORIDE 1 MG: 1 INJECTION, SOLUTION INTRAMUSCULAR; INTRAVENOUS; SUBCUTANEOUS at 23:10

## 2024-12-28 RX ADMIN — GABAPENTIN 100 MG: 100 CAPSULE ORAL at 21:03

## 2024-12-28 RX ADMIN — IPRATROPIUM BROMIDE AND ALBUTEROL SULFATE 1 DOSE: 2.5; .5 SOLUTION RESPIRATORY (INHALATION) at 16:19

## 2024-12-28 RX ADMIN — SODIUM CHLORIDE, PRESERVATIVE FREE 10 ML: 5 INJECTION INTRAVENOUS at 21:04

## 2024-12-28 RX ADMIN — VALSARTAN 80 MG: 160 TABLET, FILM COATED ORAL at 16:30

## 2024-12-28 ASSESSMENT — PAIN SCALES - GENERAL
PAINLEVEL_OUTOF10: 10
PAINLEVEL_OUTOF10: 10
PAINLEVEL_OUTOF10: 8
PAINLEVEL_OUTOF10: 10

## 2024-12-28 ASSESSMENT — PAIN DESCRIPTION - DESCRIPTORS
DESCRIPTORS: ACHING
DESCRIPTORS: THROBBING
DESCRIPTORS: ACHING

## 2024-12-28 ASSESSMENT — PAIN DESCRIPTION - LOCATION
LOCATION: BACK;HEAD
LOCATION: HEAD
LOCATION: BACK;HEAD
LOCATION: HEAD

## 2024-12-28 ASSESSMENT — PAIN DESCRIPTION - ORIENTATION: ORIENTATION: LOWER

## 2024-12-28 NOTE — H&P
respiratory failure due to COPD home oxygen dependent  -Admitted on medical floor  -Start IV Solu-Medrol 40 mg IV Q8, Pulmicort, Brovana, as needed DuoNeb treatment  -Continue home oxygen 4 L/min, monitor pulse ox  -check Chest x-ray, ABG    HTN  -BP not at goal, continue home metoprolol, prazosin, as needed IV hydralazine and monitor BP    T2DM  -Check A1c  -Hold home metformin  -Start Lantus 25 units, insulin sliding scale, monitor fingerstick glucose per hypoglycemic protocol    Hypothyroidism  -Continue home Synthroid    Back pain with history of chronic back pain  -Complaining severe back pain, denies fall or injury  -As needed Tylenol, Norco, IV Dilaudid and Narcan as needed    Hx fibromyalgia  -Continue gabapentin 100 mg 3 times daily    Hx anxiety/depression  -Denied depression  -Continue home BuSpar, Wellbutrin  -Continue supportive care    Obesity  -BMI 69.69 kg/m²  -Counseled lifestyle modification, AHA type of diet, exercise and weight loss program      DIET: ADULT DIET; Regular; 4 carb choices (60 gm/meal)   ISOLATION PRECAUTIONS: No active isolations  CODE STATUS: Full Code   Central Line:     DVT PROPHYLAXIS: Lovenox  FUNCTIONAL STATUS PRIOR TO HOSPITALIZATION: Fully active and ambulatory; able to carry on all self-care without restriction.  Ambulatory status/function: Ambulates with assistance:  Cane and Walker   EARLY MOBILITY ASSESSMENT: Recommend routine ambulation while hospitalized with the assistance of nursing staff  ANTICIPATED DISCHARGE: Greater than 48 hours.  ANTICIPATED DISPOSITION: Home  EMERGENCY CONTACT/SURROGATE DECISION MAKER:  Ginger Mendes, daughter, [phone]     CRITICAL CARE WAS PERFORMED FOR THIS ENCOUNTER: NO.      Signed By: Cuate Castellanos MD     December 28, 2024         Please note that this dictation may have been completed with Dragon, the eZono voice recognition software.  Quite often unanticipated grammatical, syntax, homophones, and other interpretive errors are  inadvertently transcribed by the computer software.  Please disregard these errors.  Please excuse any errors that have escaped final proofreading.

## 2024-12-28 NOTE — ACP (ADVANCE CARE PLANNING)
Advance Care Planning     Advance Care Planning (ACP) Physician/NP/PA Conversation    Date of Conversation: 12/28/2024  Conducted with: Patient with Decision Making Capacity  Other persons present: Daughter, Ginger Mendes - [phone]    Healthcare Decision Maker:     Primary Decision Maker: Ginger Mendes [phone]    Care Preferences:    Hospitalization:  \"If your health worsens and it becomes clear that your chance of recovery is unlikely, what would be your preference regarding hospitalization?\"  The patient would prefer hospitalization.    Ventilation:  \"If you were unable to breath on your own and your chance of recovery was unlikely, what would be your preference about the use of a ventilator (breathing machine) if it was available to you?\"  The patient would desire the use of a ventilator.    Resuscitation:  \"In the event your heart stopped as a result of an underlying serious health condition, would you want attempts made to restart your heart, or would you prefer a natural death?\"  Yes, attempt to resuscitate.    treatment goals, benefit/burden of treatment options, artificial nutrition, ventilation preferences, hospitalization preferences, and resuscitation preferences    Conversation Outcomes / Follow-Up Plan:  ACP complete - no further action today  Reviewed DNR/DNI and patient elects Full Code (Attempt Resuscitation)    Length of Voluntary ACP Conversation in minutes:  16 minutes    Cuate Castellanos MD  12/28/2024

## 2024-12-29 ENCOUNTER — APPOINTMENT (OUTPATIENT)
Facility: HOSPITAL | Age: 64
DRG: 189 | End: 2024-12-29
Attending: FAMILY MEDICINE
Payer: COMMERCIAL

## 2024-12-29 LAB
ALBUMIN SERPL-MCNC: 3.3 G/DL (ref 3.5–5)
ALBUMIN/GLOB SERPL: 1.1 (ref 1.1–2.2)
ALP SERPL-CCNC: 61 U/L (ref 45–117)
ALT SERPL-CCNC: 18 U/L (ref 12–78)
ANION GAP SERPL CALC-SCNC: 6 MMOL/L (ref 2–12)
AST SERPL-CCNC: 14 U/L (ref 15–37)
BASOPHILS # BLD: 0.1 K/UL (ref 0–0.1)
BASOPHILS NFR BLD: 1 % (ref 0–1)
BILIRUB SERPL-MCNC: 0.2 MG/DL (ref 0.2–1)
BUN SERPL-MCNC: 27 MG/DL (ref 6–20)
BUN/CREAT SERPL: 24 (ref 12–20)
CALCIUM SERPL-MCNC: 9.4 MG/DL (ref 8.5–10.1)
CHLORIDE SERPL-SCNC: 103 MMOL/L (ref 97–108)
CO2 SERPL-SCNC: 25 MMOL/L (ref 21–32)
CREAT SERPL-MCNC: 1.12 MG/DL (ref 0.55–1.02)
DIFFERENTIAL METHOD BLD: ABNORMAL
EOSINOPHIL # BLD: 0 K/UL (ref 0–0.4)
EOSINOPHIL NFR BLD: 0 % (ref 0–7)
ERYTHROCYTE [DISTWIDTH] IN BLOOD BY AUTOMATED COUNT: 18.5 % (ref 11.5–14.5)
EST. AVERAGE GLUCOSE BLD GHB EST-MCNC: 171 MG/DL
GLOBULIN SER CALC-MCNC: 2.9 G/DL (ref 2–4)
GLUCOSE BLD STRIP.AUTO-MCNC: 232 MG/DL (ref 65–117)
GLUCOSE BLD STRIP.AUTO-MCNC: 376 MG/DL (ref 65–117)
GLUCOSE BLD STRIP.AUTO-MCNC: 388 MG/DL (ref 65–117)
GLUCOSE BLD STRIP.AUTO-MCNC: 392 MG/DL (ref 65–117)
GLUCOSE SERPL-MCNC: 299 MG/DL (ref 65–100)
HBA1C MFR BLD: 7.6 % (ref 4–5.6)
HCT VFR BLD AUTO: 29.7 % (ref 35–47)
HGB BLD-MCNC: 8.3 G/DL (ref 11.5–16)
IMM GRANULOCYTES # BLD AUTO: 0.1 K/UL (ref 0–0.04)
IMM GRANULOCYTES NFR BLD AUTO: 1 % (ref 0–0.5)
LYMPHOCYTES # BLD: 0.7 K/UL (ref 0.8–3.5)
LYMPHOCYTES NFR BLD: 8 % (ref 12–49)
MCH RBC QN AUTO: 20.7 PG (ref 26–34)
MCHC RBC AUTO-ENTMCNC: 27.9 G/DL (ref 30–36.5)
MCV RBC AUTO: 74.1 FL (ref 80–99)
MONOCYTES # BLD: 0.3 K/UL (ref 0–1)
MONOCYTES NFR BLD: 4 % (ref 5–13)
NEUTS SEG # BLD: 7.3 K/UL (ref 1.8–8)
NEUTS SEG NFR BLD: 86 % (ref 32–75)
NRBC # BLD: 0 K/UL (ref 0–0.01)
NRBC BLD-RTO: 0 PER 100 WBC
PLATELET # BLD AUTO: 235 K/UL (ref 150–400)
PMV BLD AUTO: 10.4 FL (ref 8.9–12.9)
POTASSIUM SERPL-SCNC: 4.7 MMOL/L (ref 3.5–5.1)
PROT SERPL-MCNC: 6.2 G/DL (ref 6.4–8.2)
RBC # BLD AUTO: 4.01 M/UL (ref 3.8–5.2)
RBC MORPH BLD: ABNORMAL
SERVICE CMNT-IMP: ABNORMAL
SODIUM SERPL-SCNC: 134 MMOL/L (ref 136–145)
WBC # BLD AUTO: 8.5 K/UL (ref 3.6–11)

## 2024-12-29 PROCEDURE — 80053 COMPREHEN METABOLIC PANEL: CPT

## 2024-12-29 PROCEDURE — 6360000002 HC RX W HCPCS: Performed by: HOSPITALIST

## 2024-12-29 PROCEDURE — 71045 X-RAY EXAM CHEST 1 VIEW: CPT

## 2024-12-29 PROCEDURE — 2500000003 HC RX 250 WO HCPCS: Performed by: HOSPITALIST

## 2024-12-29 PROCEDURE — 82962 GLUCOSE BLOOD TEST: CPT

## 2024-12-29 PROCEDURE — 2700000000 HC OXYGEN THERAPY PER DAY

## 2024-12-29 PROCEDURE — 83036 HEMOGLOBIN GLYCOSYLATED A1C: CPT

## 2024-12-29 PROCEDURE — 97116 GAIT TRAINING THERAPY: CPT | Performed by: PHYSICAL THERAPIST

## 2024-12-29 PROCEDURE — 94640 AIRWAY INHALATION TREATMENT: CPT

## 2024-12-29 PROCEDURE — 1200000000 HC SEMI PRIVATE

## 2024-12-29 PROCEDURE — 97161 PT EVAL LOW COMPLEX 20 MIN: CPT | Performed by: PHYSICAL THERAPIST

## 2024-12-29 PROCEDURE — 6370000000 HC RX 637 (ALT 250 FOR IP): Performed by: HOSPITALIST

## 2024-12-29 PROCEDURE — 85025 COMPLETE CBC W/AUTO DIFF WBC: CPT

## 2024-12-29 RX ORDER — DEXTROSE MONOHYDRATE 100 MG/ML
INJECTION, SOLUTION INTRAVENOUS CONTINUOUS PRN
Status: DISCONTINUED | OUTPATIENT
Start: 2024-12-29 | End: 2024-12-31 | Stop reason: HOSPADM

## 2024-12-29 RX ORDER — INSULIN GLARGINE 100 [IU]/ML
34 INJECTION, SOLUTION SUBCUTANEOUS NIGHTLY
Status: DISCONTINUED | OUTPATIENT
Start: 2024-12-29 | End: 2024-12-30

## 2024-12-29 RX ORDER — PREDNISONE 20 MG/1
40 TABLET ORAL DAILY
Status: DISCONTINUED | OUTPATIENT
Start: 2024-12-30 | End: 2024-12-31 | Stop reason: HOSPADM

## 2024-12-29 RX ORDER — INSULIN LISPRO 100 [IU]/ML
12 INJECTION, SOLUTION INTRAVENOUS; SUBCUTANEOUS ONCE
Status: COMPLETED | OUTPATIENT
Start: 2024-12-29 | End: 2024-12-29

## 2024-12-29 RX ORDER — ENOXAPARIN SODIUM 100 MG/ML
40 INJECTION SUBCUTANEOUS 2 TIMES DAILY
Status: DISCONTINUED | OUTPATIENT
Start: 2024-12-29 | End: 2024-12-31 | Stop reason: HOSPADM

## 2024-12-29 RX ADMIN — BUSPIRONE HYDROCHLORIDE 15 MG: 10 TABLET ORAL at 13:14

## 2024-12-29 RX ADMIN — IPRATROPIUM BROMIDE AND ALBUTEROL SULFATE 1 DOSE: 2.5; .5 SOLUTION RESPIRATORY (INHALATION) at 11:40

## 2024-12-29 RX ADMIN — INSULIN LISPRO 12 UNITS: 100 INJECTION, SOLUTION INTRAVENOUS; SUBCUTANEOUS at 16:45

## 2024-12-29 RX ADMIN — GABAPENTIN 100 MG: 100 CAPSULE ORAL at 09:35

## 2024-12-29 RX ADMIN — WATER 40 MG: 1 INJECTION INTRAMUSCULAR; INTRAVENOUS; SUBCUTANEOUS at 05:59

## 2024-12-29 RX ADMIN — SODIUM CHLORIDE, PRESERVATIVE FREE 10 ML: 5 INJECTION INTRAVENOUS at 09:39

## 2024-12-29 RX ADMIN — INSULIN LISPRO 8 UNITS: 100 INJECTION, SOLUTION INTRAVENOUS; SUBCUTANEOUS at 21:26

## 2024-12-29 RX ADMIN — GABAPENTIN 100 MG: 100 CAPSULE ORAL at 13:14

## 2024-12-29 RX ADMIN — HYDROCODONE BITARTRATE AND ACETAMINOPHEN 1 TABLET: 5; 325 TABLET ORAL at 09:34

## 2024-12-29 RX ADMIN — WATER 40 MG: 1 INJECTION INTRAMUSCULAR; INTRAVENOUS; SUBCUTANEOUS at 21:26

## 2024-12-29 RX ADMIN — INSULIN GLARGINE 34 UNITS: 100 INJECTION, SOLUTION SUBCUTANEOUS at 21:26

## 2024-12-29 RX ADMIN — ENOXAPARIN SODIUM 40 MG: 100 INJECTION SUBCUTANEOUS at 09:40

## 2024-12-29 RX ADMIN — BUPROPION HYDROCHLORIDE 300 MG: 150 TABLET, EXTENDED RELEASE ORAL at 09:34

## 2024-12-29 RX ADMIN — BUSPIRONE HYDROCHLORIDE 15 MG: 10 TABLET ORAL at 09:35

## 2024-12-29 RX ADMIN — GUAIFENESIN 600 MG: 600 TABLET, EXTENDED RELEASE ORAL at 09:34

## 2024-12-29 RX ADMIN — GUAIFENESIN 600 MG: 600 TABLET, EXTENDED RELEASE ORAL at 21:26

## 2024-12-29 RX ADMIN — HYDROCODONE BITARTRATE AND ACETAMINOPHEN 1 TABLET: 5; 325 TABLET ORAL at 03:17

## 2024-12-29 RX ADMIN — BUDESONIDE 500 MCG: 0.5 INHALANT RESPIRATORY (INHALATION) at 20:42

## 2024-12-29 RX ADMIN — SODIUM CHLORIDE, PRESERVATIVE FREE 10 ML: 5 INJECTION INTRAVENOUS at 21:27

## 2024-12-29 RX ADMIN — HYDROMORPHONE HYDROCHLORIDE 1 MG: 1 INJECTION, SOLUTION INTRAMUSCULAR; INTRAVENOUS; SUBCUTANEOUS at 11:52

## 2024-12-29 RX ADMIN — ENOXAPARIN SODIUM 40 MG: 100 INJECTION SUBCUTANEOUS at 21:27

## 2024-12-29 RX ADMIN — HYDROMORPHONE HYDROCHLORIDE 1 MG: 1 INJECTION, SOLUTION INTRAMUSCULAR; INTRAVENOUS; SUBCUTANEOUS at 18:17

## 2024-12-29 RX ADMIN — ATORVASTATIN CALCIUM 10 MG: 10 TABLET, FILM COATED ORAL at 09:34

## 2024-12-29 RX ADMIN — ARFORMOTEROL TARTRATE 15 MCG: 15 SOLUTION RESPIRATORY (INHALATION) at 07:38

## 2024-12-29 RX ADMIN — MONTELUKAST 10 MG: 10 TABLET, FILM COATED ORAL at 09:34

## 2024-12-29 RX ADMIN — HYDROCODONE BITARTRATE AND ACETAMINOPHEN 1 TABLET: 5; 325 TABLET ORAL at 16:31

## 2024-12-29 RX ADMIN — VALSARTAN 80 MG: 160 TABLET, FILM COATED ORAL at 09:34

## 2024-12-29 RX ADMIN — HYDROMORPHONE HYDROCHLORIDE 1 MG: 1 INJECTION, SOLUTION INTRAMUSCULAR; INTRAVENOUS; SUBCUTANEOUS at 06:00

## 2024-12-29 RX ADMIN — BUSPIRONE HYDROCHLORIDE 15 MG: 10 TABLET ORAL at 21:26

## 2024-12-29 RX ADMIN — INSULIN LISPRO 2 UNITS: 100 INJECTION, SOLUTION INTRAVENOUS; SUBCUTANEOUS at 09:39

## 2024-12-29 RX ADMIN — ARFORMOTEROL TARTRATE 15 MCG: 15 SOLUTION RESPIRATORY (INHALATION) at 20:42

## 2024-12-29 RX ADMIN — GABAPENTIN 100 MG: 100 CAPSULE ORAL at 21:26

## 2024-12-29 RX ADMIN — TOPIRAMATE 200 MG: 100 TABLET, FILM COATED ORAL at 21:26

## 2024-12-29 RX ADMIN — WATER 40 MG: 1 INJECTION INTRAMUSCULAR; INTRAVENOUS; SUBCUTANEOUS at 13:15

## 2024-12-29 RX ADMIN — ASPIRIN 81 MG CHEWABLE TABLET 81 MG: 81 TABLET CHEWABLE at 09:34

## 2024-12-29 RX ADMIN — ERGOCALCIFEROL 50000 UNITS: 1.25 CAPSULE ORAL at 09:36

## 2024-12-29 RX ADMIN — LEVOTHYROXINE SODIUM 112 MCG: 0.11 TABLET ORAL at 05:59

## 2024-12-29 RX ADMIN — INSULIN LISPRO 12 UNITS: 100 INJECTION, SOLUTION INTRAVENOUS; SUBCUTANEOUS at 13:13

## 2024-12-29 RX ADMIN — IPRATROPIUM BROMIDE AND ALBUTEROL SULFATE 1 DOSE: 2.5; .5 SOLUTION RESPIRATORY (INHALATION) at 07:38

## 2024-12-29 RX ADMIN — BUDESONIDE 500 MCG: 0.5 INHALANT RESPIRATORY (INHALATION) at 07:38

## 2024-12-29 RX ADMIN — METOPROLOL SUCCINATE 25 MG: 25 TABLET, EXTENDED RELEASE ORAL at 09:35

## 2024-12-29 ASSESSMENT — PAIN SCALES - GENERAL
PAINLEVEL_OUTOF10: 8
PAINLEVEL_OUTOF10: 10
PAINLEVEL_OUTOF10: 7
PAINLEVEL_OUTOF10: 9
PAINLEVEL_OUTOF10: 8

## 2024-12-29 ASSESSMENT — PAIN DESCRIPTION - DESCRIPTORS: DESCRIPTORS: ACHING

## 2024-12-29 ASSESSMENT — PAIN DESCRIPTION - LOCATION
LOCATION: BACK;HEAD
LOCATION: BACK;LEG

## 2024-12-29 NOTE — CONSULTS
[place] [id number]                              CONSULTATION      PATIENT NAME: JD BINGHAM             : 1960  MED REC NO: [id number]                       ROOM: 606  ACCOUNT NO: [id number]                       ADMIT DATE: 2024  PROVIDER: Raji Reyes MD    DATE OF SERVICE:  2024    ATTENDING PHYSICIAN:  Cuate Castellanos MD    REQUESTING PHYSICIAN:  Hospitalist service.    INDICATION:  Shortness of breath.    CHIEF COMPLAINT:  Shortness of breath.    HISTORY OF PRESENT ILLNESS:  The patient is a 64-year-old lady with COPD and chronic respiratory failure, on home oxygen, with morbid obesity, chronic pain, hypothyroidism, and type 2 diabetes.  She reports that she is reported to have more shortness of breath over the last several weeks.  She has had more reflux over the last year in addition to having lower extremity edema.  She denies current smoking.    MEDICAL HISTORY:  COPD, chronic respiratory failure, morbid obesity, chronic pain, GERD, and anemia.    HOME MEDICATIONS:  Albuterol, aspirin, bupropion, BuSpar, Flonase, Trelegy, Neurontin, Norco, DuoNeb, levothyroxine, metformin, metoprolol, Singulair, Prilosec, Seroquel, Zocor, Topamax, Diovan, Victoza, and Flomax.    ALLERGIES:  SHE IS ALLERGIC TO METHOCARBAMOL, CELEBREX, IBUPROFEN, MORPHINE, SULFA, TRAMADOL, AND CODEINE.      FAMILY HISTORY:  No lung disease.    SOCIAL HISTORY:  Former smoker.    REVIEW OF SYSTEMS:  GENERAL:  No fevers or chills.  EYES:  No double or blurred vision.  EARS:  No tinnitus or deafness.  NOSE:  No sinusitis, obstruction, or nosebleeds.  THROAT:  No soreness or hoarseness.  CARDIOVASCULAR:  Lower extremity edema.  PULMONARY:  See HPI.  GASTROINTESTINAL:  Positive for reflux.  GENITOURINARY:  No dysuria or hematuria.  ENDOCRINE:  No polyuria or polydipsia.  LYMPHATIC:  No enlarged or painful lymph nodes.  HEMATOLOGIC:  No easy  bleeding or bruising.  PSYCHIATRIC:  No anxiety or depression.    PHYSICAL EXAMINATION:  VITAL SIGNS:  Temperature is 97.7, pulse of 84, respiratory rate 22, blood pressure 104/67, and saturations 97% on 4 L nasal cannula.  GENERAL:  Well-developed, well-nourished, obese,  lady.  HEENT:  Normocephalic and atraumatic.  NECK:  Supple without mass, JVD, or carotid bruits.  LYMPHATIC:  No palpable supraclavicular, submandibular, or cervical adenopathy.  LUNGS:  Poor airway movement.  CARDIOVASCULAR:  Regular rhythm.  ABDOMEN:  Soft and nontender.  EXTREMITIES:  No cyanosis or clubbing.  NEUROLOGIC:  Cranial nerves 2 through 12 are grossly intact.    LABORATORY DATA:  Sodium 134, potassium 4.7, chloride 103, CO2 of 25, BUN 27, and creatinine 1.12.  White count 8.5, hemoglobin 8.3, and a platelet count of 235.    I do not see a chest x-ray on admission.    IMPRESSION:    1. Chronic obstructive pulmonary disease.  2. Worsening shortness of breath.  3. Morbid obesity.  4. Gastroesophageal reflux disease.    DISCUSSION AND PLAN:    1. The patient's respiratory status is likely stable.  The patient's worsening of symptoms are due to her morbid obesity, GERD, and possible volume overload with new lower extremity edema.  Recommend a cardiology and a GI workup.  2. Continue current pulmonary management.  However, she reports that the medicines that she has been taking and inhalers have not been helping her, which points again to a nonpulmonary cause.  3. Her obesity is a major factor in her breathing in addition to her inactivity, and those also need to be addressed as an outpatient.        MD AMRIK MOSCOSO/ROBERTOS  D:  12/29/2024 13:24:29  T:  12/29/2024 16:16:50  JOB #:  [id number]

## 2024-12-29 NOTE — CARE COORDINATION
Care Management Initial Assessment       RUR:  16%  Readmission? No    RENNY: pt admitted from home, SNF recommended 12/29  - CM to offer choice 12/30    Transport: Likely BLS     CM met with pt at bedside to introduce self and role. Pt lives w/ spouse in a 1 story home w 3 DARIN. Pt has private duty caregivers for bathing and cleaning 2x week.     ADLs: spouse/caregiver assist w/ cleaning, cooking, mobility  DME: WC, walker, shower chair  PCP follow up: PCP confirmed - within last month   Previous Home Health: none  Previous Skilled Nursing Facility: yes but does not recall agency (reported somewhere in [place] but denied [facility] when CM suggested)  Previous Inpatient Rehab: none  Insurance verified: yes; [place] [employer] (ins. Will stay the same starting Jan. 1st)  Reported will have Medicare starting in February   Pharmacy: [facility]   Emergency Contact:   Ginger Mendes (Child)  [phone] (Home Phone)    3pm: PT OT Attending recommending SNF at [place], pt expressed being open to recommendation. CM to f/u w/ choice.     CM will follow patient progress and assist as needed with RENNY plan.     12/29/24 1523   Service Assessment   Patient Orientation Alert and Oriented;Person;Place;Situation;Self   Cognition Alert   History Provided By Patient   Primary Caregiver Spouse  (has private duty caregivers 2x/week)   Support Systems Spouse/Significant Other   Patient's Healthcare Decision Maker is: Legal Next of Kin   PCP Verified by CM Yes   Last Visit to PCP Within last 3 months   Prior Functional Level Assistance with the following:;Bathing;Dressing;Feeding;Toileting;Housework;Shopping;Cooking;Mobility   Current Functional Level Assistance with the following:;Bathing;Dressing;Toileting;Feeding;Cooking;Housework;Shopping;Mobility   Can patient return to prior living arrangement Unknown at present   Ability to make needs known: Good   Family able to assist with home care needs: Yes   Would you like for me to discuss the  discharge plan with any other family members/significant others, and if so, who? Yes  (spouse)   Social/Functional History   Lives With Spouse   Type of Home House   Home Layout One level   Home Access Stairs to enter with rails   Entrance Stairs - Number of Steps 3   Bathroom Shower/Tub Walk-in shower;Shower chair with back   Bathroom Equipment None   Home Equipment Wheelchair - Manual;Walker - Standard   Receives Help From Family;Personal care attendant   Prior Level of Assist for ADLs Needs assistance   Prior Level of Assist for Homemaking Needs assistance   Homemaking Responsibilities No   Ambulation Assistance Needs assistance   Prior Level of Assist for Transfers Needs assistance   Active  Yes  (needs passenger assist to ambulate)   Occupation On disability   Discharge Planning   Type of Residence House   Living Arrangements Spouse/Significant Other   Current Services Prior To Admission Durable Medical Equipment;Private Duty Homecare   Patient expects to be discharged to: Skilled nursing facility   Services At/After Discharge   Mode of Transport at Discharge BLS   Confirm Follow Up Transport Family   Condition of Participation: Discharge Planning   The Plan for Transition of Care is related to the following treatment goals: likely SNF, pt open to recommendation   The Patient and/or Patient Representative was provided with a Choice of Provider? Patient   The Patient and/Or Patient Representative agree with the Discharge Plan? Yes   Freedom of Choice list was provided with basic dialogue that supports the patient's individualized plan of care/goals, treatment preferences, and shares the quality data associated with the providers?  Yes     RICKY Fontaine

## 2024-12-29 NOTE — PROGRESS NOTES
Lovenox Monitoring  Indication: DVT Prophylaxis  No results for input(s): \"HGB\", \"PLT\", \"INR\" in the last 72 hours.    Invalid input(s): \"CREA\"  Current Weight: 168.4 kg  Est. CrCl = >30 ml/min  Current Dose: 40 mg subcutaneously every 24 hours.  Plan: Change to enoxaparin 40 mg sc q12h based on dosing weight and presumed renal function.

## 2024-12-29 NOTE — PROGRESS NOTES
[facility]                                                                                          Hospitalist Progress Note  Cuate Castellanos MD  Answering service: [phone] OR [phone] from in house phone        Date of Service:  2024  NAME:  Isabela Beckham  :  1960  MRN:  [id number]       Admission Summary:     \"Isabela Beckham is a 64 y.o. female with PMH significant for COPD home oxygen dependent 4 L/minute, HTN, T2DM, hypothyroidism, fibromyalgia, anxiety, chronic pain syndrome, chronic low back pain, and obesity who is transferred to [facility] for progressive shortness of breath 1 day duration.  She stated that when she woke up this morning she could not hold her breathing and called 911 and went to ER.  She stated that her shortness of breath progressively worse for the last 2 months this.  She stated that her oxygen saturation dropped to 88% on 4 L and her heart rate was high on pulse oximetry.  She has on and off cough with whitish sputum and pleuritic chest pain.  No fever, palpitation, chills, sweating, nausea, vomiting, sore throat, trouble swallowing, abdominal pain, abnormal bowel movement or urinary complaints.  No sick contact with COVID 19 diagnosed patient.  She also complained severe low back pain worse with movement.  She stated that she has on and off headache but no lower extremities weakness.     The patient denies any blurry vision, trouble with speech,  falls, injuries, rashes, hematemesis, melena, or hemoptysis.       Lab; troponin high-sensitivity 6 and 4        Vital signs on arrival to [facility] ER; temperature 98.8, respirate 22, pulse 103, /89, saturation of oxygen 99% on 4 L/min\"       Interval history / Subjective:     Patient seen and examined at the bedside. She stated that she had episode of shortness of breath this morning, has on and off cough. No left side chest pain. Her back pain improving with pain  independently reviewed all pertinent labs, diagnostic studies, imaging, and have provided independent interpretation of the same.     Labs:   No results for input(s): \"WBC\", \"HGB\", \"HCT\", \"PLT\" in the last 72 hours.  No results for input(s): \"NA\", \"K\", \"CL\", \"CO2\", \"BUN\", \"GLU\", \"MG\", \"PHOS\" in the last 72 hours.    Invalid input(s): \"CREA\", \"CA\", \"URICA\"  No results for input(s): \"ALT\", \"TP\", \"GLOB\", \"GGT\" in the last 72 hours.    Invalid input(s): \"SGOT\", \"GPT\", \"AP\", \"TBIL\", \"TBILI\", \"ALB\", \"AML\", \"AMYP\", \"LPSE\", \"HLPSE\"  No results for input(s): \"INR\", \"APTT\" in the last 72 hours.    Invalid input(s): \"PTP\"   No results for input(s): \"TIBC\" in the last 72 hours.    Invalid input(s): \"FE\", \"PSAT\", \"FERR\"   No results found for: \"RBCF\"   No results for input(s): \"PH\", \"PCO2\", \"PO2\" in the last 72 hours.  No results for input(s): \"CPK\" in the last 72 hours.    Invalid input(s): \"CPKMB\", \"CKNDX\", \"TROIQ\"  No results found for: \"CHOL\", \"CHLST\", \"CHOLV\", \"HDL\", \"HDLC\", \"LDL\", \"LDLC\"  No results found for: \"GLUCPOC\"  [unfilled]    Notes reviewed from all clinical/nonclinical/nursing services involved in patient's clinical care. Care coordination discussions were held with appropriate clinical/nonclinical/ nursing providers based on care coordination needs.         Patients current active Medications were reviewed, considered, added and adjusted based on the clinical condition today.      Home Medications were reconciled to the best of my ability given all available resources at the time of admission. Route is PO if not otherwise noted.      Admission Status:[id number]:::1}      Medications Reviewed:     Current Facility-Administered Medications   Medication Dose Route Frequency    enoxaparin (LOVENOX) injection 40 mg  40 mg SubCUTAneous BID    aspirin chewable tablet 81 mg  81 mg Oral Daily    busPIRone (BUSPAR) tablet 15 mg  15 mg Oral TID    ergocalciferol capsule 50,000 Units  50,000 Units Oral Q7 Days    buPROPion

## 2024-12-29 NOTE — PLAN OF CARE
Problem: Physical Therapy - Adult  Goal: By Discharge: Performs mobility at highest level of function for planned discharge setting.  See evaluation for individualized goals.  Description: FUNCTIONAL STATUS PRIOR TO ADMISSION: The patient has a friend that assists her twice a week in the shower.  She has had declining ability to ambulate and recently on went short distances in the home.  On 4L O2.     HOME SUPPORT PRIOR TO ADMISSION: The patient lived with her  and requires assist for household chores.    Physical Therapy Goals  Initiated 12/29/2024  1.  Patient will move from supine to sit and sit to supine , scoot up and down, and roll side to side in bed with supervision/set-up within 7 day(s).    2.  Patient will transfer from bed to chair and chair to bed with supervision/set-up using the least restrictive device within 7 day(s).  3.  Patient will perform sit to stand with supervision/set-up within 7 day(s).  4.  Patient will ambulate with supervision/set-up for 50 feet with the least restrictive device within 7 day(s).   5.  Patient will ascend/descend 3 stairs with 2 handrail(s) with supervision/set-up within 7 day(s). 3  Outcome: Progressing     PHYSICAL THERAPY EVALUATION    Patient: Isabela Beckham (64 y.o. female)  Date: 12/29/2024  Primary Diagnosis: Shortness of breath [R06.02]       Precautions:                        ASSESSMENT :   DEFICITS/IMPAIRMENTS:   The patient is limited by decreased independence in ADLs, activity tolerance, endurance and a decreased ability to ambulate due to SOB and difficulty breathing.  The patient has had declining activity level at home and is admitted for CHF.  She lives in [facility].  Today her family is present but after they left she was able to come to sitting EOB with minimal assist.  Her sitting balance is good. She sat for a bit to get situated and then stood with a RW and took a few steps to the bedside chair.  Once up she felt better than in the  bed.  She has been transferring to the BSC with nursing but at this time is unable to go further than the chair or the BSC.  She is cooperative and motivated and states she has been told she will need to go to rehab.     Based on the impairments listed above The patient is unable to ambulate further than the chair or BSC due to SOB.    Patient will benefit from skilled intervention to address the above impairments.    Functional Outcome Measure:  The patient scored 45 on the Barthel outcome measure which is indicative of 65% debility.           PLAN :  Recommendations and Planned Interventions:   bed mobility training, transfer training, gait training, and therapeutic exercises    Frequency/Duration: Patient will be followed by physical therapy to address goals, PT Plan of Care: 5 times/week to address goals.    Recommendations for staff mobility and toileting assistance:  Recommend that staff completes patient mobility with assist x1 using gait belt and rolling walker.    Recommend for next PT session: sit to stand transfers, bed to bedside chair transfers, and further progression of gait with existing device    Recommendation for discharge: (in order for the patient to meet his/her long term goals):   Moderate intensity short-term skilled physical therapy up to 5x/week    Other factors to consider for discharge: impaired cognition, high risk for falls, not safe to be alone, and concern for safely navigating or managing the home environment    IF patient discharges home will need the following DME: patient owns DME required for discharge                SUBJECTIVE:   Patient stated “They told me I will need to go to rehab.  I couldn't do anything at home and I am afraid of having to walk.”    OBJECTIVE DATA SUMMARY:       Past Medical History:   Diagnosis Date    Adverse effect of anesthesia     slow to wake up       Anemia     B12 injections    Anxiety and depression     Arrhythmia     Arthritis     Chronic

## 2024-12-30 ENCOUNTER — APPOINTMENT (OUTPATIENT)
Facility: HOSPITAL | Age: 64
DRG: 189 | End: 2024-12-30
Attending: HOSPITALIST
Payer: COMMERCIAL

## 2024-12-30 LAB
ALBUMIN SERPL-MCNC: 3.1 G/DL (ref 3.5–5)
ALBUMIN/GLOB SERPL: 1 (ref 1.1–2.2)
ALP SERPL-CCNC: 64 U/L (ref 45–117)
ALT SERPL-CCNC: 18 U/L (ref 12–78)
ANION GAP SERPL CALC-SCNC: 10 MMOL/L (ref 2–12)
AST SERPL-CCNC: 8 U/L (ref 15–37)
BASOPHILS # BLD: 0.1 K/UL (ref 0–0.1)
BASOPHILS NFR BLD: 1 % (ref 0–1)
BILIRUB SERPL-MCNC: 0.2 MG/DL (ref 0.2–1)
BUN SERPL-MCNC: 24 MG/DL (ref 6–20)
BUN/CREAT SERPL: 25 (ref 12–20)
CALCIUM SERPL-MCNC: 9.5 MG/DL (ref 8.5–10.1)
CHLORIDE SERPL-SCNC: 102 MMOL/L (ref 97–108)
CO2 SERPL-SCNC: 23 MMOL/L (ref 21–32)
CREAT SERPL-MCNC: 0.97 MG/DL (ref 0.55–1.02)
D DIMER PPP FEU-MCNC: 0.39 MG/L FEU (ref 0–0.65)
DIFFERENTIAL METHOD BLD: ABNORMAL
ECHO BSA: 2.71 M2
ECHO LV EF PHYSICIAN: 60 %
EKG ATRIAL RATE: 79 BPM
EKG DIAGNOSIS: NORMAL
EKG P AXIS: 1 DEGREES
EKG P-R INTERVAL: 144 MS
EKG Q-T INTERVAL: 366 MS
EKG QRS DURATION: 92 MS
EKG QTC CALCULATION (BAZETT): 419 MS
EKG R AXIS: -1 DEGREES
EKG T AXIS: 12 DEGREES
EKG VENTRICULAR RATE: 79 BPM
EOSINOPHIL # BLD: 0 K/UL (ref 0–0.4)
EOSINOPHIL NFR BLD: 0 % (ref 0–7)
ERYTHROCYTE [DISTWIDTH] IN BLOOD BY AUTOMATED COUNT: 18.1 % (ref 11.5–14.5)
GLOBULIN SER CALC-MCNC: 3.1 G/DL (ref 2–4)
GLUCOSE BLD STRIP.AUTO-MCNC: 249 MG/DL (ref 65–117)
GLUCOSE BLD STRIP.AUTO-MCNC: 297 MG/DL (ref 65–117)
GLUCOSE BLD STRIP.AUTO-MCNC: 318 MG/DL (ref 65–117)
GLUCOSE BLD STRIP.AUTO-MCNC: 340 MG/DL (ref 65–117)
GLUCOSE SERPL-MCNC: 306 MG/DL (ref 65–100)
HCT VFR BLD AUTO: 29.4 % (ref 35–47)
HGB BLD-MCNC: 8.2 G/DL (ref 11.5–16)
IMM GRANULOCYTES # BLD AUTO: 0.1 K/UL (ref 0–0.04)
IMM GRANULOCYTES NFR BLD AUTO: 1 % (ref 0–0.5)
LYMPHOCYTES # BLD: 1.1 K/UL (ref 0.8–3.5)
LYMPHOCYTES NFR BLD: 12 % (ref 12–49)
MCH RBC QN AUTO: 20.8 PG (ref 26–34)
MCHC RBC AUTO-ENTMCNC: 27.9 G/DL (ref 30–36.5)
MCV RBC AUTO: 74.4 FL (ref 80–99)
MONOCYTES # BLD: 0.7 K/UL (ref 0–1)
MONOCYTES NFR BLD: 7 % (ref 5–13)
NEUTS SEG # BLD: 7.3 K/UL (ref 1.8–8)
NEUTS SEG NFR BLD: 79 % (ref 32–75)
NRBC # BLD: 0 K/UL (ref 0–0.01)
NRBC BLD-RTO: 0 PER 100 WBC
PLATELET # BLD AUTO: 238 K/UL (ref 150–400)
PMV BLD AUTO: 10.3 FL (ref 8.9–12.9)
POTASSIUM SERPL-SCNC: 4.8 MMOL/L (ref 3.5–5.1)
PROT SERPL-MCNC: 6.2 G/DL (ref 6.4–8.2)
RBC # BLD AUTO: 3.95 M/UL (ref 3.8–5.2)
RBC MORPH BLD: ABNORMAL
SERVICE CMNT-IMP: ABNORMAL
SODIUM SERPL-SCNC: 135 MMOL/L (ref 136–145)
T4 FREE SERPL-MCNC: 0.8 NG/DL (ref 0.8–1.5)
TSH SERPL DL<=0.05 MIU/L-ACNC: 0.45 UIU/ML (ref 0.36–3.74)
WBC # BLD AUTO: 9.3 K/UL (ref 3.6–11)

## 2024-12-30 PROCEDURE — 80053 COMPREHEN METABOLIC PANEL: CPT

## 2024-12-30 PROCEDURE — 85025 COMPLETE CBC W/AUTO DIFF WBC: CPT

## 2024-12-30 PROCEDURE — 6370000000 HC RX 637 (ALT 250 FOR IP): Performed by: HOSPITALIST

## 2024-12-30 PROCEDURE — 94760 N-INVAS EAR/PLS OXIMETRY 1: CPT

## 2024-12-30 PROCEDURE — 2709999900 HC NON-CHARGEABLE SUPPLY

## 2024-12-30 PROCEDURE — 93306 TTE W/DOPPLER COMPLETE: CPT | Performed by: INTERNAL MEDICINE

## 2024-12-30 PROCEDURE — 2700000000 HC OXYGEN THERAPY PER DAY

## 2024-12-30 PROCEDURE — 97530 THERAPEUTIC ACTIVITIES: CPT

## 2024-12-30 PROCEDURE — 94640 AIRWAY INHALATION TREATMENT: CPT

## 2024-12-30 PROCEDURE — C1751 CATH, INF, PER/CENT/MIDLINE: HCPCS

## 2024-12-30 PROCEDURE — 6360000002 HC RX W HCPCS: Performed by: HOSPITALIST

## 2024-12-30 PROCEDURE — 93005 ELECTROCARDIOGRAM TRACING: CPT | Performed by: NURSE PRACTITIONER

## 2024-12-30 PROCEDURE — 97165 OT EVAL LOW COMPLEX 30 MIN: CPT

## 2024-12-30 PROCEDURE — 2500000003 HC RX 250 WO HCPCS: Performed by: HOSPITALIST

## 2024-12-30 PROCEDURE — 76937 US GUIDE VASCULAR ACCESS: CPT

## 2024-12-30 PROCEDURE — 82962 GLUCOSE BLOOD TEST: CPT

## 2024-12-30 PROCEDURE — 6370000000 HC RX 637 (ALT 250 FOR IP): Performed by: NURSE PRACTITIONER

## 2024-12-30 PROCEDURE — 84439 ASSAY OF FREE THYROXINE: CPT

## 2024-12-30 PROCEDURE — 99223 1ST HOSP IP/OBS HIGH 75: CPT | Performed by: INTERNAL MEDICINE

## 2024-12-30 PROCEDURE — 36415 COLL VENOUS BLD VENIPUNCTURE: CPT

## 2024-12-30 PROCEDURE — 85379 FIBRIN DEGRADATION QUANT: CPT

## 2024-12-30 PROCEDURE — 1200000000 HC SEMI PRIVATE

## 2024-12-30 PROCEDURE — 84443 ASSAY THYROID STIM HORMONE: CPT

## 2024-12-30 PROCEDURE — 05HB33Z INSERTION OF INFUSION DEVICE INTO RIGHT BASILIC VEIN, PERCUTANEOUS APPROACH: ICD-10-PCS | Performed by: HOSPITALIST

## 2024-12-30 PROCEDURE — 93306 TTE W/DOPPLER COMPLETE: CPT

## 2024-12-30 RX ORDER — INSULIN GLARGINE 100 [IU]/ML
40 INJECTION, SOLUTION SUBCUTANEOUS NIGHTLY
Status: DISCONTINUED | OUTPATIENT
Start: 2024-12-30 | End: 2024-12-31 | Stop reason: HOSPADM

## 2024-12-30 RX ORDER — INSULIN LISPRO 100 [IU]/ML
8 INJECTION, SOLUTION INTRAVENOUS; SUBCUTANEOUS
Status: DISCONTINUED | OUTPATIENT
Start: 2024-12-30 | End: 2024-12-31 | Stop reason: HOSPADM

## 2024-12-30 RX ADMIN — INSULIN LISPRO 6 UNITS: 100 INJECTION, SOLUTION INTRAVENOUS; SUBCUTANEOUS at 08:58

## 2024-12-30 RX ADMIN — VALSARTAN 80 MG: 160 TABLET, FILM COATED ORAL at 08:52

## 2024-12-30 RX ADMIN — SODIUM CHLORIDE, PRESERVATIVE FREE 10 ML: 5 INJECTION INTRAVENOUS at 20:59

## 2024-12-30 RX ADMIN — METOPROLOL SUCCINATE 25 MG: 25 TABLET, EXTENDED RELEASE ORAL at 08:52

## 2024-12-30 RX ADMIN — ARFORMOTEROL TARTRATE 15 MCG: 15 SOLUTION RESPIRATORY (INHALATION) at 19:30

## 2024-12-30 RX ADMIN — HYDROMORPHONE HYDROCHLORIDE 1 MG: 1 INJECTION, SOLUTION INTRAMUSCULAR; INTRAVENOUS; SUBCUTANEOUS at 10:34

## 2024-12-30 RX ADMIN — INSULIN GLARGINE 40 UNITS: 100 INJECTION, SOLUTION SUBCUTANEOUS at 20:54

## 2024-12-30 RX ADMIN — PREDNISONE 40 MG: 20 TABLET ORAL at 08:52

## 2024-12-30 RX ADMIN — INSULIN LISPRO 8 UNITS: 100 INJECTION, SOLUTION INTRAVENOUS; SUBCUTANEOUS at 17:44

## 2024-12-30 RX ADMIN — SODIUM CHLORIDE, PRESERVATIVE FREE 10 ML: 5 INJECTION INTRAVENOUS at 08:58

## 2024-12-30 RX ADMIN — MICONAZOLE NITRATE: 2 POWDER TOPICAL at 20:59

## 2024-12-30 RX ADMIN — ARFORMOTEROL TARTRATE 15 MCG: 15 SOLUTION RESPIRATORY (INHALATION) at 07:40

## 2024-12-30 RX ADMIN — HYDROCODONE BITARTRATE AND ACETAMINOPHEN 1 TABLET: 5; 325 TABLET ORAL at 12:55

## 2024-12-30 RX ADMIN — TOPIRAMATE 200 MG: 100 TABLET, FILM COATED ORAL at 20:58

## 2024-12-30 RX ADMIN — GUAIFENESIN 600 MG: 600 TABLET, EXTENDED RELEASE ORAL at 20:55

## 2024-12-30 RX ADMIN — HYDROCODONE BITARTRATE AND ACETAMINOPHEN 1 TABLET: 5; 325 TABLET ORAL at 17:40

## 2024-12-30 RX ADMIN — BUDESONIDE 500 MCG: 0.5 INHALANT RESPIRATORY (INHALATION) at 07:40

## 2024-12-30 RX ADMIN — BUSPIRONE HYDROCHLORIDE 15 MG: 10 TABLET ORAL at 08:52

## 2024-12-30 RX ADMIN — INSULIN LISPRO 8 UNITS: 100 INJECTION, SOLUTION INTRAVENOUS; SUBCUTANEOUS at 11:51

## 2024-12-30 RX ADMIN — BUDESONIDE 500 MCG: 0.5 INHALANT RESPIRATORY (INHALATION) at 19:30

## 2024-12-30 RX ADMIN — INSULIN LISPRO 4 UNITS: 100 INJECTION, SOLUTION INTRAVENOUS; SUBCUTANEOUS at 20:54

## 2024-12-30 RX ADMIN — INSULIN LISPRO 6 UNITS: 100 INJECTION, SOLUTION INTRAVENOUS; SUBCUTANEOUS at 17:44

## 2024-12-30 RX ADMIN — BUSPIRONE HYDROCHLORIDE 15 MG: 10 TABLET ORAL at 12:55

## 2024-12-30 RX ADMIN — ENOXAPARIN SODIUM 40 MG: 100 INJECTION SUBCUTANEOUS at 08:53

## 2024-12-30 RX ADMIN — GABAPENTIN 100 MG: 100 CAPSULE ORAL at 08:52

## 2024-12-30 RX ADMIN — HYDROMORPHONE HYDROCHLORIDE 1 MG: 1 INJECTION, SOLUTION INTRAMUSCULAR; INTRAVENOUS; SUBCUTANEOUS at 03:15

## 2024-12-30 RX ADMIN — GUAIFENESIN 600 MG: 600 TABLET, EXTENDED RELEASE ORAL at 08:51

## 2024-12-30 RX ADMIN — ATORVASTATIN CALCIUM 10 MG: 10 TABLET, FILM COATED ORAL at 20:58

## 2024-12-30 RX ADMIN — HYDROMORPHONE HYDROCHLORIDE 1 MG: 1 INJECTION, SOLUTION INTRAMUSCULAR; INTRAVENOUS; SUBCUTANEOUS at 18:50

## 2024-12-30 RX ADMIN — GABAPENTIN 100 MG: 100 CAPSULE ORAL at 12:55

## 2024-12-30 RX ADMIN — HYDROCODONE BITARTRATE AND ACETAMINOPHEN 1 TABLET: 5; 325 TABLET ORAL at 06:13

## 2024-12-30 RX ADMIN — ASPIRIN 81 MG CHEWABLE TABLET 81 MG: 81 TABLET CHEWABLE at 08:52

## 2024-12-30 RX ADMIN — Medication 3 MG: at 21:38

## 2024-12-30 RX ADMIN — GABAPENTIN 100 MG: 100 CAPSULE ORAL at 20:55

## 2024-12-30 RX ADMIN — MONTELUKAST 10 MG: 10 TABLET, FILM COATED ORAL at 08:52

## 2024-12-30 RX ADMIN — BUSPIRONE HYDROCHLORIDE 15 MG: 10 TABLET ORAL at 20:55

## 2024-12-30 RX ADMIN — INSULIN LISPRO 8 UNITS: 100 INJECTION, SOLUTION INTRAVENOUS; SUBCUTANEOUS at 08:57

## 2024-12-30 RX ADMIN — MICONAZOLE NITRATE: 2 POWDER TOPICAL at 10:38

## 2024-12-30 RX ADMIN — BUPROPION HYDROCHLORIDE 300 MG: 150 TABLET, EXTENDED RELEASE ORAL at 08:52

## 2024-12-30 RX ADMIN — INSULIN LISPRO 2 UNITS: 100 INJECTION, SOLUTION INTRAVENOUS; SUBCUTANEOUS at 11:51

## 2024-12-30 RX ADMIN — ENOXAPARIN SODIUM 40 MG: 100 INJECTION SUBCUTANEOUS at 20:54

## 2024-12-30 RX ADMIN — LEVOTHYROXINE SODIUM 112 MCG: 0.11 TABLET ORAL at 06:13

## 2024-12-30 ASSESSMENT — PAIN DESCRIPTION - DESCRIPTORS
DESCRIPTORS: ACHING
DESCRIPTORS: ACHING

## 2024-12-30 ASSESSMENT — PAIN DESCRIPTION - LOCATION
LOCATION: GENERALIZED;HEAD
LOCATION: HEAD
LOCATION: GENERALIZED
LOCATION: HEAD;BACK

## 2024-12-30 ASSESSMENT — PAIN SCALES - GENERAL
PAINLEVEL_OUTOF10: 10
PAINLEVEL_OUTOF10: 8
PAINLEVEL_OUTOF10: 9
PAINLEVEL_OUTOF10: 10

## 2024-12-30 NOTE — PROGRESS NOTES
Pulmonary, Critical Care, and Sleep Medicine~Progress Note    Name: Isabela Beckham MRN: [id number]   : 1960 Hospital: [facility]   Date: 2024 5:13 PM Admission: 2024     Impression Plan   Acute on chronic hypoxic resp failure   AE of COPD. Followed by Dr Alvarenga   Obesity   GERD  Hypochromic microcytic anemia  Fibromyalgia   Hypothyroidism   DM II Bronchodilators  Treating anxiety  Mucolytic's  O2 titration above 90%  P.o. prednisone  Consider diuretics  Will need to consider repeat sleep study a later date     Daily Progression:    Feels about the same     Echo  Left Ventricle Normal left ventricular systolic function with a visually estimated EF of 55 - 60%. Left ventricle size is normal. Increased wall thickness. Findings consistent with mild concentric hypertrophy. Normal wall motion. Normal diastolic function.   Left Atrium Left atrium size is normal.   Right Ventricle Right ventricle size is normal. Normal systolic function.   Right Atrium Right atrium size is normal.   Aortic Valve Valve structure is normal. No regurgitation. No stenosis.   Mitral Valve Valve structure is normal. Trace regurgitation. No stenosis noted.   Tricuspid Valve Valve structure is normal. Trace regurgitation. No stenosis noted.   Pulmonic Valve The pulmonic valve visualization is suboptimal but appears to be functioning normally. Physiologically normal regurgitation. No stenosis noted.   Aorta Normal sized aortic root.   Pericardium No pericardial effusion.     I have reviewed the labs and previous day’s notes.    Pertinent items are noted in HPI.  Past Medical History:   Diagnosis Date    Adverse effect of anesthesia     slow to wake up       Anemia     B12 injections    Anxiety and depression     Arrhythmia     Arthritis     Chronic obstructive pulmonary disease (HCC)     Chronic pain     djd    Diabetes (HCC)     GERD (gastroesophageal reflux disease)     Hypertension     Hypothyroidism      Units  40 Units SubCUTAneous Nightly    miconazole (MICOTIN) 2 % powder   Topical BID    enoxaparin (LOVENOX) injection 40 mg  40 mg SubCUTAneous BID    predniSONE (DELTASONE) tablet 40 mg  40 mg Oral Daily    glucose chewable tablet 16 g  4 tablet Oral PRN    dextrose bolus 10% 125 mL  125 mL IntraVENous PRN    Or    dextrose bolus 10% 250 mL  250 mL IntraVENous PRN    glucagon injection 1 mg  1 mg SubCUTAneous PRN    dextrose 10 % infusion   IntraVENous Continuous PRN    aspirin chewable tablet 81 mg  81 mg Oral Daily    busPIRone (BUSPAR) tablet 15 mg  15 mg Oral TID    ergocalciferol capsule 50,000 Units  50,000 Units Oral Q7 Days    buPROPion (WELLBUTRIN XL) extended release tablet 300 mg  300 mg Oral Daily    gabapentin (NEURONTIN) capsule 100 mg  100 mg Oral TID    levothyroxine (SYNTHROID) tablet 112 mcg  112 mcg Oral QAM AC    insulin lispro (HUMALOG,ADMELOG) injection vial 0-8 Units  0-8 Units SubCUTAneous 4x Daily AC & HS    montelukast (SINGULAIR) tablet 10 mg  10 mg Oral Daily    metoprolol succinate (TOPROL XL) extended release tablet 25 mg  25 mg Oral Daily    prazosin (MINIPRESS) capsule 3 mg  3 mg Oral Nightly    atorvastatin (LIPITOR) tablet 10 mg  10 mg Oral Daily    valsartan (DIOVAN) tablet 80 mg  80 mg Oral Daily    sodium chloride flush 0.9 % injection 5-40 mL  5-40 mL IntraVENous 2 times per day    sodium chloride flush 0.9 % injection 5-40 mL  5-40 mL IntraVENous PRN    0.9 % sodium chloride infusion   IntraVENous PRN    ondansetron (ZOFRAN-ODT) disintegrating tablet 4 mg  4 mg Oral Q8H PRN    Or    ondansetron (ZOFRAN) injection 4 mg  4 mg IntraVENous Q6H PRN    polyethylene glycol (GLYCOLAX) packet 17 g  17 g Oral Daily PRN    acetaminophen (TYLENOL) tablet 650 mg  650 mg Oral Q6H PRN    Or    acetaminophen (TYLENOL) suppository 650 mg  650 mg Rectal Q6H PRN    guaiFENesin (MUCINEX) extended release tablet 600 mg  600 mg Oral BID    budesonide (PULMICORT) nebulizer suspension 500 mcg  0.5

## 2024-12-30 NOTE — CONSULTS
Cuate HERRERA MD, 40 mg at 12/30/24 0853    predniSONE (DELTASONE) tablet 40 mg, 40 mg, Oral, Daily, Cuate Castellanos MD, 40 mg at 12/30/24 0852    glucose chewable tablet 16 g, 4 tablet, Oral, PRN, Cuate Castellanos MD    dextrose bolus 10% 125 mL, 125 mL, IntraVENous, PRN **OR** dextrose bolus 10% 250 mL, 250 mL, IntraVENous, PRN, Cuate Castellanos MD    glucagon injection 1 mg, 1 mg, SubCUTAneous, PRN, Cuate Castellanos MD    dextrose 10 % infusion, , IntraVENous, Continuous PRN, Cuate Castellanos MD    aspirin chewable tablet 81 mg, 81 mg, Oral, Daily, Cuate Castellanos MD, 81 mg at 12/30/24 0852    busPIRone (BUSPAR) tablet 15 mg, 15 mg, Oral, TID, Cuate Castellanos MD, 15 mg at 12/30/24 0852    ergocalciferol capsule 50,000 Units, 50,000 Units, Oral, Q7 Days, Cuate Castellanos MD, 50,000 Units at 12/29/24 0936    buPROPion (WELLBUTRIN XL) extended release tablet 300 mg, 300 mg, Oral, Daily, Cuate Castellanos MD, 300 mg at 12/30/24 0852    gabapentin (NEURONTIN) capsule 100 mg, 100 mg, Oral, TID, Cuate Castellanos MD, 100 mg at 12/30/24 0852    levothyroxine (SYNTHROID) tablet 112 mcg, 112 mcg, Oral, QAM AC, Cuate Castellanos MD, 112 mcg at 12/30/24 0613    insulin lispro (HUMALOG,ADMELOG) injection vial 0-8 Units, 0-8 Units, SubCUTAneous, 4x Daily AC & HS, Cuate Castellanos MD, 6 Units at 12/30/24 0858    montelukast (SINGULAIR) tablet 10 mg, 10 mg, Oral, Daily, Cuate Castellanos MD, 10 mg at 12/30/24 0852    metoprolol succinate (TOPROL XL) extended release tablet 25 mg, 25 mg, Oral, Daily, Cuate Castellanos MD, 25 mg at 12/30/24 0852    prazosin (MINIPRESS) capsule 3 mg, 3 mg, Oral, Nightly, Cuate Castellanos MD    atorvastatin (LIPITOR) tablet 10 mg, 10 mg, Oral, Daily, Cuate Castellanos MD, 10 mg at 12/29/24 0934    valsartan (DIOVAN) tablet 80 mg, 80 mg, Oral, Daily, Cuate Castellanos MD, 80 mg at 12/30/24 0852    sodium chloride flush

## 2024-12-30 NOTE — PLAN OF CARE
Problem: Physical Therapy - Adult  Goal: By Discharge: Performs mobility at highest level of function for planned discharge setting.  See evaluation for individualized goals.  Description: FUNCTIONAL STATUS PRIOR TO ADMISSION: The patient has a friend that assists her twice a week in the shower.  She has had declining ability to ambulate and recently on went short distances in the home.  On 4L O2.     HOME SUPPORT PRIOR TO ADMISSION: The patient lived with her  and requires assist for household chores.    Physical Therapy Goals  Initiated 12/29/2024  1.  Patient will move from supine to sit and sit to supine , scoot up and down, and roll side to side in bed with supervision/set-up within 7 day(s).    2.  Patient will transfer from bed to chair and chair to bed with supervision/set-up using the least restrictive device within 7 day(s).  3.  Patient will perform sit to stand with supervision/set-up within 7 day(s).  4.  Patient will ambulate with supervision/set-up for 50 feet with the least restrictive device within 7 day(s).   5.  Patient will ascend/descend 3 stairs with 2 handrail(s) with supervision/set-up within 7 day(s). 3  Outcome: Progressing   PHYSICAL THERAPY TREATMENT    Patient: Isabela Beckham (64 y.o. female)  Date: 12/30/2024  Diagnosis: Shortness of breath [R06.02] Shortness of breath      Precautions:                        ASSESSMENT:  Patient continues to benefit from skilled PT services and is progressing towards goals. Patient received in bed and requesting to get up to chair.  She is moving at overall stand by assist level of mobility managing bed mobility well.  Sit to stand with bariatric walker (pulling up) and able to take a few steps to chair.  Encouraged patient to progress with gait but she declined stating can't breath well enough.  Oxygen and HR stable with (99-96% oxygen saturation and HR 88-98 bpm , sitting then after transfer).  Attempted to educate patient on the  Widened  Speed/[place]: Pace decreased (< 100 feet/min)  Step Length: Right shortened;Left shortened  Gait Abnormalities: Decreased step clearance  Activity Tolerance:   Poor and requires frequent rest breaks    After treatment:   Patient left in no apparent distress sitting up in chair and Call bell within reach      COMMUNICATION/EDUCATION:   The patient's plan of care was discussed with: registered nurse    Patient Education  Education Given To: Patient  Education Provided: Role of Therapy;Plan of Care;Home Exercise Program;Transfer Training  Education Method: Verbal  Barriers to Learning: None  Education Outcome: Verbalized understanding;Continued education needed      AKOSUA REID, PT  Minutes: 24

## 2024-12-30 NOTE — CARE COORDINATION
RENNY- D/c to [facility] and [facility]  Auth has been obtained on 12/30/24    Minerva received a message from Rhianna at [facility] saying that the pt has an available bed at [facility]. They started the auth and go the auth.    MINERVA informed Dr. Castellanos that auth was obtained and he said that this pt is not ready to d/c today, but may be ready tomorrow. MINERVA informed her daughter, Ginger.RICKY Posey,ACM-SW

## 2024-12-30 NOTE — PROGRESS NOTES
Occupational Therapy  12/30/24    Orders received and chart reviewed up to this date. Cleared to see by RN. Pt received with PICC team finishing up and declined OT intervention at this time citing significant increased pain. Will defer and continue to follow up as able/medically appropriate.     Sandrine Osorio, OTD, OTR/L

## 2024-12-30 NOTE — PLAN OF CARE
Problem: Occupational Therapy - Adult  Goal: By Discharge: Performs self-care activities at highest level of function for planned discharge setting.  See evaluation for individualized goals.  Description: FUNCTIONAL STATUS PRIOR TO ADMISSION:  Pt reported she lives with  and required assistance with all ADLs. Reported she has a caregiver 2 days/week that helps her bathe/dress then stays in same clothes until caregiver comes again. Her  completes all IADLs.     HOME SUPPORT: Patient lived with  in one level home.    Occupational Therapy Goals:  Initiated 12/30/2024  1.  Patient will perform grooming seated with Stand by Assist within 7 day(s).  2.  Patient will perform upper body dressing seated with Stand by Assist within 7 day(s).  3.  Patient will perform lower body bathing seated with Minimal Assist and AE PRN within 7 day(s).  4.  Patient will perform toilet transfers with Stand by Assist and DME PRN within 7 day(s).  5.  Patient will perform all aspects of toileting with Minimal Assist within 7 day(s).  6.  Patient will participate in upper extremity therapeutic exercise/activities with Stand by Assist for 5 minutes within 7 day(s).    7.  Patient will utilize energy conservation techniques during functional activities with verbal cues within 7 day(s).    Outcome: Progressing     OCCUPATIONAL THERAPY EVALUATION    Patient: Isabela Beckham (64 y.o. female)  Date: 12/30/2024  Primary Diagnosis: Shortness of breath [R06.02]         Precautions: Fall Risk                  ASSESSMENT :  The patient is limited by decreased functional mobility, independence in ADLs, high-level IADLs, ROM, strength, activity tolerance, endurance, coordination, balance, following admission for increasing shortness of breath.     Pt received seated in chair, amendable to session, on 4L O2 NC. Required SBA-SPV for all functional mobility for dynamic standing balance and min VCs for RW management, technique, and  under license to [employer]. All rights reserved     Score: 15/24     Interpretation of Tool:  Represents clinically-significant functional categories (i.e. Activities of daily living).    Cutoff score 39.4 (19) correlates to a good likelihood of discharging home versus a facility  Bell Prabhakar, Emily Kunz, Bulmaro Wells, Jeri Abraham, Samson Gu, Chuck Prabhakar, -PAC “6-Clicks” Functional Assessment Scores Predict Acute Care Hospital Discharge Destination, Physical Therapy, Volume 94, Issue 9, 1 September 2014, Pages 1078-3330, [email or web address]    Pain Rating:  None reported    Activity Tolerance:   Good    After treatment:   Patient left in no apparent distress sitting up in chair, Call bell within reach, and Bed/ chair alarm activated    COMMUNICATION/EDUCATION:   The patient's plan of care was discussed with: registered nurse    Patient Education  Education Given To: Patient  Education Provided: Role of Therapy;Plan of Care  Education Method: Verbal  Barriers to Learning: None  Education Outcome: Verbalized understanding;Continued education needed;Demonstrated understanding    Thank you for this referral.  Sandrine Benedict, OT       Occupational Therapy Evaluation Charge Determination   History Examination Decision-Making   MEDIUM Complexity : Expanded review of history including physical, cognitive and psychocial history  MEDIUM Complexity: 3-5 Performance deficits relating to physical, cognitive, or psychosocial skills that result in activity limitations and/or participation restrictions LOW Complexity: No comorbidities that affect functional and  no verbal  or physical assist needed to complete eval tasks   Based on the above components, the patient evaluation is determined to be of the following complexity level: Low

## 2024-12-30 NOTE — PROGRESS NOTES
[facility]                                                                                          Hospitalist Progress Note  Cuate Castellanos MD  Answering service: [phone] OR [phone] from in house phone        Date of Service:  2024  NAME:  Isabela Beckham  :  1960  MRN:  [id number]       Admission Summary:     \"Isabela Beckham is a 64 y.o. female with PMH significant for COPD home oxygen dependent 4 L/minute, HTN, T2DM, hypothyroidism, fibromyalgia, anxiety, chronic pain syndrome, chronic low back pain, and obesity who is transferred to [facility] for progressive shortness of breath 1 day duration.  She stated that when she woke up this morning she could not hold her breathing and called 911 and went to ER.  She stated that her shortness of breath progressively worse for the last 2 months this.  She stated that her oxygen saturation dropped to 88% on 4 L and her heart rate was high on pulse oximetry.  She has on and off cough with whitish sputum and pleuritic chest pain.  No fever, palpitation, chills, sweating, nausea, vomiting, sore throat, trouble swallowing, abdominal pain, abnormal bowel movement or urinary complaints.  No sick contact with COVID 19 diagnosed patient.  She also complained severe low back pain worse with movement.  She stated that she has on and off headache but no lower extremities weakness.     The patient denies any blurry vision, trouble with speech,  falls, injuries, rashes, hematemesis, melena, or hemoptysis.       Lab; troponin high-sensitivity 6 and 4        Vital signs on arrival to [facility] ER; temperature 98.8, respirate 22, pulse 103, /89, saturation of oxygen 99% on 4 L/min\"       Interval history / Subjective:     Patient seen and examined at the bedside. She stated that she had episode of shortness of breath this morning, has on and off cough. No left side chest pain. Her back pain improving with pain  independently reviewed all pertinent labs, diagnostic studies, imaging, and have provided independent interpretation of the same.     Labs:     Recent Labs     12/29/24  1020 12/30/24  0422   WBC 8.5 9.3   HGB 8.3* 8.2*   HCT 29.7* 29.4*    238     Recent Labs     12/29/24  1020 12/30/24  0422   * 135*   K 4.7 4.8    102   CO2 25 23   BUN 27* 24*     Recent Labs     12/29/24  1020 12/30/24  0422   ALT 18 18   GLOB 2.9 3.1     No results for input(s): \"INR\", \"APTT\" in the last 72 hours.    Invalid input(s): \"PTP\"   No results for input(s): \"TIBC\" in the last 72 hours.    Invalid input(s): \"FE\", \"PSAT\", \"FERR\"   No results found for: \"RBCF\"   No results for input(s): \"PH\", \"PCO2\", \"PO2\" in the last 72 hours.  No results for input(s): \"CPK\" in the last 72 hours.    Invalid input(s): \"CPKMB\", \"CKNDX\", \"TROIQ\"  No results found for: \"CHOL\", \"CHLST\", \"CHOLV\", \"HDL\", \"HDLC\", \"LDL\", \"LDLC\"  No results found for: \"GLUCPOC\"  [unfilled]    Notes reviewed from all clinical/nonclinical/nursing services involved in patient's clinical care. Care coordination discussions were held with appropriate clinical/nonclinical/ nursing providers based on care coordination needs.         Patients current active Medications were reviewed, considered, added and adjusted based on the clinical condition today.      Home Medications were reconciled to the best of my ability given all available resources at the time of admission. Route is PO if not otherwise noted.      Admission Status:[id number]:::1}      Medications Reviewed:     Current Facility-Administered Medications   Medication Dose Route Frequency    insulin lispro (HUMALOG,ADMELOG) injection vial 8 Units  8 Units SubCUTAneous TID WC    insulin glargine (LANTUS) injection vial 40 Units  40 Units SubCUTAneous Nightly    miconazole (MICOTIN) 2 % powder   Topical BID    enoxaparin (LOVENOX) injection 40 mg  40 mg SubCUTAneous BID    predniSONE (DELTASONE) tablet 40 mg  40 mg

## 2024-12-30 NOTE — PROCEDURES
PROCEDURE NOTE  Date: 12/30/2024   Name: Isabela Beckham  YOB: 1960    Procedures    An extended dwell PIV (PowerGlide) was placed using ultrasound guidance. The IV flushed easily and had brisk blood return. The patient tolerated the procedure well.      Catheter Size: 20 g  Total length: 8 cm  External length: 0 cm  Location: right ventral forearm     Lot#: [id number]    May be used for blood draws--flush, waste 3ml, draw labs, flush post lab draw with at least 10ml NS and clamp if not infusing to maintain patency.   PowerGlide 20 gauge catheter is power injectable to 5 mL/s (325 psi max)     Primary RN notified      Virgie Carlson RN  Vascular Access Nurse

## 2024-12-30 NOTE — CARE COORDINATION
Transition of Care Plan:    RUR: 15%  Prior Level of Functioning: Pt needed assistance w/her ADL's  Disposition: probable SNF  AASHISH: TBD  If SNF or IPR: Date FOC offered: 12/30/24  Date FOC received: 12/30/24  Accepting facility:TBD   Date authorization started with reference number:   Date authorization received and expires:   Follow up appointments:   Transportation at discharge: Likely BLS  Caregiver Contact: Will Beckham- [phone]  Discharge Caregiver contacted prior to discharge? yes  Barriers to discharge: Medical stability, SNF acceptance, insurance auth.    CM attempted to call pt's Will on the phone number listed on the face sheet, but it did not work. MINERVA spoke with pt's daughter, Ginger ([phone]) to introduce CM role. MINERVA informed her that SNF is recommended and offered choice. She said that [facility] is their first preference, but they would also agree to [facility].She also gave the correct phone number for the pt's  and it has been changed on the face sheet.     MINERVA sent referrals to [facility] and [facility] via Link/[facility]. Auth will be needed. Phylicia Rucker,BSW,ACM-SW

## 2024-12-31 VITALS
HEART RATE: 78 BPM | TEMPERATURE: 98.2 F | OXYGEN SATURATION: 95 % | DIASTOLIC BLOOD PRESSURE: 59 MMHG | HEIGHT: 62 IN | WEIGHT: 293 LBS | SYSTOLIC BLOOD PRESSURE: 115 MMHG | RESPIRATION RATE: 18 BRPM | BODY MASS INDEX: 53.92 KG/M2

## 2024-12-31 LAB
ALBUMIN SERPL-MCNC: 2.9 G/DL (ref 3.5–5)
ALBUMIN/GLOB SERPL: 0.9 (ref 1.1–2.2)
ALP SERPL-CCNC: 59 U/L (ref 45–117)
ALT SERPL-CCNC: 20 U/L (ref 12–78)
ANION GAP SERPL CALC-SCNC: 6 MMOL/L (ref 2–12)
AST SERPL-CCNC: 20 U/L (ref 15–37)
BASOPHILS # BLD: 0.1 K/UL (ref 0–0.1)
BASOPHILS NFR BLD: 1 % (ref 0–1)
BILIRUB SERPL-MCNC: 0.2 MG/DL (ref 0.2–1)
BUN SERPL-MCNC: 26 MG/DL (ref 6–20)
BUN/CREAT SERPL: 30 (ref 12–20)
CALCIUM SERPL-MCNC: 9.5 MG/DL (ref 8.5–10.1)
CHLORIDE SERPL-SCNC: 106 MMOL/L (ref 97–108)
CO2 SERPL-SCNC: 24 MMOL/L (ref 21–32)
CREAT SERPL-MCNC: 0.86 MG/DL (ref 0.55–1.02)
DIFFERENTIAL METHOD BLD: ABNORMAL
EOSINOPHIL # BLD: 0.2 K/UL (ref 0–0.4)
EOSINOPHIL NFR BLD: 2 % (ref 0–7)
ERYTHROCYTE [DISTWIDTH] IN BLOOD BY AUTOMATED COUNT: 18.4 % (ref 11.5–14.5)
GLOBULIN SER CALC-MCNC: 3.3 G/DL (ref 2–4)
GLUCOSE BLD STRIP.AUTO-MCNC: 154 MG/DL (ref 65–117)
GLUCOSE BLD STRIP.AUTO-MCNC: 255 MG/DL (ref 65–117)
GLUCOSE SERPL-MCNC: 139 MG/DL (ref 65–100)
HCT VFR BLD AUTO: 32.6 % (ref 35–47)
HGB BLD-MCNC: 8.9 G/DL (ref 11.5–16)
IMM GRANULOCYTES # BLD AUTO: 0.1 K/UL (ref 0–0.04)
IMM GRANULOCYTES NFR BLD AUTO: 1 % (ref 0–0.5)
LYMPHOCYTES # BLD: 2.4 K/UL (ref 0.8–3.5)
LYMPHOCYTES NFR BLD: 26 % (ref 12–49)
MCH RBC QN AUTO: 20.6 PG (ref 26–34)
MCHC RBC AUTO-ENTMCNC: 27.3 G/DL (ref 30–36.5)
MCV RBC AUTO: 75.5 FL (ref 80–99)
MONOCYTES # BLD: 1 K/UL (ref 0–1)
MONOCYTES NFR BLD: 11 % (ref 5–13)
NEUTS SEG # BLD: 5.5 K/UL (ref 1.8–8)
NEUTS SEG NFR BLD: 59 % (ref 32–75)
NRBC # BLD: 0 K/UL (ref 0–0.01)
NRBC BLD-RTO: 0 PER 100 WBC
PLATELET # BLD AUTO: 236 K/UL (ref 150–400)
PMV BLD AUTO: 10.5 FL (ref 8.9–12.9)
POTASSIUM SERPL-SCNC: 3.9 MMOL/L (ref 3.5–5.1)
PROT SERPL-MCNC: 6.2 G/DL (ref 6.4–8.2)
RBC # BLD AUTO: 4.32 M/UL (ref 3.8–5.2)
RBC MORPH BLD: ABNORMAL
SERVICE CMNT-IMP: ABNORMAL
SERVICE CMNT-IMP: ABNORMAL
SODIUM SERPL-SCNC: 136 MMOL/L (ref 136–145)
WBC # BLD AUTO: 9.3 K/UL (ref 3.6–11)

## 2024-12-31 PROCEDURE — 36415 COLL VENOUS BLD VENIPUNCTURE: CPT

## 2024-12-31 PROCEDURE — 85025 COMPLETE CBC W/AUTO DIFF WBC: CPT

## 2024-12-31 PROCEDURE — 94640 AIRWAY INHALATION TREATMENT: CPT

## 2024-12-31 PROCEDURE — 80053 COMPREHEN METABOLIC PANEL: CPT

## 2024-12-31 PROCEDURE — 6370000000 HC RX 637 (ALT 250 FOR IP): Performed by: HOSPITALIST

## 2024-12-31 PROCEDURE — 94760 N-INVAS EAR/PLS OXIMETRY 1: CPT

## 2024-12-31 PROCEDURE — 2500000003 HC RX 250 WO HCPCS: Performed by: HOSPITALIST

## 2024-12-31 PROCEDURE — 6360000002 HC RX W HCPCS: Performed by: HOSPITALIST

## 2024-12-31 PROCEDURE — 99233 SBSQ HOSP IP/OBS HIGH 50: CPT | Performed by: INTERNAL MEDICINE

## 2024-12-31 PROCEDURE — 82962 GLUCOSE BLOOD TEST: CPT

## 2024-12-31 PROCEDURE — 2700000000 HC OXYGEN THERAPY PER DAY

## 2024-12-31 RX ORDER — HYDROCODONE BITARTRATE AND ACETAMINOPHEN 5; 325 MG/1; MG/1
1 TABLET ORAL EVERY 6 HOURS PRN
Qty: 8 TABLET | Refills: 0 | Status: CANCELLED | OUTPATIENT
Start: 2024-12-31 | End: 2025-01-02

## 2024-12-31 RX ORDER — PREDNISONE 20 MG/1
40 TABLET ORAL DAILY
Qty: 6 TABLET | Refills: 0 | Status: SHIPPED
Start: 2025-01-01 | End: 2025-01-04

## 2024-12-31 RX ORDER — HYDROCODONE BITARTRATE AND ACETAMINOPHEN 5; 325 MG/1; MG/1
1 TABLET ORAL EVERY 6 HOURS PRN
Qty: 8 TABLET | Refills: 0 | Status: SHIPPED | OUTPATIENT
Start: 2024-12-31 | End: 2025-01-02

## 2024-12-31 RX ADMIN — INSULIN LISPRO 8 UNITS: 100 INJECTION, SOLUTION INTRAVENOUS; SUBCUTANEOUS at 08:10

## 2024-12-31 RX ADMIN — BUSPIRONE HYDROCHLORIDE 15 MG: 10 TABLET ORAL at 08:09

## 2024-12-31 RX ADMIN — HYDROMORPHONE HYDROCHLORIDE 1 MG: 1 INJECTION, SOLUTION INTRAMUSCULAR; INTRAVENOUS; SUBCUTANEOUS at 01:29

## 2024-12-31 RX ADMIN — ENOXAPARIN SODIUM 40 MG: 100 INJECTION SUBCUTANEOUS at 08:10

## 2024-12-31 RX ADMIN — SODIUM CHLORIDE, PRESERVATIVE FREE 10 ML: 5 INJECTION INTRAVENOUS at 08:12

## 2024-12-31 RX ADMIN — BUPROPION HYDROCHLORIDE 300 MG: 150 TABLET, EXTENDED RELEASE ORAL at 08:09

## 2024-12-31 RX ADMIN — GABAPENTIN 100 MG: 100 CAPSULE ORAL at 14:14

## 2024-12-31 RX ADMIN — INSULIN LISPRO 4 UNITS: 100 INJECTION, SOLUTION INTRAVENOUS; SUBCUTANEOUS at 11:54

## 2024-12-31 RX ADMIN — BUSPIRONE HYDROCHLORIDE 15 MG: 10 TABLET ORAL at 14:14

## 2024-12-31 RX ADMIN — HYDROMORPHONE HYDROCHLORIDE 1 MG: 1 INJECTION, SOLUTION INTRAMUSCULAR; INTRAVENOUS; SUBCUTANEOUS at 08:11

## 2024-12-31 RX ADMIN — HYDROMORPHONE HYDROCHLORIDE 1 MG: 1 INJECTION, SOLUTION INTRAMUSCULAR; INTRAVENOUS; SUBCUTANEOUS at 14:14

## 2024-12-31 RX ADMIN — GABAPENTIN 100 MG: 100 CAPSULE ORAL at 08:09

## 2024-12-31 RX ADMIN — ONDANSETRON 4 MG: 2 INJECTION INTRAMUSCULAR; INTRAVENOUS at 08:17

## 2024-12-31 RX ADMIN — LEVOTHYROXINE SODIUM 112 MCG: 0.11 TABLET ORAL at 06:18

## 2024-12-31 RX ADMIN — MONTELUKAST 10 MG: 10 TABLET, FILM COATED ORAL at 08:13

## 2024-12-31 RX ADMIN — BUDESONIDE 500 MCG: 0.5 INHALANT RESPIRATORY (INHALATION) at 08:18

## 2024-12-31 RX ADMIN — ONDANSETRON 4 MG: 2 INJECTION INTRAMUSCULAR; INTRAVENOUS at 01:32

## 2024-12-31 RX ADMIN — ASPIRIN 81 MG CHEWABLE TABLET 81 MG: 81 TABLET CHEWABLE at 08:09

## 2024-12-31 RX ADMIN — PREDNISONE 40 MG: 20 TABLET ORAL at 08:09

## 2024-12-31 RX ADMIN — INSULIN LISPRO 8 UNITS: 100 INJECTION, SOLUTION INTRAVENOUS; SUBCUTANEOUS at 11:54

## 2024-12-31 RX ADMIN — MICONAZOLE NITRATE: 2 POWDER TOPICAL at 08:10

## 2024-12-31 RX ADMIN — METOPROLOL SUCCINATE 25 MG: 25 TABLET, EXTENDED RELEASE ORAL at 08:09

## 2024-12-31 RX ADMIN — ONDANSETRON 4 MG: 2 INJECTION INTRAMUSCULAR; INTRAVENOUS at 14:19

## 2024-12-31 RX ADMIN — ARFORMOTEROL TARTRATE 15 MCG: 15 SOLUTION RESPIRATORY (INHALATION) at 08:18

## 2024-12-31 ASSESSMENT — PAIN SCALES - GENERAL
PAINLEVEL_OUTOF10: 10
PAINLEVEL_OUTOF10: 9

## 2024-12-31 ASSESSMENT — PAIN DESCRIPTION - LOCATION
LOCATION: HEAD;BACK
LOCATION: HEAD

## 2024-12-31 NOTE — PROGRESS NOTES
Physician Progress Note      PATIENT:               JD BINGHAM  [facility] #:                  [id number]  :                       1960  ADMIT DATE:       2024 2:33 PM  DISCH DATE:  RESPONDING  PROVIDER #:        Bull Goff PA-C          QUERY TEXT:    Patient admitted with COPD exacerbation. Noted documentation of acute   respiratory failure in Pulmonology PN on . In order to support the   diagnosis of acute respiratory failure, please include additional clinical   indicators in your documentation.  Or please document if the diagnosis of   acute respiratory failure has been ruled out after further study.    The medical record reflects the following:  Risk Factors: COPD exacerbation, obesity hypoventilation syndrome  Clinical Indicators: Pulmonology PN on  Acute on chronic hypoxic resp   failure, No resp distress noted,  Cardiology consult on  No accessory muscle use, Clear breath sounds,  IMPN on  Decreased bronchial breath sound and few expiratory wheezing to   auscultation bilaterally  SpO2 98%  RR 22, 20,  Gas panel blood  pCO2 Bld (MMHG) 39.5  pH Bld 7.36  pO2 Bld (MMHG) 129H  SAO2 % BLD (%) 98.8H  Treatment: Pulmonology consult, 4 L NC, XR chest,    Thank you,  HESHAM Nair CDS  Options provided:  -- Acute on chronic Respiratory Failure as evidenced by, Please document   evidence.  -- Acute Respiratory Failure ruled out after study and Chronic Respiratory   Failure confirmed  -- Other - I will add my own diagnosis  -- Disagree - Not applicable / Not valid  -- Disagree - Clinically unable to determine / Unknown  -- Refer to Clinical Documentation Reviewer    PROVIDER RESPONSE TEXT:    This patient is in Acute on chronic respiratory failure as evidenced by    Query created by: He Bowling on 2024 4:10 AM      Electronically signed by:  Bull Goff PA-C 2024 2:41 PM

## 2024-12-31 NOTE — PLAN OF CARE
Report called to [facility] and accepted @1331.    Discharge order received and CM arranged transportation, pt notified and agreeable. PIV removed, site dressed - clean, dry and intact. AVS printed and reviewed with pt. Opportunity for questions to be asked and answered provided. Pt belongings including AVS packed and pt assisted with ADLs. Report called to facility and provided to . Pt escorted off unit by  via stretcher in no apparent distress.     Problem: Chronic Conditions and Co-morbidities  Goal: Patient's chronic conditions and co-morbidity symptoms are monitored and maintained or improved  Outcome: Progressing     Problem: Pain  Goal: Verbalizes/displays adequate comfort level or baseline comfort level  12/31/2024 0903 by Taisha Rosario RN  Outcome: Progressing  12/30/2024 2158 by Valerie Muñoz LPN  Outcome: Progressing     Problem: Skin/Tissue Integrity  Goal: Absence of new skin breakdown  Description: 1.  Monitor for areas of redness and/or skin breakdown  2.  Assess vascular access sites hourly  3.  Every 4-6 hours minimum:  Change oxygen saturation probe site  4.  Every 4-6 hours:  If on nasal continuous positive airway pressure, respiratory therapy assess nares and determine need for appliance change or resting period.  12/31/2024 0903 by Taisha Rosario RN  Outcome: Progressing  12/30/2024 2158 by Valerie Muñoz LPN  Outcome: Progressing     Problem: Safety - Adult  Goal: Free from fall injury  12/31/2024 0903 by Taisha Rosario, RN  Outcome: Progressing  12/30/2024 2158 by Valerie Muñoz LPN  Outcome: Progressing

## 2024-12-31 NOTE — DISCHARGE INSTRUCTIONS
Discharge SNF/Rehab Instructions/LTAC       PATIENT ID: Isabela Beckham  MRN: [id number]   YOB: 1960    DATE OF ADMISSION:   12/28/2024  2:33 PM    DATE OF DISCHARGE: 12/31/2024    PRIMARY CARE PROVIDER:  Alfonso Guadalupe DO       ATTENDING PHYSICIAN:Cuate Castellanos MD  DISCHARGING PROVIDER: Cuate Castellanos MD     To contact this individual call [phone] and ask the  to page.   If unavailable ask to be transferred the Adult Hospitalist Department.    CONSULTATIONS: [unfilled]    PROCEDURES/SURGERIES: * No surgery found *    ADMITTING DIAGNOSES & HOSPITAL COURSE:     Shortness of breath due to acute COPD exacerbation  Chronic respiratory failure due to COPD home oxygen dependent  Possible obesity hypoventilation syndrome   -switch IV Solu-Medrol 40 mg IV Q8, to po prednison in am  -on Pulmicort, Brovana, as needed DuoNeb treatment  -Continue home oxygen 4 L/min, monitor pulse ox  -ABG pH 7.36, pO2 129, pCO2 39.5  -repeated chest x ray no acute cardiopulmonary abnormality.     -troponin high sensitivity normal  -hx of cardiac cath on 11/8/2010 normal coronaries and LV systolic function at that time-Echo EF of 55 - 60%. Left ventricle size is normal. Increased wall thickness. Findings consistent with mild concentric hypertrophy. Normal wall motion.  -pulmonologist on board  -cardiologist on cardiologist  HTN  -BP normal, continue home metoprolol, prazosin, as needed IV hydralazine and monitor BP  T2DM  -A1c pending  -Hold home metformin  -adjust Lantus to 30 units, insulin sliding scale, monitor fingerstick glucose per hypoglycemic protocol  Hypothyroidism  -Continue home Synthroid   Back pain with history of chronic back pain  -Complaining severe back pain, denies fall or injury  -As needed Tylenol, Norco, IV Dilaudid and Narcan as needed  Hx fibromyalgia  -Continue gabapentin 100 mg 3 times daily  Hx anxiety/depression  -Denied depression  -Continue home BuSpar,  Wellbutrin  -Continue supportive care  Obesity  -BMI 69.69 kg/m²  -Counseled lifestyle modification, AHA type of diet, exercise and weight loss program        Code status: Full code  Prophylaxis: Lovenox     DISCHARGE DIAGNOSES / PLAN:       Shortness of breath due to acute COPD exacerbation  Chronic respiratory failure due to COPD home oxygen dependent  Possible obesity hypoventilation syndrome   -Continue prednison 40 mg daily for 3 days  -continue prn DuoNeb treatment  -Continue home oxygen 4 L/min, monitor pulse ox  -continue home flonase, montelukast, and Trelegy Ellipta inhaler  -outpatient follow up with pulmonologist on board and cardiologist  HTN  - continue home metoprolol, prazosin,and valsartan  T2DM  -continue home metformin,insulin sliding scale, monitor fingerstick glucose per hypoglycemic protocol  Hypothyroidism  -Continue home Synthroid   Back pain with history of chronic back pain  -Continue prn Tylenol and Norco   Hx fibromyalgia  -Continue gabapentin 100 mg 3 times daily  Hx anxiety/depression  -Continue home BuSpar, Wellbutrin  -Continue supportive care  Obesity  -BMI 69.69 kg/m²  -Counseled lifestyle modification, AHA type of diet, exercise and weight loss program           PENDING TEST RESULTS:   At the time of discharge the following test results are still pending: None      FOLLOW UP APPOINTMENTS:    Alfonso Guadalupe DO in 2 weeks  Outpatient follow up with Pulmonologist as an outpatient  Follow up with Cardiologist in 2-3 weeks     ADDITIONAL CARE RECOMMENDATIONS:      DIET: diabetic diet    TUBE FEEDING INSTRUCTIONS: None     OXYGEN: continue home oxygen 4 l/m    ACTIVITY: activity as tolerated    WOUND CARE: None    EQUIPMENT needed: None       DISCHARGE MEDICATIONS:   See Medication Reconciliation Form      NOTIFY YOUR PHYSICIAN FOR ANY OF THE FOLLOWING:   Fever over 101 degrees for 24 hours.   Chest pain, shortness of breath, fever, chills, nausea, vomiting, diarrhea, change in

## 2024-12-31 NOTE — PROGRESS NOTES
[facility] CARDIOLOGY  Cardiology Care Note                  []Initial visit     [x]Established visit     Patient Name: Isabela Beckham - :1960 - MRN:[id number]  Primary Cardiologist: None  Consulting Cardiologist: Malik Hernadez MD       Assessment/Plan/Discussion: Cardiology Attending:     ASSESSMENT  1.  Morbid obesity  2.  Obesity hypoventilation syndrome  3.  Diabetes  4.  Hypertension  5.  Atypical chest discomfort  6.  Palpitations      PLAN Isabela Beckham is evaluate for above-mentioned problems.  Her chest pain does not appear to be typical for angina.  In the absence of any significant troponin elevation or EKG changes, does not need to be treated as ischemic chest discomfort.  Palpitations could be multifactorial likely from a combination of PACs and SVT secondary to lung disease and do not require any additional intervention.  Shortness of breath is predominantly from lung disease but possibility of underlying HFpEF cannot be completely excluded and if bronchodilators and prednisone did not help, trial of diuretics is reasonable.  Nonetheless echocardiogram reviewed and shows normal LV and RV function.  No significant diastolic dysfunction.    Can be discharged from a cardiac standpoint.  Does not require routine cardiac follow-up      Malik Hernadez MD    [facility] [place]  [facility]               Reason for initial visit: SOB     Subjective:   on 4L NC (baseline).    Was never placed on telemetry by nursing.       Assessment and Plan       Shortness of breath:  multifactorial.   Known COPD, severe obesity.  TTE  with preserved LV Function, no significant abnormalities to explain cardiac cause of SOB.   Further pulm care per primary team/pulmonary.    On oral prednisone,  nebs.      No CT imaging.  D dimer  0.39.     2.   Chest pain:  Described as right upper

## 2024-12-31 NOTE — DISCHARGE SUMMARY
follow up with pulmonologist on board and cardiologist  HTN  - continue home metoprolol, prazosin,and valsartan  T2DM  -continue home metformin,insulin sliding scale, monitor fingerstick glucose per hypoglycemic protocol  Hypothyroidism  -Continue home Synthroid   Back pain with history of chronic back pain  -Continue prn Tylenol and Norco   Hx fibromyalgia  -Continue gabapentin 100 mg 3 times daily  Hx anxiety/depression  -Continue home BuSpar, Wellbutrin  -Continue supportive care  Obesity  -BMI 69.69 kg/m²  -Counseled lifestyle modification, AHA type of diet, exercise and weight loss program        PENDING TEST RESULTS:   At the time of discharge the following test results are still pending: None     FOLLOW UP APPOINTMENTS:    Alfonso Guadalupe DO in 2 weeks  Outpatient follow up with Pulmonologist as an outpatient  Follow up with Cardiologist in 2-3 weeks     ADDITIONAL CARE RECOMMENDATIONS:      DIET: diabetic diet    ACTIVITY: activity as tolerated    WOUND CARE: None     EQUIPMENT needed: None       DISCHARGE MEDICATIONS:     Medication List        ASK your doctor about these medications      albuterol sulfate  (90 Base) MCG/ACT inhaler  Commonly known as: PROVENTIL;VENTOLIN;PROAIR     aspirin 81 MG chewable tablet     buPROPion 150 MG extended release tablet  Commonly known as: WELLBUTRIN XL     busPIRone 15 MG tablet  Commonly known as: BUSPAR     ergocalciferol 1.25 MG (77549 UT) capsule  Commonly known as: ERGOCALCIFEROL     estradiol 0.5 MG tablet  Commonly known as: ESTRACE     fluticasone 50 MCG/ACT nasal spray  Commonly known as: FLONASE     fluticasone-umeclidin-vilant 100-62.5-25 MCG/ACT Aepb inhaler  Commonly known as: TRELEGY ELLIPTA     gabapentin 100 MG capsule  Commonly known as: NEURONTIN     HYDROcodone-acetaminophen 5-325 MG per tablet  Commonly known as: NORCO     ipratropium 0.5 mg-albuterol 2.5 mg 0.5-2.5 (3) MG/3ML Soln nebulizer solution  Commonly known as: DUONEB    Capillary refill less than 2 seconds  Abd: soft/ Non tender, non distended, BS physiological,   Ext: no clubbing, no cyanosis, no edema, brisk 2+ DP pulses  Neuro/Psych: pleasant mood and affect, CN 2-12 grossly intact, sensory grossly within normal limit, Strength 5/5 in all extremities, DTR 1+ x 4  Skin: warm     CHRONIC MEDICAL DIAGNOSES:      Greater than 31 minutes were spent with the patient on counseling and coordination of care    Signed:   Cuate Castellanos MD  12/31/2024  10:40 AM

## 2024-12-31 NOTE — PROGRESS NOTES
Physical Therapy  Attempted to work with patient.  She is up in chair and eating breakfast.  She states she feels terrible.  \"They gave me 2 melatonin last night and it made me sick all over.  My lungs hurt.\"  She defers at this time.  Will follow up later as able and patient willing.  AKOSUA REID, PT

## 2024-12-31 NOTE — PROGRESS NOTES
Pulmonary, Critical Care, and Sleep Medicine~Progress Note    Name: Isabela Beckham MRN: [id number]   : 1960 Hospital: [facility]   Date: 2024 12:20 PM Admission: 2024     Impression Plan   Acute on chronic hypoxic resp failure   AE of COPD. Followed by Dr Alvarenga   Obesity   GERD  Hypochromic microcytic anemia  Fibromyalgia   Hypothyroidism   DM II Bronchodilators  Treating anxiety  Mucolytic's  O2 titration above 90%  P.o. prednisone taper  Consider diuretics  Will need to consider repeat sleep study a later date  We will be available again to see if needed      Daily Progression:    Feels about the same   Discharge pending     Echo  Left Ventricle Normal left ventricular systolic function with a visually estimated EF of 55 - 60%. Left ventricle size is normal. Increased wall thickness. Findings consistent with mild concentric hypertrophy. Normal wall motion. Normal diastolic function.   Left Atrium Left atrium size is normal.   Right Ventricle Right ventricle size is normal. Normal systolic function.   Right Atrium Right atrium size is normal.   Aortic Valve Valve structure is normal. No regurgitation. No stenosis.   Mitral Valve Valve structure is normal. Trace regurgitation. No stenosis noted.   Tricuspid Valve Valve structure is normal. Trace regurgitation. No stenosis noted.   Pulmonic Valve The pulmonic valve visualization is suboptimal but appears to be functioning normally. Physiologically normal regurgitation. No stenosis noted.   Aorta Normal sized aortic root.   Pericardium No pericardial effusion.     I have reviewed the labs and previous day’s notes.    Pertinent items are noted in HPI.  Past Medical History:   Diagnosis Date    Adverse effect of anesthesia     slow to wake up       Anemia     B12 injections    Anxiety and depression     Arrhythmia     Arthritis     Chronic obstructive pulmonary disease (HCC)     Chronic pain     djd    Diabetes (HCC)     GERD

## 2024-12-31 NOTE — CARE COORDINATION
Transition of Care Plan:  Patient has [employer] insurance     Patient discharging today to [facility]  MINERVA confirmed with liaison Rhianna [phone]-that patient can be admitted today-- Room 58 B  Nurse to call report to [phone] and ask for [place].     CM talked with patient,   and daughter Ginger and they are in agreement with discharge today at 15:30     [facility] will transport at 15:30   requiring 4 Lt 02  Envelope on chart.    RUR: 15%  Prior Level of Functioning: Pt needed assistance w/her ADL's/IADS mobile in wheelchair   Disposition:  [facility]  [phone]  SNF  AASHISH: 12/31/24  If SNF or IPR: Date FOC offered: 12/30/24  Date FOC received: 12/30/24  Accepting facility:[facility]   Date authorization started with reference number: 12/30/24/    Date authorization received and expires: 12/31/24  Follow up appointments: SNF to arrange  Transportation at discharge:  [facility] 15:30   Caregiver Contact:   Will Beckham- [phone]  Daughter  Ginger Fyre [phone]  Discharge Caregiver contacted prior to discharge? Yes Daughter informed of transport time and room number  Barriers to discharge: none    AVS updated    GABRIELA LANDEROSW

## 2025-08-05 ENCOUNTER — HOSPITAL ENCOUNTER (INPATIENT)
Facility: HOSPITAL | Age: 65
LOS: 2 days | Discharge: HOME | DRG: 552 | End: 2025-08-08
Attending: EMERGENCY MEDICINE | Admitting: STUDENT IN AN ORGANIZED HEALTH CARE EDUCATION/TRAINING PROGRAM
Payer: MEDICARE

## 2025-08-05 DIAGNOSIS — R26.2 UNABLE TO AMBULATE: ICD-10-CM

## 2025-08-05 DIAGNOSIS — M79.7 FIBROMYALGIA: ICD-10-CM

## 2025-08-05 DIAGNOSIS — M54.9 INTRACTABLE BACK PAIN: ICD-10-CM

## 2025-08-05 DIAGNOSIS — R51.9 ACUTE INTRACTABLE HEADACHE, UNSPECIFIED HEADACHE TYPE: Primary | ICD-10-CM

## 2025-08-05 DIAGNOSIS — M54.59 INTRACTABLE LOW BACK PAIN: ICD-10-CM

## 2025-08-05 LAB
ALBUMIN SERPL-MCNC: 3.6 G/DL (ref 3.5–5.2)
ALBUMIN/GLOB SERPL: 1 (ref 1.1–2.2)
ALP SERPL-CCNC: 62 U/L (ref 35–104)
ALT SERPL-CCNC: 16 U/L (ref 10–35)
ANION GAP SERPL CALC-SCNC: 14 MMOL/L (ref 2–14)
AST SERPL-CCNC: 21 U/L (ref 10–35)
BASOPHILS # BLD: 0.09 K/UL (ref 0–0.1)
BASOPHILS NFR BLD: 1.2 % (ref 0–1)
BILIRUB SERPL-MCNC: <0.2 MG/DL (ref 0–1.2)
BUN SERPL-MCNC: 18 MG/DL (ref 8–23)
BUN/CREAT SERPL: 20 (ref 12–20)
CALCIUM SERPL-MCNC: 10.5 MG/DL (ref 8.8–10.2)
CHLORIDE SERPL-SCNC: 107 MMOL/L (ref 98–107)
CO2 SERPL-SCNC: 21 MMOL/L (ref 20–29)
CREAT SERPL-MCNC: 0.87 MG/DL (ref 0.6–1)
DIFFERENTIAL METHOD BLD: ABNORMAL
EOSINOPHIL # BLD: 0.19 K/UL (ref 0–0.4)
EOSINOPHIL NFR BLD: 2.6 % (ref 0–7)
ERYTHROCYTE [DISTWIDTH] IN BLOOD BY AUTOMATED COUNT: 14.2 % (ref 11.5–14.5)
GLOBULIN SER CALC-MCNC: 3.4 G/DL (ref 2–4)
GLUCOSE SERPL-MCNC: 139 MG/DL (ref 65–100)
HCT VFR BLD AUTO: 39 % (ref 35–47)
HGB BLD-MCNC: 11.9 G/DL (ref 11.5–16)
IMM GRANULOCYTES # BLD AUTO: 0.05 K/UL (ref 0–0.04)
IMM GRANULOCYTES NFR BLD AUTO: 0.7 % (ref 0–0.5)
LYMPHOCYTES # BLD: 1.97 K/UL (ref 0.8–3.5)
LYMPHOCYTES NFR BLD: 27.1 % (ref 12–49)
MCH RBC QN AUTO: 26.6 PG (ref 26–34)
MCHC RBC AUTO-ENTMCNC: 30.5 G/DL (ref 30–36.5)
MCV RBC AUTO: 87.2 FL (ref 80–99)
MONOCYTES # BLD: 0.46 K/UL (ref 0–1)
MONOCYTES NFR BLD: 6.3 % (ref 5–13)
NEUTS SEG # BLD: 4.52 K/UL (ref 1.8–8)
NEUTS SEG NFR BLD: 62.1 % (ref 32–75)
NRBC # BLD: 0 K/UL (ref 0–0.01)
NRBC BLD-RTO: 0 PER 100 WBC
PLATELET # BLD AUTO: 179 K/UL (ref 150–400)
PMV BLD AUTO: 10.2 FL (ref 8.9–12.9)
POTASSIUM SERPL-SCNC: 3.8 MMOL/L (ref 3.5–5.1)
PROT SERPL-MCNC: 7 G/DL (ref 6.4–8.3)
RBC # BLD AUTO: 4.47 M/UL (ref 3.8–5.2)
SODIUM SERPL-SCNC: 142 MMOL/L (ref 136–145)
WBC # BLD AUTO: 7.3 K/UL (ref 3.6–11)

## 2025-08-05 PROCEDURE — 6360000002 HC RX W HCPCS: Performed by: EMERGENCY MEDICINE

## 2025-08-05 PROCEDURE — 85025 COMPLETE CBC W/AUTO DIFF WBC: CPT

## 2025-08-05 PROCEDURE — 99285 EMERGENCY DEPT VISIT HI MDM: CPT

## 2025-08-05 PROCEDURE — 80053 COMPREHEN METABOLIC PANEL: CPT

## 2025-08-05 PROCEDURE — 6370000000 HC RX 637 (ALT 250 FOR IP): Performed by: EMERGENCY MEDICINE

## 2025-08-05 PROCEDURE — 36415 COLL VENOUS BLD VENIPUNCTURE: CPT

## 2025-08-05 PROCEDURE — 96375 TX/PRO/DX INJ NEW DRUG ADDON: CPT

## 2025-08-05 RX ORDER — DEXAMETHASONE SODIUM PHOSPHATE 10 MG/ML
10 INJECTION, SOLUTION INTRAMUSCULAR; INTRAVENOUS ONCE
Status: COMPLETED | OUTPATIENT
Start: 2025-08-05 | End: 2025-08-05

## 2025-08-05 RX ORDER — MAGNESIUM SULFATE 1 G/100ML
1000 INJECTION INTRAVENOUS
Status: COMPLETED | OUTPATIENT
Start: 2025-08-05 | End: 2025-08-06

## 2025-08-05 RX ORDER — GABAPENTIN 100 MG/1
200 CAPSULE ORAL ONCE
Status: COMPLETED | OUTPATIENT
Start: 2025-08-05 | End: 2025-08-05

## 2025-08-05 RX ORDER — HYDROMORPHONE HYDROCHLORIDE 1 MG/ML
1 INJECTION, SOLUTION INTRAMUSCULAR; INTRAVENOUS; SUBCUTANEOUS
Status: COMPLETED | OUTPATIENT
Start: 2025-08-05 | End: 2025-08-05

## 2025-08-05 RX ORDER — PROCHLORPERAZINE EDISYLATE 5 MG/ML
10 INJECTION INTRAMUSCULAR; INTRAVENOUS ONCE
Status: COMPLETED | OUTPATIENT
Start: 2025-08-05 | End: 2025-08-05

## 2025-08-05 RX ORDER — BUTALBITAL, ACETAMINOPHEN AND CAFFEINE 50; 325; 40 MG/1; MG/1; MG/1
2 TABLET ORAL
Status: COMPLETED | OUTPATIENT
Start: 2025-08-05 | End: 2025-08-05

## 2025-08-05 RX ORDER — DIPHENHYDRAMINE HYDROCHLORIDE 50 MG/ML
25 INJECTION, SOLUTION INTRAMUSCULAR; INTRAVENOUS
Status: COMPLETED | OUTPATIENT
Start: 2025-08-05 | End: 2025-08-05

## 2025-08-05 RX ADMIN — HYDROMORPHONE HYDROCHLORIDE 1 MG: 1 INJECTION, SOLUTION INTRAMUSCULAR; INTRAVENOUS; SUBCUTANEOUS at 22:00

## 2025-08-05 RX ADMIN — MAGNESIUM SULFATE HEPTAHYDRATE 1000 MG: 1 INJECTION, SOLUTION INTRAVENOUS at 23:56

## 2025-08-05 RX ADMIN — PROCHLORPERAZINE EDISYLATE 10 MG: 5 INJECTION INTRAMUSCULAR; INTRAVENOUS at 22:01

## 2025-08-05 RX ADMIN — GABAPENTIN 200 MG: 100 CAPSULE ORAL at 22:01

## 2025-08-05 RX ADMIN — BUTALBITAL, ACETAMINOPHEN, AND CAFFEINE 2 TABLET: 50; 325; 40 TABLET ORAL at 22:01

## 2025-08-05 RX ADMIN — DIPHENHYDRAMINE HYDROCHLORIDE 25 MG: 50 INJECTION INTRAMUSCULAR; INTRAVENOUS at 22:01

## 2025-08-05 RX ADMIN — DEXAMETHASONE SODIUM PHOSPHATE 10 MG: 10 INJECTION, SOLUTION INTRAMUSCULAR; INTRAVENOUS at 23:57

## 2025-08-05 ASSESSMENT — PAIN DESCRIPTION - LOCATION
LOCATION: HEAD
LOCATION: HEAD
LOCATION: LEG

## 2025-08-05 ASSESSMENT — PAIN DESCRIPTION - DESCRIPTORS
DESCRIPTORS: ACHING
DESCRIPTORS: ACHING
DESCRIPTORS: SHOOTING

## 2025-08-05 ASSESSMENT — PAIN - FUNCTIONAL ASSESSMENT
PAIN_FUNCTIONAL_ASSESSMENT: PREVENTS OR INTERFERES SOME ACTIVE ACTIVITIES AND ADLS
PAIN_FUNCTIONAL_ASSESSMENT: ACTIVITIES ARE NOT PREVENTED
PAIN_FUNCTIONAL_ASSESSMENT: ACTIVITIES ARE NOT PREVENTED
PAIN_FUNCTIONAL_ASSESSMENT: 0-10

## 2025-08-05 ASSESSMENT — PAIN DESCRIPTION - PAIN TYPE
TYPE: ACUTE PAIN
TYPE: ACUTE PAIN

## 2025-08-05 ASSESSMENT — PAIN SCALES - GENERAL
PAINLEVEL_OUTOF10: 10
PAINLEVEL_OUTOF10: 9
PAINLEVEL_OUTOF10: 6

## 2025-08-05 ASSESSMENT — PAIN DESCRIPTION - ORIENTATION
ORIENTATION: RIGHT;LEFT
ORIENTATION: ANTERIOR
ORIENTATION: ANTERIOR

## 2025-08-05 ASSESSMENT — PAIN DESCRIPTION - ONSET
ONSET: ON-GOING
ONSET: ON-GOING

## 2025-08-05 ASSESSMENT — PAIN DESCRIPTION - FREQUENCY
FREQUENCY: INTERMITTENT
FREQUENCY: CONTINUOUS

## 2025-08-05 ASSESSMENT — LIFESTYLE VARIABLES
HOW OFTEN DO YOU HAVE A DRINK CONTAINING ALCOHOL: NEVER
HOW MANY STANDARD DRINKS CONTAINING ALCOHOL DO YOU HAVE ON A TYPICAL DAY: PATIENT DOES NOT DRINK

## 2025-08-06 ENCOUNTER — APPOINTMENT (OUTPATIENT)
Facility: HOSPITAL | Age: 65
DRG: 552 | End: 2025-08-06
Payer: MEDICARE

## 2025-08-06 PROBLEM — R29.898 COMPLAINTS OF WEAKNESS OF LOWER EXTREMITY: Status: ACTIVE | Noted: 2025-08-06

## 2025-08-06 LAB
ALBUMIN SERPL-MCNC: 3.3 G/DL (ref 3.5–5.2)
ALBUMIN/GLOB SERPL: 1 (ref 1.1–2.2)
ALP SERPL-CCNC: 56 U/L (ref 35–104)
ALT SERPL-CCNC: 14 U/L (ref 10–35)
ANION GAP SERPL CALC-SCNC: 12 MMOL/L (ref 2–14)
APPEARANCE UR: CLEAR
AST SERPL-CCNC: 22 U/L (ref 10–35)
BACTERIA URNS QL MICRO: NEGATIVE /HPF
BASOPHILS # BLD: 0.05 K/UL (ref 0–0.1)
BASOPHILS NFR BLD: 0.8 % (ref 0–1)
BILIRUB SERPL-MCNC: 0.2 MG/DL (ref 0–1.2)
BILIRUB UR QL: NEGATIVE
BUN SERPL-MCNC: 19 MG/DL (ref 8–23)
BUN/CREAT SERPL: 22 (ref 12–20)
CALCIUM SERPL-MCNC: 10 MG/DL (ref 8.8–10.2)
CHLORIDE SERPL-SCNC: 104 MMOL/L (ref 98–107)
CO2 SERPL-SCNC: 22 MMOL/L (ref 20–29)
COLOR UR: NORMAL
CREAT SERPL-MCNC: 0.87 MG/DL (ref 0.6–1)
DIFFERENTIAL METHOD BLD: ABNORMAL
EOSINOPHIL # BLD: 0.06 K/UL (ref 0–0.4)
EOSINOPHIL NFR BLD: 0.9 % (ref 0–7)
EPITH CASTS URNS QL MICRO: NORMAL /LPF
ERYTHROCYTE [DISTWIDTH] IN BLOOD BY AUTOMATED COUNT: 14.5 % (ref 11.5–14.5)
ERYTHROCYTE [SEDIMENTATION RATE] IN BLOOD: 33 MM/HR (ref 0–30)
GLOBULIN SER CALC-MCNC: 3.3 G/DL (ref 2–4)
GLUCOSE BLD STRIP.AUTO-MCNC: 164 MG/DL (ref 65–117)
GLUCOSE BLD STRIP.AUTO-MCNC: 167 MG/DL (ref 65–117)
GLUCOSE BLD STRIP.AUTO-MCNC: 256 MG/DL (ref 65–117)
GLUCOSE SERPL-MCNC: 227 MG/DL (ref 65–100)
GLUCOSE UR STRIP.AUTO-MCNC: NEGATIVE MG/DL
HCT VFR BLD AUTO: 36.5 % (ref 35–47)
HGB BLD-MCNC: 10.8 G/DL (ref 11.5–16)
HGB UR QL STRIP: NEGATIVE
IMM GRANULOCYTES # BLD AUTO: 0.06 K/UL (ref 0–0.04)
IMM GRANULOCYTES NFR BLD AUTO: 0.9 % (ref 0–0.5)
KETONES UR QL STRIP.AUTO: NEGATIVE MG/DL
LEUKOCYTE ESTERASE UR QL STRIP.AUTO: NEGATIVE
LYMPHOCYTES # BLD: 0.96 K/UL (ref 0.8–3.5)
LYMPHOCYTES NFR BLD: 14.8 % (ref 12–49)
MCH RBC QN AUTO: 25.9 PG (ref 26–34)
MCHC RBC AUTO-ENTMCNC: 29.6 G/DL (ref 30–36.5)
MCV RBC AUTO: 87.5 FL (ref 80–99)
MONOCYTES # BLD: 0.17 K/UL (ref 0–1)
MONOCYTES NFR BLD: 2.6 % (ref 5–13)
NEUTS SEG # BLD: 5.2 K/UL (ref 1.8–8)
NEUTS SEG NFR BLD: 80 % (ref 32–75)
NITRITE UR QL STRIP.AUTO: NEGATIVE
NRBC # BLD: 0 K/UL (ref 0–0.01)
NRBC BLD-RTO: 0 PER 100 WBC
PH UR STRIP: 5 (ref 5–8)
PLATELET # BLD AUTO: 181 K/UL (ref 150–400)
PMV BLD AUTO: 10 FL (ref 8.9–12.9)
POTASSIUM SERPL-SCNC: 4.3 MMOL/L (ref 3.5–5.1)
PROT SERPL-MCNC: 6.7 G/DL (ref 6.4–8.3)
PROT UR STRIP-MCNC: NEGATIVE MG/DL
RBC # BLD AUTO: 4.17 M/UL (ref 3.8–5.2)
RBC #/AREA URNS HPF: NORMAL /HPF (ref 0–5)
SERVICE CMNT-IMP: ABNORMAL
SODIUM SERPL-SCNC: 138 MMOL/L (ref 136–145)
SP GR UR REFRACTOMETRY: 1.02
URINE CULTURE IF INDICATED: NORMAL
UROBILINOGEN UR QL STRIP.AUTO: 0.2 EU/DL (ref 0.2–1)
WBC # BLD AUTO: 6.5 K/UL (ref 3.6–11)
WBC URNS QL MICRO: NORMAL /HPF (ref 0–4)

## 2025-08-06 PROCEDURE — 6360000004 HC RX CONTRAST MEDICATION: Performed by: STUDENT IN AN ORGANIZED HEALTH CARE EDUCATION/TRAINING PROGRAM

## 2025-08-06 PROCEDURE — 82962 GLUCOSE BLOOD TEST: CPT

## 2025-08-06 PROCEDURE — 85652 RBC SED RATE AUTOMATED: CPT

## 2025-08-06 PROCEDURE — 2700000000 HC OXYGEN THERAPY PER DAY

## 2025-08-06 PROCEDURE — 70450 CT HEAD/BRAIN W/O DYE: CPT

## 2025-08-06 PROCEDURE — 94640 AIRWAY INHALATION TREATMENT: CPT

## 2025-08-06 PROCEDURE — 81001 URINALYSIS AUTO W/SCOPE: CPT

## 2025-08-06 PROCEDURE — 72158 MRI LUMBAR SPINE W/O & W/DYE: CPT

## 2025-08-06 PROCEDURE — 87086 URINE CULTURE/COLONY COUNT: CPT

## 2025-08-06 PROCEDURE — A9579 GAD-BASE MR CONTRAST NOS,1ML: HCPCS | Performed by: STUDENT IN AN ORGANIZED HEALTH CARE EDUCATION/TRAINING PROGRAM

## 2025-08-06 PROCEDURE — 6370000000 HC RX 637 (ALT 250 FOR IP): Performed by: NURSE PRACTITIONER

## 2025-08-06 PROCEDURE — 6370000000 HC RX 637 (ALT 250 FOR IP): Performed by: STUDENT IN AN ORGANIZED HEALTH CARE EDUCATION/TRAINING PROGRAM

## 2025-08-06 PROCEDURE — 6360000002 HC RX W HCPCS: Performed by: NURSE PRACTITIONER

## 2025-08-06 PROCEDURE — 85025 COMPLETE CBC W/AUTO DIFF WBC: CPT

## 2025-08-06 PROCEDURE — 36415 COLL VENOUS BLD VENIPUNCTURE: CPT

## 2025-08-06 PROCEDURE — 94761 N-INVAS EAR/PLS OXIMETRY MLT: CPT

## 2025-08-06 PROCEDURE — 80053 COMPREHEN METABOLIC PANEL: CPT

## 2025-08-06 PROCEDURE — 1100000003 HC PRIVATE W/ TELEMETRY

## 2025-08-06 PROCEDURE — 6360000002 HC RX W HCPCS: Performed by: STUDENT IN AN ORGANIZED HEALTH CARE EDUCATION/TRAINING PROGRAM

## 2025-08-06 PROCEDURE — 96365 THER/PROPH/DIAG IV INF INIT: CPT

## 2025-08-06 PROCEDURE — 2500000003 HC RX 250 WO HCPCS: Performed by: STUDENT IN AN ORGANIZED HEALTH CARE EDUCATION/TRAINING PROGRAM

## 2025-08-06 PROCEDURE — 6370000000 HC RX 637 (ALT 250 FOR IP)

## 2025-08-06 PROCEDURE — 2500000003 HC RX 250 WO HCPCS: Performed by: NURSE PRACTITIONER

## 2025-08-06 RX ORDER — CLONAZEPAM 0.5 MG/1
0.5 TABLET ORAL
Status: COMPLETED | OUTPATIENT
Start: 2025-08-06 | End: 2025-08-06

## 2025-08-06 RX ORDER — ACETAMINOPHEN 325 MG/1
650 TABLET ORAL EVERY 6 HOURS PRN
Status: DISCONTINUED | OUTPATIENT
Start: 2025-08-06 | End: 2025-08-08 | Stop reason: HOSPADM

## 2025-08-06 RX ORDER — FLUTICASONE PROPIONATE 50 MCG
2 SPRAY, SUSPENSION (ML) NASAL DAILY
Status: DISCONTINUED | OUTPATIENT
Start: 2025-08-06 | End: 2025-08-08 | Stop reason: HOSPADM

## 2025-08-06 RX ORDER — SODIUM CHLORIDE 9 MG/ML
INJECTION, SOLUTION INTRAVENOUS PRN
Status: DISCONTINUED | OUTPATIENT
Start: 2025-08-06 | End: 2025-08-08 | Stop reason: HOSPADM

## 2025-08-06 RX ORDER — PRAZOSIN HYDROCHLORIDE 1 MG/1
4 CAPSULE ORAL NIGHTLY
Status: DISCONTINUED | OUTPATIENT
Start: 2025-08-06 | End: 2025-08-08 | Stop reason: HOSPADM

## 2025-08-06 RX ORDER — HYDROMORPHONE HYDROCHLORIDE 1 MG/ML
1 INJECTION, SOLUTION INTRAMUSCULAR; INTRAVENOUS; SUBCUTANEOUS ONCE
Status: COMPLETED | OUTPATIENT
Start: 2025-08-06 | End: 2025-08-06

## 2025-08-06 RX ORDER — SODIUM CHLORIDE 0.9 % (FLUSH) 0.9 %
5-40 SYRINGE (ML) INJECTION EVERY 12 HOURS SCHEDULED
Status: DISCONTINUED | OUTPATIENT
Start: 2025-08-06 | End: 2025-08-08 | Stop reason: HOSPADM

## 2025-08-06 RX ORDER — GADOTERIDOL 279.3 MG/ML
20 INJECTION INTRAVENOUS
Status: COMPLETED | OUTPATIENT
Start: 2025-08-06 | End: 2025-08-06

## 2025-08-06 RX ORDER — PANTOPRAZOLE SODIUM 40 MG/1
40 TABLET, DELAYED RELEASE ORAL
Status: DISCONTINUED | OUTPATIENT
Start: 2025-08-06 | End: 2025-08-08 | Stop reason: HOSPADM

## 2025-08-06 RX ORDER — ATORVASTATIN CALCIUM 10 MG/1
10 TABLET, FILM COATED ORAL DAILY
Status: DISCONTINUED | OUTPATIENT
Start: 2025-08-06 | End: 2025-08-08 | Stop reason: HOSPADM

## 2025-08-06 RX ORDER — POTASSIUM CHLORIDE 7.45 MG/ML
10 INJECTION INTRAVENOUS PRN
Status: DISCONTINUED | OUTPATIENT
Start: 2025-08-06 | End: 2025-08-08 | Stop reason: HOSPADM

## 2025-08-06 RX ORDER — POTASSIUM CHLORIDE 1500 MG/1
40 TABLET, EXTENDED RELEASE ORAL PRN
Status: DISCONTINUED | OUTPATIENT
Start: 2025-08-06 | End: 2025-08-08 | Stop reason: HOSPADM

## 2025-08-06 RX ORDER — ONDANSETRON 2 MG/ML
4 INJECTION INTRAMUSCULAR; INTRAVENOUS EVERY 6 HOURS PRN
Status: DISCONTINUED | OUTPATIENT
Start: 2025-08-06 | End: 2025-08-08 | Stop reason: HOSPADM

## 2025-08-06 RX ORDER — SODIUM CHLORIDE 0.9 % (FLUSH) 0.9 %
5-40 SYRINGE (ML) INJECTION PRN
Status: DISCONTINUED | OUTPATIENT
Start: 2025-08-06 | End: 2025-08-08 | Stop reason: HOSPADM

## 2025-08-06 RX ORDER — HYDROMORPHONE HYDROCHLORIDE 1 MG/ML
0.5 INJECTION, SOLUTION INTRAMUSCULAR; INTRAVENOUS; SUBCUTANEOUS ONCE
Status: DISCONTINUED | OUTPATIENT
Start: 2025-08-06 | End: 2025-08-06

## 2025-08-06 RX ORDER — SUMATRIPTAN SUCCINATE 25 MG/1
50 TABLET ORAL EVERY 6 HOURS PRN
Status: DISCONTINUED | OUTPATIENT
Start: 2025-08-06 | End: 2025-08-08 | Stop reason: HOSPADM

## 2025-08-06 RX ORDER — IPRATROPIUM BROMIDE AND ALBUTEROL SULFATE 2.5; .5 MG/3ML; MG/3ML
1 SOLUTION RESPIRATORY (INHALATION) EVERY 4 HOURS PRN
Status: DISCONTINUED | OUTPATIENT
Start: 2025-08-06 | End: 2025-08-08 | Stop reason: HOSPADM

## 2025-08-06 RX ORDER — BUPROPION HYDROCHLORIDE 150 MG/1
300 TABLET ORAL DAILY
Status: DISCONTINUED | OUTPATIENT
Start: 2025-08-06 | End: 2025-08-08 | Stop reason: HOSPADM

## 2025-08-06 RX ORDER — VALSARTAN 80 MG/1
80 TABLET ORAL DAILY
Status: DISCONTINUED | OUTPATIENT
Start: 2025-08-06 | End: 2025-08-08 | Stop reason: HOSPADM

## 2025-08-06 RX ORDER — HYDROCODONE BITARTRATE AND ACETAMINOPHEN 5; 325 MG/1; MG/1
1 TABLET ORAL 4 TIMES DAILY PRN
COMMUNITY
Start: 2025-06-23

## 2025-08-06 RX ORDER — ENOXAPARIN SODIUM 100 MG/ML
40 INJECTION SUBCUTANEOUS 2 TIMES DAILY
Status: DISCONTINUED | OUTPATIENT
Start: 2025-08-06 | End: 2025-08-08 | Stop reason: HOSPADM

## 2025-08-06 RX ORDER — ALBUTEROL SULFATE 90 UG/1
2 INHALANT RESPIRATORY (INHALATION) EVERY 4 HOURS PRN
Status: DISCONTINUED | OUTPATIENT
Start: 2025-08-06 | End: 2025-08-08 | Stop reason: HOSPADM

## 2025-08-06 RX ORDER — METOPROLOL SUCCINATE 25 MG/1
25 TABLET, EXTENDED RELEASE ORAL DAILY
Status: DISCONTINUED | OUTPATIENT
Start: 2025-08-06 | End: 2025-08-08 | Stop reason: HOSPADM

## 2025-08-06 RX ORDER — ARFORMOTEROL TARTRATE 15 UG/2ML
15 SOLUTION RESPIRATORY (INHALATION)
Status: DISCONTINUED | OUTPATIENT
Start: 2025-08-06 | End: 2025-08-07

## 2025-08-06 RX ORDER — SUMATRIPTAN SUCCINATE 25 MG/1
50 TABLET ORAL ONCE
Status: COMPLETED | OUTPATIENT
Start: 2025-08-06 | End: 2025-08-06

## 2025-08-06 RX ORDER — INSULIN LISPRO 100 [IU]/ML
0-8 INJECTION, SOLUTION INTRAVENOUS; SUBCUTANEOUS
Status: DISCONTINUED | OUTPATIENT
Start: 2025-08-06 | End: 2025-08-08 | Stop reason: HOSPADM

## 2025-08-06 RX ORDER — GLUCAGON 1 MG/ML
1 KIT INJECTION PRN
Status: DISCONTINUED | OUTPATIENT
Start: 2025-08-06 | End: 2025-08-08 | Stop reason: HOSPADM

## 2025-08-06 RX ORDER — KETOROLAC TROMETHAMINE 30 MG/ML
30 INJECTION, SOLUTION INTRAMUSCULAR; INTRAVENOUS ONCE
Status: COMPLETED | OUTPATIENT
Start: 2025-08-06 | End: 2025-08-06

## 2025-08-06 RX ORDER — ACETAMINOPHEN 650 MG/1
650 SUPPOSITORY RECTAL EVERY 6 HOURS PRN
Status: DISCONTINUED | OUTPATIENT
Start: 2025-08-06 | End: 2025-08-08 | Stop reason: HOSPADM

## 2025-08-06 RX ORDER — ASPIRIN 81 MG/1
81 TABLET, CHEWABLE ORAL DAILY
Status: DISCONTINUED | OUTPATIENT
Start: 2025-08-06 | End: 2025-08-08 | Stop reason: HOSPADM

## 2025-08-06 RX ORDER — DIAZEPAM 10 MG/2ML
2.5 INJECTION, SOLUTION INTRAMUSCULAR; INTRAVENOUS ONCE
Status: COMPLETED | OUTPATIENT
Start: 2025-08-06 | End: 2025-08-06

## 2025-08-06 RX ORDER — LEVOTHYROXINE SODIUM 112 UG/1
112 TABLET ORAL
Status: DISCONTINUED | OUTPATIENT
Start: 2025-08-06 | End: 2025-08-08 | Stop reason: HOSPADM

## 2025-08-06 RX ORDER — GABAPENTIN 100 MG/1
100 CAPSULE ORAL 3 TIMES DAILY
Status: DISCONTINUED | OUTPATIENT
Start: 2025-08-06 | End: 2025-08-08 | Stop reason: HOSPADM

## 2025-08-06 RX ORDER — QUETIAPINE FUMARATE 100 MG/1
600 TABLET, FILM COATED ORAL NIGHTLY
Status: DISCONTINUED | OUTPATIENT
Start: 2025-08-06 | End: 2025-08-08 | Stop reason: HOSPADM

## 2025-08-06 RX ORDER — BUDESONIDE 0.5 MG/2ML
1 INHALANT ORAL
Status: DISCONTINUED | OUTPATIENT
Start: 2025-08-06 | End: 2025-08-07

## 2025-08-06 RX ORDER — MONTELUKAST SODIUM 10 MG/1
10 TABLET ORAL DAILY
Status: DISCONTINUED | OUTPATIENT
Start: 2025-08-06 | End: 2025-08-08 | Stop reason: HOSPADM

## 2025-08-06 RX ORDER — HYDROCODONE BITARTRATE AND ACETAMINOPHEN 5; 325 MG/1; MG/1
1 TABLET ORAL EVERY 4 HOURS PRN
Refills: 0 | Status: DISCONTINUED | OUTPATIENT
Start: 2025-08-06 | End: 2025-08-08 | Stop reason: HOSPADM

## 2025-08-06 RX ORDER — HYDROCODONE BITARTRATE AND ACETAMINOPHEN 5; 325 MG/1; MG/1
1 TABLET ORAL EVERY 6 HOURS PRN
Status: DISCONTINUED | OUTPATIENT
Start: 2025-08-06 | End: 2025-08-06

## 2025-08-06 RX ORDER — MAGNESIUM SULFATE IN WATER 40 MG/ML
2000 INJECTION, SOLUTION INTRAVENOUS PRN
Status: DISCONTINUED | OUTPATIENT
Start: 2025-08-06 | End: 2025-08-08 | Stop reason: HOSPADM

## 2025-08-06 RX ORDER — DEXTROSE MONOHYDRATE 100 MG/ML
INJECTION, SOLUTION INTRAVENOUS CONTINUOUS PRN
Status: DISCONTINUED | OUTPATIENT
Start: 2025-08-06 | End: 2025-08-08 | Stop reason: HOSPADM

## 2025-08-06 RX ORDER — TOPIRAMATE 100 MG/1
200 TABLET, FILM COATED ORAL DAILY
Status: DISCONTINUED | OUTPATIENT
Start: 2025-08-06 | End: 2025-08-08 | Stop reason: HOSPADM

## 2025-08-06 RX ADMIN — BUDESONIDE 1000 MCG: 0.5 INHALANT RESPIRATORY (INHALATION) at 20:25

## 2025-08-06 RX ADMIN — ENOXAPARIN SODIUM 40 MG: 100 INJECTION SUBCUTANEOUS at 20:28

## 2025-08-06 RX ADMIN — BUPROPION HYDROCHLORIDE 300 MG: 150 TABLET, EXTENDED RELEASE ORAL at 08:44

## 2025-08-06 RX ADMIN — IPRATROPIUM BROMIDE 0.5 MG: 0.5 SOLUTION RESPIRATORY (INHALATION) at 08:20

## 2025-08-06 RX ADMIN — PRAZOSIN HYDROCHLORIDE 4 MG: 1 CAPSULE ORAL at 20:27

## 2025-08-06 RX ADMIN — INSULIN LISPRO 4 UNITS: 100 INJECTION, SOLUTION INTRAVENOUS; SUBCUTANEOUS at 17:49

## 2025-08-06 RX ADMIN — LEVOTHYROXINE SODIUM 112 MCG: 0.11 TABLET ORAL at 08:44

## 2025-08-06 RX ADMIN — BUSPIRONE HYDROCHLORIDE 15 MG: 5 TABLET ORAL at 15:44

## 2025-08-06 RX ADMIN — QUETIAPINE FUMARATE 600 MG: 100 TABLET ORAL at 21:36

## 2025-08-06 RX ADMIN — VALSARTAN 80 MG: 80 TABLET, FILM COATED ORAL at 08:44

## 2025-08-06 RX ADMIN — SODIUM CHLORIDE, PRESERVATIVE FREE 10 ML: 5 INJECTION INTRAVENOUS at 21:38

## 2025-08-06 RX ADMIN — HYDROCODONE BITARTRATE AND ACETAMINOPHEN 1 TABLET: 5; 325 TABLET ORAL at 20:27

## 2025-08-06 RX ADMIN — IPRATROPIUM BROMIDE 0.5 MG: 0.5 SOLUTION RESPIRATORY (INHALATION) at 20:25

## 2025-08-06 RX ADMIN — CLONAZEPAM 0.5 MG: 0.5 TABLET ORAL at 21:36

## 2025-08-06 RX ADMIN — DIAZEPAM 2.5 MG: 5 INJECTION, SOLUTION INTRAMUSCULAR; INTRAVENOUS at 13:59

## 2025-08-06 RX ADMIN — HYDROCODONE BITARTRATE AND ACETAMINOPHEN 1 TABLET: 5; 325 TABLET ORAL at 15:44

## 2025-08-06 RX ADMIN — ARFORMOTEROL TARTRATE 15 MCG: 15 SOLUTION RESPIRATORY (INHALATION) at 08:20

## 2025-08-06 RX ADMIN — GABAPENTIN 100 MG: 100 CAPSULE ORAL at 15:44

## 2025-08-06 RX ADMIN — BUDESONIDE 1000 MCG: 0.5 INHALANT RESPIRATORY (INHALATION) at 08:20

## 2025-08-06 RX ADMIN — HYDROCODONE BITARTRATE AND ACETAMINOPHEN 1 TABLET: 5; 325 TABLET ORAL at 08:44

## 2025-08-06 RX ADMIN — ENOXAPARIN SODIUM 40 MG: 100 INJECTION SUBCUTANEOUS at 08:46

## 2025-08-06 RX ADMIN — METOPROLOL SUCCINATE 25 MG: 25 TABLET, EXTENDED RELEASE ORAL at 08:45

## 2025-08-06 RX ADMIN — ARFORMOTEROL TARTRATE 15 MCG: 15 SOLUTION RESPIRATORY (INHALATION) at 20:25

## 2025-08-06 RX ADMIN — ATORVASTATIN CALCIUM 10 MG: 10 TABLET, FILM COATED ORAL at 08:44

## 2025-08-06 RX ADMIN — MONTELUKAST 10 MG: 10 TABLET, FILM COATED ORAL at 08:44

## 2025-08-06 RX ADMIN — GADOTERIDOL 20 ML: 279.3 INJECTION, SOLUTION INTRAVENOUS at 14:49

## 2025-08-06 RX ADMIN — GABAPENTIN 100 MG: 100 CAPSULE ORAL at 08:44

## 2025-08-06 RX ADMIN — MICONAZOLE NITRATE: 2 POWDER TOPICAL at 12:24

## 2025-08-06 RX ADMIN — KETOROLAC TROMETHAMINE 30 MG: 30 INJECTION, SOLUTION INTRAMUSCULAR at 17:32

## 2025-08-06 RX ADMIN — TOPIRAMATE 200 MG: 100 TABLET, FILM COATED ORAL at 08:45

## 2025-08-06 RX ADMIN — BUSPIRONE HYDROCHLORIDE 15 MG: 5 TABLET ORAL at 21:36

## 2025-08-06 RX ADMIN — HYDROMORPHONE HYDROCHLORIDE 1 MG: 1 INJECTION, SOLUTION INTRAMUSCULAR; INTRAVENOUS; SUBCUTANEOUS at 11:51

## 2025-08-06 RX ADMIN — GABAPENTIN 100 MG: 100 CAPSULE ORAL at 20:28

## 2025-08-06 RX ADMIN — SODIUM CHLORIDE, PRESERVATIVE FREE 10 ML: 5 INJECTION INTRAVENOUS at 08:46

## 2025-08-06 RX ADMIN — PANTOPRAZOLE SODIUM 40 MG: 40 TABLET, DELAYED RELEASE ORAL at 08:45

## 2025-08-06 RX ADMIN — SUMATRIPTAN SUCCINATE 50 MG: 25 TABLET ORAL at 05:54

## 2025-08-06 RX ADMIN — MICONAZOLE NITRATE: 2 POWDER TOPICAL at 21:39

## 2025-08-06 RX ADMIN — ASPIRIN 81 MG: 81 TABLET, CHEWABLE ORAL at 08:45

## 2025-08-06 RX ADMIN — BUSPIRONE HYDROCHLORIDE 15 MG: 5 TABLET ORAL at 08:44

## 2025-08-06 ASSESSMENT — PAIN DESCRIPTION - ORIENTATION
ORIENTATION: RIGHT;LEFT

## 2025-08-06 ASSESSMENT — PAIN DESCRIPTION - LOCATION
LOCATION: LEG
LOCATION: BACK
LOCATION: LEG
LOCATION: LEG
LOCATION: HEAD;BACK;LEG
LOCATION: LEG

## 2025-08-06 ASSESSMENT — PAIN DESCRIPTION - DESCRIPTORS
DESCRIPTORS: SHOOTING
DESCRIPTORS: STABBING
DESCRIPTORS: ACHING
DESCRIPTORS: SHOOTING
DESCRIPTORS: ACHING

## 2025-08-06 ASSESSMENT — PAIN SCALES - GENERAL
PAINLEVEL_OUTOF10: 6
PAINLEVEL_OUTOF10: 8
PAINLEVEL_OUTOF10: 8
PAINLEVEL_OUTOF10: 9
PAINLEVEL_OUTOF10: 5
PAINLEVEL_OUTOF10: 2
PAINLEVEL_OUTOF10: 8
PAINLEVEL_OUTOF10: 5
PAINLEVEL_OUTOF10: 8
PAINLEVEL_OUTOF10: 9
PAINLEVEL_OUTOF10: 5

## 2025-08-06 ASSESSMENT — PAIN - FUNCTIONAL ASSESSMENT
PAIN_FUNCTIONAL_ASSESSMENT: PREVENTS OR INTERFERES SOME ACTIVE ACTIVITIES AND ADLS
PAIN_FUNCTIONAL_ASSESSMENT: ACTIVITIES ARE NOT PREVENTED
PAIN_FUNCTIONAL_ASSESSMENT: ACTIVITIES ARE NOT PREVENTED

## 2025-08-07 LAB
ALBUMIN SERPL-MCNC: 2.7 G/DL (ref 3.5–5.2)
ALBUMIN/GLOB SERPL: 0.9 (ref 1.1–2.2)
ALP SERPL-CCNC: 55 U/L (ref 35–104)
ALT SERPL-CCNC: 14 U/L (ref 10–35)
ANION GAP SERPL CALC-SCNC: 9 MMOL/L (ref 2–14)
AST SERPL-CCNC: 20 U/L (ref 10–35)
BACTERIA SPEC CULT: NORMAL
BASOPHILS # BLD: 0.05 K/UL (ref 0–0.1)
BASOPHILS NFR BLD: 0.9 % (ref 0–1)
BILIRUB SERPL-MCNC: <0.2 MG/DL (ref 0–1.2)
BUN SERPL-MCNC: 24 MG/DL (ref 8–23)
BUN/CREAT SERPL: 29 (ref 12–20)
CALCIUM SERPL-MCNC: 9.3 MG/DL (ref 8.8–10.2)
CHLORIDE SERPL-SCNC: 106 MMOL/L (ref 98–107)
CO2 SERPL-SCNC: 23 MMOL/L (ref 20–29)
CREAT SERPL-MCNC: 0.83 MG/DL (ref 0.6–1)
DIFFERENTIAL METHOD BLD: ABNORMAL
EOSINOPHIL # BLD: 0.14 K/UL (ref 0–0.4)
EOSINOPHIL NFR BLD: 2.6 % (ref 0–7)
ERYTHROCYTE [DISTWIDTH] IN BLOOD BY AUTOMATED COUNT: 14.3 % (ref 11.5–14.5)
EST. AVERAGE GLUCOSE BLD GHB EST-MCNC: 141 MG/DL
GLOBULIN SER CALC-MCNC: 2.9 G/DL (ref 2–4)
GLUCOSE BLD STRIP.AUTO-MCNC: 139 MG/DL (ref 65–117)
GLUCOSE BLD STRIP.AUTO-MCNC: 150 MG/DL (ref 65–117)
GLUCOSE BLD STRIP.AUTO-MCNC: 181 MG/DL (ref 65–117)
GLUCOSE BLD STRIP.AUTO-MCNC: 251 MG/DL (ref 65–117)
GLUCOSE SERPL-MCNC: 186 MG/DL (ref 65–100)
HBA1C MFR BLD: 6.5 % (ref 4–5.6)
HCT VFR BLD AUTO: 33.9 % (ref 35–47)
HGB BLD-MCNC: 10.2 G/DL (ref 11.5–16)
IMM GRANULOCYTES # BLD AUTO: 0.04 K/UL (ref 0–0.04)
IMM GRANULOCYTES NFR BLD AUTO: 0.7 % (ref 0–0.5)
LYMPHOCYTES # BLD: 1.44 K/UL (ref 0.8–3.5)
LYMPHOCYTES NFR BLD: 26.6 % (ref 12–49)
MCH RBC QN AUTO: 26.3 PG (ref 26–34)
MCHC RBC AUTO-ENTMCNC: 30.1 G/DL (ref 30–36.5)
MCV RBC AUTO: 87.4 FL (ref 80–99)
MONOCYTES # BLD: 0.43 K/UL (ref 0–1)
MONOCYTES NFR BLD: 7.9 % (ref 5–13)
NEUTS SEG # BLD: 3.31 K/UL (ref 1.8–8)
NEUTS SEG NFR BLD: 61.3 % (ref 32–75)
NRBC # BLD: 0 K/UL (ref 0–0.01)
NRBC BLD-RTO: 0 PER 100 WBC
PLATELET # BLD AUTO: 154 K/UL (ref 150–400)
PMV BLD AUTO: 10.3 FL (ref 8.9–12.9)
POTASSIUM SERPL-SCNC: 3.7 MMOL/L (ref 3.5–5.1)
PROT SERPL-MCNC: 5.7 G/DL (ref 6.4–8.3)
RBC # BLD AUTO: 3.88 M/UL (ref 3.8–5.2)
SERVICE CMNT-IMP: ABNORMAL
SERVICE CMNT-IMP: NORMAL
SODIUM SERPL-SCNC: 138 MMOL/L (ref 136–145)
T4 FREE SERPL-MCNC: 0.8 NG/DL (ref 0.9–1.6)
TSH, 3RD GENERATION: 1.86 UIU/ML (ref 0.27–4.2)
WBC # BLD AUTO: 5.4 K/UL (ref 3.6–11)

## 2025-08-07 PROCEDURE — 83036 HEMOGLOBIN GLYCOSYLATED A1C: CPT

## 2025-08-07 PROCEDURE — 6360000002 HC RX W HCPCS: Performed by: NURSE PRACTITIONER

## 2025-08-07 PROCEDURE — 94640 AIRWAY INHALATION TREATMENT: CPT

## 2025-08-07 PROCEDURE — 6370000000 HC RX 637 (ALT 250 FOR IP): Performed by: NURSE PRACTITIONER

## 2025-08-07 PROCEDURE — 36415 COLL VENOUS BLD VENIPUNCTURE: CPT

## 2025-08-07 PROCEDURE — 84439 ASSAY OF FREE THYROXINE: CPT

## 2025-08-07 PROCEDURE — 6360000002 HC RX W HCPCS: Performed by: STUDENT IN AN ORGANIZED HEALTH CARE EDUCATION/TRAINING PROGRAM

## 2025-08-07 PROCEDURE — 84443 ASSAY THYROID STIM HORMONE: CPT

## 2025-08-07 PROCEDURE — 80053 COMPREHEN METABOLIC PANEL: CPT

## 2025-08-07 PROCEDURE — 6370000000 HC RX 637 (ALT 250 FOR IP): Performed by: STUDENT IN AN ORGANIZED HEALTH CARE EDUCATION/TRAINING PROGRAM

## 2025-08-07 PROCEDURE — 82962 GLUCOSE BLOOD TEST: CPT

## 2025-08-07 PROCEDURE — 85025 COMPLETE CBC W/AUTO DIFF WBC: CPT

## 2025-08-07 PROCEDURE — 1100000003 HC PRIVATE W/ TELEMETRY

## 2025-08-07 PROCEDURE — 2500000003 HC RX 250 WO HCPCS: Performed by: STUDENT IN AN ORGANIZED HEALTH CARE EDUCATION/TRAINING PROGRAM

## 2025-08-07 PROCEDURE — 2700000000 HC OXYGEN THERAPY PER DAY

## 2025-08-07 RX ORDER — OXYCODONE HYDROCHLORIDE 5 MG/1
5 TABLET ORAL EVERY 6 HOURS PRN
Qty: 12 TABLET | Refills: 0 | Status: SHIPPED | OUTPATIENT
Start: 2025-08-07 | End: 2025-08-08 | Stop reason: HOSPADM

## 2025-08-07 RX ORDER — HYDROMORPHONE HYDROCHLORIDE 1 MG/ML
1 INJECTION, SOLUTION INTRAMUSCULAR; INTRAVENOUS; SUBCUTANEOUS ONCE
Status: COMPLETED | OUTPATIENT
Start: 2025-08-07 | End: 2025-08-07

## 2025-08-07 RX ORDER — BUDESONIDE 0.5 MG/2ML
1 INHALANT ORAL
Status: DISCONTINUED | OUTPATIENT
Start: 2025-08-07 | End: 2025-08-08

## 2025-08-07 RX ORDER — ARFORMOTEROL TARTRATE 15 UG/2ML
15 SOLUTION RESPIRATORY (INHALATION)
Status: DISCONTINUED | OUTPATIENT
Start: 2025-08-07 | End: 2025-08-08

## 2025-08-07 RX ADMIN — SODIUM CHLORIDE, PRESERVATIVE FREE 10 ML: 5 INJECTION INTRAVENOUS at 21:42

## 2025-08-07 RX ADMIN — GABAPENTIN 100 MG: 100 CAPSULE ORAL at 21:36

## 2025-08-07 RX ADMIN — FLUTICASONE PROPIONATE 2 SPRAY: 50 SPRAY, METERED NASAL at 09:14

## 2025-08-07 RX ADMIN — ARFORMOTEROL TARTRATE 15 MCG: 15 SOLUTION RESPIRATORY (INHALATION) at 08:18

## 2025-08-07 RX ADMIN — ATORVASTATIN CALCIUM 10 MG: 10 TABLET, FILM COATED ORAL at 09:04

## 2025-08-07 RX ADMIN — PANTOPRAZOLE SODIUM 40 MG: 40 TABLET, DELAYED RELEASE ORAL at 09:04

## 2025-08-07 RX ADMIN — ACETAMINOPHEN 650 MG: 325 TABLET ORAL at 06:27

## 2025-08-07 RX ADMIN — SODIUM CHLORIDE, PRESERVATIVE FREE 10 ML: 5 INJECTION INTRAVENOUS at 09:05

## 2025-08-07 RX ADMIN — BUDESONIDE 1000 MCG: 0.5 INHALANT RESPIRATORY (INHALATION) at 19:45

## 2025-08-07 RX ADMIN — HYDROMORPHONE HYDROCHLORIDE 1 MG: 1 INJECTION, SOLUTION INTRAMUSCULAR; INTRAVENOUS; SUBCUTANEOUS at 15:42

## 2025-08-07 RX ADMIN — IPRATROPIUM BROMIDE 0.5 MG: 0.5 SOLUTION RESPIRATORY (INHALATION) at 13:56

## 2025-08-07 RX ADMIN — MONTELUKAST 10 MG: 10 TABLET, FILM COATED ORAL at 09:03

## 2025-08-07 RX ADMIN — HYDROCODONE BITARTRATE AND ACETAMINOPHEN 1 TABLET: 5; 325 TABLET ORAL at 23:39

## 2025-08-07 RX ADMIN — BUSPIRONE HYDROCHLORIDE 15 MG: 5 TABLET ORAL at 21:37

## 2025-08-07 RX ADMIN — MICONAZOLE NITRATE: 2 POWDER TOPICAL at 09:17

## 2025-08-07 RX ADMIN — HYDROCODONE BITARTRATE AND ACETAMINOPHEN 1 TABLET: 5; 325 TABLET ORAL at 19:36

## 2025-08-07 RX ADMIN — ACETAMINOPHEN 650 MG: 325 TABLET ORAL at 12:09

## 2025-08-07 RX ADMIN — INSULIN LISPRO 4 UNITS: 100 INJECTION, SOLUTION INTRAVENOUS; SUBCUTANEOUS at 21:35

## 2025-08-07 RX ADMIN — BUSPIRONE HYDROCHLORIDE 15 MG: 5 TABLET ORAL at 09:02

## 2025-08-07 RX ADMIN — BUSPIRONE HYDROCHLORIDE 15 MG: 5 TABLET ORAL at 14:46

## 2025-08-07 RX ADMIN — HYDROCODONE BITARTRATE AND ACETAMINOPHEN 1 TABLET: 5; 325 TABLET ORAL at 04:23

## 2025-08-07 RX ADMIN — ARFORMOTEROL TARTRATE 15 MCG: 15 SOLUTION RESPIRATORY (INHALATION) at 19:45

## 2025-08-07 RX ADMIN — INSULIN LISPRO 2 UNITS: 100 INJECTION, SOLUTION INTRAVENOUS; SUBCUTANEOUS at 18:03

## 2025-08-07 RX ADMIN — QUETIAPINE FUMARATE 600 MG: 100 TABLET ORAL at 21:37

## 2025-08-07 RX ADMIN — METOPROLOL SUCCINATE 25 MG: 25 TABLET, EXTENDED RELEASE ORAL at 09:03

## 2025-08-07 RX ADMIN — MICONAZOLE NITRATE: 2 POWDER TOPICAL at 21:43

## 2025-08-07 RX ADMIN — VALSARTAN 80 MG: 80 TABLET, FILM COATED ORAL at 09:04

## 2025-08-07 RX ADMIN — IPRATROPIUM BROMIDE 0.5 MG: 0.5 SOLUTION RESPIRATORY (INHALATION) at 19:40

## 2025-08-07 RX ADMIN — GABAPENTIN 100 MG: 100 CAPSULE ORAL at 14:46

## 2025-08-07 RX ADMIN — BUPROPION HYDROCHLORIDE 300 MG: 150 TABLET, EXTENDED RELEASE ORAL at 09:02

## 2025-08-07 RX ADMIN — IPRATROPIUM BROMIDE 0.5 MG: 0.5 SOLUTION RESPIRATORY (INHALATION) at 08:12

## 2025-08-07 RX ADMIN — ASPIRIN 81 MG: 81 TABLET, CHEWABLE ORAL at 09:05

## 2025-08-07 RX ADMIN — GABAPENTIN 100 MG: 100 CAPSULE ORAL at 09:05

## 2025-08-07 RX ADMIN — HYDROCODONE BITARTRATE AND ACETAMINOPHEN 1 TABLET: 5; 325 TABLET ORAL at 09:03

## 2025-08-07 RX ADMIN — ENOXAPARIN SODIUM 40 MG: 100 INJECTION SUBCUTANEOUS at 21:36

## 2025-08-07 RX ADMIN — TOPIRAMATE 200 MG: 100 TABLET, FILM COATED ORAL at 09:03

## 2025-08-07 RX ADMIN — BUDESONIDE 1000 MCG: 0.5 INHALANT RESPIRATORY (INHALATION) at 08:18

## 2025-08-07 RX ADMIN — PRAZOSIN HYDROCHLORIDE 4 MG: 1 CAPSULE ORAL at 21:37

## 2025-08-07 RX ADMIN — ENOXAPARIN SODIUM 40 MG: 100 INJECTION SUBCUTANEOUS at 09:05

## 2025-08-07 RX ADMIN — LEVOTHYROXINE SODIUM 112 MCG: 0.11 TABLET ORAL at 06:27

## 2025-08-07 ASSESSMENT — PAIN DESCRIPTION - PAIN TYPE
TYPE: CHRONIC PAIN

## 2025-08-07 ASSESSMENT — PAIN SCALES - GENERAL
PAINLEVEL_OUTOF10: 5
PAINLEVEL_OUTOF10: 3
PAINLEVEL_OUTOF10: 10
PAINLEVEL_OUTOF10: 8
PAINLEVEL_OUTOF10: 9
PAINLEVEL_OUTOF10: 5
PAINLEVEL_OUTOF10: 10
PAINLEVEL_OUTOF10: 6
PAINLEVEL_OUTOF10: 7

## 2025-08-07 ASSESSMENT — PAIN DESCRIPTION - LOCATION
LOCATION: BACK
LOCATION: BACK;LEG
LOCATION: HEAD
LOCATION: BACK;LEG
LOCATION: BACK
LOCATION: LEG
LOCATION: LEG

## 2025-08-07 ASSESSMENT — PAIN DESCRIPTION - FREQUENCY: FREQUENCY: CONTINUOUS

## 2025-08-07 ASSESSMENT — PAIN DESCRIPTION - DESCRIPTORS
DESCRIPTORS: SHOOTING
DESCRIPTORS: ACHING;CRAMPING
DESCRIPTORS: SHOOTING
DESCRIPTORS: ACHING
DESCRIPTORS: ACHING;THROBBING
DESCRIPTORS: ACHING;CRAMPING
DESCRIPTORS: SHARP

## 2025-08-07 ASSESSMENT — PAIN - FUNCTIONAL ASSESSMENT
PAIN_FUNCTIONAL_ASSESSMENT: PREVENTS OR INTERFERES SOME ACTIVE ACTIVITIES AND ADLS
PAIN_FUNCTIONAL_ASSESSMENT: ACTIVITIES ARE NOT PREVENTED

## 2025-08-07 ASSESSMENT — PAIN DESCRIPTION - ORIENTATION
ORIENTATION: RIGHT
ORIENTATION: LOWER
ORIENTATION: RIGHT;LEFT;LOWER
ORIENTATION: RIGHT;LEFT
ORIENTATION: RIGHT

## 2025-08-07 ASSESSMENT — PAIN DESCRIPTION - ONSET: ONSET: ON-GOING

## 2025-08-08 VITALS
BODY MASS INDEX: 67.86 KG/M2 | DIASTOLIC BLOOD PRESSURE: 64 MMHG | HEART RATE: 78 BPM | OXYGEN SATURATION: 99 % | RESPIRATION RATE: 16 BRPM | WEIGHT: 293 LBS | TEMPERATURE: 97.7 F | SYSTOLIC BLOOD PRESSURE: 117 MMHG

## 2025-08-08 LAB
ALBUMIN SERPL-MCNC: 2.7 G/DL (ref 3.5–5.2)
ALBUMIN/GLOB SERPL: 0.9 (ref 1.1–2.2)
ALP SERPL-CCNC: 54 U/L (ref 35–104)
ALT SERPL-CCNC: 16 U/L (ref 10–35)
ANION GAP SERPL CALC-SCNC: 9 MMOL/L (ref 2–14)
AST SERPL-CCNC: 21 U/L (ref 10–35)
BASOPHILS # BLD: 0.05 K/UL (ref 0–0.1)
BASOPHILS NFR BLD: 0.9 % (ref 0–1)
BILIRUB SERPL-MCNC: <0.2 MG/DL (ref 0–1.2)
BUN SERPL-MCNC: 21 MG/DL (ref 8–23)
BUN/CREAT SERPL: 29 (ref 12–20)
CALCIUM SERPL-MCNC: 9.5 MG/DL (ref 8.8–10.2)
CHLORIDE SERPL-SCNC: 106 MMOL/L (ref 98–107)
CO2 SERPL-SCNC: 22 MMOL/L (ref 20–29)
CREAT SERPL-MCNC: 0.73 MG/DL (ref 0.6–1)
DIFFERENTIAL METHOD BLD: ABNORMAL
EOSINOPHIL # BLD: 0.23 K/UL (ref 0–0.4)
EOSINOPHIL NFR BLD: 3.9 % (ref 0–7)
ERYTHROCYTE [DISTWIDTH] IN BLOOD BY AUTOMATED COUNT: 14.3 % (ref 11.5–14.5)
GLOBULIN SER CALC-MCNC: 2.9 G/DL (ref 2–4)
GLUCOSE BLD STRIP.AUTO-MCNC: 154 MG/DL (ref 65–117)
GLUCOSE BLD STRIP.AUTO-MCNC: 157 MG/DL (ref 65–117)
GLUCOSE SERPL-MCNC: 157 MG/DL (ref 65–100)
HCT VFR BLD AUTO: 34.3 % (ref 35–47)
HGB BLD-MCNC: 10.2 G/DL (ref 11.5–16)
IMM GRANULOCYTES # BLD AUTO: 0.03 K/UL (ref 0–0.04)
IMM GRANULOCYTES NFR BLD AUTO: 0.5 % (ref 0–0.5)
LYMPHOCYTES # BLD: 1.45 K/UL (ref 0.8–3.5)
LYMPHOCYTES NFR BLD: 24.8 % (ref 12–49)
MCH RBC QN AUTO: 25.9 PG (ref 26–34)
MCHC RBC AUTO-ENTMCNC: 29.7 G/DL (ref 30–36.5)
MCV RBC AUTO: 87.1 FL (ref 80–99)
MONOCYTES # BLD: 0.49 K/UL (ref 0–1)
MONOCYTES NFR BLD: 8.4 % (ref 5–13)
NEUTS SEG # BLD: 3.59 K/UL (ref 1.8–8)
NEUTS SEG NFR BLD: 61.5 % (ref 32–75)
NRBC # BLD: 0 K/UL (ref 0–0.01)
NRBC BLD-RTO: 0 PER 100 WBC
PLATELET # BLD AUTO: 165 K/UL (ref 150–400)
PMV BLD AUTO: 10.6 FL (ref 8.9–12.9)
POTASSIUM SERPL-SCNC: 3.7 MMOL/L (ref 3.5–5.1)
PROT SERPL-MCNC: 5.6 G/DL (ref 6.4–8.3)
RBC # BLD AUTO: 3.94 M/UL (ref 3.8–5.2)
SERVICE CMNT-IMP: ABNORMAL
SERVICE CMNT-IMP: ABNORMAL
SODIUM SERPL-SCNC: 136 MMOL/L (ref 136–145)
WBC # BLD AUTO: 5.8 K/UL (ref 3.6–11)

## 2025-08-08 PROCEDURE — 36415 COLL VENOUS BLD VENIPUNCTURE: CPT

## 2025-08-08 PROCEDURE — 2700000000 HC OXYGEN THERAPY PER DAY

## 2025-08-08 PROCEDURE — 85025 COMPLETE CBC W/AUTO DIFF WBC: CPT

## 2025-08-08 PROCEDURE — 94760 N-INVAS EAR/PLS OXIMETRY 1: CPT

## 2025-08-08 PROCEDURE — 6370000000 HC RX 637 (ALT 250 FOR IP): Performed by: STUDENT IN AN ORGANIZED HEALTH CARE EDUCATION/TRAINING PROGRAM

## 2025-08-08 PROCEDURE — 94761 N-INVAS EAR/PLS OXIMETRY MLT: CPT

## 2025-08-08 PROCEDURE — 2500000003 HC RX 250 WO HCPCS: Performed by: STUDENT IN AN ORGANIZED HEALTH CARE EDUCATION/TRAINING PROGRAM

## 2025-08-08 PROCEDURE — 82962 GLUCOSE BLOOD TEST: CPT

## 2025-08-08 PROCEDURE — 6370000000 HC RX 637 (ALT 250 FOR IP): Performed by: NURSE PRACTITIONER

## 2025-08-08 PROCEDURE — 6360000002 HC RX W HCPCS: Performed by: STUDENT IN AN ORGANIZED HEALTH CARE EDUCATION/TRAINING PROGRAM

## 2025-08-08 PROCEDURE — 80053 COMPREHEN METABOLIC PANEL: CPT

## 2025-08-08 PROCEDURE — 94640 AIRWAY INHALATION TREATMENT: CPT

## 2025-08-08 RX ORDER — ONDANSETRON 2 MG/ML
4 INJECTION INTRAMUSCULAR; INTRAVENOUS ONCE
Status: COMPLETED | OUTPATIENT
Start: 2025-08-08 | End: 2025-08-08

## 2025-08-08 RX ORDER — FAMOTIDINE 40 MG/1
40 TABLET, FILM COATED ORAL 2 TIMES DAILY
Qty: 28 TABLET | Refills: 0 | Status: SHIPPED | OUTPATIENT
Start: 2025-08-08 | End: 2025-08-22

## 2025-08-08 RX ORDER — GABAPENTIN 100 MG/1
100 CAPSULE ORAL ONCE
Status: COMPLETED | OUTPATIENT
Start: 2025-08-08 | End: 2025-08-08

## 2025-08-08 RX ORDER — ARFORMOTEROL TARTRATE 15 UG/2ML
15 SOLUTION RESPIRATORY (INHALATION)
Status: DISCONTINUED | OUTPATIENT
Start: 2025-08-08 | End: 2025-08-08 | Stop reason: HOSPADM

## 2025-08-08 RX ORDER — BUDESONIDE 0.5 MG/2ML
1 INHALANT ORAL
Status: DISCONTINUED | OUTPATIENT
Start: 2025-08-08 | End: 2025-08-08 | Stop reason: HOSPADM

## 2025-08-08 RX ORDER — OMEPRAZOLE 40 MG/1
40 CAPSULE, DELAYED RELEASE ORAL
Qty: 28 CAPSULE | Refills: 0 | Status: SHIPPED | OUTPATIENT
Start: 2025-08-08 | End: 2025-08-22

## 2025-08-08 RX ORDER — CELECOXIB 100 MG/1
100 CAPSULE ORAL 2 TIMES DAILY
Qty: 10 CAPSULE | Refills: 0 | Status: SHIPPED | OUTPATIENT
Start: 2025-08-08 | End: 2025-08-13

## 2025-08-08 RX ORDER — MORPHINE SULFATE 2 MG/ML
1 INJECTION, SOLUTION INTRAMUSCULAR; INTRAVENOUS ONCE
Status: COMPLETED | OUTPATIENT
Start: 2025-08-08 | End: 2025-08-08

## 2025-08-08 RX ADMIN — ENOXAPARIN SODIUM 40 MG: 100 INJECTION SUBCUTANEOUS at 08:27

## 2025-08-08 RX ADMIN — PANTOPRAZOLE SODIUM 40 MG: 40 TABLET, DELAYED RELEASE ORAL at 05:15

## 2025-08-08 RX ADMIN — ASPIRIN 81 MG: 81 TABLET, CHEWABLE ORAL at 08:26

## 2025-08-08 RX ADMIN — MONTELUKAST 10 MG: 10 TABLET, FILM COATED ORAL at 08:26

## 2025-08-08 RX ADMIN — GABAPENTIN 100 MG: 100 CAPSULE ORAL at 10:15

## 2025-08-08 RX ADMIN — METOPROLOL SUCCINATE 25 MG: 25 TABLET, EXTENDED RELEASE ORAL at 08:26

## 2025-08-08 RX ADMIN — HYDROCODONE BITARTRATE AND ACETAMINOPHEN 1 TABLET: 5; 325 TABLET ORAL at 12:05

## 2025-08-08 RX ADMIN — BUPROPION HYDROCHLORIDE 300 MG: 150 TABLET, EXTENDED RELEASE ORAL at 08:26

## 2025-08-08 RX ADMIN — ATORVASTATIN CALCIUM 10 MG: 10 TABLET, FILM COATED ORAL at 08:27

## 2025-08-08 RX ADMIN — GABAPENTIN 100 MG: 100 CAPSULE ORAL at 14:47

## 2025-08-08 RX ADMIN — IPRATROPIUM BROMIDE 0.5 MG: 0.5 SOLUTION RESPIRATORY (INHALATION) at 07:49

## 2025-08-08 RX ADMIN — SUMATRIPTAN SUCCINATE 50 MG: 25 TABLET ORAL at 10:20

## 2025-08-08 RX ADMIN — LEVOTHYROXINE SODIUM 112 MCG: 0.11 TABLET ORAL at 05:15

## 2025-08-08 RX ADMIN — MICONAZOLE NITRATE: 2 POWDER TOPICAL at 08:28

## 2025-08-08 RX ADMIN — SODIUM CHLORIDE, PRESERVATIVE FREE 10 ML: 5 INJECTION INTRAVENOUS at 08:27

## 2025-08-08 RX ADMIN — HYDROCODONE BITARTRATE AND ACETAMINOPHEN 1 TABLET: 5; 325 TABLET ORAL at 05:15

## 2025-08-08 RX ADMIN — FLUTICASONE PROPIONATE 2 SPRAY: 50 SPRAY, METERED NASAL at 08:28

## 2025-08-08 RX ADMIN — HYDROCODONE BITARTRATE AND ACETAMINOPHEN 1 TABLET: 5; 325 TABLET ORAL at 08:25

## 2025-08-08 RX ADMIN — ONDANSETRON 4 MG: 2 INJECTION, SOLUTION INTRAMUSCULAR; INTRAVENOUS at 10:16

## 2025-08-08 RX ADMIN — GABAPENTIN 100 MG: 100 CAPSULE ORAL at 08:26

## 2025-08-08 RX ADMIN — ARFORMOTEROL TARTRATE 15 MCG: 15 SOLUTION RESPIRATORY (INHALATION) at 07:49

## 2025-08-08 RX ADMIN — HYDROCODONE BITARTRATE AND ACETAMINOPHEN 1 TABLET: 5; 325 TABLET ORAL at 16:34

## 2025-08-08 RX ADMIN — BUDESONIDE 1000 MCG: 0.5 INHALANT RESPIRATORY (INHALATION) at 07:49

## 2025-08-08 RX ADMIN — TOPIRAMATE 200 MG: 100 TABLET, FILM COATED ORAL at 08:26

## 2025-08-08 RX ADMIN — BUSPIRONE HYDROCHLORIDE 15 MG: 5 TABLET ORAL at 14:48

## 2025-08-08 RX ADMIN — MORPHINE SULFATE 1 MG: 2 INJECTION, SOLUTION INTRAMUSCULAR; INTRAVENOUS at 10:16

## 2025-08-08 RX ADMIN — BUSPIRONE HYDROCHLORIDE 15 MG: 5 TABLET ORAL at 08:26

## 2025-08-08 RX ADMIN — VALSARTAN 80 MG: 80 TABLET, FILM COATED ORAL at 08:26

## 2025-08-08 ASSESSMENT — PAIN DESCRIPTION - DESCRIPTORS
DESCRIPTORS: ACHING
DESCRIPTORS: SHARP
DESCRIPTORS: SHARP
DESCRIPTORS: ACHING
DESCRIPTORS: SHARP
DESCRIPTORS: SHARP
DESCRIPTORS: ACHING
DESCRIPTORS: SHARP
DESCRIPTORS: SHARP

## 2025-08-08 ASSESSMENT — PAIN DESCRIPTION - PAIN TYPE
TYPE: CHRONIC PAIN

## 2025-08-08 ASSESSMENT — PAIN DESCRIPTION - ORIENTATION
ORIENTATION: RIGHT;LEFT;LOWER
ORIENTATION: RIGHT;LEFT
ORIENTATION: ANTERIOR
ORIENTATION: ANTERIOR
ORIENTATION: RIGHT;LEFT
ORIENTATION: RIGHT;LEFT

## 2025-08-08 ASSESSMENT — PAIN - FUNCTIONAL ASSESSMENT
PAIN_FUNCTIONAL_ASSESSMENT: ACTIVITIES ARE NOT PREVENTED

## 2025-08-08 ASSESSMENT — PAIN SCALES - GENERAL
PAINLEVEL_OUTOF10: 8
PAINLEVEL_OUTOF10: 8
PAINLEVEL_OUTOF10: 6
PAINLEVEL_OUTOF10: 9
PAINLEVEL_OUTOF10: 6
PAINLEVEL_OUTOF10: 5
PAINLEVEL_OUTOF10: 6
PAINLEVEL_OUTOF10: 7
PAINLEVEL_OUTOF10: 6
PAINLEVEL_OUTOF10: 10
PAINLEVEL_OUTOF10: 6

## 2025-08-08 ASSESSMENT — PAIN DESCRIPTION - ONSET
ONSET: ON-GOING

## 2025-08-08 ASSESSMENT — PAIN DESCRIPTION - LOCATION
LOCATION: HEAD
LOCATION: LEG
LOCATION: LEG
LOCATION: BACK;LEG
LOCATION: LEG
LOCATION: HEAD
LOCATION: LEG

## 2025-08-08 ASSESSMENT — PAIN DESCRIPTION - FREQUENCY
FREQUENCY: CONTINUOUS

## 2025-08-11 ENCOUNTER — HOSPITAL ENCOUNTER (EMERGENCY)
Facility: HOSPITAL | Age: 65
Discharge: HOME | End: 2025-08-11
Attending: EMERGENCY MEDICINE
Payer: MEDICARE

## 2025-08-11 VITALS
TEMPERATURE: 97.7 F | OXYGEN SATURATION: 99 % | HEART RATE: 97 BPM | RESPIRATION RATE: 18 BRPM | SYSTOLIC BLOOD PRESSURE: 136 MMHG | DIASTOLIC BLOOD PRESSURE: 64 MMHG

## 2025-08-11 VITALS
SYSTOLIC BLOOD PRESSURE: 146 MMHG | HEART RATE: 104 BPM | RESPIRATION RATE: 16 BRPM | WEIGHT: 293 LBS | BODY MASS INDEX: 53.92 KG/M2 | DIASTOLIC BLOOD PRESSURE: 94 MMHG | OXYGEN SATURATION: 99 % | HEIGHT: 62 IN | TEMPERATURE: 99.2 F

## 2025-08-11 DIAGNOSIS — R04.0 EPISTAXIS: Primary | ICD-10-CM

## 2025-08-11 DIAGNOSIS — N30.01 ACUTE CYSTITIS WITH HEMATURIA: ICD-10-CM

## 2025-08-11 LAB
APPEARANCE UR: CLEAR
BACTERIA URNS QL MICRO: NEGATIVE /HPF
BILIRUB UR QL: NEGATIVE
COLOR UR: ABNORMAL
EPITH CASTS URNS QL MICRO: ABNORMAL /LPF
GLUCOSE UR STRIP.AUTO-MCNC: NEGATIVE MG/DL
HGB UR QL STRIP: ABNORMAL
KETONES UR QL STRIP.AUTO: NEGATIVE MG/DL
LEUKOCYTE ESTERASE UR QL STRIP.AUTO: ABNORMAL
NITRITE UR QL STRIP.AUTO: NEGATIVE
PH UR STRIP: 8 (ref 5–8)
PROT UR STRIP-MCNC: NEGATIVE MG/DL
RBC #/AREA URNS HPF: ABNORMAL /HPF (ref 0–5)
SP GR UR REFRACTOMETRY: 1.01
URINE CULTURE IF INDICATED: ABNORMAL
UROBILINOGEN UR QL STRIP.AUTO: 0.2 EU/DL (ref 0.2–1)
WBC URNS QL MICRO: ABNORMAL /HPF (ref 0–4)

## 2025-08-11 PROCEDURE — 81001 URINALYSIS AUTO W/SCOPE: CPT

## 2025-08-11 PROCEDURE — 87088 URINE BACTERIA CULTURE: CPT

## 2025-08-11 PROCEDURE — 6370000000 HC RX 637 (ALT 250 FOR IP)

## 2025-08-11 PROCEDURE — 99283 EMERGENCY DEPT VISIT LOW MDM: CPT

## 2025-08-11 PROCEDURE — 87086 URINE CULTURE/COLONY COUNT: CPT

## 2025-08-11 PROCEDURE — 87186 SC STD MICRODIL/AGAR DIL: CPT

## 2025-08-11 RX ORDER — CLONAZEPAM 0.5 MG/1
0.25 TABLET ORAL DAILY PRN
COMMUNITY

## 2025-08-11 RX ORDER — ONDANSETRON 4 MG/1
4 TABLET, ORALLY DISINTEGRATING ORAL EVERY 8 HOURS PRN
COMMUNITY

## 2025-08-11 RX ORDER — HYDROXYZINE HYDROCHLORIDE 10 MG/1
10 TABLET, FILM COATED ORAL 2 TIMES DAILY PRN
COMMUNITY

## 2025-08-11 RX ORDER — FLUCONAZOLE 150 MG/1
150 TABLET ORAL
COMMUNITY

## 2025-08-11 RX ORDER — CEPHALEXIN 500 MG/1
500 CAPSULE ORAL 2 TIMES DAILY
Qty: 14 CAPSULE | Refills: 0 | Status: SHIPPED | OUTPATIENT
Start: 2025-08-11 | End: 2025-08-18

## 2025-08-11 RX ORDER — HYDROXYZINE HYDROCHLORIDE 25 MG/1
25 TABLET, FILM COATED ORAL ONCE
Status: COMPLETED | OUTPATIENT
Start: 2025-08-11 | End: 2025-08-11

## 2025-08-11 RX ORDER — OXYMETAZOLINE HYDROCHLORIDE 0.05 G/100ML
3 SPRAY NASAL
Status: COMPLETED | OUTPATIENT
Start: 2025-08-11 | End: 2025-08-11

## 2025-08-11 RX ORDER — OXYCODONE AND ACETAMINOPHEN 5; 325 MG/1; MG/1
1 TABLET ORAL
Refills: 0 | Status: COMPLETED | OUTPATIENT
Start: 2025-08-11 | End: 2025-08-11

## 2025-08-11 RX ADMIN — HYDROXYZINE HYDROCHLORIDE 25 MG: 25 TABLET ORAL at 12:15

## 2025-08-11 RX ADMIN — OXYMETAZOLINE HYDROCHLORIDE 3 SPRAY: 0.5 SPRAY NASAL at 11:31

## 2025-08-11 RX ADMIN — OXYCODONE AND ACETAMINOPHEN 1 TABLET: 5; 325 TABLET ORAL at 12:15

## 2025-08-11 ASSESSMENT — PAIN DESCRIPTION - DESCRIPTORS: DESCRIPTORS: THROBBING

## 2025-08-11 ASSESSMENT — PAIN SCALES - GENERAL
PAINLEVEL_OUTOF10: 10
PAINLEVEL_OUTOF10: 9
PAINLEVEL_OUTOF10: 0

## 2025-08-11 ASSESSMENT — PAIN DESCRIPTION - LOCATION: LOCATION: HEAD

## 2025-08-11 ASSESSMENT — PAIN DESCRIPTION - FREQUENCY: FREQUENCY: CONTINUOUS

## 2025-08-11 ASSESSMENT — PAIN DESCRIPTION - ONSET: ONSET: ON-GOING

## 2025-08-11 ASSESSMENT — PAIN - FUNCTIONAL ASSESSMENT: PAIN_FUNCTIONAL_ASSESSMENT: 0-10

## 2025-08-13 LAB
BACTERIA SPEC CULT: ABNORMAL
CC UR VC: ABNORMAL
SERVICE CMNT-IMP: ABNORMAL

## (undated) DEVICE — TUBING IRRIG L77IN DIA0.241IN L BOR FOR CYSTO W/ NVENT

## (undated) DEVICE — Z DISCONTINUEDSOLUTION PREP 2OZ 10% POVIDONE IOD SCR CAP BTL

## (undated) DEVICE — SKIN MARKER,REGULAR TIP WITH RULER AND LABELS: Brand: DEVON

## (undated) DEVICE — GDWIRE UROL STR 150CM FLX TP -- BX/5 SENSOR

## (undated) DEVICE — TOWEL SURG W17XL27IN STD BLU COT NONFENESTRATED PREWASHED

## (undated) DEVICE — GOWN,PLEAT,SPECIALTY,XL,STRL: Brand: MEDLINE

## (undated) DEVICE — SOLUTION IRRIGATION H2O 0797305] ICU MEDICAL INC]

## (undated) DEVICE — GAUZE SPONGES,12 PLY: Brand: CURITY

## (undated) DEVICE — CONTAINER,SPECIMEN,4OZ,OR STRL: Brand: MEDLINE

## (undated) DEVICE — CYSTOSCOPY PACK: Brand: CONVERTORS

## (undated) DEVICE — BAG COLLECTION FLD OR-TBL NS --

## (undated) DEVICE — SYR 10ML LUER LOK 1/5ML GRAD --

## (undated) DEVICE — HANDLE LT SNAP ON ULT DURABLE LENS FOR TRUMPF ALC DISPOSABLE

## (undated) DEVICE — SNAP KOVER: Brand: UNBRANDED

## (undated) DEVICE — OPEN-END URETERAL CATHETER: Brand: COOK

## (undated) DEVICE — DEVON™ KNEE AND BODY STRAP 60" X 3" (1.5 M X 7.6 CM): Brand: DEVON

## (undated) DEVICE — SOLUTION IRRIG 1000ML STRL H2O USP PLAS POUR BTL

## (undated) DEVICE — SOLUTION IRRIG 3000ML 0.9% SOD CHL FLX CONT 0797208] ICU MEDICAL INC]

## (undated) DEVICE — STERILE LATEX POWDER-FREE SURGICAL GLOVESWITH NITRILE COATING: Brand: PROTEXIS

## (undated) DEVICE — 4-PORT MANIFOLD: Brand: NEPTUNE 2

## (undated) DEVICE — TUBING, SUCTION, 1/4" X 12', STRAIGHT: Brand: MEDLINE

## (undated) DEVICE — PACK,CYSTOSCOPY,PK III,SIRUS: Brand: MEDLINE

## (undated) DEVICE — GLOVE ORANGE PI 8   MSG9080

## (undated) DEVICE — SOLUTION IRRIG 1000ML H2O STRL BLT

## (undated) DEVICE — JELLY,LUBE,STERILE,FLIP TOP,TUBE,4-OZ: Brand: MEDLINE

## (undated) DEVICE — URETERAL ACCESS SHEATH SET: Brand: NAVIGATOR HD

## (undated) DEVICE — INFECTION CONTROL KIT SYS

## (undated) DEVICE — GOWN,SIRUS,FABRNF,XL,20/CS: Brand: MEDLINE

## (undated) DEVICE — GOWN,SIRUS,NONRNF,XLN/2XL,18/CS: Brand: MEDLINE

## (undated) DEVICE — MEDI-VAC NON-CONDUCTIVE SUCTION TUBING: Brand: CARDINAL HEALTH

## (undated) DEVICE — 3000CC GUARDIAN II: Brand: GUARDIAN

## (undated) DEVICE — GUIDEWIRE ENDOSCP L150CM DIA0.038IN TIP L3CM STD NIT POLYUR